# Patient Record
Sex: FEMALE | Race: ASIAN | NOT HISPANIC OR LATINO | Employment: FULL TIME | URBAN - METROPOLITAN AREA
[De-identification: names, ages, dates, MRNs, and addresses within clinical notes are randomized per-mention and may not be internally consistent; named-entity substitution may affect disease eponyms.]

---

## 2017-02-08 DIAGNOSIS — A60.00 RECURRENT GENITAL HERPES: ICD-10-CM

## 2017-02-08 DIAGNOSIS — K21.9 GASTROESOPHAGEAL REFLUX DISEASE WITHOUT ESOPHAGITIS: ICD-10-CM

## 2017-02-08 DIAGNOSIS — F32.1 MAJOR DEPRESSIVE DISORDER, SINGLE EPISODE, MODERATE (H): Primary | ICD-10-CM

## 2017-02-08 NOTE — Clinical Note
[facility]  [address] [place] [address]  [phone]      February 10, 2017      Fredy Jeanstacy Parker  [address] [facility] [address]        Dear Fredy,          Our records indicates that it is time for you to be seen for an office visit with IVY Mejía.    Please call our office at [phone] to schedule an appointment for a Physical .     Please disregard this notice if you have already made an appointment.      Sincerely,    [facility] Staff

## 2017-02-08 NOTE — TELEPHONE ENCOUNTER
FLUoxetine (PROZAC) 20 MG capsule  Last Written Prescription Date: 7-  Last Fill Quantity: 30, # refills: 4  Last Office Visit with [facility] primary care provider:  8-        Last PHQ-9 score on record=   PHQ-9 SCORE 5/23/2016   Total Score -   Total Score 2   Total Score -      valACYclovir (VALTREX) 500 MG tablet  Last Written Prescription Date: 12-3-2015  Last Fill Quantity: 90, # refills: 3  Last Office Visit with [facility], [facility] or [facility] prescribing provider: 8-        CREATININE   Date Value Ref Range Status   05/15/2016 0.58 0.52 - 1.04 mg/dL Final            omeprazole (PRILOSEC) 40 MG capsule  Last Written Prescription Date: 12-3-2015  Last Fill Quantity: 180,  # refills: 3   Last Office Visit with [facility], [facility] or [facility] prescribing provider: 8-

## 2017-02-09 RX ORDER — VALACYCLOVIR HYDROCHLORIDE 500 MG/1
500 TABLET, FILM COATED ORAL DAILY
Qty: 90 TABLET | Refills: 0 | Status: SHIPPED
Start: 2017-02-09 | End: 2017-05-11

## 2017-02-09 RX ORDER — OMEPRAZOLE 40 MG/1
40 CAPSULE, DELAYED RELEASE ORAL 2 TIMES DAILY
Qty: 180 CAPSULE | Refills: 2 | Status: SHIPPED
Start: 2017-02-09 | End: 2017-10-26

## 2017-02-09 NOTE — TELEPHONE ENCOUNTER
PHQ-9 SCORE 12/3/2015 5/23/2016 2/9/2017   Total Score - - -   Total Score 2 2 1   Total Score - - -     patient takes her medications daily and feels so much better on them.        Noelle Reardon RN  [facility]  [phone]

## 2017-02-09 NOTE — TELEPHONE ENCOUNTER
Needs updated phq9  Left message on answering machine for patient to call back.    Valtrex and omeprazole filled.      Noelle Reardon RN  [facility]  [phone]

## 2017-02-09 NOTE — TELEPHONE ENCOUNTER
I sent the Rx to the pharmacy.    Needs PHQ 9 . Overdue for physical exam. Please help her make appt

## 2017-02-10 ASSESSMENT — PATIENT HEALTH QUESTIONNAIRE - PHQ9: SUM OF ALL RESPONSES TO PHQ QUESTIONS 1-9: 1

## 2017-02-10 NOTE — TELEPHONE ENCOUNTER
PHQ obtained today. Routing to team to inform and assist in scheduling.   Sumi Schilling RN   [facility] - Triage

## 2017-02-14 ENCOUNTER — TELEPHONE (OUTPATIENT)
Dept: FAMILY MEDICINE | Facility: CLINIC | Age: 57
End: 2017-02-14

## 2017-02-20 ENCOUNTER — OFFICE VISIT (OUTPATIENT)
Dept: FAMILY MEDICINE | Facility: CLINIC | Age: 57
End: 2017-02-20
Payer: COMMERCIAL

## 2017-02-20 VITALS
RESPIRATION RATE: 16 BRPM | WEIGHT: 134 LBS | DIASTOLIC BLOOD PRESSURE: 84 MMHG | HEART RATE: 71 BPM | SYSTOLIC BLOOD PRESSURE: 136 MMHG | HEIGHT: 60 IN | BODY MASS INDEX: 26.31 KG/M2 | TEMPERATURE: 97.2 F | OXYGEN SATURATION: 97 %

## 2017-02-20 DIAGNOSIS — M54.2 POSTERIOR NECK PAIN: ICD-10-CM

## 2017-02-20 DIAGNOSIS — F32.1 MAJOR DEPRESSIVE DISORDER, SINGLE EPISODE, MODERATE (H): ICD-10-CM

## 2017-02-20 DIAGNOSIS — K21.9 GASTROESOPHAGEAL REFLUX DISEASE WITHOUT ESOPHAGITIS: ICD-10-CM

## 2017-02-20 DIAGNOSIS — Z23 NEED FOR PROPHYLACTIC VACCINATION WITH TETANUS-DIPHTHERIA (TD): ICD-10-CM

## 2017-02-20 DIAGNOSIS — R87.612 PAPANICOLAOU SMEAR OF CERVIX WITH LOW GRADE SQUAMOUS INTRAEPITHELIAL LESION (LGSIL): ICD-10-CM

## 2017-02-20 DIAGNOSIS — M19.90 INFLAMMATORY ARTHRITIS: ICD-10-CM

## 2017-02-20 DIAGNOSIS — R74.01 ELEVATED TRANSAMINASE LEVEL: ICD-10-CM

## 2017-02-20 DIAGNOSIS — A60.00 RECURRENT GENITAL HERPES: ICD-10-CM

## 2017-02-20 DIAGNOSIS — E78.5 HYPERLIPIDEMIA LDL GOAL <160: ICD-10-CM

## 2017-02-20 DIAGNOSIS — Z23 NEED FOR VACCINATION: ICD-10-CM

## 2017-02-20 DIAGNOSIS — Z00.00 ENCOUNTER FOR ROUTINE ADULT HEALTH EXAMINATION WITHOUT ABNORMAL FINDINGS: Primary | ICD-10-CM

## 2017-02-20 DIAGNOSIS — J30.89 PERENNIAL ALLERGIC RHINITIS, UNSPECIFIED ALLERGIC RHINITIS TRIGGER: ICD-10-CM

## 2017-02-20 DIAGNOSIS — M62.830 BACK MUSCLE SPASM: ICD-10-CM

## 2017-02-20 DIAGNOSIS — Z12.4 SCREENING FOR MALIGNANT NEOPLASM OF CERVIX: ICD-10-CM

## 2017-02-20 PROCEDURE — 87624 HPV HI-RISK TYP POOLED RSLT: CPT | Performed by: NURSE PRACTITIONER

## 2017-02-20 PROCEDURE — 88175 CYTOPATH C/V AUTO FLUID REDO: CPT | Performed by: NURSE PRACTITIONER

## 2017-02-20 PROCEDURE — 88141 CYTOPATH C/V INTERPRET: CPT | Performed by: NURSE PRACTITIONER

## 2017-02-20 PROCEDURE — 99396 PREV VISIT EST AGE 40-64: CPT | Mod: 25 | Performed by: NURSE PRACTITIONER

## 2017-02-20 PROCEDURE — 90471 IMMUNIZATION ADMIN: CPT | Performed by: NURSE PRACTITIONER

## 2017-02-20 PROCEDURE — 90715 TDAP VACCINE 7 YRS/> IM: CPT | Performed by: NURSE PRACTITIONER

## 2017-02-20 RX ORDER — METHOCARBAMOL 750 MG/1
750 TABLET, FILM COATED ORAL 3 TIMES DAILY PRN
Qty: 30 TABLET | Refills: 2 | Status: SHIPPED | OUTPATIENT
Start: 2017-02-20 | End: 2017-10-26

## 2017-02-20 RX ORDER — CYCLOBENZAPRINE HCL 5 MG
TABLET ORAL
Qty: 30 TABLET | Refills: 2 | Status: SHIPPED | OUTPATIENT
Start: 2017-02-20 | End: 2017-10-26

## 2017-02-20 NOTE — NURSING NOTE
Chief Complaint   Patient presents with     Physical       Initial /84 (Cuff Size: Adult Regular)  Pulse 71  Temp 97.2  F (36.2  C) (Tympanic)  Resp 16  Ht 5' (1.524 m)  Wt 134 lb (60.8 kg)  LMP 03/24/2009  SpO2 97%  BMI 26.17 kg/m2 Estimated body mass index is 26.17 kg/(m^2) as calculated from the following:    Height as of this encounter: 5' (1.524 m).    Weight as of this encounter: 134 lb (60.8 kg).  Medication Reconciliation: complete   Bushra Grajeda, CMA

## 2017-02-20 NOTE — MR AVS SNAPSHOT
After Visit Summary   2/20/2017    Fredy Parker    MRN: [id number]           Patient Information     Date Of Birth          1960        Visit Information        Provider Department      2/20/2017 5:00 PM Nancy Saldivar APRN [facility]        Today's Diagnoses     Encounter for routine adult health examination without abnormal findings    -  1    Major depressive disorder, single episode, moderate (H)        Papanicolaou smear of cervix with low grade squamous intraepithelial lesion (LGSIL)        Hyperlipidemia LDL goal <160        Back muscle spasm        Posterior neck pain        Inflammatory arthritis        Elevated transaminase level        Recurrent genital herpes        Gastroesophageal reflux disease without esophagitis        Perennial allergic rhinitis, unspecified allergic rhinitis trigger        Need for vaccination        Need for prophylactic vaccination with tetanus-diphtheria (TD)        Screening for malignant neoplasm of cervix          Care Instructions      Preventive Health Recommendations  Female Ages 50 - 64    Yearly exam: See your health care provider every year in order to  o Review health changes.   o Discuss preventive care.    o Review your medicines if your doctor has prescribed any.      Get a Pap test every three years (unless you have an abnormal result and your provider advises testing more often).    If you get Pap tests with HPV test, you only need to test every 5 years, unless you have an abnormal result.     You do not need a Pap test if your uterus was removed (hysterectomy) and you have not had cancer.    You should be tested each year for STDs (sexually transmitted diseases) if you're at risk.     Have a mammogram every 1 to 2 years.    Have a colonoscopy at age 50, or have a yearly FIT test (stool test). These exams screen for colon cancer.      Have a cholesterol test every 5 years, or more often if advised.    Have a diabetes  test (fasting glucose) every three years. If you are at risk for diabetes, you should have this test more often.     If you are at risk for osteoporosis (brittle bone disease), think about having a bone density scan (DEXA).    Shots: Get a flu shot each year. Get a tetanus shot every 10 years.    Nutrition:     Eat at least 5 servings of fruits and vegetables each day.    Eat whole-grain bread, whole-wheat pasta and brown rice instead of white grains and rice.    Talk to your provider about Calcium and Vitamin D.     Lifestyle    Exercise at least 150 minutes a week (30 minutes a day, 5 days a week). This will help you control your weight and prevent disease.    Limit alcohol to one drink per day.    No smoking.     Wear sunscreen to prevent skin cancer.     See your dentist every six months for an exam and cleaning.    See your eye doctor every 1 to 2 years.          Follow-ups after your visit        Your next 10 appointments already scheduled     Feb 28, 2017  2:30 PM CST   MAMMOGRAM with [facility]   [facility] ([facility] [address] [phone]           Do not wear any body powder, lotions, deodorant or perfume the day of the exam. Bring a list of all medications, especially hormones.  If your last mammogram was not done at [facility], please bring your mammogram films. We will need the name of your MD/PA to send a copy of your report.              Future tests that were ordered for you today     Open Future Orders        Priority Expected Expires Ordered    Lipid Profile with reflex to direct LDL Routine 2/27/2017 3/30/2017 2/21/2017    Comprehensive metabolic panel Routine 2/27/2017 3/30/2017 2/21/2017    *MA Screening Digital Bilateral Routine  2/21/2018 2/21/2017            Who to contact     If you have questions or need follow up information about today's clinic visit or your schedule please contact [facility] directly at [phone].  Normal or non-critical lab and imaging results will be communicated to you by MyChart, letter or phone within 4 business days after the clinic has received the results. If you do not hear from us within 7 days, please contact the clinic through [facility] or phone. If you have a critical or abnormal lab result, we will notify you by phone as soon as possible.  Submit refill requests through [employer] or call your pharmacy and they will forward the refill request to us. Please allow 3 business days for your refill to be completed.          Additional Information About Your Visit        [facility] Information     [employer] lets you send messages to your doctor, view your test results, renew your prescriptions, schedule appointments and more. To sign up, go to [email or web address] . Click on \"Log in\" on the left side of the screen, which will take you to the Welcome page. Then click on \"Sign up Now\" on the right side of the page.     You will be asked to enter the access code listed below, as well as some personal information. Please follow the directions to create your username and password.     Your access code is: [email or web address]  Expires: 2017 12:05 PM     Your access code will  in 90 days. If you need help or a new code, please call your [facility] clinic or [phone].        Care EveryWhere ID     This is your Care EveryWhere ID. This could be used by other organizations to access your [facility] medical records  [id number]        Your Vitals Were     Pulse Temperature Respirations Height Last Period Pulse Oximetry    71 97.2  F (36.2  C) (Tympanic) 16 5' (1.524 m) 2009 97%    BMI (Body Mass Index)                   26.17 kg/m2            Blood Pressure from Last 3 Encounters:   17 136/84   16 110/72   08/10/16 120/80    Weight from Last 3 Encounters:   17 134 lb (60.8 kg)   16 132 lb 9.6 oz (60.1 kg)   08/10/16 134 lb (60.8 kg)              We " Performed the Following     1st  Administration  [17659]     DEPRESSION ACTION PLAN (DAP) Order [[id number]]     HPV High Risk Types DNA Cervical     Pap imaged thin layer diagnostic with HPV (select HPV order below)     TDAP (ADACEL AGES 11-64) [12547.002]          Today's Medication Changes          These changes are accurate as of: 2/20/17 11:59 PM.  If you have any questions, ask your nurse or doctor.               These medicines have changed or have updated prescriptions.        Dose/Directions    methocarbamol 750 MG tablet   Commonly known as:  ROBAXIN   This may have changed:  Another medication with the same name was removed. Continue taking this medication, and follow the directions you see here.   Used for:  Back muscle spasm   Changed by:  Nancy Saldivar APRN CNP        Dose:  750 mg   Take 1 tablet (750 mg) by mouth 3 times daily as needed   Quantity:  30 tablet   Refills:  2         Stop taking these medicines if you haven't already. Please contact your care team if you have questions.     TYLENOL PO   Stopped by:  Nancy Saldivar APRN CNP                Where to get your medicines      These medications were sent to [facility]/pharmacy #[id number] - [place], [place] - [address] [place] [facility] [place] [address]     Phone:  [phone]     cyclobenzaprine 5 MG tablet    methocarbamol 750 MG tablet                Primary Care Provider Office Phone # Fax #    MARIA DEL CARMEN Mejía [phone]       [address]  [place] [address]        Thank you!     Thank you for choosing [facility]  for your care. Our goal is always to provide you with excellent care. Hearing back from our patients is one way we can continue to improve our services. Please take a few minutes to complete the written survey that you may receive in the mail after your visit with us. Thank you!             Your Updated Medication List - Protect others around you: Learn how  to safely use, store and throw away your medicines at www.disposemymeds.org.          This list is accurate as of: 2/20/17 11:59 PM.  Always use your most recent med list.                   Brand Name Dispense Instructions for use    CALTRATE 600+D PLUS 600-400 MG-UNIT Tabs     3 MONTHS    1 TABLET TWICE DAILY       CENTRUM Tabs     30    1 TABLET DAILY       cetirizine 10 MG tablet    ZYRTEC    90 tablet    Take 1 tablet (10 mg) by mouth daily       cyclobenzaprine 5 MG tablet    FLEXERIL    30 tablet    TAKE 1 TABLET BY MOUTH NIGHTLY AS NEEDED FOR MUSCLE SPASMS       FLUoxetine 20 MG capsule    PROzac    90 capsule    Take 1 capsule (20 mg) by mouth daily       methocarbamol 750 MG tablet    ROBAXIN    30 tablet    Take 1 tablet (750 mg) by mouth 3 times daily as needed       omeprazole 40 MG capsule    priLOSEC    180 capsule    Take 1 capsule (40 mg) by mouth 2 times daily Take 30-60 minutes before a meal.       valACYclovir 500 MG tablet    VALTREX    90 tablet    Take 1 tablet (500 mg) by mouth daily       vitamin D 1000 UNITS capsule     180 Cap    2 CAPSULES DAILY

## 2017-02-20 NOTE — LETTER
[facility]  [address] [place] [address]  [phone]      March 14, 2017    Fredy Parker  [address] [facility] [address]    Dear Fredy,    We are contacting you in writing because we have been unable to reach you by phone.      We have recently received your PAP smear results and they were not normal.  Your results came back as low grade squamous intraepithelial lesion (LGSIL) and was positive for a high risk type of HPV (Human Papilloma Virus).  Although this is not cancer, it is clinically very significant, and can possibly develop into cancer in the future.      Because of this, we need to do further testing.  This testing would include sending you to see a doctor who is able to do a more specific test called a colposcopy.  This test allows a closer look at the abnormal cells of the cervix.  During this test, a biopsy of these cells may be taken for further lab testing.  The exam and tests will help determine the reason for your abnormal PAP smear.    Please call [facility] at [phone] to schedule this procedure.   If you have additional questions regarding this result, please call Melissa KELLY at [phone].      Sincerely,    MARIA DEL CARMEN Mejía CNP/Lynette Nissen RN

## 2017-02-20 NOTE — PROGRESS NOTES
SUBJECTIVE:     CC: Fredy Parker is an 56 year old woman who presents for preventive health visit.     Healthy Habits:    Do you get at least three servings of calcium containing foods daily (dairy, green leafy vegetables, etc.)? no, taking calcium and/or vitamin D supplement: yes     Amount of exercise or daily activities, outside of work: 2 day(s) per week    Problems taking medications regularly No    Medication side effects: No    Have you had an eye exam in the past two years? yes    Do you see a dentist twice per year? no    Do you have sleep apnea, excessive snoring or daytime drowsiness?no            Today's PHQ-2 Score:   PHQ-2 ( 1999 [employer]) 6/14/2016 6/6/2016   Q1: Little interest or pleasure in doing things 0 0   Q2: Feeling down, depressed or hopeless 0 0   PHQ-2 Score 0 0       Abuse: Current or Past(Physical, Sexual or Emotional)- No  Do you feel safe in your environment - Yes    Social History   Substance Use Topics     Smoking status: Never Smoker     Smokeless tobacco: Never Used     Alcohol use No     The patient does not drink >3 drinks per day nor >7 drinks per week.    Recent Labs   Lab Test  12/02/15   0934  10/24/14   0705  02/12/13   1609   CHOL  221*  231*  198   HDL  73  66  79   LDL  111*  136*  95   TRIG  185*  144  117   CHOLHDLRATIO   --   3.5  2.5   NHDL  148*   --    --        Reviewed orders with patient.  Reviewed health maintenance and updated orders accordingly - Yes    Mammo Decision Support:  Patient over age 50, mutual decision to screen reflected in health maintenance.  Pertinent mammograms are reviewed under the imaging tab    History of abnormal Pap smear: YES - other categories - see link Cervical Cytology Screening Guidelines    All Histories reviewed and updated in Epic.  Past Medical History   Diagnosis Date     Acne      Allergic rhinitis      grasses, birch, dust mite, cat, dog - previous immunotherapy     ASCUS with positive high risk HPV 09/2014      colposcopy benign     Atrophic vaginitis      with dyspareunia     Chronic gastric ulcer without mention of hemorrhage, perforation, without mention of obstruction      gastric erosion dx'ed by endoscopy. pt was also treated for pos H.pylori     Disc disorder of cervical region 2006     mild disc changes C3-4/C4-5 and C5-6 with chronic myofascial cervicoscapular pain     Elevated glucose 2012     one fasting glc of 136, A1C 6.3     Elevated transaminase level      AST/ALT 2-3 x normal; fatty infiltration liver on ultrasound; negative testing for infectious hepatitis; normal iron levels, ELP, CK     Inflammatory arthritis      likely a spondyloarthropathy, IP joints and Angel's tendons     Latent tuberculosis 10/2009     started INH      LSIL (low grade squamous intraepithelial lesion) on Pap smear 3/2009, 3/2010, 2010     HPV 56 (high risk) detected      Lumbar disc herniation with radiculopathy      left extruded L5-S1 disc herniation, treated with diskectomy 2007     Major depressive disorder, single episode, moderate (H)      Mixed hyperlipidemia 2009     elevated TG and LDL     Osteopenia 2009     mild, femoral neck     Papanicolaou smear of cervix with low grade squamous intraepithelial lesion (LGSIL) 12/02/15     +HR HPV. Plan for colp     Prolonged QT interval 2016     ? Prozac, resolved with decreased dose                                            Recurrent genital herpes      Status post laminectomy 2007: L5-S1 diskectomy :Revision diskectomy left at L5-S1     Status post LEEP (loop electrosurgical excision procedure) of cervix 2011     for persistent LSIL     Supervision of other normal pregnancy       - vaginal     Trigger finger, right 2012     right ring finger - surgically treated 2012     Vestibular neuronitis of left ear 2016      Past Surgical History   Procedure Laterality Date     Tubal ligation        Laparoscopic cholecystectomy  7/99     Hc mri cervical spine w/o contrast  3/2006     C3-4 small central disc /C4-5 broad based disk/C5-6 central to rt disc all with mild to mod central stenosis     Hc duplex extremity venous, limited unilateral  4/2006     left lower extremity - normal     Hc inj transforamin epidural, cerv/thor single  5/2006     C7-T1 epidural steroid     Injection, anesthetic/steroid, transforaminal epidural; lumbar/sacral, single level  8,9/2007     left L5 selective nerve root injection under fluoroscopy with corticosteroid.      Injection, anesthetic/steroid, transforaminal epidural; lumbar/sacral, single level  10/2007     right L5-S1 translaminar epidural injection     Diskectomy, lumbar, single sp  12/2007     L5-S1 diskectomy     C dexa interpretation, axial  3/2009     T score lumbar -0.2, femoral neck -1.4/-1.3     Hc colp cervix/upper vagina w bx cervix/endocerv curett  3/2009     ECC bx with chronic inflammation and squamous atypia, favor reactive     Hc ct head wo contrast  4/2009     normal - done for acute vertigo     Hc colp cervix/upper vagina w endocerv curett  3/2009     negative, reactive process with no dysplasia, repeat pap 6 months     Colonoscopy  7/2009     normal, repeat 7 years     Hc colp cervix/upper vagina w bx cervix/endocerv curett  4/2010     acute inflammation, no dysplasia.  ECC negative - repeat pap 6 months     Conization leep  2/2011     chronic endocervicitis     Dexa  3/2011     T score lumbar -0.7, femoral neck -1.7/-1.9 with BMD 0.769     Mr lumbar spine w/o contrast  1/2012     Moderate-sized left posterolateral caudally extruded L5-S1disc herniation impinging on left S1 nerve, mild degenerative disc changes at L4-5     Xr lumbar transforaminal inj single  1/2012     Left L5-S1 transforaminal epidural steroid/anesthetic along left L5 nerve root      Laminectomy lumbar one level  3/6/2012     Procedure:LAMINECTOMY LUMBAR ONE LEVEL; REVISION DISCECTOMY  L5-S1 ON THE LEFT ; Surgeon:ROSE PRITCHETT; Location: OR     Release trigger finger  5/17/2012     Procedure:RELEASE TRIGGER FINGER; right ring finger trigger release  (latex allergy:contact); Surgeon:QUYEN FUNK; Location: [facility]     Sono abdomen limited  9/2012     Increased echotexture liver c/w fatty infiltration       Social Hx: . Has 2 children. Works in assembly.       ROS:  C: NEGATIVE for fever, chills, change in weight  I: NEGATIVE for worrisome rashes, moles or lesions  E: NEGATIVE for vision changes or irritation  ENT: NEGATIVE for ear, mouth and throat problems. Zyrtec for allergies   R: NEGATIVE for significant cough or SOB  B: NEGATIVE for masses, tenderness or discharge.Plans mammogram at the clinic mobile truck.   CV: NEGATIVE for chest pain, palpitations or peripheral edema  GI: NEGATIVE for nausea, abdominal pain, heartburn, or change in bowel habits. Omeprazole for GERD  : NEGATIVE for unusual urinary or vaginal symptoms. No vaginal bleeding. Pap 12/2015 was LSIL & positive HPV (not 16 or 18) . H/O LEEP.  Valtrex daily to prevent herpes outbreaks.   M: NEGATIVE for significant arthralgias or myalgia. Chronic neck & back  pain. Takes Flexeril or Robaxin depending on level of discomfort.   N: NEGATIVE for weakness, dizziness or paresthesias  P: NEGATIVE for changes in mood or affect. Prozac for depression. PHQ 9 = 1       OBJECTIVE:     /84 (Cuff Size: Adult Regular)  Pulse 71  Temp 97.2  F (36.2  C) (Tympanic)  Resp 16  Ht 5' (1.524 m)  Wt 134 lb (60.8 kg)  LMP 03/24/2009  SpO2 97%  BMI 26.17 kg/m2  EXAM:  GENERAL APPEARANCE: healthy, alert and no distress  EYES: Eyes grossly normal to inspection, PERRL and conjunctivae and sclerae normal  HENT: ear canals and TM's normal, nose and mouth without ulcers or lesions, oropharynx clear and oral mucous membranes moist  NECK: no adenopathy, no asymmetry, masses, or scars and thyroid normal to palpation  RESP: lungs clear to  auscultation - no rales, rhonchi or wheezes  BREAST: normal without masses, tenderness or nipple discharge and no palpable axillary masses or adenopathy  CV: regular rate and rhythm, normal S1 S2, no S3 or S4, no murmur, click or rub, no peripheral edema and peripheral pulses strong  ABDOMEN: soft, nontender, no hepatosplenomegaly, no masses and bowel sounds normal   (female): normal female external genitalia, normal urethral meatus, vaginal mucosal atrophy noted, normal cervix (appearance of LEEP), adnexae, and uterus without masses or abnormal discharge  MS: no musculoskeletal defects are noted and gait is age appropriate without ataxia  SKIN: no suspicious lesions or rashes  NEURO: Normal strength and tone, sensory exam grossly normal, mentation intact and speech normal  PSYCH: mentation appears normal and affect normal/bright    ASSESSMENT/PLAN:     Freyd was seen today for physical.    Diagnoses and all orders for this visit:    Encounter for routine adult health examination without abnormal findings  -     *MA Screening Digital Bilateral; Future  -     Comprehensive metabolic panel; Future    Major depressive disorder, single episode, moderate (H)  -     DEPRESSION ACTION PLAN (DAP) Order [[id number]]    Papanicolaou smear of cervix with low grade squamous intraepithelial lesion (LGSIL)    Hyperlipidemia LDL goal <160  -     Lipid Profile with reflex to direct LDL; Future    Back muscle spasm  -     methocarbamol (ROBAXIN) 750 MG tablet; Take 1 tablet (750 mg) by mouth 3 times daily as needed    Posterior neck pain  -     cyclobenzaprine (FLEXERIL) 5 MG tablet; TAKE 1 TABLET BY MOUTH NIGHTLY AS NEEDED FOR MUSCLE SPASMS    Inflammatory arthritis    Elevated transaminase level  -     Comprehensive metabolic panel; Future    Recurrent genital herpes    Gastroesophageal reflux disease without esophagitis    Perennial allergic rhinitis, unspecified allergic rhinitis trigger    Need for vaccination  -     TDAP  (ADACEL AGES 11-64) [31770.002]  -     1st  Administration  [37266]    Need for prophylactic vaccination with tetanus-diphtheria (TD)    Screening for malignant neoplasm of cervix  -     Pap imaged thin layer diagnostic with HPV (select HPV order below)  -     HPV High Risk Types DNA Cervical    Other orders  -     Cancel: Pap imaged thin layer screen with HPV - recommended age 30 - 65 years (select HPV order below)  -     Cancel: HPV High Risk Types DNA Cervical        COUNSELING:   Reviewed preventive health counseling, as reflected in patient instructions  Special attention given to:        Regular exercise       Healthy diet/nutrition       Vision screening       Osteoporosis Prevention/Bone Health       Dental care     BP Screening:   Last 3 BP Readings:    BP Readings from Last 3 Encounters:   02/20/17 136/84   09/12/16 110/72   08/10/16 120/80       The following was recommended to the patient:  Re-screen BP within a year and recommended lifestyle modifications     reports that she has never smoked. She has never used smokeless tobacco.    Estimated body mass index is 25.9 kg/(m^2) as calculated from the following:    Height as of 6/14/16: 5' (1.524 m).    Weight as of 9/12/16: 132 lb 9.6 oz (60.1 kg).       Counseling Resources:  ATP IV Guidelines  Pooled Cohorts Equation Calculator  Breast Cancer Risk Calculator  FRAX Risk Assessment  ICSI Preventive Guidelines  Dietary Guidelines for Americans, 2010  USDA's MyPlate  ASA Prophylaxis  Lung CA Screening    MARIA DEL CARMEN Mejía [facility]

## 2017-02-24 ENCOUNTER — TELEPHONE (OUTPATIENT)
Dept: FAMILY MEDICINE | Facility: CLINIC | Age: 57
End: 2017-02-24

## 2017-02-24 LAB
COPATH REPORT: ABNORMAL
PAP: ABNORMAL

## 2017-02-24 NOTE — TELEPHONE ENCOUNTER
"Called Pt per Nancy to remind her to schedule fasting labs and a mammogram at the \"truck\". Bushra Grajeda, [facility]    "

## 2017-02-27 LAB
FINAL DIAGNOSIS: ABNORMAL
HPV HR 12 DNA CVX QL NAA+PROBE: POSITIVE
HPV16 DNA SPEC QL NAA+PROBE: NEGATIVE
HPV18 DNA SPEC QL NAA+PROBE: NEGATIVE
SPECIMEN DESCRIPTION: ABNORMAL

## 2017-03-01 ENCOUNTER — TELEPHONE (OUTPATIENT)
Dept: FAMILY MEDICINE | Facility: CLINIC | Age: 57
End: 2017-03-01

## 2017-03-01 NOTE — TELEPHONE ENCOUNTER
Nancy    Please review and advise     Current diagnostic LSIL/+ HR HPV (not 16 or 18) in 56 yr old . Extensive pap hx below including LEEP for non resolving LSIL in 2011.  Please recommend follow up for this patient.  Repeat colposcopy?    2/27/07 ASCUS/ Neg HPV  6/8/07 ASCUS/Neg HPV  3/20/09 LSIL  5/2009 COLP- Negative  3/26/10 LSIL/+ HR HPV  12/7/10 LSIL/Neg HPV  2/2011 LEEP  2011, 2012, 2013 NIL paps  09/29/14 ASCUS, +HR HPV.   10/21/14 Colposcopy = Cervical Bx and ECC= Benign. Repeat co-test 1 yr  12/02/15 LSIL/ +HR HPV. Plan: colposcopy  12/15/15 Colposcopy= Negative. 1 yr pap  Current Dx pap results    Thank you  Lynette Nissen RN

## 2017-03-02 NOTE — TELEPHONE ENCOUNTER
I would suggest referring patient to OB/GYN.    Erna Patrick MD  [facility], [place]

## 2017-03-02 NOTE — TELEPHONE ENCOUNTER
Left message for patient to call back to this writer at [phone].  It looks like she has had prior abnormal pap treatment with [facility]av[facility] Martinez.  Could be referred back to them for colposcopy.    Lynette Nissen RN

## 2017-03-07 NOTE — TELEPHONE ENCOUNTER
Left 2nd message for patient to call back to this writer for results and recommendations.  Lynette Nissen RN

## 2017-04-10 ENCOUNTER — TELEPHONE (OUTPATIENT)
Dept: FAMILY MEDICINE | Facility: CLINIC | Age: 57
End: 2017-04-10

## 2017-04-17 ENCOUNTER — OFFICE VISIT (OUTPATIENT)
Dept: FAMILY MEDICINE | Facility: CLINIC | Age: 57
End: 2017-04-17
Payer: COMMERCIAL

## 2017-04-17 VITALS
BODY MASS INDEX: 25.6 KG/M2 | HEIGHT: 60 IN | TEMPERATURE: 97.2 F | DIASTOLIC BLOOD PRESSURE: 78 MMHG | WEIGHT: 130.4 LBS | HEART RATE: 68 BPM | OXYGEN SATURATION: 99 % | SYSTOLIC BLOOD PRESSURE: 122 MMHG

## 2017-04-17 DIAGNOSIS — R87.612 PAPANICOLAOU SMEAR OF CERVIX WITH LOW GRADE SQUAMOUS INTRAEPITHELIAL LESION (LGSIL): Primary | ICD-10-CM

## 2017-04-17 PROCEDURE — 88305 TISSUE EXAM BY PATHOLOGIST: CPT | Performed by: FAMILY MEDICINE

## 2017-04-17 PROCEDURE — 57454 BX/CURETT OF CERVIX W/SCOPE: CPT | Performed by: FAMILY MEDICINE

## 2017-04-17 NOTE — MR AVS SNAPSHOT
After Visit Summary   4/17/2017    Fredy Parker    MRN: [id number]           Patient Information     Date Of Birth          1960        Visit Information        Provider Department      4/17/2017 1:40 PM Erna Patrick MD [facility] [place]        Today's Diagnoses     Papanicolaou smear of cervix with low grade squamous intraepithelial lesion (LGSIL)    -  1       Follow-ups after your visit        Additional Services     OB/GYN REFERRAL       Your provider has referred you to:  [facility] [phone]  [email or web address]    Please be aware that coverage of these services is subject to the terms and limitations of your health insurance plan.  Call member services at your health plan with any benefit or coverage questions.      Please bring the following with you to your appointment:    (1) Any X-Rays, CTs or MRIs which have been performed.  Contact the facility where they were done to arrange for  prior to your scheduled appointment.   (2) List of current medications   (3) This referral request   (4) Any documents/labs given to you for this referral                  Who to contact     If you have questions or need follow up information about today's clinic visit or your schedule please contact [facility] directly at [phone].  Normal or non-critical lab and imaging results will be communicated to you by MyChart, letter or phone within 4 business days after the clinic has received the results. If you do not hear from us within 7 days, please contact the clinic through MyChart or phone. If you have a critical or abnormal lab result, we will notify you by phone as soon as possible.  Submit refill requests through [email or web address] or call your pharmacy and they will forward the refill request to us. Please allow 3 business days for your refill to be completed.          Additional Information About  "Your Visit        MyChart Information     [employer] lets you send messages to your doctor, view your test results, renew your prescriptions, schedule appointments and more. To sign up, go to [email or web address] . Click on \"Log in\" on the left side of the screen, which will take you to the Welcome page. Then click on \"Sign up Now\" on the right side of the page.     You will be asked to enter the access code listed below, as well as some personal information. Please follow the directions to create your username and password.     Your access code is: [email or web address]  Expires: 2017  1:05 PM     Your access code will  in 90 days. If you need help or a new code, please call your [place] clinic or [phone].        Care EveryWhere ID     This is your Care EveryWhere ID. This could be used by other organizations to access your [facility] medical records  [id number]        Your Vitals Were     Pulse Temperature Height Last Period Pulse Oximetry BMI (Body Mass Index)    68 97.2  F (36.2  C) (Tympanic) 5' (1.524 m) 2009 99% 25.47 kg/m2       Blood Pressure from Last 3 Encounters:   17 122/78   17 136/84   16 110/72    Weight from Last 3 Encounters:   17 130 lb 6.4 oz (59.1 kg)   17 134 lb (60.8 kg)   16 132 lb 9.6 oz (60.1 kg)              We Performed the Following     COLP CERVIX/UPPER VAGINA W BX CERVIX/ENDOCERV CURETT     OB/GYN REFERRAL     Surgical pathology exam        Primary Care Provider Office Phone # Fax #    MARIA DEL CARMEN Mejía [phone]       [address]  [place] [address]        Thank you!     Thank you for choosing [facility] [place]  for your care. Our goal is always to provide you with excellent care. Hearing back from our patients is one way we can continue to improve our services. Please take a few minutes to complete the written survey that you may receive in the mail after your visit with " us. Thank you!             Your Updated Medication List - Protect others around you: Learn how to safely use, store and throw away your medicines at www.disposemymeds.org.          This list is accurate as of: 4/17/17 11:59 PM.  Always use your most recent med list.                   Brand Name Dispense Instructions for use    CALTRATE 600+D PLUS 600-400 MG-UNIT Tabs     3 MONTHS    1 TABLET TWICE DAILY       CENTRUM Tabs     30    1 TABLET DAILY       cetirizine 10 MG tablet    ZYRTEC    90 tablet    Take 1 tablet (10 mg) by mouth daily       cyclobenzaprine 5 MG tablet    FLEXERIL    30 tablet    TAKE 1 TABLET BY MOUTH NIGHTLY AS NEEDED FOR MUSCLE SPASMS       FLUoxetine 20 MG capsule    PROzac    90 capsule    Take 1 capsule (20 mg) by mouth daily       methocarbamol 750 MG tablet    ROBAXIN    30 tablet    Take 1 tablet (750 mg) by mouth 3 times daily as needed       omeprazole 40 MG capsule    priLOSEC    180 capsule    Take 1 capsule (40 mg) by mouth 2 times daily Take 30-60 minutes before a meal.       valACYclovir 500 MG tablet    VALTREX    90 tablet    Take 1 tablet (500 mg) by mouth daily       vitamin D 1000 UNITS capsule     180 Cap    2 CAPSULES DAILY

## 2017-04-17 NOTE — NURSING NOTE
Chief Complaint   Patient presents with     Colposcopy       Initial /78 (BP Location: Right arm, Cuff Size: Adult Regular)  Pulse 68  Temp 97.2  F (36.2  C) (Tympanic)  Ht 5' (1.524 m)  Wt 130 lb 6.4 oz (59.1 kg)  LMP 03/24/2009  SpO2 99%  BMI 25.47 kg/m2 Estimated body mass index is 25.47 kg/(m^2) as calculated from the following:    Height as of this encounter: 5' (1.524 m).    Weight as of this encounter: 130 lb 6.4 oz (59.1 kg).  Medication Reconciliation: complete

## 2017-04-19 ENCOUNTER — RADIANT APPOINTMENT (OUTPATIENT)
Dept: MAMMOGRAPHY | Facility: CLINIC | Age: 57
End: 2017-04-19
Attending: NURSE PRACTITIONER
Payer: COMMERCIAL

## 2017-04-19 DIAGNOSIS — Z00.00 ENCOUNTER FOR ROUTINE ADULT HEALTH EXAMINATION WITHOUT ABNORMAL FINDINGS: ICD-10-CM

## 2017-04-19 LAB — COPATH REPORT: NORMAL

## 2017-04-19 PROCEDURE — G0202 SCR MAMMO BI INCL CAD: HCPCS | Mod: TC

## 2017-04-19 NOTE — PROGRESS NOTES
Please call the patient to notify patient that the colposcopy shows low grade changes in the cells from the cervical biopsies. As the abnormality has persisted over the last 2 years, I would like her to see OB/GYN to review this and suggested management. Referral to OB/GYN placed.    Erna Patrick MD

## 2017-04-20 NOTE — TELEPHONE ENCOUNTER
Patient had colposcopy completed on 4/17/17.    Episode, history, problem list and HM updated.  Results follow up tracking continued.    Lynette Nissen RN

## 2017-04-20 NOTE — PROGRESS NOTES
4/17/2017    Colposcopy Procedure Note  LMP: Patient's last menstrual period was 03/24/2009.    Lab Results   Component Value Date    PAP LSIL 02/20/2017       Previous pap showed LGSIL.   Previous Colpo : YES - benign  Previous cervical treatment: no treatment.  Bleeding disorder:NO    Social History   Substance Use Topics     Smoking status: Never Smoker     Smokeless tobacco: Never Used     Alcohol use No     Past Medical History:   Diagnosis Date     Acne      Allergic rhinitis     grasses, birch, dust mite, cat, dog - previous immunotherapy     ASCUS with positive high risk HPV 09/2014    colposcopy benign     Atrophic vaginitis 2012    with dyspareunia     Chronic gastric ulcer without mention of hemorrhage, perforation, without mention of obstruction 1994    gastric erosion dx'ed by endoscopy. pt was also treated for pos H.pylori     Disc disorder of cervical region 2006    mild disc changes C3-4/C4-5 and C5-6 with chronic myofascial cervicoscapular pain     Elevated glucose 2012    one fasting glc of 136, A1C 6.3     Elevated transaminase level 2012    AST/ALT 2-3 x normal; fatty infiltration liver on ultrasound; negative testing for infectious hepatitis; normal iron levels, ELP, CK     Inflammatory arthritis 2009    likely a spondyloarthropathy, IP joints and Angel's tendons     Latent tuberculosis 10/2009    started INH      LSIL (low grade squamous intraepithelial lesion) on Pap smear 3/2009, 3/2010, 12/2010    HPV 56 (high risk) detected 2010     Lumbar disc herniation with radiculopathy 2007    left extruded L5-S1 disc herniation, treated with diskectomy 12/2007     Major depressive disorder, single episode, moderate (H) 2006     Mixed hyperlipidemia 2009    elevated TG and LDL     Osteopenia 2009    mild, femoral neck     Papanicolaou smear of cervix with low grade squamous intraepithelial lesion (LGSIL) 12/02/15 & 2017    +HR HPV. Piffard= CORBIN 1 in 2017.     Prolonged QT interval 5/2016    ?  Prozac, resolved with decreased dose                                            Recurrent genital herpes      Status post laminectomy 2007: L5-S1 diskectomy :Revision diskectomy left at L5-S1     Status post LEEP (loop electrosurgical excision procedure) of cervix 2011    for persistent LSIL     Supervision of other normal pregnancy      - vaginal     Trigger finger, right     right ring finger - surgically treated 2012     Vestibular neuronitis of left ear 2016     Past Surgical History:   Procedure Laterality Date     C DEXA INTERPRETATION, AXIAL  3/2009    T score lumbar -0.2, femoral neck -1.4/-1.3     COLONOSCOPY  2009    normal, repeat 10 years     CONIZATION LEEP  2011    chronic endocervicitis     DEXA  3/2011    T score lumbar -0.7, femoral neck -1.7/-1.9 with BMD 0.769     DISKECTOMY, LUMBAR, SINGLE SP  2007    L5-S1 diskectomy     HC COLP CERVIX/UPPER VAGINA W BX CERVIX/ENDOCERV CURETT  3/2009    ECC bx with chronic inflammation and squamous atypia, favor reactive     HC COLP CERVIX/UPPER VAGINA W BX CERVIX/ENDOCERV CURETT  2010    acute inflammation, no dysplasia.  ECC negative - repeat pap 6 months     HC COLP CERVIX/UPPER VAGINA W ENDOCERV CURETT  3/2009    negative, reactive process with no dysplasia, repeat pap 6 months     HC CT HEAD WO CONTRAST  2009    normal - done for acute vertigo     HC DUPLEX EXTREMITY VENOUS, LIMITED UNILATERAL  2006    left lower extremity - normal     HC INJ TRANSFORAMIN EPIDURAL, CERV/THOR SINGLE  2006    C7-T1 epidural steroid     HC MRI CERVICAL SPINE W/O CONTRAST  3/2006    C3-4 small central disc /C4-5 broad based disk/C5-6 central to rt disc all with mild to mod central stenosis     INJECTION, ANESTHETIC/STEROID, TRANSFORAMINAL EPIDURAL; LUMBAR/SACRAL, SINGLE LEVEL  ,2007    left L5 selective nerve root injection under fluoroscopy with corticosteroid.      INJECTION, ANESTHETIC/STEROID, TRANSFORAMINAL  EPIDURAL; LUMBAR/SACRAL, SINGLE LEVEL  10/2007    right L5-S1 translaminar epidural injection     LAMINECTOMY LUMBAR ONE LEVEL  3/6/2012    Procedure:LAMINECTOMY LUMBAR ONE LEVEL; REVISION DISCECTOMY L5-S1 ON THE LEFT ; Surgeon:ROSE PRITCHETT; Location: OR     LAPAROSCOPIC CHOLECYSTECTOMY  7/99     MR LUMBAR SPINE W/O CONTRAST  1/2012    Moderate-sized left posterolateral caudally extruded L5-S1disc herniation impinging on left S1 nerve, mild degenerative disc changes at L4-5     RELEASE TRIGGER FINGER  5/17/2012    Procedure:RELEASE TRIGGER FINGER; right ring finger trigger release  (latex allergy:contact); Surgeon:QUYEN FUNK; Location: [facility]     SONO ABDOMEN LIMITED  9/2012    Increased echotexture liver c/w fatty infiltration     TUBAL LIGATION  2000     XR LUMBAR TRANSFORAMINAL INJ SINGLE  1/2012    Left L5-S1 transforaminal epidural steroid/anesthetic along left L5 nerve root            Current Outpatient Prescriptions:      methocarbamol (ROBAXIN) 750 MG tablet, Take 1 tablet (750 mg) by mouth 3 times daily as needed, Disp: 30 tablet, Rfl: 2     cyclobenzaprine (FLEXERIL) 5 MG tablet, TAKE 1 TABLET BY MOUTH NIGHTLY AS NEEDED FOR MUSCLE SPASMS, Disp: 30 tablet, Rfl: 2     FLUoxetine (PROZAC) 20 MG capsule, Take 1 capsule (20 mg) by mouth daily, Disp: 90 capsule, Rfl: 0     valACYclovir (VALTREX) 500 MG tablet, Take 1 tablet (500 mg) by mouth daily, Disp: 90 tablet, Rfl: 0     omeprazole (PRILOSEC) 40 MG capsule, Take 1 capsule (40 mg) by mouth 2 times daily Take 30-60 minutes before a meal., Disp: 180 capsule, Rfl: 2     cetirizine (ZYRTEC) 10 MG tablet, Take 1 tablet (10 mg) by mouth daily, Disp: 90 tablet, Rfl: 3     VITAMIN D 1000 UNIT OR CAPS, 2 CAPSULES DAILY, Disp: 180 Cap, Rfl: PRN     CALTRATE 600+D PLUS 600-400 MG-UNIT OR TABS, 1 TABLET TWICE DAILY , Disp: 3 MONTHS, Rfl: PRN     CENTRUM OR TABS, 1 TABLET DAILY, Disp: 30, Rfl: 0        Allergies   Allergen Reactions     Aspirin Rash     Latex       Nsaids GI Disturbance     Sulfasalazine Rash         Procedure Details   The risks and benefits of the procedure and Written Consent Received.  Speculum placed in vagina and excellent visualization of cervix achieved, cervix swabbed with acetic acid solution.  Findings:  Cervix: abnormal vessels noted at 5 o'clock; endocervical curettage performed, cervical biopsies taken at 5 o'clock and specimen labelled and sent to pathology.  Specimens:   -cervical biopsy  -ECC  Complications:none    1. Papanicolaou smear of cervix with low grade squamous intraepithelial lesion (LGSIL)    - Surgical pathology exam  - OB/GYN REFERRAL  - COLP CERVIX/UPPER VAGINA W BX CERVIX/ENDOCERV CURETT    Plan:  Specimens labelled and sent to Pathology.  Will base further treatment on Pathology findings.  Post biopsy instructions given to patient.        Erna Patrick  4/17/2017

## 2017-05-01 ENCOUNTER — OFFICE VISIT (OUTPATIENT)
Dept: OBGYN | Facility: CLINIC | Age: 57
End: 2017-05-01
Payer: COMMERCIAL

## 2017-05-01 VITALS
DIASTOLIC BLOOD PRESSURE: 72 MMHG | HEIGHT: 60 IN | SYSTOLIC BLOOD PRESSURE: 124 MMHG | WEIGHT: 131 LBS | BODY MASS INDEX: 25.72 KG/M2

## 2017-05-01 DIAGNOSIS — R87.612 LGSIL OF CERVIX OF UNDETERMINED SIGNIFICANCE: Primary | ICD-10-CM

## 2017-05-01 DIAGNOSIS — B97.7 HPV IN FEMALE: ICD-10-CM

## 2017-05-01 PROCEDURE — 99203 OFFICE O/P NEW LOW 30 MIN: CPT | Performed by: OBSTETRICS & GYNECOLOGY

## 2017-05-01 ASSESSMENT — PATIENT HEALTH QUESTIONNAIRE - PHQ9: 5. POOR APPETITE OR OVEREATING: NOT AT ALL

## 2017-05-01 ASSESSMENT — ANXIETY QUESTIONNAIRES
3. WORRYING TOO MUCH ABOUT DIFFERENT THINGS: NOT AT ALL
1. FEELING NERVOUS, ANXIOUS, OR ON EDGE: NOT AT ALL
5. BEING SO RESTLESS THAT IT IS HARD TO SIT STILL: NOT AT ALL
2. NOT BEING ABLE TO STOP OR CONTROL WORRYING: NOT AT ALL
IF YOU CHECKED OFF ANY PROBLEMS ON THIS QUESTIONNAIRE, HOW DIFFICULT HAVE THESE PROBLEMS MADE IT FOR YOU TO DO YOUR WORK, TAKE CARE OF THINGS AT HOME, OR GET ALONG WITH OTHER PEOPLE: NOT DIFFICULT AT ALL
7. FEELING AFRAID AS IF SOMETHING AWFUL MIGHT HAPPEN: NOT AT ALL
GAD7 TOTAL SCORE: 0
6. BECOMING EASILY ANNOYED OR IRRITABLE: NOT AT ALL

## 2017-05-01 NOTE — PROGRESS NOTES
SUBJECTIVE:                                                   Fredy Parker is a 56 year old female who presents to clinic today for the following health issue(s):  Patient presents with:  Consult: Discuss colposcopy results        HPI:  Patient referred by Dr Patrick due to concerns regarding persistent hpv and now LGSIL on pap  Patient has had colposcopy more than once in past few yrs  Biopsy this years showed lgsil   Not moderate or severe  Her hpv test is + other type, not 16 or 18  Patient recalls having abnormal paps in yrs past so  Most likely has had this hpv for many yrs and thus far has never progressed to more serious levels of tissue disease.   Prior protocols didn't do hpv testing if pap was ok and for many yrs there were no hpv tests available so there are a lot of pts who prob had chronic hpv for many yrs but were unaware of it    I discussed with patient that having LGSIL on biopsy only indicates presence of HPV and does not require treatment at this level since there is a low risk of progression to invasive cancer at this level  The typical protocol would be yearly pap and colposcopy as long as her hpv remains positive  And her dysplasia is no worse than mild. Treatment usually only for severe or greater lesion on colposcopy biopsies.  Leep or laser will not cure her of her carrier status for hpv as the virus will persist in surrounding tissues in most cases.  We see a lot of persistent hpv in older women and I suspect it is similar to other viral illnesses that increase with age such as shingles and herpes.     Alternative option is to go ahead and do a leep cone biopsy now just to prove she doesn't have more serious level of disease and possibly buy some time where she might be free of hpv testing positive for a time and maybe skip a few colposcopies.  I did however tell here that in my experience, pts at her age are frequently back with +HPV tests within a year or two.  There is no  treatment that can rid her body of this virus permanently at this time and we have no effective antiviral medications that can suppress this virus long term so the best we have to offer is close surveillance and treatment when a patient has progression of their biopsy proven disease to a severe level of dysplasia.      Even hysterectomy will not keep the viral disease from recurring on vaginal walls after surgery so I generally reserve that as a treatment option only to patients who have progressed to the severe level.   In a way, hpv is more a disease of containment rather than cure.  It is in her favor that her viral type was not 16 or 18 since those are higher risk for progression than the other types.     Patient wants to go ahead with Leep hoping it will stay gone for a long time.  I spent a long time drawing her diagrams of the stages of dysplasia and how a leep is done.   I'm not sure she fully understands that there is a good chance of relatively fast recurrence even with surgical excision of part of cervix.    It is a low risk simple procedure so doing it once is an ok choice as long as we are clear that it may not stay gone.     Patient's last menstrual period was 03/24/2009..   Patient is not sexually active, No obstetric history on file..  Using not sexually active for contraception.    reports that she has never smoked. She has never used smokeless tobacco.    STD testing offered?  Declined    Health maintenance updated:  yes    Today's PHQ-2 Score:   PHQ-2 ( 1999 Pfizer) 2/20/2017   Q1: Little interest or pleasure in doing things 0   Q2: Feeling down, depressed or hopeless 0   PHQ-2 Score 0     Today's PHQ-9 Score:   PHQ-9 SCORE 5/1/2017   Total Score -   Total Score 3   Total Score -     Today's KEYLA-7 Score:   KEYLA-7 SCORE 5/1/2017   Total Score 0       Problem list and histories reviewed & adjusted, as indicated.  Additional history: as documented.    Patient Active Problem List   Diagnosis      Inflammatory arthritis     Osteopenia     Papanicolaou smear of cervix with low grade squamous intraepithelial lesion (LGSIL)     Latent tuberculosis     Atrophic vaginitis     Elevated transaminase level     Recurrent genital herpes     Posterior neck pain     Hyperlipidemia LDL goal <160     Gastroesophageal reflux disease without esophagitis     Major depressive disorder, single episode, moderate (H)     Osteoarthritis of finger, unspecified laterality     Perennial allergic rhinitis     Advanced directives, counseling/discussion     Vertigo     Vestibular neuronitis of left ear     Past Surgical History:   Procedure Laterality Date     C DEXA INTERPRETATION, AXIAL  3/2009    T score lumbar -0.2, femoral neck -1.4/-1.3     COLONOSCOPY  07/2009    normal, repeat 10 years     CONIZATION LEEP  2/2011    chronic endocervicitis     DEXA  3/2011    T score lumbar -0.7, femoral neck -1.7/-1.9 with BMD 0.769     DISKECTOMY, LUMBAR, SINGLE SP  12/2007    L5-S1 diskectomy     HC COLP CERVIX/UPPER VAGINA W BX CERVIX/ENDOCERV CURETT  3/2009    ECC bx with chronic inflammation and squamous atypia, favor reactive     HC COLP CERVIX/UPPER VAGINA W BX CERVIX/ENDOCERV CURETT  4/2010    acute inflammation, no dysplasia.  ECC negative - repeat pap 6 months     HC COLP CERVIX/UPPER VAGINA W ENDOCERV CURETT  3/2009    negative, reactive process with no dysplasia, repeat pap 6 months     HC CT HEAD WO CONTRAST  4/2009    normal - done for acute vertigo     HC DUPLEX EXTREMITY VENOUS, LIMITED UNILATERAL  4/2006    left lower extremity - normal     HC INJ TRANSFORAMIN EPIDURAL, CERV/THOR SINGLE  5/2006    C7-T1 epidural steroid     HC MRI CERVICAL SPINE W/O CONTRAST  3/2006    C3-4 small central disc /C4-5 broad based disk/C5-6 central to rt disc all with mild to mod central stenosis     INJECTION, ANESTHETIC/STEROID, TRANSFORAMINAL EPIDURAL; LUMBAR/SACRAL, SINGLE LEVEL  8,9/2007    left L5 selective nerve root injection under  fluoroscopy with corticosteroid.      INJECTION, ANESTHETIC/STEROID, TRANSFORAMINAL EPIDURAL; LUMBAR/SACRAL, SINGLE LEVEL  10/2007    right L5-S1 translaminar epidural injection     LAMINECTOMY LUMBAR ONE LEVEL  3/6/2012    Procedure:LAMINECTOMY LUMBAR ONE LEVEL; REVISION DISCECTOMY L5-S1 ON THE LEFT ; Surgeon:ROSE PRITCHETT; Location: OR     LAPAROSCOPIC CHOLECYSTECTOMY  7/99     MR LUMBAR SPINE W/O CONTRAST  1/2012    Moderate-sized left posterolateral caudally extruded L5-S1disc herniation impinging on left S1 nerve, mild degenerative disc changes at L4-5     RELEASE TRIGGER FINGER  5/17/2012    Procedure:RELEASE TRIGGER FINGER; right ring finger trigger release  (latex allergy:contact); Surgeon:QUYEN FUNK; Location: [facility]     SONO ABDOMEN LIMITED  9/2012    Increased echotexture liver c/w fatty infiltration     TUBAL LIGATION  2000     XR LUMBAR TRANSFORAMINAL INJ SINGLE  1/2012    Left L5-S1 transforaminal epidural steroid/anesthetic along left L5 nerve root       Social History   Substance Use Topics     Smoking status: Never Smoker     Smokeless tobacco: Never Used     Alcohol use No      Problem (# of Occurrences) Relation (Name,Age of Onset)    Hypertension (2) Mother, Father    Lipids (3) Mother, Father, Sister            Current Outpatient Prescriptions   Medication Sig     FLUoxetine (PROZAC) 20 MG capsule Take 1 capsule (20 mg) by mouth daily     valACYclovir (VALTREX) 500 MG tablet Take 1 tablet (500 mg) by mouth daily     omeprazole (PRILOSEC) 40 MG capsule Take 1 capsule (40 mg) by mouth 2 times daily Take 30-60 minutes before a meal.     cetirizine (ZYRTEC) 10 MG tablet Take 1 tablet (10 mg) by mouth daily     VITAMIN D 1000 UNIT OR CAPS 2 CAPSULES DAILY     CALTRATE 600+D PLUS 600-400 MG-UNIT OR TABS 1 TABLET TWICE DAILY      CENTRUM OR TABS 1 TABLET DAILY     methocarbamol (ROBAXIN) 750 MG tablet Take 1 tablet (750 mg) by mouth 3 times daily as needed (Patient not taking: Reported on  5/1/2017)     cyclobenzaprine (FLEXERIL) 5 MG tablet TAKE 1 TABLET BY MOUTH NIGHTLY AS NEEDED FOR MUSCLE SPASMS (Patient not taking: Reported on 5/1/2017)     No current facility-administered medications for this visit.      Allergies   Allergen Reactions     Aspirin Rash     Latex      Nsaids GI Disturbance     Sulfasalazine Rash       ROS:  12 point review of systems negative other than symptoms noted below.    OBJECTIVE:     /72  Ht 5' (1.524 m)  Wt 131 lb (59.4 kg)  LMP 03/24/2009  BMI 25.58 kg/m2  Body mass index is 25.58 kg/(m^2).    Exam:  Constitutional:  Appearance: Well nourished, well developed alert, in no acute distress  Neurologic/Psychiatric:  Mental Status:  Oriented X3      In-Clinic Test Results:  No results found for this or any previous visit (from the past 24 hour(s)).    ASSESSMENT/PLAN:                                                        ICD-10-CM    1. LGSIL of cervix of undetermined significance R87.612    2. HPV in female A63.0        There are no Patient Instructions on file for this visit.    Face to face time 30 minute  100% counceling          Kristine Diaz MD  [facility]

## 2017-05-01 NOTE — MR AVS SNAPSHOT
"              After Visit Summary   5/1/2017    Fredy Parker    MRN: [id number]           Patient Information     Date Of Birth          1960        Visit Information        Provider Department      5/1/2017 3:20 PM Kristine Diaz MD [facility]        Today's Diagnoses     LGSIL of cervix of undetermined significance    -  1    HPV in female           Follow-ups after your visit        Your next 10 appointments already scheduled     May 11, 2017   Procedure with Kristine Diaz MD   [facility] (--)    [address]  [facility] [id number] [phone]              Who to contact     If you have questions or need follow up information about today's clinic visit or your schedule please contact [facility] directly at [phone].  Normal or non-critical lab and imaging results will be communicated to you by MyChart, letter or phone within 4 business days after the clinic has received the results. If you do not hear from us within 7 days, please contact the clinic through MyChart or phone. If you have a critical or abnormal lab result, we will notify you by phone as soon as possible.  Submit refill requests through [employer] or call your pharmacy and they will forward the refill request to us. Please allow 3 business days for your refill to be completed.          Additional Information About Your Visit        MyChart Information     [employer] lets you send messages to your doctor, view your test results, renew your prescriptions, schedule appointments and more. To sign up, go to [email or web address] . Click on \"Log in\" on the left side of the screen, which will take you to the Welcome page. Then click on \"Sign up Now\" on the right side of the page.     You will be asked to enter the access code listed below, as well as some personal information. Please follow the directions to create your username and password.     Your " access code is: [email or web address]  Expires: 2017  1:05 PM     Your access code will  in 90 days. If you need help or a new code, please call your [facility] clinic or [phone].        Care EveryWhere ID     This is your Care EveryWhere ID. This could be used by other organizations to access your [facility] medical records  [id number]        Your Vitals Were     Height Last Period BMI (Body Mass Index)             5' (1.524 m) 2009 25.58 kg/m2          Blood Pressure from Last 3 Encounters:   17 124/72   17 122/78   17 136/84    Weight from Last 3 Encounters:   17 131 lb (59.4 kg)   17 130 lb 6.4 oz (59.1 kg)   17 134 lb (60.8 kg)              Today, you had the following     No orders found for display       Primary Care Provider Office Phone # Fax #    MARIA DEL CARMEN Mejía [phone]       [facility] [address]  [place] [address]        Thank you!     Thank you for choosing [facility] [place]  for your care. Our goal is always to provide you with excellent care. Hearing back from our patients is one way we can continue to improve our services. Please take a few minutes to complete the written survey that you may receive in the mail after your visit with us. Thank you!             Your Updated Medication List - Protect others around you: Learn how to safely use, store and throw away your medicines at www.disposemymeds.org.          This list is accurate as of: 17  5:28 PM.  Always use your most recent med list.                   Brand Name Dispense Instructions for use    CALTRATE 600+D PLUS 600-400 MG-UNIT Tabs     3 MONTHS    1 TABLET TWICE DAILY       CENTRUM Tabs     30    1 TABLET DAILY       cetirizine 10 MG tablet    ZYRTEC    90 tablet    Take 1 tablet (10 mg) by mouth daily       cyclobenzaprine 5 MG tablet    FLEXERIL    30 tablet    TAKE 1 TABLET BY MOUTH NIGHTLY AS NEEDED FOR MUSCLE SPASMS        FLUoxetine 20 MG capsule    PROzac    90 capsule    Take 1 capsule (20 mg) by mouth daily       methocarbamol 750 MG tablet    ROBAXIN    30 tablet    Take 1 tablet (750 mg) by mouth 3 times daily as needed       omeprazole 40 MG capsule    priLOSEC    180 capsule    Take 1 capsule (40 mg) by mouth 2 times daily Take 30-60 minutes before a meal.       valACYclovir 500 MG tablet    VALTREX    90 tablet    Take 1 tablet (500 mg) by mouth daily       vitamin D 1000 UNITS capsule     180 Cap    2 CAPSULES DAILY

## 2017-05-01 NOTE — Clinical Note
Please schedule for surgery:  Patient Name:  Fredy Parker ([id number]). :  1960   Physician requests assist from:  None Requested Dates:  any Schedule based on:  any Amount of time needed for the procedure:  30 min  Expected time off from work:  2 days Surgeon:  Kristine Diaz MD Surgery permit to read:  leep cone bx Preop Needed:  Yes with  PCP Location for surgery to performed:   Surgery Outpatient Anesthesia:  General   -- Aspirin -- Rash  -- Latex   -- Nsaids -- GI Disturbance  -- Sulfasalazine -- Rash Nickel allergy:  Not Applicable EMB: Not applicable  DIAGNOSIS:  Lgsil, +HPV  Special instructions:  none Vendor Rep:  none Meds Needed: None

## 2017-05-02 ENCOUNTER — TELEPHONE (OUTPATIENT)
Dept: OBGYN | Facility: CLINIC | Age: 57
End: 2017-05-02

## 2017-05-02 ENCOUNTER — TELEPHONE (OUTPATIENT)
Dept: NURSING | Facility: CLINIC | Age: 57
End: 2017-05-02

## 2017-05-02 ASSESSMENT — ANXIETY QUESTIONNAIRES: GAD7 TOTAL SCORE: 0

## 2017-05-02 ASSESSMENT — PATIENT HEALTH QUESTIONNAIRE - PHQ9: SUM OF ALL RESPONSES TO PHQ QUESTIONS 1-9: 3

## 2017-05-02 NOTE — TELEPHONE ENCOUNTER
Pt left message on Surgery Scheduler (Holly) VM asking for the information to be sent to her PCP. Pt was difficult to understand on the VM. Her PCP, Dr. Patrick, is in the [facility] system and are able to see all the records and what was discussed yesterday with Dr. Diaz. LM on VM (PHI on consent) that we received her VM and that her PCP can see all the records. If has any further questions to call back and ask to speak to Triage.      Closing encounter.

## 2017-05-02 NOTE — TELEPHONE ENCOUNTER
Pt called stating that she spoke with  and they have decided to cancel procedure at this time as they do not think she needs this done.     Spoke w/Claudia at [facility] to CX per pt request and advised Dr Diaz of above    Holly Roman  Surgery Scheduler

## 2017-05-02 NOTE — TELEPHONE ENCOUNTER
Patient surgery scheduled on 2017 at 9AM Check in 7AM  Location for surgery to performed:   Surgery Outpatient  Scheduled by Richie 2017     Information Packet given :Yes: HANDED 2017    CPT codes given: Yes    15327        Consents signed? N/A  Rep Informed :N/A    PREOP DATE :  PRIMARY  In Epic :Yes    On Spreadsheet :Yes    On Calendar EB  :No    In [place] Calendar KAIT  :No    Assist NA   Assist in Epic NA  Assist Notified as needed :No     Holly Roman  Surgery Scheduler      Patient Name:  Fredy Parker ([id number]).   :  1960       Physician requests assist from:  None   Requested Dates:  any   Schedule based on:  any   Amount of time needed for the procedure:  30 min   Expected time off from work:  2 days   Surgeon:  Kristine Diaz MD   Surgery permit to read:  leep cone bx   Preop Needed:  Yes with  PCP   Location for surgery to performed:   Surgery Outpatient   Anesthesia:  General     -- Aspirin -- Rash    -- Latex    -- Nsaids -- GI Disturbance    -- Sulfasalazine -- Rash   Nickel allergy:  Not Applicable   EMB: Not applicable     DIAGNOSIS:  Lgsil, +HPV     Special instructions:  none   Vendor Rep:  none   Meds Needed: None

## 2017-05-02 NOTE — TELEPHONE ENCOUNTER
Patient reconsidered after our discussion yesterday and called to cancel her Leep.   I would like to see her back in 1 years for colposcopy again.

## 2017-05-11 DIAGNOSIS — A60.00 RECURRENT GENITAL HERPES: ICD-10-CM

## 2017-05-11 DIAGNOSIS — F32.1 MAJOR DEPRESSIVE DISORDER, SINGLE EPISODE, MODERATE (H): ICD-10-CM

## 2017-05-11 NOTE — TELEPHONE ENCOUNTER
valACYclovir (VALTREX) 500 MG     Last Written Prescription Date: 2/9/17  Last Fill Quantity: 90, # refills: 0  Last Office Visit with [facility], [facility] or [facility] prescribing provider: 4/17/17        Creatinine   Date Value Ref Range Status   05/15/2016 0.58 0.52 - 1.04 mg/dL Final     FLUoxetine (PROZAC) 20 MG     Last Written Prescription Date: 2/9/17  Last Fill Quantity: 90, # refills: 0  Last Office Visit with [facility] primary care provider:  4/17/17        Last PHQ-9 score on record=   PHQ-9 SCORE 5/1/2017   Total Score -   Total Score 3   Total Score -

## 2017-05-12 RX ORDER — VALACYCLOVIR HYDROCHLORIDE 500 MG/1
500 TABLET, FILM COATED ORAL DAILY
Qty: 90 TABLET | Refills: 1 | Status: SHIPPED | OUTPATIENT
Start: 2017-05-12 | End: 2017-10-26

## 2017-05-12 NOTE — TELEPHONE ENCOUNTER
Refill approved through [facility] protocol.  Noelle Reardon RN  [facility]  [phone]

## 2017-10-26 ENCOUNTER — OFFICE VISIT (OUTPATIENT)
Dept: FAMILY MEDICINE | Facility: CLINIC | Age: 57
End: 2017-10-26
Payer: COMMERCIAL

## 2017-10-26 VITALS
HEIGHT: 60 IN | DIASTOLIC BLOOD PRESSURE: 89 MMHG | BODY MASS INDEX: 25.52 KG/M2 | SYSTOLIC BLOOD PRESSURE: 143 MMHG | OXYGEN SATURATION: 100 % | HEART RATE: 73 BPM | WEIGHT: 130 LBS | TEMPERATURE: 97.6 F | RESPIRATION RATE: 14 BRPM

## 2017-10-26 DIAGNOSIS — A60.00 RECURRENT GENITAL HERPES: ICD-10-CM

## 2017-10-26 DIAGNOSIS — K21.9 GASTROESOPHAGEAL REFLUX DISEASE WITHOUT ESOPHAGITIS: ICD-10-CM

## 2017-10-26 DIAGNOSIS — M54.2 POSTERIOR NECK PAIN: ICD-10-CM

## 2017-10-26 DIAGNOSIS — Z23 NEED FOR PROPHYLACTIC VACCINATION AND INOCULATION AGAINST INFLUENZA: Primary | ICD-10-CM

## 2017-10-26 DIAGNOSIS — M62.830 BACK MUSCLE SPASM: ICD-10-CM

## 2017-10-26 DIAGNOSIS — F32.1 MAJOR DEPRESSIVE DISORDER, SINGLE EPISODE, MODERATE (H): ICD-10-CM

## 2017-10-26 LAB
ERYTHROCYTE [DISTWIDTH] IN BLOOD BY AUTOMATED COUNT: 13.5 % (ref 10–15)
HCT VFR BLD AUTO: 41.1 % (ref 35–47)
HGB BLD-MCNC: 13.8 G/DL (ref 11.7–15.7)
MCH RBC QN AUTO: 29.4 PG (ref 26.5–33)
MCHC RBC AUTO-ENTMCNC: 33.6 G/DL (ref 31.5–36.5)
MCV RBC AUTO: 87 FL (ref 78–100)
PLATELET # BLD AUTO: 228 10E9/L (ref 150–450)
RBC # BLD AUTO: 4.7 10E12/L (ref 3.8–5.2)
WBC # BLD AUTO: 7.4 10E9/L (ref 4–11)

## 2017-10-26 PROCEDURE — 83721 ASSAY OF BLOOD LIPOPROTEIN: CPT | Performed by: FAMILY MEDICINE

## 2017-10-26 PROCEDURE — 36415 COLL VENOUS BLD VENIPUNCTURE: CPT | Performed by: FAMILY MEDICINE

## 2017-10-26 PROCEDURE — 80053 COMPREHEN METABOLIC PANEL: CPT | Performed by: FAMILY MEDICINE

## 2017-10-26 PROCEDURE — 90471 IMMUNIZATION ADMIN: CPT | Performed by: FAMILY MEDICINE

## 2017-10-26 PROCEDURE — 99214 OFFICE O/P EST MOD 30 MIN: CPT | Performed by: FAMILY MEDICINE

## 2017-10-26 PROCEDURE — 90686 IIV4 VACC NO PRSV 0.5 ML IM: CPT | Performed by: FAMILY MEDICINE

## 2017-10-26 PROCEDURE — 84443 ASSAY THYROID STIM HORMONE: CPT | Performed by: FAMILY MEDICINE

## 2017-10-26 PROCEDURE — 85027 COMPLETE CBC AUTOMATED: CPT | Performed by: FAMILY MEDICINE

## 2017-10-26 RX ORDER — OMEPRAZOLE 40 MG/1
40 CAPSULE, DELAYED RELEASE ORAL DAILY
Qty: 90 CAPSULE | Refills: 1 | Status: SHIPPED | OUTPATIENT
Start: 2017-10-26 | End: 2018-04-17

## 2017-10-26 RX ORDER — OMEPRAZOLE 40 MG/1
40 CAPSULE, DELAYED RELEASE ORAL 2 TIMES DAILY
Qty: 180 CAPSULE | Refills: 2 | Status: CANCELLED | OUTPATIENT
Start: 2017-10-26

## 2017-10-26 RX ORDER — VALACYCLOVIR HYDROCHLORIDE 500 MG/1
500 TABLET, FILM COATED ORAL DAILY
Qty: 90 TABLET | Refills: 1 | Status: SHIPPED | OUTPATIENT
Start: 2017-10-26 | End: 2018-05-03

## 2017-10-26 RX ORDER — CYCLOBENZAPRINE HCL 5 MG
TABLET ORAL
Qty: 30 TABLET | Refills: 2 | Status: SHIPPED | OUTPATIENT
Start: 2017-10-26 | End: 2019-02-19

## 2017-10-26 RX ORDER — FLUOXETINE 40 MG/1
40 CAPSULE ORAL DAILY
Qty: 90 CAPSULE | Refills: 1 | Status: SHIPPED | OUTPATIENT
Start: 2017-10-26 | End: 2018-04-20

## 2017-10-26 RX ORDER — METHOCARBAMOL 750 MG/1
750 TABLET, FILM COATED ORAL 3 TIMES DAILY PRN
Qty: 30 TABLET | Refills: 2 | Status: SHIPPED | OUTPATIENT
Start: 2017-10-26 | End: 2018-10-04

## 2017-10-26 NOTE — LETTER
October 30, 2017      Fredy Parker  [address] [facility]  [place] [address]        Dear ,    We are writing to inform you of your test results.    Your lab results including complete blood cell counts/electrolyte balance and glucose/liver and kidney function/thyroid function were all normal.  However, your cholesterol indexes are elevated around the borderline. Please keep working on life style modification including low fat/carb diet with regular exercise, and plan to recheck fasting lipid in 6 months with clinic visit. We should consider starting cholesterol lowering medicine if your cholesterol level is not improving.    Resulted Orders   CBC with platelets   Result Value Ref Range    WBC 7.4 4.0 - 11.0 10e9/L    RBC Count 4.70 3.8 - 5.2 10e12/L    Hemoglobin 13.8 11.7 - 15.7 g/dL    Hematocrit 41.1 35.0 - 47.0 %    MCV 87 78 - 100 fl    MCH 29.4 26.5 - 33.0 pg    MCHC 33.6 31.5 - 36.5 g/dL    RDW 13.5 10.0 - 15.0 %    Platelet Count 228 150 - 450 10e9/L   Comprehensive metabolic panel   Result Value Ref Range    Sodium 140 133 - 144 mmol/L    Potassium 4.2 3.4 - 5.3 mmol/L    Chloride 109 94 - 109 mmol/L    Carbon Dioxide 19 (L) 20 - 32 mmol/L    Anion Gap 12 3 - 14 mmol/L    Glucose 93 70 - 99 mg/dL      Comment:      Non Fasting    Urea Nitrogen 23 7 - 30 mg/dL    Creatinine 0.66 0.52 - 1.04 mg/dL    GFR Estimate >90 >60 mL/min/1.7m2      Comment:      Non  GFR Calc    GFR Estimate If Black >90 >60 mL/min/1.7m2      Comment:       GFR Calc    Calcium 9.1 8.5 - 10.1 mg/dL    Bilirubin Total 0.3 0.2 - 1.3 mg/dL    Albumin 4.0 3.4 - 5.0 g/dL    Protein Total 7.8 6.8 - 8.8 g/dL    Alkaline Phosphatase 141 40 - 150 U/L    ALT 31 0 - 50 U/L    AST 12 0 - 45 U/L   LDL cholesterol direct   Result Value Ref Range    LDL Cholesterol Direct 141 (H) <100 mg/dL      Comment:      Above desirable:  100-129 mg/dl  Borderline High:  130-159 mg/dL  High:             160-189  mg/dL  Very high:       >189 mg/dl     TSH with free T4 reflex   Result Value Ref Range    TSH 2.76 0.40 - 4.00 mU/L       If you have any questions or concerns, please call the clinic at the number listed above.       Sincerely,        Blu Henry MD

## 2017-10-26 NOTE — NURSING NOTE
Chief Complaint   Patient presents with     Establish Care       Initial /89 (Cuff Size: Adult Regular)  Pulse 73  Temp 97.6  F (36.4  C) (Tympanic)  Resp 14  Ht 5' (1.524 m)  Wt 130 lb (59 kg)  LMP 03/24/2009  SpO2 100%  BMI 25.39 kg/m2 Estimated body mass index is 25.39 kg/(m^2) as calculated from the following:    Height as of this encounter: 5' (1.524 m).    Weight as of this encounter: 130 lb (59 kg).  Medication Reconciliation: complete   Bushra Grajeda, CMA

## 2017-10-26 NOTE — PROGRESS NOTES

## 2017-10-26 NOTE — PROGRESS NOTES
SUBJECTIVE:   Fredy Parker is a 57 year old female who presents to clinic today for the following health issues:      Establish Care         Description (location/character/radiation): Pt is her to establish care and get her medication refilled.        Problem list and histories reviewed & adjusted, as indicated.  Additional history: as documented    Patient Active Problem List   Diagnosis     Inflammatory arthritis     Osteopenia     Papanicolaou smear of cervix with low grade squamous intraepithelial lesion (LGSIL)     Latent tuberculosis     Atrophic vaginitis     Elevated transaminase level     Recurrent genital herpes     Posterior neck pain     Hyperlipidemia LDL goal <160     Gastroesophageal reflux disease without esophagitis     Major depressive disorder, single episode, moderate (H)     Osteoarthritis of finger, unspecified laterality     Perennial allergic rhinitis     Advanced directives, counseling/discussion     Vertigo     Vestibular neuronitis of left ear     Past Surgical History:   Procedure Laterality Date     C DEXA INTERPRETATION, AXIAL  3/2009    T score lumbar -0.2, femoral neck -1.4/-1.3     COLONOSCOPY  07/2009    normal, repeat 10 years     CONIZATION LEEP  2/2011    chronic endocervicitis     DEXA  3/2011    T score lumbar -0.7, femoral neck -1.7/-1.9 with BMD 0.769     DISKECTOMY, LUMBAR, SINGLE SP  12/2007    L5-S1 diskectomy     HC COLP CERVIX/UPPER VAGINA W BX CERVIX/ENDOCERV CURETT  3/2009    ECC bx with chronic inflammation and squamous atypia, favor reactive     HC COLP CERVIX/UPPER VAGINA W BX CERVIX/ENDOCERV CURETT  4/2010    acute inflammation, no dysplasia.  ECC negative - repeat pap 6 months     HC COLP CERVIX/UPPER VAGINA W ENDOCERV CURETT  3/2009    negative, reactive process with no dysplasia, repeat pap 6 months     HC CT HEAD WO CONTRAST  4/2009    normal - done for acute vertigo     HC DUPLEX EXTREMITY VENOUS, LIMITED UNILATERAL  4/2006    left lower extremity -  normal     HC INJ TRANSFORAMIN EPIDURAL, CERV/THOR SINGLE  5/2006    C7-T1 epidural steroid     HC MRI CERVICAL SPINE W/O CONTRAST  3/2006    C3-4 small central disc /C4-5 broad based disk/C5-6 central to rt disc all with mild to mod central stenosis     INJECTION, ANESTHETIC/STEROID, TRANSFORAMINAL EPIDURAL; LUMBAR/SACRAL, SINGLE LEVEL  8,9/2007    left L5 selective nerve root injection under fluoroscopy with corticosteroid.      INJECTION, ANESTHETIC/STEROID, TRANSFORAMINAL EPIDURAL; LUMBAR/SACRAL, SINGLE LEVEL  10/2007    right L5-S1 translaminar epidural injection     LAMINECTOMY LUMBAR ONE LEVEL  3/6/2012    Procedure:LAMINECTOMY LUMBAR ONE LEVEL; REVISION DISCECTOMY L5-S1 ON THE LEFT ; Surgeon:ROSE PRITCHETT; Location: OR     LAPAROSCOPIC CHOLECYSTECTOMY  7/99     MR LUMBAR SPINE W/O CONTRAST  1/2012    Moderate-sized left posterolateral caudally extruded L5-S1disc herniation impinging on left S1 nerve, mild degenerative disc changes at L4-5     RELEASE TRIGGER FINGER  5/17/2012    Procedure:RELEASE TRIGGER FINGER; right ring finger trigger release  (latex allergy:contact); Surgeon:QUYEN FUNK; Location: [facility]     SONO ABDOMEN LIMITED  9/2012    Increased echotexture liver c/w fatty infiltration     TUBAL LIGATION  2000     XR LUMBAR TRANSFORAMINAL INJ SINGLE  1/2012    Left L5-S1 transforaminal epidural steroid/anesthetic along left L5 nerve root        Social History   Substance Use Topics     Smoking status: Never Smoker     Smokeless tobacco: Never Used     Alcohol use No     Family History   Problem Relation Age of Onset     Hypertension Mother      Lipids Mother      Hypertension Father      Lipids Father      Lipids Sister          Current Outpatient Prescriptions   Medication Sig Dispense Refill     omeprazole (PRILOSEC) 40 MG capsule Take 1 capsule (40 mg) by mouth daily Take 30-60 minutes before a meal. 90 capsule 1     methocarbamol (ROBAXIN) 750 MG tablet Take 1 tablet (750 mg) by mouth 3  times daily as needed 30 tablet 2     cyclobenzaprine (FLEXERIL) 5 MG tablet TAKE 1 TABLET BY MOUTH NIGHTLY AS NEEDED FOR MUSCLE SPASMS 30 tablet 2     valACYclovir (VALTREX) 500 MG tablet Take 1 tablet (500 mg) by mouth daily 90 tablet 1     FLUoxetine (PROZAC) 40 MG capsule Take 1 capsule (40 mg) by mouth daily 90 capsule 1     FLUoxetine (PROZAC) 20 MG capsule Take 1 capsule (20 mg) by mouth daily 90 capsule 1     cetirizine (ZYRTEC) 10 MG tablet Take 1 tablet (10 mg) by mouth daily 90 tablet 3     VITAMIN D 1000 UNIT OR CAPS 2 CAPSULES DAILY 180 Cap PRN     CALTRATE 600+D PLUS 600-400 MG-UNIT OR TABS 1 TABLET TWICE DAILY  3 MONTHS PRN     CENTRUM OR TABS 1 TABLET DAILY 30 0     [DISCONTINUED] valACYclovir (VALTREX) 500 MG tablet Take 1 tablet (500 mg) by mouth daily 90 tablet 1     [DISCONTINUED] omeprazole (PRILOSEC) 40 MG capsule Take 1 capsule (40 mg) by mouth 2 times daily Take 30-60 minutes before a meal. 180 capsule 2     Allergies   Allergen Reactions     Aspirin Rash     Latex      Nsaids GI Disturbance     Sulfasalazine Rash     Recent Labs   Lab Test  05/15/16   0632  05/14/16   0606  05/13/16   1447  12/02/15   0934  10/24/14   0705  04/24/13   1754  02/12/13   1609  12/18/12   1612  08/27/12   0739   05/02/12   0751   A1C   --    --    --    --    --   6.2*   --   6.1*  5.9   --   6.3*   LDL   --    --    --   111*  136*   --   95   --   101   --   143*   HDL   --    --    --   73  66   --   79   --   60   --   49*   TRIG   --    --    --   185*  144   --   117   --   159*   --   263*   ALT   --   49  60*  109*  96*  140*  158*  137*  111*   < >  65*   CR  0.58  0.61  0.60  0.61  0.60   --    --    --    --    < >  0.60   GFRESTIMATED  >90  Non  GFR Calc    >90  Non  GFR Calc    >90  Non  GFR Calc    >90  Non  GFR Calc    >90  Non  GFR Calc     --    --    --    --    < >  >90   GFRESTBLACK  >90   GFR  Calc    >90   GFR Calc    >90   GFR Calc    >90   GFR Calc    >90   GFR Calc     --    --    --    --    --   >90   POTASSIUM  3.5  3.4  3.5  3.4  4.0   --    --    --    --    --   4.1   TSH   --    --    --   1.88   --    --    --    --    --    --   2.85    < > = values in this interval not displayed.      BP Readings from Last 3 Encounters:   10/26/17 143/89   05/01/17 124/72   04/17/17 122/78    Wt Readings from Last 3 Encounters:   10/26/17 130 lb (59 kg)   05/01/17 131 lb (59.4 kg)   04/17/17 130 lb 6.4 oz (59.1 kg)          Reviewed and updated as needed this visit by clinical staff     Reviewed and updated as needed this visit by Provider         ROS:  Constitutional, HEENT, cardiovascular, pulmonary, gi and gu systems are negative, except as otherwise noted.      OBJECTIVE:   /89 (Cuff Size: Adult Regular)  Pulse 73  Temp 97.6  F (36.4  C) (Tympanic)  Resp 14  Ht 5' (1.524 m)  Wt 130 lb (59 kg)  LMP 03/24/2009  SpO2 100%  BMI 25.39 kg/m2  Body mass index is 25.39 kg/(m^2).  GENERAL: healthy, alert and no distress  NECK: no adenopathy, no asymmetry, masses, or scars and thyroid normal to palpation  RESP: lungs clear to auscultation - no rales, rhonchi or wheezes  CV: regular rate and rhythm, normal S1 S2, no S3 or S4, no murmur, click or rub, no peripheral edema and peripheral pulses strong  ABDOMEN: soft, nontender, no hepatosplenomegaly, no masses and bowel sounds normal  MS: no gross musculoskeletal defects noted, no edema        ASSESSMENT/PLAN:   ASSESSMENT / PLAN:  (Z23) Need for prophylactic vaccination and inoculation against influenza  (primary encounter diagnosis)      (F32.1) Major depressive disorder, single episode, moderate (H)  Comment: has been worsening, will have her to increase the dose of prozac to 40mg  Plan: FLUoxetine (PROZAC) 40 MG capsule, CBC with         platelets, Comprehensive metabolic panel, LDL          cholesterol direct, TSH with free T4 reflex        RTC in 6 months for recheck    (K21.9) Gastroesophageal reflux disease without esophagitis  Comment: has been stable with current dose of meds, due to increase risk of dementia, will have her to cut the dose of omeprazole to 40mg once daily for next 3 months and qod for the next 3 months   Plan: omeprazole (PRILOSEC) 40 MG capsule, CBC with         platelets, Comprehensive metabolic panel, LDL         cholesterol direct, TSH with free T4 reflex        RTC in 6 months     (M62.830) Back muscle spasm  Comment: has been stable with current dose of meds, will keep watching sx   Plan: methocarbamol (ROBAXIN) 750 MG tablet, CBC with        platelets, Comprehensive metabolic panel, LDL         cholesterol direct, TSH with free T4 reflex            (M54.2) Posterior neck pain  Comment: stable   Plan: cyclobenzaprine (FLEXERIL) 5 MG tablet, CBC         with platelets, Comprehensive metabolic panel,         LDL cholesterol direct, TSH with free T4 reflex            (A60.00) Recurrent genital herpes  Comment: stable   Plan: valACYclovir (VALTREX) 500 MG tablet, CBC with         platelets, Comprehensive metabolic panel, LDL         cholesterol direct, TSH with free T4 reflex                FUTURE APPOINTMENTS:       - Follow-up visit in 6 months     Blu Henry MD  [facility]

## 2017-10-26 NOTE — MR AVS SNAPSHOT
"              After Visit Summary   10/26/2017    Fredy Parker    MRN: [id number]           Patient Information     Date Of Birth          1960        Visit Information        Provider Department      10/26/2017 6:00 PM Blu Henry MD [facility] [place]        Today's Diagnoses     Need for prophylactic vaccination and inoculation against influenza    -  1    Major depressive disorder, single episode, moderate (H)        Gastroesophageal reflux disease without esophagitis        Back muscle spasm        Posterior neck pain        Recurrent genital herpes           Follow-ups after your visit        Who to contact     If you have questions or need follow up information about today's clinic visit or your schedule please [facility] directly at [phone].  Normal or non-critical lab and imaging results will be communicated to you by MyChart, letter or phone within 4 business days after the clinic has received the results. If you do not hear from us within 7 days, please contact the clinic through MyChart or phone. If you have a critical or abnormal lab result, we will notify you by phone as soon as possible.  Submit refill requests through [employer] or call your pharmacy and they will forward the refill request to us. Please allow 3 business days for your refill to be completed.          Additional Information About Your Visit        MyChart Information     [employer] lets you send messages to your doctor, view your test results, renew your prescriptions, schedule appointments and more. To sign up, go to [email or web address] . Click on \"Log in\" on the left side of the screen, which will take you to the Welcome page. Then click on \"Sign up Now\" on the right side of the page.     You will be asked to enter the access code listed below, as well as some personal information. Please follow the directions to create your username and password.     Your access code is: " [email or web address]  Expires: 2018  6:29 PM     Your access code will  in 90 days. If you need help or a new code, please call your [place] clinic or [phone].        Care EveryWhere ID     This is your Care EveryWhere ID. This could be used by other organizations to access your [place] medical records  [id number]        Your Vitals Were     Pulse Temperature Respirations Height Last Period Pulse Oximetry    73 97.6  F (36.4  C) (Tympanic) 14 5' (1.524 m) 2009 100%    BMI (Body Mass Index)                   25.39 kg/m2            Blood Pressure from Last 3 Encounters:   10/26/17 143/89   17 124/72   17 122/78    Weight from Last 3 Encounters:   10/26/17 130 lb (59 kg)   17 131 lb (59.4 kg)   17 130 lb 6.4 oz (59.1 kg)              We Performed the Following     CBC with platelets     Comprehensive metabolic panel     LDL cholesterol direct     TSH with free T4 reflex          Today's Medication Changes          These changes are accurate as of: 10/26/17  6:29 PM.  If you have any questions, ask your nurse or doctor.               These medicines have changed or have updated prescriptions.        Dose/Directions    * FLUoxetine 20 MG capsule   Commonly known as:  PROzac   This may have changed:  Another medication with the same name was added. Make sure you understand how and when to take each.   Used for:  Major depressive disorder, single episode, moderate (H)   Changed by:  Nancy Saldivar APRN CNP        Dose:  20 mg   Take 1 capsule (20 mg) by mouth daily   Quantity:  90 capsule   Refills:  1       * FLUoxetine 40 MG capsule   Commonly known as:  PROzac   This may have changed:  You were already taking a medication with the same name, and this prescription was added. Make sure you understand how and when to take each.   Used for:  Major depressive disorder, single episode, moderate (H)   Changed by:  Blu Henry MD        Dose:  40 mg   Take 1 capsule (40 mg) by mouth daily    Quantity:  90 capsule   Refills:  1       omeprazole 40 MG capsule   Commonly known as:  priLOSEC   This may have changed:  when to take this   Used for:  Gastroesophageal reflux disease without esophagitis   Changed by:  Blu Henry MD        Dose:  40 mg   Take 1 capsule (40 mg) by mouth daily Take 30-60 minutes before a meal.   Quantity:  90 capsule   Refills:  1       * Notice:  This list has 2 medication(s) that are the same as other medications prescribed for you. Read the directions carefully, and ask your doctor or other care provider to review them with you.         Where to get your medicines      These medications were sent to [facility] - [place], [place] - [address] AT [address] [place] [address] [address], [facility] [address]     Phone:  [phone]     cyclobenzaprine 5 MG tablet    FLUoxetine 40 MG capsule    methocarbamol 750 MG tablet    omeprazole 40 MG capsule    valACYclovir 500 MG tablet                Primary Care Provider Office Phone #    Nancy Saldivar, APRN [phone]       No address on file        Equal Access to Services     IVAN TRISTAN : Hadii randee bean hadasho Soomaali, waaxda luqadaha, qaybta kaalmada adeegyagian, antonietta altman . So [facility] [phone].    ATENCIÓN: Si habla español, tiene a mercado disposición servicios gratuitos de asistencia lingüística. Ll[facility] [phone].    We comply with applicable federal civil rights laws and [place] laws. We do not discriminate on the basis of race, color, national origin, age, disability, sex, sexual orientation, or gender identity.            Thank you!     Thank you for choosing [facility] [place]  for your care. Our goal is always to provide you with excellent care. Hearing back from our patients is one way we can continue to improve our services. Please take a few minutes to complete the written survey that you may receive in the mail after your visit with  us. Thank you!             Your Updated Medication List - Protect others around you: Learn how to safely use, store and throw away your medicines at www.disposemymeds.org.          This list is accurate as of: 10/26/17  6:29 PM.  Always use your most recent med list.                   Brand Name Dispense Instructions for use Diagnosis    CALTRATE 600+D PLUS 600-400 MG-UNIT Tabs     3 MONTHS    1 TABLET TWICE DAILY    Osteopenia       CENTRUM Tabs     30    1 TABLET DAILY        cetirizine 10 MG tablet    ZYRTEC    90 tablet    Take 1 tablet (10 mg) by mouth daily    Perennial allergic rhinitis       cyclobenzaprine 5 MG tablet    FLEXERIL    30 tablet    TAKE 1 TABLET BY MOUTH NIGHTLY AS NEEDED FOR MUSCLE SPASMS    Posterior neck pain       * FLUoxetine 20 MG capsule    PROzac    90 capsule    Take 1 capsule (20 mg) by mouth daily    Major depressive disorder, single episode, moderate (H)       * FLUoxetine 40 MG capsule    PROzac    90 capsule    Take 1 capsule (40 mg) by mouth daily    Major depressive disorder, single episode, moderate (H)       methocarbamol 750 MG tablet    ROBAXIN    30 tablet    Take 1 tablet (750 mg) by mouth 3 times daily as needed    Back muscle spasm       omeprazole 40 MG capsule    priLOSEC    90 capsule    Take 1 capsule (40 mg) by mouth daily Take 30-60 minutes before a meal.    Gastroesophageal reflux disease without esophagitis       valACYclovir 500 MG tablet    VALTREX    90 tablet    Take 1 tablet (500 mg) by mouth daily    Recurrent genital herpes       vitamin D 1000 UNITS capsule     180 Cap    2 CAPSULES DAILY    Routine physical examination       * Notice:  This list has 2 medication(s) that are the same as other medications prescribed for you. Read the directions carefully, and ask your doctor or other care provider to review them with you.

## 2017-10-27 LAB
ALBUMIN SERPL-MCNC: 4 G/DL (ref 3.4–5)
ALP SERPL-CCNC: 141 U/L (ref 40–150)
ALT SERPL W P-5'-P-CCNC: 31 U/L (ref 0–50)
ANION GAP SERPL CALCULATED.3IONS-SCNC: 12 MMOL/L (ref 3–14)
AST SERPL W P-5'-P-CCNC: 12 U/L (ref 0–45)
BILIRUB SERPL-MCNC: 0.3 MG/DL (ref 0.2–1.3)
BUN SERPL-MCNC: 23 MG/DL (ref 7–30)
CALCIUM SERPL-MCNC: 9.1 MG/DL (ref 8.5–10.1)
CHLORIDE SERPL-SCNC: 109 MMOL/L (ref 94–109)
CO2 SERPL-SCNC: 19 MMOL/L (ref 20–32)
CREAT SERPL-MCNC: 0.66 MG/DL (ref 0.52–1.04)
GFR SERPL CREATININE-BSD FRML MDRD: >90 ML/MIN/1.7M2
GLUCOSE SERPL-MCNC: 93 MG/DL (ref 70–99)
LDLC SERPL DIRECT ASSAY-MCNC: 141 MG/DL
POTASSIUM SERPL-SCNC: 4.2 MMOL/L (ref 3.4–5.3)
PROT SERPL-MCNC: 7.8 G/DL (ref 6.8–8.8)
SODIUM SERPL-SCNC: 140 MMOL/L (ref 133–144)
TSH SERPL DL<=0.005 MIU/L-ACNC: 2.76 MU/L (ref 0.4–4)

## 2017-10-30 ENCOUNTER — TELEPHONE (OUTPATIENT)
Dept: FAMILY MEDICINE | Facility: CLINIC | Age: 57
End: 2017-10-30

## 2017-10-30 DIAGNOSIS — F32.1 MAJOR DEPRESSIVE DISORDER, SINGLE EPISODE, MODERATE (H): Primary | ICD-10-CM

## 2017-10-30 RX ORDER — FLUOXETINE 40 MG/1
40 CAPSULE ORAL DAILY
Qty: 30 CAPSULE | Refills: 1 | Status: SHIPPED | OUTPATIENT
Start: 2017-10-30 | End: 2017-11-03

## 2017-10-30 NOTE — TELEPHONE ENCOUNTER
Patient returning clinic call. She did call [facility] home delivery to verify. Please return her call.  [phone] (home) NONE (work)  Thank you  Marga Diaz

## 2017-10-30 NOTE — TELEPHONE ENCOUNTER
Spoke with pharmacy. Pt was listed under his middle name.They will process this request and it takes 7-10 business  days for Pt to receive her meds. Pt has only 3 Prozac left. Can we send one time refill to our Pharmacy so that she does not run out?

## 2017-10-30 NOTE — TELEPHONE ENCOUNTER
Received a notification from [facility] that they do not have this Pt in their system. Bushra Grajeda, CMA'

## 2017-10-30 NOTE — TELEPHONE ENCOUNTER
Tried calling pharmacy to clarify. They still claim that Pt is not in their system. Called Pt and asked her to call her pharmacy and call us back once  She has this figured out. Bushra Grajeda, CMA

## 2017-11-03 ENCOUNTER — OFFICE VISIT (OUTPATIENT)
Dept: FAMILY MEDICINE | Facility: CLINIC | Age: 57
End: 2017-11-03
Payer: COMMERCIAL

## 2017-11-03 VITALS
HEART RATE: 82 BPM | DIASTOLIC BLOOD PRESSURE: 77 MMHG | OXYGEN SATURATION: 100 % | SYSTOLIC BLOOD PRESSURE: 121 MMHG | HEIGHT: 60 IN | BODY MASS INDEX: 25.52 KG/M2 | WEIGHT: 130 LBS | TEMPERATURE: 97.1 F | RESPIRATION RATE: 14 BRPM

## 2017-11-03 DIAGNOSIS — M62.838 NECK MUSCLE SPASM: Primary | ICD-10-CM

## 2017-11-03 PROCEDURE — 99213 OFFICE O/P EST LOW 20 MIN: CPT | Mod: 25 | Performed by: FAMILY MEDICINE

## 2017-11-03 PROCEDURE — 96372 THER/PROPH/DIAG INJ SC/IM: CPT | Performed by: FAMILY MEDICINE

## 2017-11-03 RX ORDER — SULINDAC 200 MG/1
200 TABLET ORAL 2 TIMES DAILY PRN
Qty: 30 TABLET | Refills: 0 | Status: SHIPPED | OUTPATIENT
Start: 2017-11-03 | End: 2018-03-19

## 2017-11-03 RX ORDER — KETOROLAC TROMETHAMINE 30 MG/ML
30 INJECTION, SOLUTION INTRAMUSCULAR; INTRAVENOUS ONCE
Qty: 1 ML | Refills: 0 | OUTPATIENT
Start: 2017-11-03 | End: 2017-11-03

## 2017-11-03 ASSESSMENT — ANXIETY QUESTIONNAIRES
IF YOU CHECKED OFF ANY PROBLEMS ON THIS QUESTIONNAIRE, HOW DIFFICULT HAVE THESE PROBLEMS MADE IT FOR YOU TO DO YOUR WORK, TAKE CARE OF THINGS AT HOME, OR GET ALONG WITH OTHER PEOPLE: NOT DIFFICULT AT ALL
1. FEELING NERVOUS, ANXIOUS, OR ON EDGE: NOT AT ALL
3. WORRYING TOO MUCH ABOUT DIFFERENT THINGS: NEARLY EVERY DAY
GAD7 TOTAL SCORE: 3
2. NOT BEING ABLE TO STOP OR CONTROL WORRYING: NOT AT ALL
6. BECOMING EASILY ANNOYED OR IRRITABLE: NOT AT ALL
7. FEELING AFRAID AS IF SOMETHING AWFUL MIGHT HAPPEN: NOT AT ALL
5. BEING SO RESTLESS THAT IT IS HARD TO SIT STILL: NOT AT ALL

## 2017-11-03 ASSESSMENT — PATIENT HEALTH QUESTIONNAIRE - PHQ9
5. POOR APPETITE OR OVEREATING: NOT AT ALL
SUM OF ALL RESPONSES TO PHQ QUESTIONS 1-9: 4

## 2017-11-03 NOTE — NURSING NOTE
Chief Complaint   Patient presents with     Neck Pain       Initial /77 (Cuff Size: Adult Regular)  Pulse 82  Temp 97.1  F (36.2  C) (Tympanic)  Resp 14  Ht 5' (1.524 m)  Wt 130 lb (59 kg)  LMP 03/24/2009  SpO2 100%  BMI 25.39 kg/m2 Estimated body mass index is 25.39 kg/(m^2) as calculated from the following:    Height as of this encounter: 5' (1.524 m).    Weight as of this encounter: 130 lb (59 kg).  Medication Reconciliation: complete   Bushra Grajeda, CMA

## 2017-11-03 NOTE — PROGRESS NOTES
SUBJECTIVE:   Fredy Parker is a 57 year old female who presents to clinic today for the following health issues:      Musculoskeletal problem/pain      Duration: 4-5 days     Description  Location: neck pain    Intensity:  moderate    Accompanying signs and symptoms: headache     History  Previous similar problem: no   Previous evaluation:  none    Precipitating or alleviating factors:  Trauma or overuse: no   Aggravating factors include: none    Therapies tried and outcome: Flexeril, not helping       Problem list and histories reviewed & adjusted, as indicated.  Additional history: as documented    Patient Active Problem List   Diagnosis     Inflammatory arthritis     Osteopenia     Papanicolaou smear of cervix with low grade squamous intraepithelial lesion (LGSIL)     Latent tuberculosis     Atrophic vaginitis     Elevated transaminase level     Recurrent genital herpes     Posterior neck pain     Hyperlipidemia LDL goal <160     Gastroesophageal reflux disease without esophagitis     Major depressive disorder, single episode, moderate (H)     Osteoarthritis of finger, unspecified laterality     Perennial allergic rhinitis     Advanced directives, counseling/discussion     Vertigo     Vestibular neuronitis of left ear     Past Surgical History:   Procedure Laterality Date     C DEXA INTERPRETATION, AXIAL  3/2009    T score lumbar -0.2, femoral neck -1.4/-1.3     COLONOSCOPY  07/2009    normal, repeat 10 years     CONIZATION LEEP  2/2011    chronic endocervicitis     DEXA  3/2011    T score lumbar -0.7, femoral neck -1.7/-1.9 with BMD 0.769     DISKECTOMY, LUMBAR, SINGLE SP  12/2007    L5-S1 diskectomy     HC COLP CERVIX/UPPER VAGINA W BX CERVIX/ENDOCERV CURETT  3/2009    ECC bx with chronic inflammation and squamous atypia, favor reactive     HC COLP CERVIX/UPPER VAGINA W BX CERVIX/ENDOCERV CURETT  4/2010    acute inflammation, no dysplasia.  ECC negative - repeat pap 6 months     HC COLP CERVIX/UPPER  VAGINA W ENDOCERV CURETT  3/2009    negative, reactive process with no dysplasia, repeat pap 6 months     HC CT HEAD WO CONTRAST  4/2009    normal - done for acute vertigo     HC DUPLEX EXTREMITY VENOUS, LIMITED UNILATERAL  4/2006    left lower extremity - normal     HC INJ TRANSFORAMIN EPIDURAL, CERV/THOR SINGLE  5/2006    C7-T1 epidural steroid     HC MRI CERVICAL SPINE W/O CONTRAST  3/2006    C3-4 small central disc /C4-5 broad based disk/C5-6 central to rt disc all with mild to mod central stenosis     INJECTION, ANESTHETIC/STEROID, TRANSFORAMINAL EPIDURAL; LUMBAR/SACRAL, SINGLE LEVEL  8,9/2007    left L5 selective nerve root injection under fluoroscopy with corticosteroid.      INJECTION, ANESTHETIC/STEROID, TRANSFORAMINAL EPIDURAL; LUMBAR/SACRAL, SINGLE LEVEL  10/2007    right L5-S1 translaminar epidural injection     LAMINECTOMY LUMBAR ONE LEVEL  3/6/2012    Procedure:LAMINECTOMY LUMBAR ONE LEVEL; REVISION DISCECTOMY L5-S1 ON THE LEFT ; Surgeon:ROSE PRITCHETT; Location: OR     LAPAROSCOPIC CHOLECYSTECTOMY  7/99     MR LUMBAR SPINE W/O CONTRAST  1/2012    Moderate-sized left posterolateral caudally extruded L5-S1disc herniation impinging on left S1 nerve, mild degenerative disc changes at L4-5     RELEASE TRIGGER FINGER  5/17/2012    Procedure:RELEASE TRIGGER FINGER; right ring finger trigger release  (latex allergy:contact); Surgeon:QUYEN FUNK; Location: [facility]     SONO ABDOMEN LIMITED  9/2012    Increased echotexture liver c/w fatty infiltration     TUBAL LIGATION  2000     XR LUMBAR TRANSFORAMINAL INJ SINGLE  1/2012    Left L5-S1 transforaminal epidural steroid/anesthetic along left L5 nerve root        Social History   Substance Use Topics     Smoking status: Never Smoker     Smokeless tobacco: Never Used     Alcohol use No     Family History   Problem Relation Age of Onset     Hypertension Mother      Lipids Mother      Hypertension Father      Lipids Father      Lipids Sister          Current  Outpatient Prescriptions   Medication Sig Dispense Refill     sulindac (CLINORIL) 200 MG tablet Take 1 tablet (200 mg) by mouth 2 times daily as needed 30 tablet 0     omeprazole (PRILOSEC) 40 MG capsule Take 1 capsule (40 mg) by mouth daily Take 30-60 minutes before a meal. 90 capsule 1     methocarbamol (ROBAXIN) 750 MG tablet Take 1 tablet (750 mg) by mouth 3 times daily as needed 30 tablet 2     cyclobenzaprine (FLEXERIL) 5 MG tablet TAKE 1 TABLET BY MOUTH NIGHTLY AS NEEDED FOR MUSCLE SPASMS 30 tablet 2     valACYclovir (VALTREX) 500 MG tablet Take 1 tablet (500 mg) by mouth daily 90 tablet 1     FLUoxetine (PROZAC) 40 MG capsule Take 1 capsule (40 mg) by mouth daily 90 capsule 1     cetirizine (ZYRTEC) 10 MG tablet Take 1 tablet (10 mg) by mouth daily 90 tablet 3     VITAMIN D 1000 UNIT OR CAPS 2 CAPSULES DAILY 180 Cap PRN     CALTRATE 600+D PLUS 600-400 MG-UNIT OR TABS 1 TABLET TWICE DAILY  3 MONTHS PRN     CENTRUM OR TABS 1 TABLET DAILY 30 0     [DISCONTINUED] FLUoxetine (PROZAC) 40 MG capsule Take 1 capsule (40 mg) by mouth daily 30 capsule 1     [DISCONTINUED] FLUoxetine (PROZAC) 20 MG capsule Take 1 capsule (20 mg) by mouth daily 90 capsule 1     Allergies   Allergen Reactions     Aspirin Rash     Latex      Nsaids GI Disturbance     Sulfasalazine Rash     Recent Labs   Lab Test  10/26/17   1829  05/15/16   0632  05/14/16   0606  05/13/16   1447  12/02/15   0934  10/24/14   0705  04/24/13   1754  02/12/13   1609  12/18/12   1612  08/27/12   0739   A1C   --    --    --    --    --    --   6.2*   --   6.1*  5.9   LDL  141*   --    --    --   111*  136*   --   95   --   101   HDL   --    --    --    --   73  66   --   79   --   60   TRIG   --    --    --    --   185*  144   --   117   --   159*   ALT  31   --   49  60*  109*  96*  140*  158*  137*  111*   CR  0.66  0.58  0.61  0.60  0.61  0.60   --    --    --    --    GFRESTIMATED  >90  >90  Non African American GFR Calc    >90  Non  GFR  Calc    >90  Non  GFR Calc    >90  Non  GFR Calc    >90  Non  GFR Calc     --    --    --    --    GFRESTBLACK  >90  >90  African American GFR Calc    >90   GFR Calc    >90   GFR Calc    >90   GFR Calc    >90   GFR Calc     --    --    --    --    POTASSIUM  4.2  3.5  3.4  3.5  3.4  4.0   --    --    --    --    TSH  2.76   --    --    --   1.88   --    --    --    --    --       BP Readings from Last 3 Encounters:   11/03/17 121/77   10/26/17 143/89   05/01/17 124/72    Wt Readings from Last 3 Encounters:   11/03/17 130 lb (59 kg)   10/26/17 130 lb (59 kg)   05/01/17 131 lb (59.4 kg)             Reviewed and updated as needed this visit by clinical staff     Reviewed and updated as needed this visit by Provider         ROS:  Constitutional, HEENT, cardiovascular, pulmonary, gi and gu systems are negative, except as otherwise noted.      OBJECTIVE:   /77 (Cuff Size: Adult Regular)  Pulse 82  Temp 97.1  F (36.2  C) (Tympanic)  Resp 14  Ht 5' (1.524 m)  Wt 130 lb (59 kg)  LMP 03/24/2009  SpO2 100%  BMI 25.39 kg/m2  Body mass index is 25.39 kg/(m^2).  GENERAL: healthy, alert and no distress  NECK: no adenopathy, no asymmetry, masses, or scars and thyroid normal to palpation  RESP: lungs clear to auscultation - no rales, rhonchi or wheezes  CV: regular rate and rhythm, normal S1 S2, no S3 or S4, no murmur, click or rub, no peripheral edema and peripheral pulses strong  ABDOMEN: soft, nontender, no hepatosplenomegaly, no masses and bowel sounds normal  MS: no gross musculoskeletal defects noted, no edema        ASSESSMENT/PLAN:   Fredy was seen today for neck pain.    Diagnoses and all orders for this visit:    Neck muscle spasm  -     [facility] PT, HAND, AND CHIROPRACTIC REFERRAL  -     sulindac (CLINORIL) 200 MG tablet; Take 1 tablet (200 mg) by mouth 2 times daily as needed  -     ketorolac (TORADOL)  30 MG/ML injection; Inject 1 mL (30 mg) into the muscle once for 1 dose      Neck spasm with tension HA for 4 days, has no other focal neurologic sign, will have her to try PT        Blu Henry MD  [facility] [place]

## 2017-11-03 NOTE — MR AVS SNAPSHOT
After Visit Summary   11/3/2017    Fredy Parker    MRN: [id number]           Patient Information     Date Of Birth          1960        Visit Information        Provider Department      11/3/2017 2:00 PM Blu Henry MD [facility] [place]        Today's Diagnoses     Neck muscle spasm    -  1       Follow-ups after your visit        Additional Services     [facility] PT, HAND, AND CHIROPRACTIC REFERRAL       **This order will print in the [facility] Scheduling Office**    Physical Therapy, Hand Therapy and Chiropractic Care are available through:    *[facility]  *[facility]  *[facility]    Call one number to schedule at any of the above locations: [phone].    Your provider has referred you to: Physical Therapy at [facility] or [facility]    Indication/Reason for Referral: neck spasm   Onset of Illness: acute   Therapy Orders: Evaluate and Treat  Special Programs: None  Special Request: None    Sherley Lepe      Additional Comments for the Therapist or Chiropractor: none     Please be aware that coverage of these services is subject to the terms and limitations of your health insurance plan.  Call member services at your health plan with any benefit or coverage questions.      Please bring the following to your appointment:    *Your personal calendar for scheduling future appointments  *Comfortable clothing                  Who to contact     If you have questions or need follow up information about today's clinic visit or your schedule please contact [facility] [place] directly at [phone].  Normal or non-critical lab and imaging results will be communicated to you by MyChart, letter or phone within 4 business days after the clinic has received the results. If you do not hear from us within 7 days, please contact the clinic through MyChart or phone. If you have a critical or abnormal lab result, we will notify you by phone as  "soon as possible.  Submit refill requests through [employer] or call your pharmacy and they will forward the refill request to us. Please allow 3 business days for your refill to be completed.          Additional Information About Your Visit        [employer] Information     [employer] lets you send messages to your doctor, view your test results, renew your prescriptions, schedule appointments and more. To sign up, go to [email or web address] . Click on \"Log in\" on the left side of the screen, which will take you to the Welcome page. Then click on \"Sign up Now\" on the right side of the page.     You will be asked to enter the access code listed below, as well as some personal information. Please follow the directions to create your username and password.     Your access code is: [email or web address]  Expires: 2018  6:29 PM     Your access code will  in 90 days. If you need help or a new code, please call your [facility] clinic or [phone].        Care EveryWhere ID     This is your Care EveryWhere ID. This could be used by other organizations to access your [facility] medical records  [id number]        Your Vitals Were     Pulse Temperature Respirations Height Last Period Pulse Oximetry    82 97.1  F (36.2  C) (Tympanic) 14 5' (1.524 m) 2009 100%    BMI (Body Mass Index)                   25.39 kg/m2            Blood Pressure from Last 3 Encounters:   17 121/77   10/26/17 143/89   17 124/72    Weight from Last 3 Encounters:   17 130 lb (59 kg)   10/26/17 130 lb (59 kg)   17 131 lb (59.4 kg)              We Performed the Following     [facility]LD[facility] PT, HAND, AND CHIROPRACTIC REFERRAL     INJECTION INTRAMUSCULAR OR SUB-Q     KETOROLAC (TORADOL) 15 MG          Today's Medication Changes          These changes are accurate as of: 11/3/17  2:57 PM.  If you have any questions, ask your nurse or doctor.               Start taking these medicines.        Dose/Directions    ketorolac 30 MG/ML injection "   Commonly known as:  TORADOL   Used for:  Neck muscle spasm   Started by:  Blu Henry MD        Dose:  30 mg   Inject 1 mL (30 mg) into the muscle once for 1 dose   Quantity:  1 mL   Refills:  0       sulindac 200 MG tablet   Commonly known as:  CLINORIL   Used for:  Neck muscle spasm   Started by:  Blu Henry MD        Dose:  200 mg   Take 1 tablet (200 mg) by mouth 2 times daily as needed   Quantity:  30 tablet   Refills:  0            Where to get your medicines      These medications were sent to [facility] - [place], [place] - [address] to [address], [facility] [address]     Phone:  [phone]     sulindac 200 MG tablet         Some of these will need a paper prescription and others can be bought over the counter.  Ask your nurse if you have questions.     You don't need a prescription for these medications     ketorolac 30 MG/ML injection                Primary Care Provider Office Phone #    MARIA DEL CARMEN Mejía [phone]       No address on file        Equal Access to Services     [facility]: Hadii randee bean hadasho Soomaali, waaxda luqadaha, qaybta kaalmada adeegyada, antonietta altman . So [facility] [phone].    ATENCIÓN: Si habla español, tiene a mercado disposición servicios gratuitos de asistencia lingüística. Llame al [phone].    We comply with applicable federal civil rights laws and [place] laws. We do not discriminate on the basis of race, color, national origin, age, disability, sex, sexual orientation, or gender identity.            Thank you!     Thank you for choosing [facility]  for your care. Our goal is always to provide you with excellent care. Hearing back from our patients is one way we can continue to improve our services. Please take a few minutes to complete the written survey that you may receive in the mail after your visit with us. Thank you!             Your  Updated Medication List - Protect others around you: Learn how to safely use, store and throw away your medicines at www.disposemymeds.org.          This list is accurate as of: 11/3/17  2:57 PM.  Always use your most recent med list.                   Brand Name Dispense Instructions for use Diagnosis    CALTRATE 600+D PLUS 600-400 MG-UNIT Tabs     3 MONTHS    1 TABLET TWICE DAILY    Osteopenia       CENTRUM Tabs     30    1 TABLET DAILY        cetirizine 10 MG tablet    ZYRTEC    90 tablet    Take 1 tablet (10 mg) by mouth daily    Perennial allergic rhinitis       cyclobenzaprine 5 MG tablet    FLEXERIL    30 tablet    TAKE 1 TABLET BY MOUTH NIGHTLY AS NEEDED FOR MUSCLE SPASMS    Posterior neck pain       FLUoxetine 40 MG capsule    PROzac    90 capsule    Take 1 capsule (40 mg) by mouth daily    Major depressive disorder, single episode, moderate (H)       ketorolac 30 MG/ML injection    TORADOL    1 mL    Inject 1 mL (30 mg) into the muscle once for 1 dose    Neck muscle spasm       methocarbamol 750 MG tablet    ROBAXIN    30 tablet    Take 1 tablet (750 mg) by mouth 3 times daily as needed    Back muscle spasm       omeprazole 40 MG capsule    priLOSEC    90 capsule    Take 1 capsule (40 mg) by mouth daily Take 30-60 minutes before a meal.    Gastroesophageal reflux disease without esophagitis       sulindac 200 MG tablet    CLINORIL    30 tablet    Take 1 tablet (200 mg) by mouth 2 times daily as needed    Neck muscle spasm       valACYclovir 500 MG tablet    VALTREX    90 tablet    Take 1 tablet (500 mg) by mouth daily    Recurrent genital herpes       vitamin D 1000 UNITS capsule     180 Cap    2 CAPSULES DAILY    Routine physical examination

## 2017-11-04 ASSESSMENT — ANXIETY QUESTIONNAIRES: GAD7 TOTAL SCORE: 3

## 2018-03-19 ENCOUNTER — RADIANT APPOINTMENT (OUTPATIENT)
Dept: MAMMOGRAPHY | Facility: CLINIC | Age: 58
End: 2018-03-19
Payer: COMMERCIAL

## 2018-03-19 ENCOUNTER — OFFICE VISIT (OUTPATIENT)
Dept: OBGYN | Facility: CLINIC | Age: 58
End: 2018-03-19
Payer: COMMERCIAL

## 2018-03-19 VITALS
DIASTOLIC BLOOD PRESSURE: 60 MMHG | BODY MASS INDEX: 25.52 KG/M2 | HEART RATE: 64 BPM | WEIGHT: 130 LBS | SYSTOLIC BLOOD PRESSURE: 108 MMHG | HEIGHT: 60 IN

## 2018-03-19 DIAGNOSIS — Z01.419 ENCOUNTER FOR GYNECOLOGICAL EXAMINATION WITHOUT ABNORMAL FINDING: Primary | ICD-10-CM

## 2018-03-19 DIAGNOSIS — Z12.31 VISIT FOR SCREENING MAMMOGRAM: ICD-10-CM

## 2018-03-19 DIAGNOSIS — N95.2 ATROPHY OF VAGINA: ICD-10-CM

## 2018-03-19 PROCEDURE — 87624 HPV HI-RISK TYP POOLED RSLT: CPT | Performed by: NURSE PRACTITIONER

## 2018-03-19 PROCEDURE — 77067 SCR MAMMO BI INCL CAD: CPT | Mod: TC

## 2018-03-19 PROCEDURE — 99396 PREV VISIT EST AGE 40-64: CPT | Performed by: NURSE PRACTITIONER

## 2018-03-19 PROCEDURE — G0124 SCREEN C/V THIN LAYER BY MD: HCPCS | Performed by: NURSE PRACTITIONER

## 2018-03-19 PROCEDURE — 88175 CYTOPATH C/V AUTO FLUID REDO: CPT | Performed by: NURSE PRACTITIONER

## 2018-03-19 RX ORDER — ESTRADIOL 0.1 MG/G
0.5 CREAM VAGINAL
Qty: 42.5 G | Refills: 6 | Status: SHIPPED | OUTPATIENT
Start: 2018-03-19 | End: 2019-05-14

## 2018-03-19 ASSESSMENT — ANXIETY QUESTIONNAIRES
IF YOU CHECKED OFF ANY PROBLEMS ON THIS QUESTIONNAIRE, HOW DIFFICULT HAVE THESE PROBLEMS MADE IT FOR YOU TO DO YOUR WORK, TAKE CARE OF THINGS AT HOME, OR GET ALONG WITH OTHER PEOPLE: NOT DIFFICULT AT ALL
3. WORRYING TOO MUCH ABOUT DIFFERENT THINGS: NOT AT ALL
1. FEELING NERVOUS, ANXIOUS, OR ON EDGE: NOT AT ALL
5. BEING SO RESTLESS THAT IT IS HARD TO SIT STILL: NOT AT ALL
7. FEELING AFRAID AS IF SOMETHING AWFUL MIGHT HAPPEN: NOT AT ALL
GAD7 TOTAL SCORE: 0
6. BECOMING EASILY ANNOYED OR IRRITABLE: NOT AT ALL
2. NOT BEING ABLE TO STOP OR CONTROL WORRYING: NOT AT ALL

## 2018-03-19 ASSESSMENT — PATIENT HEALTH QUESTIONNAIRE - PHQ9: 5. POOR APPETITE OR OVEREATING: NOT AT ALL

## 2018-03-19 NOTE — PROGRESS NOTES
Fredy is a 57 year old No obstetric history on file. female who presents for annual exam.     Besides routine health maintenance, she has no other health concerns today .    HPI: here for annual exam today.  No HRT, menopause symptons are manageable, other than IC is painful and very dry.  LSIL pap in 05/2017 and colposcopy.  All blood work done thru PCP in [facility] office.      GYNECOLOGIC HISTORY:    Patient's last menstrual period was 03/24/2009.  Her current contraception method is: menopause.  She  reports that she has never smoked. She has never used smokeless tobacco.    Patient is sexually active.  STD testing offered?  Declined  Last PHQ-9 score on record =   PHQ-9 SCORE 3/19/2018   Total Score -   Total Score 0   Total Score -     Last GAD7 score on record =   KEYLA-7 SCORE 3/19/2018   Total Score 0     Alcohol Score = 0    HEALTH MAINTENANCE:  Cholesterol: (  Cholesterol   Date Value Ref Range Status   12/02/2015 221 (H) <200 mg/dL Final     Comment:     Desirable:       <200 mg/dl   10/24/2014 231 (H) <200 mg/dL Final     Comment:     LDL Cholesterol is the primary guide to therapy.   The NCEP recommends further evaluation of: patients with cholesterol greater   than 200 mg/dL if additional risk factors are present, cholesterol greater   than   240 mg/dL, triglycerides greater than 150 mg/dL, or HDL less than 40 mg/dL.        Last Mammo: one year ago, Result: normal, Next Mammo: today   Pap: (  Lab Results   Component Value Date    PAP LSIL 02/20/2017    PAP LSIL 12/02/2015    PAP ASC-US 09/29/2014    ) HPV+  Colonoscopy:  7/29/2009, Result: normal, Next Colonoscopy: Next year.  Dexa:  2011 Spine -1.7, Spine -0.7    Health maintenance updated:  yes    HISTORY:  Obstetric History     No data available          Patient Active Problem List   Diagnosis     Inflammatory arthritis     Osteopenia     Papanicolaou smear of cervix with low grade squamous intraepithelial lesion (LGSIL)     Latent  tuberculosis     Atrophic vaginitis     Elevated transaminase level     Recurrent genital herpes     Posterior neck pain     Hyperlipidemia LDL goal <160     Gastroesophageal reflux disease without esophagitis     Major depressive disorder, single episode, moderate (H)     Osteoarthritis of finger, unspecified laterality     Perennial allergic rhinitis     Advanced directives, counseling/discussion     Vertigo     Vestibular neuronitis of left ear     Past Surgical History:   Procedure Laterality Date     C DEXA INTERPRETATION, AXIAL  3/2009    T score lumbar -0.2, femoral neck -1.4/-1.3     COLONOSCOPY  07/2009    normal, repeat 10 years     CONIZATION LEEP  2/2011    chronic endocervicitis     DEXA  3/2011    T score lumbar -0.7, femoral neck -1.7/-1.9 with BMD 0.769     DISKECTOMY, LUMBAR, SINGLE SP  12/2007    L5-S1 diskectomy     HC COLP CERVIX/UPPER VAGINA W BX CERVIX/ENDOCERV CURETT  3/2009    ECC bx with chronic inflammation and squamous atypia, favor reactive     HC COLP CERVIX/UPPER VAGINA W BX CERVIX/ENDOCERV CURETT  4/2010    acute inflammation, no dysplasia.  ECC negative - repeat pap 6 months     HC COLP CERVIX/UPPER VAGINA W ENDOCERV CURETT  3/2009    negative, reactive process with no dysplasia, repeat pap 6 months     HC CT HEAD WO CONTRAST  4/2009    normal - done for acute vertigo     HC DUPLEX EXTREMITY VENOUS, LIMITED UNILATERAL  4/2006    left lower extremity - normal     HC INJ TRANSFORAMIN EPIDURAL, CERV/THOR SINGLE  5/2006    C7-T1 epidural steroid     HC MRI CERVICAL SPINE W/O CONTRAST  3/2006    C3-4 small central disc /C4-5 broad based disk/C5-6 central to rt disc all with mild to mod central stenosis     INJECTION, ANESTHETIC/STEROID, TRANSFORAMINAL EPIDURAL; LUMBAR/SACRAL, SINGLE LEVEL  8,9/2007    left L5 selective nerve root injection under fluoroscopy with corticosteroid.      INJECTION, ANESTHETIC/STEROID, TRANSFORAMINAL EPIDURAL; LUMBAR/SACRAL, SINGLE LEVEL  10/2007    right L5-S1  translaminar epidural injection     LAMINECTOMY LUMBAR ONE LEVEL  3/6/2012    Procedure:LAMINECTOMY LUMBAR ONE LEVEL; REVISION DISCECTOMY L5-S1 ON THE LEFT ; Surgeon:ROSE PRITCHETT; Location: OR     LAPAROSCOPIC CHOLECYSTECTOMY  7/99     MR LUMBAR SPINE W/O CONTRAST  1/2012    Moderate-sized left posterolateral caudally extruded L5-S1disc herniation impinging on left S1 nerve, mild degenerative disc changes at L4-5     RELEASE TRIGGER FINGER  5/17/2012    Procedure:RELEASE TRIGGER FINGER; right ring finger trigger release  (latex allergy:contact); Surgeon:QUYEN FUNK; Location: [facility]     SONO ABDOMEN LIMITED  9/2012    Increased echotexture liver c/w fatty infiltration     TUBAL LIGATION  2000     XR LUMBAR TRANSFORAMINAL INJ SINGLE  1/2012    Left L5-S1 transforaminal epidural steroid/anesthetic along left L5 nerve root       Social History   Substance Use Topics     Smoking status: Never Smoker     Smokeless tobacco: Never Used     Alcohol use No      Problem (# of Occurrences) Relation (Name,Age of Onset)    Hypertension (2) Mother, Father    Lipids (3) Mother, Father, Sister            Current Outpatient Prescriptions   Medication Sig     estradiol (ESTRACE VAGINAL) 0.1 MG/GM cream Place 0.5 g vaginally twice a week Place 0.5mg vaginally every night for a week, than twice weekly.     omeprazole (PRILOSEC) 40 MG capsule Take 1 capsule (40 mg) by mouth daily Take 30-60 minutes before a meal.     methocarbamol (ROBAXIN) 750 MG tablet Take 1 tablet (750 mg) by mouth 3 times daily as needed     cyclobenzaprine (FLEXERIL) 5 MG tablet TAKE 1 TABLET BY MOUTH NIGHTLY AS NEEDED FOR MUSCLE SPASMS     valACYclovir (VALTREX) 500 MG tablet Take 1 tablet (500 mg) by mouth daily     FLUoxetine (PROZAC) 40 MG capsule Take 1 capsule (40 mg) by mouth daily     cetirizine (ZYRTEC) 10 MG tablet Take 1 tablet (10 mg) by mouth daily     VITAMIN D 1000 UNIT OR CAPS 2 CAPSULES DAILY     CALTRATE 600+D PLUS 600-400 MG-UNIT OR  "TABS 1 TABLET TWICE DAILY      CENTRUM OR TABS 1 TABLET DAILY     No current facility-administered medications for this visit.      Allergies   Allergen Reactions     Aspirin Rash     Latex      Nsaids GI Disturbance     Sulfasalazine Rash       Past medical, surgical, social and family histories were reviewed and updated in EPIC.    ROS:   12 point review of systems negative other than symptoms noted below.  Gastrointestinal: Heartburn  Genitourinary: Cramps and No Periods  Skin: Skin Dryness  Neurologic: Headaches  Musculoskeletal: Joint Pain    EXAM:  /60  Pulse 64  Ht 4' 11.5\" (1.511 m)  Wt 130 lb (59 kg)  LMP 03/24/2009  Breastfeeding? No  BMI 25.82 kg/m2   BMI: Body mass index is 25.82 kg/(m^2).    PHYSICAL EXAM:  Constitutional:  Appearance: Well nourished, well developed, alert, in no acute distress  Neck:  Lymph Nodes:  No lymphadenopathy present    Thyroid:  Gland size normal, nontender, no nodules or masses present  on palpation  Chest:  Respiratory Effort:  Breathing unlabored  Cardiovascular:    Heart: Auscultation:  Regular rate, normal rhythm, no murmurs present  Breasts: Inspection of Breasts:  No lymphadenopathy present., Palpation of Breasts and Axillae:  No masses present on palpation, no breast tenderness., Axillary Lymph Nodes:  No lymphadenopathy present. and No nodularity, asymmetry or nipple discharge bilaterally.  Gastrointestinal:   Abdominal Examination:  Abdomen nontender to palpation, tone normal without rigidity or guarding, no masses present, umbilicus without lesions   Liver and Spleen:  No hepatomegaly present, liver nontender to palpation    Hernias:  No hernias present  Lymphatic: Lymph Nodes:  No other lymphadenopathy present  Skin:  General Inspection:  No rashes present, no lesions present, no areas of  discoloration    Genitalia and Groin:  No rashes present, no lesions present, no areas of  discoloration, no masses present  Neurologic/Psychiatric:    Mental Status: "  Oriented X3     Pelvic Exam:  External Genitalia:     Normal appearance for age, no discharge present, no tenderness present, no inflammatory lesions present, color normal  Vagina:     Normal vaginal vault without central or paravaginal defects, ATROPHIC  Bladder:     Nontender to palpation  Urethra:   Urethral Body:  Urethra palpation normal, urethra structural support normal   Urethral Meatus:  No erythema or lesions present  Cervix:     Appearance healthy, no lesions present, nontender to palpation, no bleeding present  Uterus:     Nontender to palpation, no masses present, position anteflexed, mobility: normal  Adnexa:     No adnexal tenderness present, no adnexal masses present  Perineum:     Perineum within normal limits, no evidence of trauma, no rashes or skin lesions present  Inguinal Lymph Nodes:     No lymphadenopathy present      COUNSELING:   Reviewed preventive health counseling, as reflected in patient instructions       Healthy diet/nutrition       Osteoporosis Prevention/Bone Health       ([place])menopause management    BMI: Body mass index is 25.82 kg/(m^2).  Weight management plan: Discussed healthy diet and exercise guidelines and patient will follow up in 12 months in clinic to re-evaluate.    ASSESSMENT:  57 year old female with satisfactory annual exam.    ICD-10-CM    1. Encounter for gynecological examination without abnormal finding Z01.419 Pap imaged thin layer screen with HPV - recommended age 30 - 65     HPV High Risk Types DNA Cervical   2. Atrophy of vagina N95.2 estradiol (ESTRACE VAGINAL) 0.1 MG/GM cream       PLAN:  Normal Gyn exam.  Patient would like to try estrace vaginal cream for atrophic vagina.  Return prn of 1 year for annual and pap smear.    MARIA DEL CARMEN Sandy CNP

## 2018-03-19 NOTE — MR AVS SNAPSHOT
"              After Visit Summary   3/19/2018    Fredy Parker    MRN: [id number]           Patient Information     Date Of Birth          1960        Visit Information        Provider Department      3/19/2018 2:30 PM Viviane Ortiz APRN CNP [facility]        Today's Diagnoses     Encounter for gynecological examination without abnormal finding    -  1    Atrophy of vagina           Follow-ups after your visit        Who to contact     If you have questions or need follow up information about today's clinic visit or your schedule please contact [facility] directly at [phone].  Normal or non-critical lab and imaging results will be communicated to you by MyChart, letter or phone within 4 business days after the clinic has received the results. If you do not hear from us within 7 days, please contact the clinic through Handmarkhart or phone. If you have a critical or abnormal lab result, we will notify you by phone as soon as possible.  Submit refill requests through AG&P or call your pharmacy and they will forward the refill request to us. Please allow 3 business days for your refill to be completed.          Additional Information About Your Visit        MyChart Information     AG&P lets you send messages to your doctor, view your test results, renew your prescriptions, schedule appointments and more. To sign up, go to [email or web address] . Click on \"Log in\" on the left side of the screen, which will take you to the Welcome page. Then click on \"Sign up Now\" on the right side of the page.     You will be asked to enter the access code listed below, as well as some personal information. Please follow the directions to create your username and password.     Your access code is: [email or web address]  Expires: 2018  3:50 PM     Your access code will  in 90 days. If you need help or a new code, please call your [place] clinic or [phone].   " "     Care EveryWhere ID     This is your Care EveryWhere ID. This could be used by other organizations to access your [place] medical records  [id number]        Your Vitals Were     Pulse Height Last Period Breastfeeding? BMI (Body Mass Index)       64 4' 11.5\" (1.511 m) 03/24/2009 No 25.82 kg/m2        Blood Pressure from Last 3 Encounters:   03/19/18 108/60   11/03/17 121/77   10/26/17 143/89    Weight from Last 3 Encounters:   03/19/18 130 lb (59 kg)   11/03/17 130 lb (59 kg)   10/26/17 130 lb (59 kg)              We Performed the Following     HPV High Risk Types DNA Cervical     Pap imaged thin layer screen with HPV - recommended age 30 - 65          Today's Medication Changes          These changes are accurate as of 3/19/18  3:50 PM.  If you have any questions, ask your nurse or doctor.               Start taking these medicines.        Dose/Directions    estradiol 0.1 MG/GM cream   Commonly known as:  ESTRACE VAGINAL   Used for:  Atrophy of vagina   Started by:  Viviane Ortiz APRN CNP        Dose:  0.5 g   Place 0.5 g vaginally twice a week Place 0.5mg vaginally every night for a week, than twice weekly.   Quantity:  42.5 g   Refills:  6            Where to get your medicines      These medications were sent to [facility] - [place], [place] - [address] AT [address] [place] [address] [address] [facility], [facility] [address]     Phone:  [phone]     estradiol 0.1 MG/GM cream                Primary Care Provider Office Phone # Fax #    [facility] [phone]       [place] [address]  [facility] [address]  [facility] [address]        Equal Access to Services     IVAN TRISTAN AH: Geraldine Carbajal, ruby cohn, qadanielle kaalantonietta ruano. So [facility] [phone].    ATENCIÓN: Si habla español, tiene a mercado disposición servicios gratuitos de " asistencia lingüística. Angus al [phone].    We comply with applicable federal civil rights laws and [place] laws. We do not discriminate on the basis of race, color, national origin, age, disability, sex, sexual orientation, or gender identity.            Thank you!     Thank you for choosing [facility]  for your care. Our goal is always to provide you with excellent care. Hearing back from our patients is one way we can continue to improve our services. Please take a few minutes to complete the written survey that you may receive in the mail after your visit with us. Thank you!             Your Updated Medication List - Protect others around you: Learn how to safely use, store and throw away your medicines at www.disposemymeds.org.          This list is accurate as of 3/19/18  3:50 PM.  Always use your most recent med list.                   Brand Name Dispense Instructions for use Diagnosis    CALTRATE 600+D PLUS 600-400 MG-UNIT Tabs     3 MONTHS    1 TABLET TWICE DAILY    Osteopenia       CENTRUM Tabs     30    1 TABLET DAILY        cetirizine 10 MG tablet    ZYRTEC    90 tablet    Take 1 tablet (10 mg) by mouth daily    Perennial allergic rhinitis       cyclobenzaprine 5 MG tablet    FLEXERIL    30 tablet    TAKE 1 TABLET BY MOUTH NIGHTLY AS NEEDED FOR MUSCLE SPASMS    Posterior neck pain       estradiol 0.1 MG/GM cream    ESTRACE VAGINAL    42.5 g    Place 0.5 g vaginally twice a week Place 0.5mg vaginally every night for a week, than twice weekly.    Atrophy of vagina       FLUoxetine 40 MG capsule    PROzac    90 capsule    Take 1 capsule (40 mg) by mouth daily    Major depressive disorder, single episode, moderate (H)       methocarbamol 750 MG tablet    ROBAXIN    30 tablet    Take 1 tablet (750 mg) by mouth 3 times daily as needed    Back muscle spasm       omeprazole 40 MG capsule    priLOSEC    90 capsule    Take 1 capsule (40 mg) by mouth daily Take 30-60 minutes before a  meal.    Gastroesophageal reflux disease without esophagitis       valACYclovir 500 MG tablet    VALTREX    90 tablet    Take 1 tablet (500 mg) by mouth daily    Recurrent genital herpes       vitamin D 1000 UNITS capsule     180 Cap    2 CAPSULES DAILY    Routine physical examination

## 2018-03-20 ASSESSMENT — PATIENT HEALTH QUESTIONNAIRE - PHQ9: SUM OF ALL RESPONSES TO PHQ QUESTIONS 1-9: 0

## 2018-03-20 ASSESSMENT — ANXIETY QUESTIONNAIRES: GAD7 TOTAL SCORE: 0

## 2018-03-23 LAB
COPATH REPORT: ABNORMAL
PAP: ABNORMAL

## 2018-03-26 LAB
FINAL DIAGNOSIS: ABNORMAL
HPV HR 12 DNA CVX QL NAA+PROBE: POSITIVE
HPV16 DNA SPEC QL NAA+PROBE: NEGATIVE
HPV18 DNA SPEC QL NAA+PROBE: NEGATIVE
SPECIMEN DESCRIPTION: ABNORMAL
SPECIMEN SOURCE CVX/VAG CYTO: ABNORMAL

## 2018-04-10 ENCOUNTER — OFFICE VISIT (OUTPATIENT)
Dept: OBGYN | Facility: CLINIC | Age: 58
End: 2018-04-10
Payer: COMMERCIAL

## 2018-04-10 VITALS — DIASTOLIC BLOOD PRESSURE: 86 MMHG | BODY MASS INDEX: 25.82 KG/M2 | SYSTOLIC BLOOD PRESSURE: 134 MMHG | WEIGHT: 130 LBS

## 2018-04-10 DIAGNOSIS — R87.611 PAP SMEAR OF CERVIX WITH ASCUS, CANNOT EXCLUDE HGSIL: Primary | ICD-10-CM

## 2018-04-10 PROCEDURE — 88305 TISSUE EXAM BY PATHOLOGIST: CPT | Performed by: OBSTETRICS & GYNECOLOGY

## 2018-04-10 PROCEDURE — 57454 BX/CURETT OF CERVIX W/SCOPE: CPT | Performed by: OBSTETRICS & GYNECOLOGY

## 2018-04-10 NOTE — MR AVS SNAPSHOT
"              After Visit Summary   4/10/2018    Fredy Parker    MRN: [id number]           Patient Information     Date Of Birth          1960        Visit Information        Provider Department      4/10/2018 3:30 PM Kristine Diaz MD; [facility]COP[facility]        Today's Diagnoses     Pap smear of cervix with ASCUS, cannot exclude HGSIL    -  1       Follow-ups after your visit        Your next 10 appointments already scheduled     Apr 17, 2018  3:10 PM CDT   [facility]   [facility]    [address] [place] [id number] [phone]           Bring a current list of meds and any records pertaining to this visit. For Physicals, please bring immunization records and any forms needing to be filled out. Please arrive 10 minutes early to complete paperwork.              Who to contact     If you have questions or need follow up information about today's clinic visit or your schedule please contact [facility] directly at [phone].  Normal or non-critical lab and imaging results will be communicated to you by Auto I.D.hart, letter or phone within 4 business days after the clinic has received the results. If you do not hear from us within 7 days, please contact the clinic through 37mhealtht or phone. If you have a critical or abnormal lab result, we will notify you by phone as soon as possible.  Submit refill requests through Apprats or call your pharmacy and they will forward the refill request to us. Please allow 3 business days for your refill to be completed.          Additional Information About Your Visit        MyChart Information     Apprats lets you send messages to your doctor, view your test results, renew your prescriptions, schedule appointments and more. To sign up, go to [email or web address] . Click on \"Log in\" on the left side of the screen, " "which will take you to the Welcome page. Then click on \"Sign up Now\" on the right side of the page.     You will be asked to enter the access code listed below, as well as some personal information. Please follow the directions to create your username and password.     Your access code is: [email or web address]  Expires: 2018  3:50 PM     Your access code will  in 90 days. If you need help or a new code, please call your [place] clinic or [phone].        Care EveryWhere ID     This is your Care EveryWhere ID. This could be used by other organizations to access your [place] medical records  [id number]        Your Vitals Were     Last Period Breastfeeding? BMI (Body Mass Index)             2009 No 25.82 kg/m2          Blood Pressure from Last 3 Encounters:   04/10/18 134/86   18 108/60   17 121/77    Weight from Last 3 Encounters:   04/10/18 130 lb (59 kg)   18 130 lb (59 kg)   17 130 lb (59 kg)              We Performed the Following     COLPOSCOPY CERVIX/UPPER VAGINA W BX CERVIX     Surgical pathology exam        Primary Care Provider Office Phone # Fax #    [facility] [phone]       [place] [address]  [address] [address]        Equal Access to Services     IVAN TRISTAN : Hadii aad ku hadasho Soloerneali, waaxda luqadaha, qaybta kaalmada adeegyada, antonietta altman . So [facility] [phone].    ATENCIÓN: Si habla español, tiene a mercado disposición servicios gratuitos de asistencia lingüística. Llame al [phone].    We comply with applicable federal civil rights laws and [place] laws. We do not discriminate on the basis of race, color, national origin, age, disability, sex, sexual orientation, or gender identity.            Thank you!     Thank you for choosing [facility]  for your care. Our goal is always to provide you with excellent care. " Hearing back from our patients is one way we can continue to improve our services. Please take a few minutes to complete the written survey that you may receive in the mail after your visit with us. Thank you!             Your Updated Medication List - Protect others around you: Learn how to safely use, store and throw away your medicines at www.disposemymeds.org.          This list is accurate as of 4/10/18 11:59 PM.  Always use your most recent med list.                   Brand Name Dispense Instructions for use Diagnosis    CALTRATE 600+D PLUS 600-400 MG-UNIT Tabs     3 MONTHS    1 TABLET TWICE DAILY    Osteopenia       CENTRUM Tabs     30    1 TABLET DAILY        cetirizine 10 MG tablet    ZYRTEC    90 tablet    Take 1 tablet (10 mg) by mouth daily    Perennial allergic rhinitis       cyclobenzaprine 5 MG tablet    FLEXERIL    30 tablet    TAKE 1 TABLET BY MOUTH NIGHTLY AS NEEDED FOR MUSCLE SPASMS    Posterior neck pain       estradiol 0.1 MG/GM cream    ESTRACE VAGINAL    42.5 g    Place 0.5 g vaginally twice a week Place 0.5mg vaginally every night for a week, than twice weekly.    Atrophy of vagina       FLUoxetine 40 MG capsule    PROzac    90 capsule    Take 1 capsule (40 mg) by mouth daily    Major depressive disorder, single episode, moderate (H)       methocarbamol 750 MG tablet    ROBAXIN    30 tablet    Take 1 tablet (750 mg) by mouth 3 times daily as needed    Back muscle spasm       omeprazole 40 MG capsule    priLOSEC    90 capsule    Take 1 capsule (40 mg) by mouth daily Take 30-60 minutes before a meal.    Gastroesophageal reflux disease without esophagitis       valACYclovir 500 MG tablet    VALTREX    90 tablet    Take 1 tablet (500 mg) by mouth daily    Recurrent genital herpes       vitamin D 1000 units capsule     180 Cap    2 CAPSULES DAILY    Routine physical examination

## 2018-04-10 NOTE — PROGRESS NOTES
"  INDICATIONS:                                                    Is a pregnancy test required: No.  Was a consent obtained?  Yes    Fredy Parker, is a 57 year old female, who had a recent ASC-H pap.  HPV other type (+)pos.  Yes prior history of abnormal pap. Here today for colposcopy. Discussed indication, risks of infection and bleeding.    Her last pap was   Lab Results   Component Value Date    PAP ASC-H 03/19/2018    .    PROCEDURE:                                                      Cervix is stained with acetic acid and viewed colposcopically. Squamocolumnar junction is visualized in it's entirity. no visible lesions . Biopsy done Yes. Endocervical curretage Done         POST PROCEDURE:                                                      IMPRESSION: Patient tolerated procedure well    PLAN : Await the results of the biopsies.    Patient has worsening pap with ascus H.  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   Already had leep. Only minimal cervix remains about 1/4\".   If dysplasia persists, will need hysterectomy, prob abdominal.    There were no complications. Patient was discharged in stable condition.    Patient advised to call the clinic if excessive bleeding, pelvic pain, or fever.     Follow-up plan based on pathology results.    Kristine Diaz MD  "

## 2018-04-12 LAB — COPATH REPORT: NORMAL

## 2018-04-17 ENCOUNTER — OFFICE VISIT (OUTPATIENT)
Dept: OBGYN | Facility: CLINIC | Age: 58
End: 2018-04-17
Payer: COMMERCIAL

## 2018-04-17 VITALS
WEIGHT: 128.2 LBS | DIASTOLIC BLOOD PRESSURE: 70 MMHG | SYSTOLIC BLOOD PRESSURE: 124 MMHG | BODY MASS INDEX: 25.17 KG/M2 | HEIGHT: 60 IN

## 2018-04-17 DIAGNOSIS — B97.7 HPV IN FEMALE: Primary | ICD-10-CM

## 2018-04-17 DIAGNOSIS — K21.9 GASTROESOPHAGEAL REFLUX DISEASE WITHOUT ESOPHAGITIS: ICD-10-CM

## 2018-04-17 PROCEDURE — 99213 OFFICE O/P EST LOW 20 MIN: CPT | Performed by: OBSTETRICS & GYNECOLOGY

## 2018-04-17 RX ORDER — OMEPRAZOLE 40 MG/1
CAPSULE, DELAYED RELEASE ORAL
Qty: 90 CAPSULE | Refills: 2 | Status: SHIPPED | OUTPATIENT
Start: 2018-04-17 | End: 2019-01-21

## 2018-04-17 NOTE — MR AVS SNAPSHOT
"              After Visit Summary   4/17/2018    Fredy Parker    MRN: [id number]           Patient Information     Date Of Birth          1960        Visit Information        Provider Department      4/17/2018 3:10 PM Kristine Diaz MD [facility]        Today's Diagnoses     HPV in female    -  1       Follow-ups after your visit        Follow-up notes from your care team     Return in about 6 months (around 10/17/2018).      Your next 10 appointments already scheduled     Sep 17, 2018  4:10 PM CDT   SHORT with Kristine Diaz MD   [facility] ([facility]    [address] [id number] [phone]              Who to contact     If you have questions or need follow up information about today's clinic visit or your schedule please contact [facility] directly at [phone].  Normal or non-critical lab and imaging results will be communicated to you by MyChart, letter or phone within 4 business days after the clinic has received the results. If you do not hear from us within 7 days, please contact the clinic through [employer] or phone. If you have a critical or abnormal lab result, we will notify you by phone as soon as possible.  Submit refill requests through [employer] or call your pharmacy and they will forward the refill request to us. Please allow 3 business days for your refill to be completed.          Additional Information About Your Visit        [facility] Information     [employer] lets you send messages to your doctor, view your test results, renew your prescriptions, schedule appointments and more. To sign up, go to [email or web address] . Click on \"Log in\" on the left side of the screen, which will take you to the Welcome page. Then click on \"Sign up Now\" on the right side of the page.     You will be asked to enter the access code listed below, as well as some personal " "information. Please follow the directions to create your username and password.     Your access code is: [email or web address]  Expires: 2018  3:50 PM     Your access code will  in 90 days. If you need help or a new code, please call your [place] clinic or [phone].        Care EveryWhere ID     This is your Care EveryWhere ID. This could be used by other organizations to access your [place] medical records  [id number]        Your Vitals Were     Height Last Period BMI (Body Mass Index)             4' 11.5\" (1.511 m) 2009 25.46 kg/m2          Blood Pressure from Last 3 Encounters:   18 124/70   04/10/18 134/86   18 108/60    Weight from Last 3 Encounters:   18 128 lb 3.2 oz (58.2 kg)   04/10/18 130 lb (59 kg)   18 130 lb (59 kg)              Today, you had the following     No orders found for display         Today's Medication Changes          These changes are accurate as of 18  3:30 PM.  If you have any questions, ask your nurse or doctor.               These medicines have changed or have updated prescriptions.        Dose/Directions    omeprazole 40 MG capsule   Commonly known as:  priLOSEC   This may have changed:  See the new instructions.   Used for:  Gastroesophageal reflux disease without esophagitis   Changed by:  Blu Henry MD        TAKE 1 CAPSULE DAILY       30 TO 60 MINUTES BEFORE    A MEAL   Quantity:  90 capsule   Refills:  2            Where to get your medicines      These medications were sent to [facility] - [place], [place] - [address] AT Portal to Registered [facility] Sites  [address], [facility] [address]     Phone:  [phone]     omeprazole 40 MG capsule                Primary Care Provider Office Phone # Fax #    [facility] [phone]       [facility] [address] [facility] [address]  [facility] [address]        Equal Access to Services     [facility] " GAAR : Hadii aad ku zenaida Carbajal, waaxda luqadaha, qaybta kaalmada adeeda, antonietta marcosin hayaapedro pablo ferguson jero agapito . So [facility] [place] [phone].    ATENCIÓN: Si habla español, tiene a mercado disposición servicios gratuitos de asistencia lingüística. Llame al [phone].    We comply with applicable federal civil rights laws and [place] laws. We do not discriminate on the basis of race, color, national origin, age, disability, sex, sexual orientation, or gender identity.            Thank you!     Thank you for choosing [facility] [place]  for your care. Our goal is always to provide you with excellent care. Hearing back from our patients is one way we can continue to improve our services. Please take a few minutes to complete the written survey that you may receive in the mail after your visit with us. Thank you!             Your Updated Medication List - Protect others around you: Learn how to safely use, store and throw away your medicines at www.disposemymeds.org.          This list is accurate as of 4/17/18  3:30 PM.  Always use your most recent med list.                   Brand Name Dispense Instructions for use Diagnosis    CALTRATE 600+D PLUS 600-400 MG-UNIT Tabs     3 MONTHS    1 TABLET TWICE DAILY    Osteopenia       CENTRUM Tabs     30    1 TABLET DAILY        cetirizine 10 MG tablet    ZYRTEC    90 tablet    Take 1 tablet (10 mg) by mouth daily    Perennial allergic rhinitis       cyclobenzaprine 5 MG tablet    FLEXERIL    30 tablet    TAKE 1 TABLET BY MOUTH NIGHTLY AS NEEDED FOR MUSCLE SPASMS    Posterior neck pain       estradiol 0.1 MG/GM cream    ESTRACE VAGINAL    42.5 g    Place 0.5 g vaginally twice a week Place 0.5mg vaginally every night for a week, than twice weekly.    Atrophy of vagina       FLUoxetine 40 MG capsule    PROzac    90 capsule    Take 1 capsule (40 mg) by mouth daily    Major depressive disorder, single episode, moderate (H)       methocarbamol 750 MG tablet     ROBAXIN    30 tablet    Take 1 tablet (750 mg) by mouth 3 times daily as needed    Back muscle spasm       omeprazole 40 MG capsule    priLOSEC    90 capsule    TAKE 1 CAPSULE DAILY       30 TO 60 MINUTES BEFORE    A MEAL    Gastroesophageal reflux disease without esophagitis       valACYclovir 500 MG tablet    VALTREX    90 tablet    Take 1 tablet (500 mg) by mouth daily    Recurrent genital herpes       vitamin D 1000 units capsule     180 Cap    2 CAPSULES DAILY    Routine physical examination

## 2018-04-17 NOTE — PROGRESS NOTES
SUBJECTIVE:                                                   Fredy Parker is a 57 year old female who presents to clinic today for the following health issue(s):  Patient presents with:  Follow Up For: patient is here to discuss results from previous visit. had colpo done recently.      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    Had leep cone in 2011  Has had repeated colposcopies  Patient is quite anxious      Patient's last menstrual period was 03/24/2009..   Patient is sexually active, No obstetric history on file..  Using none for contraception.    reports that she has never smoked. She has never used smokeless tobacco.    STD testing offered?  Declined    Health maintenance updated:  no    Today's PHQ-2 Score:   PHQ-2 ( 1999 [employer]) 11/3/2017   Q1: Little interest or pleasure in doing things 0   Q2: Feeling down, depressed or hopeless 0   PHQ-2 Score 0     Today's PHQ-9 Score:   PHQ-9 SCORE 3/19/2018   Total Score -   Total Score 0   Total Score -     Today's KEYLA-7 Score:   KEYLA-7 SCORE 3/19/2018   Total Score 0       Problem list and histories reviewed & adjusted, as indicated.  Additional history: as documented.    Patient Active Problem List   Diagnosis     Inflammatory arthritis     Osteopenia     Papanicolaou smear of cervix with low grade squamous intraepithelial lesion (LGSIL)     Latent tuberculosis     Atrophic vaginitis     Elevated transaminase level     Recurrent genital herpes     Posterior neck pain     Hyperlipidemia LDL goal <160     Gastroesophageal reflux disease without esophagitis     Major depressive disorder, single episode, moderate (H)     Osteoarthritis of finger, unspecified laterality     Perennial allergic rhinitis     Advanced directives, counseling/discussion     Vertigo     Vestibular neuronitis of left ear     Past Surgical History:    Procedure Laterality Date     C DEXA INTERPRETATION, AXIAL  3/2009    T score lumbar -0.2, femoral neck -1.4/-1.3     COLONOSCOPY  07/2009    normal, repeat 10 years     CONIZATION LEEP  2/2011    chronic endocervicitis     DEXA  3/2011    T score lumbar -0.7, femoral neck -1.7/-1.9 with BMD 0.769     DISKECTOMY, LUMBAR, SINGLE SP  12/2007    L5-S1 diskectomy     HC COLP CERVIX/UPPER VAGINA W BX CERVIX/ENDOCERV CURETT  3/2009    ECC bx with chronic inflammation and squamous atypia, favor reactive     HC COLP CERVIX/UPPER VAGINA W BX CERVIX/ENDOCERV CURETT  4/2010    acute inflammation, no dysplasia.  ECC negative - repeat pap 6 months     HC COLP CERVIX/UPPER VAGINA W ENDOCERV CURETT  3/2009    negative, reactive process with no dysplasia, repeat pap 6 months     HC CT HEAD WO CONTRAST  4/2009    normal - done for acute vertigo     HC DUPLEX EXTREMITY VENOUS, LIMITED UNILATERAL  4/2006    left lower extremity - normal     HC INJ TRANSFORAMIN EPIDURAL, CERV/THOR SINGLE  5/2006    C7-T1 epidural steroid     HC MRI CERVICAL SPINE W/O CONTRAST  3/2006    C3-4 small central disc /C4-5 broad based disk/C5-6 central to rt disc all with mild to mod central stenosis     INJECTION, ANESTHETIC/STEROID, TRANSFORAMINAL EPIDURAL; LUMBAR/SACRAL, SINGLE LEVEL  8,9/2007    left L5 selective nerve root injection under fluoroscopy with corticosteroid.      INJECTION, ANESTHETIC/STEROID, TRANSFORAMINAL EPIDURAL; LUMBAR/SACRAL, SINGLE LEVEL  10/2007    right L5-S1 translaminar epidural injection     LAMINECTOMY LUMBAR ONE LEVEL  3/6/2012    Procedure:LAMINECTOMY LUMBAR ONE LEVEL; REVISION DISCECTOMY L5-S1 ON THE LEFT ; Surgeon:ROSE PRITCHETT; Location:[facility]     LAPAROSCOPIC CHOLECYSTECTOMY  7/99     MR LUMBAR SPINE W/O CONTRAST  1/2012    Moderate-sized left posterolateral caudally extruded L5-S1disc herniation impinging on left S1 nerve, mild degenerative disc changes at L4-5     RELEASE TRIGGER FINGER  5/17/2012     Procedure:RELEASE TRIGGER FINGER; right ring finger trigger release  (latex allergy:contact); Surgeon:QUYEN FUNK; Location: [facility]     SONO ABDOMEN LIMITED  9/2012    Increased echotexture liver c/w fatty infiltration     TUBAL LIGATION  2000     XR LUMBAR TRANSFORAMINAL INJ SINGLE  1/2012    Left L5-S1 transforaminal epidural steroid/anesthetic along left L5 nerve root       Social History   Substance Use Topics     Smoking status: Never Smoker     Smokeless tobacco: Never Used     Alcohol use No      Problem (# of Occurrences) Relation (Name,Age of Onset)    Hypertension (2) Mother, Father    Lipids (3) Mother, Father, Sister            Current Outpatient Prescriptions   Medication Sig     omeprazole (PRILOSEC) 40 MG capsule TAKE 1 CAPSULE DAILY       30 TO 60 MINUTES BEFORE    A MEAL     estradiol (ESTRACE VAGINAL) 0.1 MG/GM cream Place 0.5 g vaginally twice a week Place 0.5mg vaginally every night for a week, than twice weekly.     methocarbamol (ROBAXIN) 750 MG tablet Take 1 tablet (750 mg) by mouth 3 times daily as needed     cyclobenzaprine (FLEXERIL) 5 MG tablet TAKE 1 TABLET BY MOUTH NIGHTLY AS NEEDED FOR MUSCLE SPASMS     valACYclovir (VALTREX) 500 MG tablet Take 1 tablet (500 mg) by mouth daily     FLUoxetine (PROZAC) 40 MG capsule Take 1 capsule (40 mg) by mouth daily     cetirizine (ZYRTEC) 10 MG tablet Take 1 tablet (10 mg) by mouth daily     VITAMIN D 1000 UNIT OR CAPS 2 CAPSULES DAILY     CALTRATE 600+D PLUS 600-400 MG-UNIT OR TABS 1 TABLET TWICE DAILY      CENTRUM OR TABS 1 TABLET DAILY     [DISCONTINUED] omeprazole (PRILOSEC) 40 MG capsule Take 1 capsule (40 mg) by mouth daily Take 30-60 minutes before a meal.     No current facility-administered medications for this visit.      Allergies   Allergen Reactions     Aspirin Rash     Latex      Nsaids GI Disturbance     Sulfasalazine Rash       ROS:  12 point review of systems negative other than symptoms noted below.    OBJECTIVE:     /70  Ht  "4' 11.5\" (1.511 m)  Wt 128 lb 3.2 oz (58.2 kg)  LMP 03/24/2009  BMI 25.46 kg/m2  Body mass index is 25.46 kg/(m^2).    Exam:  Constitutional:  Appearance: Well nourished, well developed alert, in no acute distress     In-Clinic Test Results:  No results found for this or any previous visit (from the past 24 hour(s)).    ASSESSMENT/PLAN:                                                        ICD-10-CM    1. HPV in female B97.7        There are no Patient Instructions on file for this visit.    We discussed options   Can change to more frequent paps q 6 months and hpv once a year and prob colposcopy once a year  Or do NAHUN   Patient wants to continue expectant management for now  We discussed chronic life long nature of hpv and possible cancers associated with it  Return 6 mo    Kristine Diaz MD  [facility]  "

## 2018-04-17 NOTE — TELEPHONE ENCOUNTER
"Requested Prescriptions   Pending Prescriptions Disp Refills     omeprazole (PRILOSEC) 40 MG capsule [Pharmacy Med Name: OMEPRAZOL DR CAP 40MG RX]  Last Written Prescription Date:  10/26/17  Last Fill Quantity: 90,  # refills: 1   Last office visit: 11/3/2017 with prescribing provider:  jose alfredo   Future Office Visit:   Next 5 appointments (look out 90 days)     Apr 17, 2018  3:10 PM CDT   Office Visit with Kristine Diaz MD   [facility]    [address] [place] [id number]   [phone]                  90 capsule 1     Sig: TAKE 1 CAPSULE DAILY       30 TO 60 MINUTES BEFORE    A MEAL    PPI Protocol Passed    4/17/2018  4:07 AM       Passed - Not on Clopidogrel (unless Pantoprazole ordered)       Passed - No diagnosis of osteoporosis on record       Passed - Recent (12 mo) or future (30 days) visit within the authorizing provider's specialty    Patient had office visit in the last 12 months or has a visit in the next 30 days with authorizing provider or within the authorizing provider's specialty.  See \"Patient Info\" tab in inbasket, or \"Choose Columns\" in Meds & Orders section of the refill encounter.           Passed - Patient is age 18 or older       Passed - No active pregnacy on record       Passed - No positive pregnancy test in past 12 months          "

## 2018-04-20 DIAGNOSIS — F32.1 MAJOR DEPRESSIVE DISORDER, SINGLE EPISODE, MODERATE (H): ICD-10-CM

## 2018-04-20 RX ORDER — FLUOXETINE 40 MG/1
40 CAPSULE ORAL DAILY
Qty: 90 CAPSULE | Refills: 1 | Status: SHIPPED | OUTPATIENT
Start: 2018-04-20 | End: 2018-04-26

## 2018-04-20 NOTE — TELEPHONE ENCOUNTER
"Requested Prescriptions   Pending Prescriptions Disp Refills     FLUoxetine (PROZAC) 40 MG capsule 90 capsule 1    Last Written Prescription Date:  10/26/2017  Last Fill Quantity: 90 capsule,  # refills: 1   Last office visit: 11/3/2017 with prescribing provider:  Blu Henry   Future Office Visit:     Sig: Take 1 capsule (40 mg) by mouth daily    SSRIs Protocol Passed    4/20/2018  8:32 AM       Passed - PHQ-9 score less than 5 in past 6 months    Please review last PHQ-9 score.          Passed - Patient is age 18 or older       Passed - No active pregnancy on record       Passed - No positive pregnancy test in last 12 months       Passed - Recent (6 mo) or future (30 days) visit within the authorizing provider's specialty    Patient had office visit in the last 6 months or has a visit in the next 30 days with authorizing provider or within the authorizing provider's specialty.  See \"Patient Info\" tab in inbasket, or \"Choose Columns\" in Meds & Orders section of the refill encounter.            PHQ-9 SCORE 5/1/2017 11/3/2017 3/19/2018   Total Score - - -   Total Score 3 4 0   Total Score - - -     KEYLA-7 SCORE 5/1/2017 11/3/2017 3/19/2018   Total Score 0 3 0         "

## 2018-04-20 NOTE — TELEPHONE ENCOUNTER
For depression  PHQ 9 normal   Prescription approved per [facility] [place]ill [facility].  Cielo Bradford RN- Triage [employer]

## 2018-04-26 ENCOUNTER — TELEPHONE (OUTPATIENT)
Dept: FAMILY MEDICINE | Facility: CLINIC | Age: 58
End: 2018-04-26

## 2018-04-26 DIAGNOSIS — F32.1 MAJOR DEPRESSIVE DISORDER, SINGLE EPISODE, MODERATE (H): ICD-10-CM

## 2018-04-26 RX ORDER — FLUOXETINE 40 MG/1
40 CAPSULE ORAL DAILY
Qty: 90 CAPSULE | Refills: 1 | Status: SHIPPED | OUTPATIENT
Start: 2018-04-26 | End: 2018-10-14

## 2018-04-26 NOTE — TELEPHONE ENCOUNTER
Reason for Call:  Other Pharamcy    Detailed comments: [facility].    Phone Number Patient can be reached at: Other phone number:  [phone]    Best Time: anytime    Can we leave a detailed message on this number? YES    Call taken on 4/26/2018 at 5:00 PM by Vesna Frost

## 2018-05-03 DIAGNOSIS — A60.00 RECURRENT GENITAL HERPES: ICD-10-CM

## 2018-05-03 RX ORDER — VALACYCLOVIR HYDROCHLORIDE 500 MG/1
TABLET, FILM COATED ORAL
Qty: 90 TABLET | Refills: 1 | Status: SHIPPED | OUTPATIENT
Start: 2018-05-03 | End: 2018-05-23

## 2018-05-03 NOTE — TELEPHONE ENCOUNTER
Prescription approved per [facility] or [facility] refill protocol.  Dana Burgos RN - Triage  [facility]

## 2018-05-03 NOTE — TELEPHONE ENCOUNTER
"Requested Prescriptions   Pending Prescriptions Disp Refills     valACYclovir (VALTREX) 500 MG tablet [Pharmacy Med Name: VALACYCLOVIR TAB 500MG]  Last Written Prescription Date:  10/26/17  Last Fill Quantity: 90,  # refills: 1   Last office visit: 11/3/2017 with prescribing provider:  Carl   Future Office Visit:     90 tablet 1     Sig: TAKE 1 TABLET DAILY    Antivirals for Herpes Protocol Passed    5/3/2018 11:11 AM       Passed - Patient is age 12 or older       Passed - Recent (12 mo) or future (30 days) visit within the authorizing provider's specialty    Patient had office visit in the last 12 months or has a visit in the next 30 days with authorizing provider or within the authorizing provider's specialty.  See \"Patient Info\" tab in inbasket, or \"Choose Columns\" in Meds & Orders section of the refill encounter.           Passed - Normal serum creatinine on file in past 12 months    Recent Labs   Lab Test  10/26/17   1829   CR  0.66               "

## 2018-05-22 DIAGNOSIS — A60.00 RECURRENT GENITAL HERPES: ICD-10-CM

## 2018-05-22 NOTE — TELEPHONE ENCOUNTER
"Requested Prescriptions   Pending Prescriptions Disp Refills     valACYclovir (VALTREX) 500 MG tablet  Last Written Prescription Date:  5/3/18  Last Fill Quantity: 90,  # refills: 1   Last office visit: 11/3/2017 with prescribing provider:  Carl   Future Office Visit:     90 tablet 1     Sig: Take 1 tablet (500 mg) by mouth daily    Antivirals for Herpes Protocol Passed    5/22/2018  2:11 PM       Passed - Patient is age 12 or older       Passed - Recent (12 mo) or future (30 days) visit within the authorizing provider's specialty    Patient had office visit in the last 12 months or has a visit in the next 30 days with authorizing provider or within the authorizing provider's specialty.  See \"Patient Info\" tab in inbasket, or \"Choose Columns\" in Meds & Orders section of the refill encounter.           Passed - Normal serum creatinine on file in past 12 months    Recent Labs   Lab Test  10/26/17   1829   CR  0.66               "

## 2018-05-22 NOTE — TELEPHONE ENCOUNTER
Reason for Call:  Medication or medication refill:    Do you use a [facility]?  Name of the pharmacy and phone number for the current request: [facility] - [place], [place] - [address] AT PORTAL TO REGISTERED [facility] SITES      Name of the medication requested: valACYclovir (VALTREX) 500 MG tablet    Other request: patient is requesting 3 monthsupply    Can we leave a detailed message on this number? YES    Phone number patient can be reached at: Cell number on file:    Telephone Information:   Mobile [phone]       Best Time: anytime    Call taken on 5/22/2018 at 2:10 PM by Candice Sanchez

## 2018-05-23 RX ORDER — VALACYCLOVIR HYDROCHLORIDE 500 MG/1
500 TABLET, FILM COATED ORAL DAILY
Qty: 90 TABLET | Refills: 1 | Status: SHIPPED | OUTPATIENT
Start: 2018-05-23 | End: 2018-05-23

## 2018-05-23 RX ORDER — VALACYCLOVIR HYDROCHLORIDE 500 MG/1
500 TABLET, FILM COATED ORAL DAILY
Qty: 90 TABLET | Refills: 1 | OUTPATIENT
Start: 2018-05-23

## 2018-05-23 RX ORDER — VALACYCLOVIR HYDROCHLORIDE 500 MG/1
500 TABLET, FILM COATED ORAL DAILY
Qty: 30 TABLET | Refills: 0 | Status: SHIPPED | OUTPATIENT
Start: 2018-05-23 | End: 2018-05-25

## 2018-05-23 NOTE — TELEPHONE ENCOUNTER
Patient states William did not receive rx from 5/3/18. Medication resent.   Sumi Schilling RN   [facility] - Triage

## 2018-05-23 NOTE — TELEPHONE ENCOUNTER
Patient also reports that she needs a small refill sent to a local pharmacy to get her through until rx comes in the mail. This was done as requested.   Sumi Schilling RN   [facility] - Triage

## 2018-05-25 DIAGNOSIS — A60.00 RECURRENT GENITAL HERPES: ICD-10-CM

## 2018-05-25 RX ORDER — VALACYCLOVIR HYDROCHLORIDE 500 MG/1
500 TABLET, FILM COATED ORAL DAILY
Qty: 30 TABLET | Refills: 0 | Status: SHIPPED | OUTPATIENT
Start: 2018-05-25 | End: 2018-06-14

## 2018-05-25 NOTE — TELEPHONE ENCOUNTER
Prescription approved per [facility], [facility] or [facility] refill protocol.  Dana Burgos RN - Triage  [facility]

## 2018-05-25 NOTE — TELEPHONE ENCOUNTER
"Requested Prescriptions   Pending Prescriptions Disp Refills     valACYclovir (VALTREX) 500 MG tablet  Last Written Prescription Date:  5-  Last Fill Quantity: 30 tablet,  # refills: 0   Last office visit: 11/3/2017 with prescribing provider:  11-3-2017  Future Office Visit:     30 tablet 0     Sig: Take 1 tablet (500 mg) by mouth daily    Antivirals for Herpes Protocol Passed    5/25/2018  9:44 AM       Passed - Patient is age 12 or older       Passed - Recent (12 mo) or future (30 days) visit within the authorizing provider's specialty    Patient had office visit in the last 12 months or has a visit in the next 30 days with authorizing provider or within the authorizing provider's specialty.  See \"Patient Info\" tab in inbasket, or \"Choose Columns\" in Meds & Orders section of the refill encounter.           Passed - Normal serum creatinine on file in past 12 months    Recent Labs   Lab Test  10/26/17   1829   CR  0.66               "

## 2018-06-14 DIAGNOSIS — A60.00 RECURRENT GENITAL HERPES: ICD-10-CM

## 2018-06-14 RX ORDER — VALACYCLOVIR HYDROCHLORIDE 500 MG/1
500 TABLET, FILM COATED ORAL DAILY
Qty: 90 TABLET | Refills: 0 | Status: SHIPPED | OUTPATIENT
Start: 2018-06-14 | End: 2018-09-02

## 2018-06-14 NOTE — TELEPHONE ENCOUNTER
"Requested Prescriptions   Pending Prescriptions Disp Refills     valACYclovir (VALTREX) 500 MG tablet  Last Written Prescription Date:  5/25/18  Last Fill Quantity: 30,  # refills: 0   Last office visit: 11/3/2017 with prescribing provider:  Carl   Future Office Visit:     30 tablet 0     Sig: Take 1 tablet (500 mg) by mouth daily    Antivirals for Herpes Protocol Passed    6/14/2018 12:32 PM       Passed - Patient is age 12 or older       Passed - Recent (12 mo) or future (30 days) visit within the authorizing provider's specialty    Patient had office visit in the last 12 months or has a visit in the next 30 days with authorizing provider or within the authorizing provider's specialty.  See \"Patient Info\" tab in inbasket, or \"Choose Columns\" in Meds & Orders section of the refill encounter.           Passed - Normal serum creatinine on file in past 12 months    Recent Labs   Lab Test  10/26/17   1829   CR  0.66               "

## 2018-06-14 NOTE — TELEPHONE ENCOUNTER
Prescription approved per [facility] Refill Protocol.  Sumi Schilling RN   [facility] - Triage

## 2018-09-02 DIAGNOSIS — A60.00 RECURRENT GENITAL HERPES: ICD-10-CM

## 2018-09-04 RX ORDER — VALACYCLOVIR HYDROCHLORIDE 500 MG/1
TABLET, FILM COATED ORAL
Qty: 90 TABLET | Refills: 0 | Status: SHIPPED | OUTPATIENT
Start: 2018-09-04 | End: 2018-10-04

## 2018-09-04 NOTE — TELEPHONE ENCOUNTER
"Requested Prescriptions   Pending Prescriptions Disp Refills     valACYclovir (VALTREX) 500 MG tablet [Pharmacy Med Name: VALACYCLOVIR TAB 500MG]  Last Written Prescription Date:  6/14/18  Last Fill Quantity: 90,  # refills: 0   Last office visit: 11/3/2017 with prescribing provider:  jose alfredo   Future Office Visit:   Next 5 appointments (look out 90 days)     Sep 17, 2018  4:10 PM CDT   SHORT with Kristine Diaz MD   [facility]    [address] [place] [id number]   [phone]                  90 tablet 0     Sig: TAKE 1 TABLET DAILY    Antivirals for Herpes Protocol Passed    9/2/2018  8:36 AM       Passed - Patient is age 12 or older       Passed - Recent (12 mo) or future (30 days) visit within the authorizing provider's specialty    Patient had office visit in the last 12 months or has a visit in the next 30 days with authorizing provider or within the authorizing provider's specialty.  See \"Patient Info\" tab in inbasket, or \"Choose Columns\" in Meds & Orders section of the refill encounter.           Passed - Normal serum creatinine on file in past 12 months    Recent Labs   Lab Test  10/26/17   1829   CR  0.66               "

## 2018-09-04 NOTE — TELEPHONE ENCOUNTER
Prescription approved per [facility] Refill Protocol.    Barb Ortega RN  EP Triage

## 2018-09-25 DIAGNOSIS — A60.00 RECURRENT GENITAL HERPES: ICD-10-CM

## 2018-09-25 RX ORDER — VALACYCLOVIR HYDROCHLORIDE 500 MG/1
500 TABLET, FILM COATED ORAL DAILY
Qty: 90 TABLET | Refills: 0 | OUTPATIENT
Start: 2018-09-25

## 2018-09-25 NOTE — TELEPHONE ENCOUNTER
"Requested Prescriptions   Pending Prescriptions Disp Refills     valACYclovir (VALTREX) 500 MG tablet 90 tablet 0     Sig: Take 1 tablet (500 mg) by mouth daily  Last Written Prescription Date:  9/4/18  Last Fill Quantity: 90,  # refills: 0   Last office visit: 11/3/2017 with prescribing provider:  Carl   Future Office Visit:   Next 5 appointments (look out 90 days)     Oct 09, 2018  2:30 PM CDT   SHORT with Kristine Diaz MD   [facility]    [address] [place] [address]   [phone]                     Antivirals for Herpes Protocol Passed    9/25/2018  4:12 PM       Passed - Patient is age 12 or older       Passed - Recent (12 mo) or future (30 days) visit within the authorizing provider's specialty    Patient had office visit in the last 12 months or has a visit in the next 30 days with authorizing provider or within the authorizing provider's specialty.  See \"Patient Info\" tab in inbasket, or \"Choose Columns\" in Meds & Orders section of the refill encounter.           Passed - Normal serum creatinine on file in past 12 months    Recent Labs   Lab Test  10/26/17   1829   CR  0.66             Rx denied to pharmacy.  New rx was sent on 9/4/18.    Barb Ortega RN  EP Triage    "

## 2018-09-25 NOTE — TELEPHONE ENCOUNTER
Reason for Call:  Medication or medication refill:    Do you use a [facility]?  Name of the pharmacy and phone number for the current request:  [facility]    Name of the medication requested: valACYclovir (VALTREX) 500 MG tablet    Other request: none    Can we leave a detailed message on this number? YES    Phone number patient can be reached at: Cell number on file:    Telephone Information:   Mobile [phone]       Best Time: any    Call taken on 9/25/2018 at 4:11 PM by Raysa Soriano

## 2018-09-26 DIAGNOSIS — F32.1 MAJOR DEPRESSIVE DISORDER, SINGLE EPISODE, MODERATE (H): ICD-10-CM

## 2018-09-26 RX ORDER — FLUOXETINE 40 MG/1
40 CAPSULE ORAL DAILY
Qty: 90 CAPSULE | Refills: 1 | OUTPATIENT
Start: 2018-09-26

## 2018-09-26 NOTE — TELEPHONE ENCOUNTER
"  Requested Prescriptions   Pending Prescriptions Disp Refills     FLUoxetine (PROZAC) 40 MG capsule 90 capsule 1     Sig: Take 1 capsule (40 mg) by mouth daily    SSRIs Protocol Failed    9/26/2018  8:50 AM       Failed - PHQ-9 score less than 5 in past 6 months    Please review last PHQ-9 score.   Last Written Prescription Date: 4/26/2018  Last Fill Quantity: 90  # refills: 1  Last office visit: 11/3/2017 with prescribing provider:    Future Office Visit:   Next 5 appointments (look out 90 days)     Oct 09, 2018  2:30 PM CDT   SHORT with Kristine Diaz MD   [facility]    [address] [place] [address] [phone]                        Failed - Recent (6 mo) or future (30 days) visit within the authorizing provider's specialty    Patient had office visit in the last 6 months or has a visit in the next 30 days with authorizing provider or within the authorizing provider's specialty.  See \"Patient Info\" tab in inbasket, or \"Choose Columns\" in Meds & Orders section of the refill encounter.           Passed - Patient is age 18 or older       Passed - No active pregnancy on record       Passed - No positive pregnancy test in last 12 months        PHQ-9 SCORE 5/1/2017 11/3/2017 3/19/2018   Total Score - - -   Total Score 3 4 0   Total Score - - -       "

## 2018-10-02 ENCOUNTER — OFFICE VISIT (OUTPATIENT)
Dept: OBGYN | Facility: CLINIC | Age: 58
End: 2018-10-02
Payer: COMMERCIAL

## 2018-10-02 VITALS
HEIGHT: 60 IN | DIASTOLIC BLOOD PRESSURE: 70 MMHG | BODY MASS INDEX: 25.72 KG/M2 | SYSTOLIC BLOOD PRESSURE: 120 MMHG | WEIGHT: 131 LBS | HEART RATE: 72 BPM

## 2018-10-02 DIAGNOSIS — Z23 NEED FOR PROPHYLACTIC VACCINATION AND INOCULATION AGAINST INFLUENZA: ICD-10-CM

## 2018-10-02 DIAGNOSIS — R87.611 PAPANICOLAOU SMEAR OF CERVIX WITH ATYPICAL SQUAMOUS CELLS CANNOT EXCLUDE HIGH GRADE SQUAMOUS INTRAEPITHELIAL LESION (ASC-H): Primary | ICD-10-CM

## 2018-10-02 PROCEDURE — 90686 IIV4 VACC NO PRSV 0.5 ML IM: CPT | Performed by: NURSE PRACTITIONER

## 2018-10-02 PROCEDURE — 90471 IMMUNIZATION ADMIN: CPT | Performed by: NURSE PRACTITIONER

## 2018-10-02 PROCEDURE — 99212 OFFICE O/P EST SF 10 MIN: CPT | Mod: 25 | Performed by: NURSE PRACTITIONER

## 2018-10-02 PROCEDURE — 88175 CYTOPATH C/V AUTO FLUID REDO: CPT | Performed by: NURSE PRACTITIONER

## 2018-10-02 PROCEDURE — 88141 CYTOPATH C/V INTERPRET: CPT | Performed by: NURSE PRACTITIONER

## 2018-10-02 PROCEDURE — 87624 HPV HI-RISK TYP POOLED RSLT: CPT | Performed by: NURSE PRACTITIONER

## 2018-10-02 NOTE — LETTER
October 10, 2018      Fredy Parker  [address] [address]  [place] [address]    Dear ,        We have recently received your PAP smear results and they were not normal.  Your results came back as low grade squamous intraepithelial lesion (LGSIL) and HPV Positive      We also tested your sample for the presence of the HPV (Human Papillomavirus). Your sample was positive for HPV. There are many types of HPV, but we test pap samples for the high risk types. HPV can be the cause of an abnormal Pap smear result.  The high risk types of HPV can also be related to the potential development of cervical cancer if not monitored and/or treated appropriately.       Please return in 6 months for a repeat pap smear and HPV test.        If you have additional questions regarding this result, please call our registered nurse, Paola at [phone].    Sincerely,      Ivette Ahmadi, APRN GENET/  Paola Goldman, RN, BSN  Pap Tracking

## 2018-10-02 NOTE — PROGRESS NOTES
SUBJECTIVE:                                                   Fredy Parker is a 58 year old female who presents to clinic today for the following health issue(s):  Patient presents with:  Gyn Exam: 6 month f/u pap smear after having colpo 04/10/18; biopsy was normal. Pap was ASC-H, HPV OTHER+      HPI:  Pt of Dr. Diaz here today for repeat pap smear at the 6 month interval. She is feelign well today. She received her flu shot today as well.     She had ASC-H, HPV other +, colpo in April which was negative.     Patient's last menstrual period was 03/24/2009..   Patient is not sexually active, No obstetric history on file..  Using not sexually active for contraception.    reports that she has never smoked. She has never used smokeless tobacco.    STD testing offered?  Declined    Health maintenance updated:  yes      Problem list and histories reviewed & adjusted, as indicated.  Additional history: as documented.    Patient Active Problem List   Diagnosis     Inflammatory arthritis     Osteopenia     Papanicolaou smear of cervix with low grade squamous intraepithelial lesion (LGSIL)     Latent tuberculosis     Atrophic vaginitis     Elevated transaminase level     Recurrent genital herpes     Posterior neck pain     Hyperlipidemia LDL goal <160     Gastroesophageal reflux disease without esophagitis     Major depressive disorder, single episode, moderate (H)     Osteoarthritis of finger, unspecified laterality     Perennial allergic rhinitis     Advanced directives, counseling/discussion     Vertigo     Vestibular neuronitis of left ear     Past Surgical History:   Procedure Laterality Date     C DEXA INTERPRETATION, AXIAL  3/2009    T score lumbar -0.2, femoral neck -1.4/-1.3     COLONOSCOPY  07/2009    normal, repeat 10 years     CONIZATION LEEP  2/2011    chronic endocervicitis     DEXA  3/2011    T score lumbar -0.7, femoral neck -1.7/-1.9 with BMD 0.769     DISKECTOMY, LUMBAR, SINGLE SP  12/2007     L5-S1 diskectomy     HC COLP CERVIX/UPPER VAGINA W BX CERVIX/ENDOCERV CURETT  3/2009    ECC bx with chronic inflammation and squamous atypia, favor reactive     HC COLP CERVIX/UPPER VAGINA W BX CERVIX/ENDOCERV CURETT  4/2010    acute inflammation, no dysplasia.  ECC negative - repeat pap 6 months     HC COLP CERVIX/UPPER VAGINA W ENDOCERV CURETT  3/2009    negative, reactive process with no dysplasia, repeat pap 6 months     HC CT HEAD WO CONTRAST  4/2009    normal - done for acute vertigo     HC DUPLEX EXTREMITY VENOUS, LIMITED UNILATERAL  4/2006    left lower extremity - normal     HC INJ TRANSFORAMIN EPIDURAL, CERV/THOR SINGLE  5/2006    C7-T1 epidural steroid     HC MRI CERVICAL SPINE W/O CONTRAST  3/2006    C3-4 small central disc /C4-5 broad based disk/C5-6 central to rt disc all with mild to mod central stenosis     INJECTION, ANESTHETIC/STEROID, TRANSFORAMINAL EPIDURAL; LUMBAR/SACRAL, SINGLE LEVEL  8,9/2007    left L5 selective nerve root injection under fluoroscopy with corticosteroid.      INJECTION, ANESTHETIC/STEROID, TRANSFORAMINAL EPIDURAL; LUMBAR/SACRAL, SINGLE LEVEL  10/2007    right L5-S1 translaminar epidural injection     LAMINECTOMY LUMBAR ONE LEVEL  3/6/2012    Procedure:LAMINECTOMY LUMBAR ONE LEVEL; REVISION DISCECTOMY L5-S1 ON THE LEFT ; Surgeon:ROSE PRITCHETT; Location: OR     LAPAROSCOPIC CHOLECYSTECTOMY  7/99     MR LUMBAR SPINE W/O CONTRAST  1/2012    Moderate-sized left posterolateral caudally extruded L5-S1disc herniation impinging on left S1 nerve, mild degenerative disc changes at L4-5     RELEASE TRIGGER FINGER  5/17/2012    Procedure:RELEASE TRIGGER FINGER; right ring finger trigger release  (latex allergy:contact); Surgeon:QUYEN FUNK; Location: [facility]     SONO ABDOMEN LIMITED  9/2012    Increased echotexture liver c/w fatty infiltration     TUBAL LIGATION  2000     XR LUMBAR TRANSFORAMINAL INJ SINGLE  1/2012    Left L5-S1 transforaminal epidural steroid/anesthetic along  "left L5 nerve root       Social History   Substance Use Topics     Smoking status: Never Smoker     Smokeless tobacco: Never Used     Alcohol use No      Problem (# of Occurrences) Relation (Name,Age of Onset)    Hypertension (2) Mother, Father    Lipids (3) Mother, Father, Sister            Current Outpatient Prescriptions   Medication Sig     CALTRATE 600+D PLUS 600-400 MG-UNIT OR TABS 1 TABLET TWICE DAILY      CENTRUM OR TABS 1 TABLET DAILY     cetirizine (ZYRTEC) 10 MG tablet Take 1 tablet (10 mg) by mouth daily     cyclobenzaprine (FLEXERIL) 5 MG tablet TAKE 1 TABLET BY MOUTH NIGHTLY AS NEEDED FOR MUSCLE SPASMS     estradiol (ESTRACE VAGINAL) 0.1 MG/GM cream Place 0.5 g vaginally twice a week Place 0.5mg vaginally every night for a week, than twice weekly.     FLUoxetine (PROZAC) 40 MG capsule Take 1 capsule (40 mg) by mouth daily     methocarbamol (ROBAXIN) 750 MG tablet Take 1 tablet (750 mg) by mouth 3 times daily as needed     omeprazole (PRILOSEC) 40 MG capsule TAKE 1 CAPSULE DAILY       30 TO 60 MINUTES BEFORE    A MEAL     valACYclovir (VALTREX) 500 MG tablet TAKE 1 TABLET DAILY     VITAMIN D 1000 UNIT OR CAPS 2 CAPSULES DAILY     No current facility-administered medications for this visit.      Allergies   Allergen Reactions     Aspirin Rash     Latex      Nsaids GI Disturbance     Sulfasalazine Rash       ROS:  12 point review of systems negative other than symptoms noted below.    OBJECTIVE:     /70  Pulse 72  Ht 4' 11.5\" (1.511 m)  Wt 131 lb (59.4 kg)  LMP 03/24/2009  BMI 26.02 kg/m2  Body mass index is 26.02 kg/(m^2).    Exam:  Constitutional:  Appearance: Well nourished, well developed alert, in no acute distress  Neurologic/Psychiatric:  Mental Status:  Oriented X3   Pelvic Exam:  External Genitalia:     Normal appearance for age, no discharge present, no tenderness present, no inflammatory lesions present, color normal  Vagina:     Normal vaginal vault without central or paravaginal " defects, no discharge present, no inflammatory lesions present, no masses present  Urethra:   Urethral Meatus:  No erythema or lesions present  Cervix:     Appearance healthy, no lesions present, nontender to palpation, no bleeding present  Perineum:     Perineum within normal limits, no evidence of trauma, no rashes or skin lesions present  Anus:     Anus within normal limits, no hemorrhoids present  Inguinal Lymph Nodes:     No lymphadenopathy present  Pubic Hair:     Normal pubic hair distribution for age  Genitalia and Groin:     No rashes present, no lesions present, no areas of discoloration, no masses present       In-Clinic Test Results:  No results found for this or any previous visit (from the past 24 hour(s)).    ASSESSMENT/PLAN:                                                        ICD-10-CM    1. Papanicolaou smear of cervix with atypical squamous cells cannot exclude high grade squamous intraepithelial lesion (ASC-H) R87.611 Pap imaged thin layer diagnostic with HPV (select HPV order below)     HPV High Risk Types DNA Cervical   2. Need for prophylactic vaccination and inoculation against influenza Z23 FLU VACCINE, SPLIT VIRUS, IM (QUADRIVALENT) [11427]- >3 YRS     Vaccine Administration, Initial [37730]       There are no Patient Instructions on file for this visit.    57 yo female with abnormal pap. Negative colpo in April 2018. Repeat pap smear collected today. Will follow up with pap results per Dr. Diaz.     MARIA DEL CARMNE Amador [facility]    Injectable Influenza Immunization Documentation    1.  Is the person to be vaccinated sick today?   No    2. Does the person to be vaccinated have an allergy to a component   of the vaccine?   No  Egg Allergy Algorithm Link    3. Has the person to be vaccinated ever had a serious reaction   to influenza vaccine in the past?   No    4. Has the person to be vaccinated ever had Guillain-Barré syndrome?   No    Form completed by  Zarina Haile, CMA

## 2018-10-02 NOTE — MR AVS SNAPSHOT
"              After Visit Summary   10/2/2018    Fredy Parker    MRN: [id number]           Patient Information     Date Of Birth          1960        Visit Information        Provider Department      10/2/2018 2:30 PM Ivette Ahmadi APRN CNP [facility]        Today's Diagnoses     Papanicolaou smear of cervix with atypical squamous cells cannot exclude high grade squamous intraepithelial lesion (ASC-H)    -  1    Need for prophylactic vaccination and inoculation against influenza           Follow-ups after your visit        Who to contact     If you have questions or need follow up information about today's clinic visit or your schedule please contact [facility] directly at [phone].  Normal or non-critical lab and imaging results will be communicated to you by MyChart, letter or phone within 4 business days after the clinic has received the results. If you do not hear from us within 7 days, please contact the clinic through MyChart or phone. If you have a critical or abnormal lab result, we will notify you by phone as soon as possible.  Submit refill requests through [employer] or call your pharmacy and they will forward the refill request to us. Please allow 3 business days for your refill to be completed.          Additional Information About Your Visit        Care EveryWhere ID     This is your Care EveryWhere ID. This could be used by other organizations to access your [place] medical records  [id number]        Your Vitals Were     Pulse Height Last Period BMI (Body Mass Index)          72 4' 11.5\" (1.511 m) 03/24/2009 26.02 kg/m2         Blood Pressure from Last 3 Encounters:   10/02/18 120/70   04/17/18 124/70   04/10/18 134/86    Weight from Last 3 Encounters:   10/02/18 131 lb (59.4 kg)   04/17/18 128 lb 3.2 oz (58.2 kg)   04/10/18 130 lb (59 kg)              We Performed the Following     FLU VACCINE, SPLIT VIRUS, IM (QUADRIVALENT) [14964]- >3 " YRS     HPV High Risk Types DNA Cervical     Pap imaged thin layer diagnostic with HPV (select HPV order below)     Vaccine Administration, Initial [[id number]]        Primary Care Provider Office Phone # Fax #    [facility] [phone]       [facility] [address]  [place] [address]        Equal Access to Services     IVAN TRISTAN : Hadii aad ku hadasho Soomaali, waaxda luqadaha, qaybta kaalmada adeegyada, waxay idiin hayaan adeeg khnasrinyuridia lajatin ah. So [facility] [phone].    ATENCIÓN: Si habla español, tiene a mercado disposición servicios gratuitos de asistencia lingüística. Angus al [phone].    We comply with applicable federal civil rights laws and [place] laws. We do not discriminate on the basis of race, color, national origin, age, disability, sex, sexual orientation, or gender identity.            Thank you!     Thank you for choosing [facility]  for your care. Our goal is always to provide you with excellent care. Hearing back from our patients is one way we can continue to improve our services. Please take a few minutes to complete the written survey that you may receive in the mail after your visit with us. Thank you!             Your Updated Medication List - Protect others around you: Learn how to safely use, store and throw away your medicines at www.disposemymeds.org.          This list is accurate as of 10/2/18  2:42 PM.  Always use your most recent med list.                   Brand Name Dispense Instructions for use Diagnosis    CALTRATE 600+D PLUS 600-400 MG-UNIT Tabs     3 MONTHS    1 TABLET TWICE DAILY    Osteopenia       CENTRUM Tabs     30    1 TABLET DAILY        cetirizine 10 MG tablet    ZYRTEC    90 tablet    Take 1 tablet (10 mg) by mouth daily    Perennial allergic rhinitis       cyclobenzaprine 5 MG tablet    FLEXERIL    30 tablet    TAKE 1 TABLET BY MOUTH NIGHTLY AS NEEDED FOR MUSCLE  SPASMS    Posterior neck pain       estradiol 0.1 MG/GM cream    ESTRACE VAGINAL    42.5 g    Place 0.5 g vaginally twice a week Place 0.5mg vaginally every night for a week, than twice weekly.    Atrophy of vagina       FLUoxetine 40 MG capsule    PROzac    90 capsule    Take 1 capsule (40 mg) by mouth daily    Major depressive disorder, single episode, moderate (H)       methocarbamol 750 MG tablet    ROBAXIN    30 tablet    Take 1 tablet (750 mg) by mouth 3 times daily as needed    Back muscle spasm       omeprazole 40 MG capsule    priLOSEC    90 capsule    TAKE 1 CAPSULE DAILY       30 TO 60 MINUTES BEFORE    A MEAL    Gastroesophageal reflux disease without esophagitis       valACYclovir 500 MG tablet    VALTREX    90 tablet    TAKE 1 TABLET DAILY    Recurrent genital herpes       vitamin D 1000 units capsule     180 Cap    2 CAPSULES DAILY    Routine physical examination

## 2018-10-04 ENCOUNTER — OFFICE VISIT (OUTPATIENT)
Dept: FAMILY MEDICINE | Facility: CLINIC | Age: 58
End: 2018-10-04
Payer: COMMERCIAL

## 2018-10-04 VITALS
OXYGEN SATURATION: 100 % | SYSTOLIC BLOOD PRESSURE: 130 MMHG | DIASTOLIC BLOOD PRESSURE: 80 MMHG | HEIGHT: 60 IN | TEMPERATURE: 97.4 F | WEIGHT: 131 LBS | BODY MASS INDEX: 25.72 KG/M2 | HEART RATE: 76 BPM

## 2018-10-04 DIAGNOSIS — M62.838 NECK MUSCLE SPASM: ICD-10-CM

## 2018-10-04 DIAGNOSIS — A60.00 RECURRENT GENITAL HERPES: ICD-10-CM

## 2018-10-04 DIAGNOSIS — S16.1XXD STRAIN OF NECK MUSCLE, SUBSEQUENT ENCOUNTER: Primary | ICD-10-CM

## 2018-10-04 PROCEDURE — 99214 OFFICE O/P EST MOD 30 MIN: CPT | Performed by: FAMILY MEDICINE

## 2018-10-04 RX ORDER — VALACYCLOVIR HYDROCHLORIDE 500 MG/1
500 TABLET, FILM COATED ORAL DAILY
Qty: 90 TABLET | Refills: 3 | Status: SHIPPED | OUTPATIENT
Start: 2018-10-04 | End: 2018-10-08

## 2018-10-04 RX ORDER — METHOCARBAMOL 750 MG/1
750 TABLET, FILM COATED ORAL 3 TIMES DAILY
Qty: 30 TABLET | Refills: 1 | Status: SHIPPED | OUTPATIENT
Start: 2018-10-04 | End: 2018-12-03

## 2018-10-04 ASSESSMENT — ANXIETY QUESTIONNAIRES
1. FEELING NERVOUS, ANXIOUS, OR ON EDGE: NOT AT ALL
3. WORRYING TOO MUCH ABOUT DIFFERENT THINGS: NOT AT ALL
7. FEELING AFRAID AS IF SOMETHING AWFUL MIGHT HAPPEN: NOT AT ALL
5. BEING SO RESTLESS THAT IT IS HARD TO SIT STILL: NOT AT ALL
GAD7 TOTAL SCORE: 0
6. BECOMING EASILY ANNOYED OR IRRITABLE: NOT AT ALL
2. NOT BEING ABLE TO STOP OR CONTROL WORRYING: NOT AT ALL

## 2018-10-04 ASSESSMENT — PATIENT HEALTH QUESTIONNAIRE - PHQ9: 5. POOR APPETITE OR OVEREATING: NOT AT ALL

## 2018-10-04 NOTE — MR AVS SNAPSHOT
"              After Visit Summary   10/4/2018    Fredy Parker    MRN: [id number]           Patient Information     Date Of Birth          1960        Visit Information        Provider Department      10/4/2018 4:00 PM Stevie Graves MD [facility] [place]        Today's Diagnoses     Strain of neck muscle, subsequent encounter    -  1    Neck muscle spasm        Recurrent genital herpes           Follow-ups after your visit        Follow-up notes from your care team     Return in about 3 months (around 1/4/2019) for if symptom does not get better.      Who to contact     If you have questions or need follow up information about today's clinic visit or your schedule please contact [facility] [place] directly at [phone].  Normal or non-critical lab and imaging results will be communicated to you by MyChart, letter or phone within 4 business days after the clinic has received the results. If you do not hear from us within 7 days, please contact the clinic through MyChart or phone. If you have a critical or abnormal lab result, we will notify you by phone as soon as possible.  Submit refill requests through [employer] or call your pharmacy and they will forward the refill request to us. Please allow 3 business days for your refill to be completed.          Additional Information About Your Visit        Care EveryWhere ID     This is your Care EveryWhere ID. This could be used by other organizations to access your [place] medical records  [id number]        Your Vitals Were     Pulse Temperature Height Last Period Pulse Oximetry Breastfeeding?    76 97.4  F (36.3  C) (Tympanic) 4' 11.5\" (1.511 m) 03/24/2009 100% No    BMI (Body Mass Index)                   26.02 kg/m2            Blood Pressure from Last 3 Encounters:   10/04/18 130/80   10/02/18 120/70   04/17/18 124/70    Weight from Last 3 Encounters:   10/04/18 131 lb (59.4 kg)   10/02/18 131 lb (59.4 kg)   04/17/18 128 lb 3.2 oz " (58.2 kg)              Today, you had the following     No orders found for display         Today's Medication Changes          These changes are accurate as of 10/4/18  4:26 PM.  If you have any questions, ask your nurse or doctor.               These medicines have changed or have updated prescriptions.        Dose/Directions    methocarbamol 750 MG tablet   Commonly known as:  ROBAXIN   This may have changed:    - when to take this  - reasons to take this   Used for:  Strain of neck muscle, subsequent encounter, Neck muscle spasm   Changed by:  Stevie Graves MD        Dose:  750 mg   Take 1 tablet (750 mg) by mouth 3 times daily   Quantity:  30 tablet   Refills:  1       valACYclovir 500 MG tablet   Commonly known as:  VALTREX   This may have changed:  See the new instructions.   Used for:  Recurrent genital herpes   Changed by:  Stevie Graves MD        Dose:  500 mg   Take 1 tablet (500 mg) by mouth daily   Quantity:  90 tablet   Refills:  3            Where to get your medicines      These medications were sent to [facility] [address] AT [address] to [address] [address]     Phone:  [phone]     valACYclovir 500 MG tablet         These medications were sent to [facility] [place] [id number] [facility]  [address] [address]     Phone:  [phone]     methocarbamol 750 MG tablet                Primary Care Provider Office Phone # Fax #    [facility] [phone]       [place] [address]  [facility]   [facility] [address]        Equal Access to Services     IVAN TRISTAN AH: Hadii randee Carbajal, waaxda luqadaha, qaybta kaalmaantonietta lopez. So [facility] [phone].    ATENCIÓN: Si habla español, tiene a mercado disposición servicios gratuitos de  asistencia lingüística. Angus al [phone].    We comply with applicable federal civil rights laws and [place] laws. We do not discriminate on the basis of race, color, national origin, age, disability, sex, sexual orientation, or gender identity.            Thank you!     Thank you for choosing [facility] [place]  for your care. Our goal is always to provide you with excellent care. Hearing back from our patients is one way we can continue to improve our services. Please take a few minutes to complete the written survey that you may receive in the mail after your visit with us. Thank you!             Your Updated Medication List - Protect others around you: Learn how to safely use, store and throw away your medicines at www.disposemymeds.org.          This list is accurate as of 10/4/18  4:26 PM.  Always use your most recent med list.                   Brand Name Dispense Instructions for use Diagnosis    CALTRATE 600+D PLUS 600-400 MG-UNIT Tabs     3 MONTHS    1 TABLET TWICE DAILY    Osteopenia       CENTRUM Tabs     30    1 TABLET DAILY        cetirizine 10 MG tablet    ZYRTEC    90 tablet    Take 1 tablet (10 mg) by mouth daily    Perennial allergic rhinitis       cyclobenzaprine 5 MG tablet    FLEXERIL    30 tablet    TAKE 1 TABLET BY MOUTH NIGHTLY AS NEEDED FOR MUSCLE SPASMS    Posterior neck pain       estradiol 0.1 MG/GM cream    ESTRACE VAGINAL    42.5 g    Place 0.5 g vaginally twice a week Place 0.5mg vaginally every night for a week, than twice weekly.    Atrophy of vagina       FLUoxetine 40 MG capsule    PROzac    90 capsule    Take 1 capsule (40 mg) by mouth daily    Major depressive disorder, single episode, moderate (H)       methocarbamol 750 MG tablet    ROBAXIN    30 tablet    Take 1 tablet (750 mg) by mouth 3 times daily    Strain of neck muscle, subsequent encounter, Neck muscle spasm       omeprazole 40 MG capsule    priLOSEC    90 capsule    TAKE 1 CAPSULE DAILY       30 TO 60  MINUTES BEFORE    A MEAL    Gastroesophageal reflux disease without esophagitis       valACYclovir 500 MG tablet    VALTREX    90 tablet    Take 1 tablet (500 mg) by mouth daily    Recurrent genital herpes       vitamin D 1000 units capsule     180 Cap    2 CAPSULES DAILY    Routine physical examination

## 2018-10-04 NOTE — NURSING NOTE
"Chief Complaint   Patient presents with     Neck Pain       Initial /80  Pulse 76  Temp 97.4  F (36.3  C) (Tympanic)  Ht 4' 11.5\" (1.511 m)  Wt 131 lb (59.4 kg)  LMP 03/24/2009  SpO2 100%  Breastfeeding? No  BMI 26.02 kg/m2 Estimated body mass index is 26.02 kg/(m^2) as calculated from the following:    Height as of this encounter: 4' 11.5\" (1.511 m).    Weight as of this encounter: 131 lb (59.4 kg).  BP completed using cuff size: regular    Health Maintenance Due   Topic Date Due     HIV SCREEN (SYSTEM ASSIGNED)  05/25/1976     DEPRESSION ACTION PLAN Q1 YR  02/20/2018     PHQ-9 Q6 MONTHS  09/19/2018         Liv Ponce MA 10/04/18        "

## 2018-10-04 NOTE — PROGRESS NOTES
SUBJECTIVE:   Fredy Parker is a 58 year old female who presents to clinic today for the following health issues:      Neck Pain  Onset: x 2 years     Description:   Location: Lower Neck   Radiation: into the right shoulder    Intensity: moderate    Progression of Symptoms:  worsening    Accompanying Signs & Symptoms:  Burning, prickly sensation (paresthesias) in arm(s): no   Numbness in arm(s): no   Weakness in arm(s):  no   Fever: no   Headache: YES  Nausea and/or vomiting: no     History:   Trauma: no   Previous neck pain: Tried Flexeril but it has not helped.  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        Problem list and histories reviewed & adjusted, as indicated.  Additional history: as documented    Patient Active Problem List   Diagnosis     Inflammatory arthritis     Osteopenia     Papanicolaou smear of cervix with low grade squamous intraepithelial lesion (LGSIL)     Latent tuberculosis     Atrophic vaginitis     Elevated transaminase level     Recurrent genital herpes     Posterior neck pain     Hyperlipidemia LDL goal <160     Gastroesophageal reflux disease without esophagitis     Major depressive disorder, single episode, moderate (H)     Osteoarthritis of finger, unspecified laterality     Perennial allergic rhinitis     Advanced directives, counseling/discussion     Vertigo     Vestibular neuronitis of left ear     Past Surgical History:   Procedure Laterality Date     C DEXA INTERPRETATION, AXIAL  3/2009    T score lumbar -0.2, femoral neck -1.4/-1.3     COLONOSCOPY  07/2009    normal, repeat 10 years     CONIZATION LEEP  2/2011    chronic endocervicitis     DEXA  3/2011    T score lumbar -0.7, femoral neck -1.7/-1.9 with BMD  0.769     DISKECTOMY, LUMBAR, SINGLE SP  12/2007    L5-S1 diskectomy     HC COLP CERVIX/UPPER VAGINA W BX CERVIX/ENDOCERV CURETT  3/2009    ECC bx with chronic inflammation and squamous atypia, favor reactive     HC COLP CERVIX/UPPER VAGINA W BX CERVIX/ENDOCERV CURETT  4/2010    acute inflammation, no dysplasia.  ECC negative - repeat pap 6 months     HC COLP CERVIX/UPPER VAGINA W ENDOCERV CURETT  3/2009    negative, reactive process with no dysplasia, repeat pap 6 months     HC CT HEAD WO CONTRAST  4/2009    normal - done for acute vertigo     HC DUPLEX EXTREMITY VENOUS, LIMITED UNILATERAL  4/2006    left lower extremity - normal     HC INJ TRANSFORAMIN EPIDURAL, CERV/THOR SINGLE  5/2006    C7-T1 epidural steroid     HC MRI CERVICAL SPINE W/O CONTRAST  3/2006    C3-4 small central disc /C4-5 broad based disk/C5-6 central to rt disc all with mild to mod central stenosis     INJECTION, ANESTHETIC/STEROID, TRANSFORAMINAL EPIDURAL; LUMBAR/SACRAL, SINGLE LEVEL  8,9/2007    left L5 selective nerve root injection under fluoroscopy with corticosteroid.      INJECTION, ANESTHETIC/STEROID, TRANSFORAMINAL EPIDURAL; LUMBAR/SACRAL, SINGLE LEVEL  10/2007    right L5-S1 translaminar epidural injection     LAMINECTOMY LUMBAR ONE LEVEL  3/6/2012    Procedure:LAMINECTOMY LUMBAR ONE LEVEL; REVISION DISCECTOMY L5-S1 ON THE LEFT ; Surgeon:ROSE PRITCHETT; Location: OR     LAPAROSCOPIC CHOLECYSTECTOMY  7/99     MR LUMBAR SPINE W/O CONTRAST  1/2012    Moderate-sized left posterolateral caudally extruded L5-S1disc herniation impinging on left S1 nerve, mild degenerative disc changes at L4-5     RELEASE TRIGGER FINGER  5/17/2012    Procedure:RELEASE TRIGGER FINGER; right ring finger trigger release  (latex allergy:contact); Surgeon:QUYEN FUNK; Location: [facility]     SONO ABDOMEN LIMITED  9/2012    Increased echotexture liver c/w fatty infiltration     TUBAL LIGATION  2000     XR LUMBAR TRANSFORAMINAL INJ SINGLE  1/2012    Left L5-S1  transforaminal epidural steroid/anesthetic along left L5 nerve root        Social History   Substance Use Topics     Smoking status: Never Smoker     Smokeless tobacco: Never Used     Alcohol use No     Family History   Problem Relation Age of Onset     Hypertension Mother      Lipids Mother      Hypertension Father      Lipids Father      Lipids Sister          Current Outpatient Prescriptions   Medication Sig Dispense Refill     methocarbamol (ROBAXIN) 750 MG tablet Take 1 tablet (750 mg) by mouth 3 times daily 30 tablet 1     valACYclovir (VALTREX) 500 MG tablet Take 1 tablet (500 mg) by mouth daily 90 tablet 3     CALTRATE 600+D PLUS 600-400 MG-UNIT OR TABS 1 TABLET TWICE DAILY  3 MONTHS PRN     CENTRUM OR TABS 1 TABLET DAILY 30 0     cetirizine (ZYRTEC) 10 MG tablet Take 1 tablet (10 mg) by mouth daily 90 tablet 3     cyclobenzaprine (FLEXERIL) 5 MG tablet TAKE 1 TABLET BY MOUTH NIGHTLY AS NEEDED FOR MUSCLE SPASMS 30 tablet 2     estradiol (ESTRACE VAGINAL) 0.1 MG/GM cream Place 0.5 g vaginally twice a week Place 0.5mg vaginally every night for a week, than twice weekly. 42.5 g 6     FLUoxetine (PROZAC) 40 MG capsule Take 1 capsule (40 mg) by mouth daily 90 capsule 1     omeprazole (PRILOSEC) 40 MG capsule TAKE 1 CAPSULE DAILY       30 TO 60 MINUTES BEFORE    A MEAL 90 capsule 2     VITAMIN D 1000 UNIT OR CAPS 2 CAPSULES DAILY 180 Cap PRN     [DISCONTINUED] valACYclovir (VALTREX) 500 MG tablet TAKE 1 TABLET DAILY 90 tablet 0       Reviewed and updated as needed this visit by clinical staff       Reviewed and updated as needed this visit by Provider         ROS:  CONSTITUTIONAL: NEGATIVE for fever, chills, change in weight  ENT/MOUTH: NEGATIVE for ear, mouth and throat problems  RESP: NEGATIVE for significant cough or SOB  CV: NEGATIVE for chest pain, palpitations or peripheral edema    OBJECTIVE:                                                    /80  Pulse 76  Temp 97.4  F (36.3  C) (Tympanic)  Ht 4'  "11.5\" (1.511 m)  Wt 131 lb (59.4 kg)  LMP 03/24/2009  SpO2 100%  Breastfeeding? No  BMI 26.02 kg/m2  Body mass index is 26.02 kg/(m^2).   GENERAL: healthy, alert, well nourished, well hydrated, no distress  HENT: ear canals- normal; TMs- normal; Nose- normal; Mouth- no ulcers, no lesions  NECK: no tenderness, no adenopathy, no asymmetry, no masses, no stiffness; thyroid- normal to palpation  RESP: lungs clear to auscultation - no rales, no rhonchi, no wheezes  CV: regular rates and rhythm, normal S1 S2, no S3 or S4 and no murmur, no click or rub -         ASSESSMENT/PLAN:                                                        ICD-10-CM    1. Strain of neck muscle, subsequent encounter S16.1XXD methocarbamol (ROBAXIN) 750 MG tablet   2. Neck muscle spasm M62.838 methocarbamol (ROBAXIN) 750 MG tablet   3. Recurrent genital herpes A60.00 valACYclovir (VALTREX) 500 MG tablet       Patient has ongoing issues with neck muscle spasm suggested Robaxin to be used as needed.  If she is not working, she can take up to 3 times a day.  Range of motion exercise discussed with the patient.  I do suggested patient she should get formal physical therapy.  She mentioned she has done that it has not helped her much.    Other modalities include stable posture.  Patient wondering if she could benefit from injection in that area.  I suggested to get into that level we may need to do an MRI to make sure there is any other abnormality.  She will let us know in future if her symptoms are continuing and recurrent will do further evaluation with MRI.  Stevie Graves MD  [facility]  "

## 2018-10-05 DIAGNOSIS — A60.00 RECURRENT GENITAL HERPES: ICD-10-CM

## 2018-10-05 LAB
COPATH REPORT: ABNORMAL
PAP: ABNORMAL

## 2018-10-05 ASSESSMENT — PATIENT HEALTH QUESTIONNAIRE - PHQ9: SUM OF ALL RESPONSES TO PHQ QUESTIONS 1-9: 6

## 2018-10-05 ASSESSMENT — ANXIETY QUESTIONNAIRES: GAD7 TOTAL SCORE: 0

## 2018-10-08 RX ORDER — VALACYCLOVIR HYDROCHLORIDE 500 MG/1
500 TABLET, FILM COATED ORAL DAILY
Qty: 90 TABLET | Refills: 3 | OUTPATIENT
Start: 2018-10-08

## 2018-10-08 RX ORDER — VALACYCLOVIR HYDROCHLORIDE 500 MG/1
500 TABLET, FILM COATED ORAL DAILY
Qty: 90 TABLET | Refills: 3 | Status: SHIPPED | OUTPATIENT
Start: 2018-10-08 | End: 2019-09-19

## 2018-10-08 NOTE — TELEPHONE ENCOUNTER
Patient walked into clinic, requests this rx be transferred from mail order to clinic pharmacy. Re-ordered per patient request.   Sumi Schilling RN   [facility] - Triage

## 2018-10-08 NOTE — TELEPHONE ENCOUNTER
"Requested Prescriptions   Pending Prescriptions Disp Refills     valACYclovir (VALTREX) 500 MG tablet  Last Written Prescription Date:  10-4-2018  Last Fill Quantity: 90 tablet,  # refills: 3   Last office visit: 10/4/2018 with prescribing provider:     Future Office Visit:     90 tablet 3     Sig: Take 1 tablet (500 mg) by mouth daily    Antivirals for Herpes Protocol Passed    10/5/2018  4:43 PM       Passed - Patient is age 12 or older       Passed - Recent (12 mo) or future (30 days) visit within the authorizing provider's specialty    Patient had office visit in the last 12 months or has a visit in the next 30 days with authorizing provider or within the authorizing provider's specialty.  See \"Patient Info\" tab in inbasket, or \"Choose Columns\" in Meds & Orders section of the refill encounter.           Passed - Normal serum creatinine on file in past 12 months    Recent Labs   Lab Test  10/26/17   1829   CR  0.66               "

## 2018-10-14 DIAGNOSIS — F32.1 MAJOR DEPRESSIVE DISORDER, SINGLE EPISODE, MODERATE (H): ICD-10-CM

## 2018-10-15 RX ORDER — FLUOXETINE 40 MG/1
CAPSULE ORAL
Qty: 90 CAPSULE | Refills: 0 | Status: SHIPPED | OUTPATIENT
Start: 2018-10-15 | End: 2019-01-12

## 2018-10-15 NOTE — TELEPHONE ENCOUNTER
"Requested Prescriptions   Pending Prescriptions Disp Refills     FLUoxetine (PROZAC) 40 MG capsule [Pharmacy Med Name: FLUOXETIN(P) CAP 40MG]  Last Written Prescription Date:  4/26/18  Last Fill Quantity: 90,  # refills: 1   Last office visit: 10/4/2018 with prescribing provider:  Star   Future Office Visit:     90 capsule 1     Sig: TAKE 1 CAPSULE DAILY    SSRIs Protocol Failed    10/14/2018  5:39 AM       Failed - PHQ-9 score less than 5 in past 6 months    Please review last PHQ-9 score.   PHQ-9 SCORE 11/3/2017 3/19/2018 10/4/2018   Total Score - - -   Total Score 4 0 6   Total Score - - -              Passed - Patient is age 18 or older       Passed - No active pregnancy on record       Passed - No positive pregnancy test in last 12 months       Passed - Recent (6 mo) or future (30 days) visit within the authorizing provider's specialty    Patient had office visit in the last 6 months or has a visit in the next 30 days with authorizing provider or within the authorizing provider's specialty.  See \"Patient Info\" tab in inbasket, or \"Choose Columns\" in Meds & Orders section of the refill encounter.              "

## 2018-10-15 NOTE — TELEPHONE ENCOUNTER
Prescription approved per [facility] Refill Protocol.    Barb VITAL RN  EP Triage

## 2019-01-02 DIAGNOSIS — A60.00 RECURRENT GENITAL HERPES: ICD-10-CM

## 2019-01-03 RX ORDER — VALACYCLOVIR HYDROCHLORIDE 500 MG/1
TABLET, FILM COATED ORAL
Qty: 30 TABLET | Refills: 0 | Status: SHIPPED | OUTPATIENT
Start: 2019-01-03 | End: 2019-07-08

## 2019-01-03 NOTE — TELEPHONE ENCOUNTER
"Requested Prescriptions   Pending Prescriptions Disp Refills     valACYclovir (VALTREX) 500 MG tablet [Pharmacy Med Name: VALACYCLOVIR HCL 500MG TABS] 30 tablet 0     Sig: TAKE ONE TABLET BY MOUTH EVERY DAY    Antivirals for Herpes Protocol Failed - 1/2/2019  5:58 PM       Failed - Normal serum creatinine on file in past 12 months    Recent Labs   Lab Test 10/26/17  1829   CR 0.66        Last Written Prescription Date:  10/8/2018  Last Fill Quantity: 90,  # refills: 3   Last office visit: 10/4/2018 with prescribing provider:  RJ Graves  Future Office Visit:   Next 5 appointments (look out 90 days)    Apr 02, 2019  3:30 PM CDT  [facility] [address] [place] [id number] [phone]             Passed - Patient is age 12 or older       Passed - Recent (12 mo) or future (30 days) visit within the authorizing provider's specialty    Patient had office visit in the last 12 months or has a visit in the next 30 days with authorizing provider or within the authorizing provider's specialty.  See \"Patient Info\" tab in inbasket, or \"Choose Columns\" in Meds & Orders section of the refill encounter.                "

## 2019-01-12 DIAGNOSIS — F32.1 MAJOR DEPRESSIVE DISORDER, SINGLE EPISODE, MODERATE (H): ICD-10-CM

## 2019-01-14 NOTE — TELEPHONE ENCOUNTER
"Requested Prescriptions   Pending Prescriptions Disp Refills     FLUoxetine (PROZAC) 40 MG capsule [Pharmacy Med Name: FLUOXETIN(P) CAP 40MG]  Last Written Prescription Date:  10/15/18  Last Fill Quantity: 90,  # refills: 0   Last office visit: 10/4/2018 with prescribing provider:  Star   Future Office Visit:   Next 5 appointments (look out 90 days)    Apr 02, 2019  3:30 PM CDT  [facility] Visit with Kristine Diaz MD  [facility] [address] [place] [phone]  [phone]          90 capsule 0     Sig: TAKE 1 CAPSULE DAILY    SSRIs Protocol Failed - 1/12/2019 12:49 AM       Failed - PHQ-9 score less than 5 in past 6 months    Please review last PHQ-9 score.   PHQ-9 SCORE 11/3/2017 3/19/2018 10/4/2018   PHQ-9 Total Score - - -   PHQ-9 Total Score 4 0 6   PHQ-9 Total Score - - -              Passed - Medication is active on med list       Passed - Patient is age 18 or older       Passed - No active pregnancy on record       Passed - No positive pregnancy test in last 12 months       Passed - Recent (6 mo) or future (30 days) visit within the authorizing provider's specialty    Patient had office visit in the last 6 months or has a visit in the next 30 days with authorizing provider or within the authorizing provider's specialty.  See \"Patient Info\" tab in inbasket, or \"Choose Columns\" in Meds & Orders section of the refill encounter.              "

## 2019-01-14 NOTE — TELEPHONE ENCOUNTER
Routing refill request to provider for review/approval because:  phq9 out of protocol range  Noelle ReardonRN BSN  [facility]  [phone]

## 2019-01-15 RX ORDER — FLUOXETINE 40 MG/1
CAPSULE ORAL
Qty: 90 CAPSULE | Refills: 0 | Status: SHIPPED | OUTPATIENT
Start: 2019-01-15 | End: 2019-04-29

## 2019-01-21 DIAGNOSIS — K21.9 GASTROESOPHAGEAL REFLUX DISEASE WITHOUT ESOPHAGITIS: ICD-10-CM

## 2019-01-21 RX ORDER — OMEPRAZOLE 40 MG/1
CAPSULE, DELAYED RELEASE ORAL
Qty: 90 CAPSULE | Refills: 2 | Status: SHIPPED | OUTPATIENT
Start: 2019-01-21 | End: 2019-09-19

## 2019-01-21 NOTE — TELEPHONE ENCOUNTER
Prescription approved per [facility] Refill Protocol.  Cielo Bradford RN- Triage [employer]

## 2019-01-21 NOTE — TELEPHONE ENCOUNTER
"Requested Prescriptions   Pending Prescriptions Disp Refills     omeprazole (PRILOSEC) 40 MG DR capsule  Last Written Prescription Date:  4/17/189  Last Fill Quantity: 90,  # refills: 2   Last office visit: 10/4/2018 with prescribing provider:  Star   Future Office Visit:   Next 5 appointments (look out 90 days)    Apr 02, 2019  3:30 PM CDT  Office Visit with Kristine Diaz MD  [facility] [address] [place] [id number]  [phone]          90 capsule 2    PPI Protocol Failed - 1/21/2019  9:47 AM       Failed - Recent (12 mo) or future (30 days) visit within the authorizing provider's specialty    Patient had office visit in the last 12 months or has a visit in the next 30 days with authorizing provider or within the authorizing provider's specialty.  See \"Patient Info\" tab in inbasket, or \"Choose Columns\" in Meds & Orders section of the refill encounter.             Passed - Not on Clopidogrel (unless Pantoprazole ordered)       Passed - No diagnosis of osteoporosis on record       Passed - Medication is active on med list       Passed - Patient is age 18 or older       Passed - No active pregnacy on record       Passed - No positive pregnancy test in past 12 months          "

## 2019-02-15 ENCOUNTER — OFFICE VISIT (OUTPATIENT)
Dept: FAMILY MEDICINE | Facility: CLINIC | Age: 59
End: 2019-02-15
Payer: COMMERCIAL

## 2019-02-15 VITALS
HEART RATE: 64 BPM | TEMPERATURE: 97.3 F | WEIGHT: 126 LBS | BODY MASS INDEX: 25.02 KG/M2 | SYSTOLIC BLOOD PRESSURE: 135 MMHG | DIASTOLIC BLOOD PRESSURE: 76 MMHG | OXYGEN SATURATION: 99 %

## 2019-02-15 DIAGNOSIS — M54.2 POSTERIOR NECK PAIN: Primary | ICD-10-CM

## 2019-02-15 DIAGNOSIS — S16.1XXD STRAIN OF NECK MUSCLE, SUBSEQUENT ENCOUNTER: ICD-10-CM

## 2019-02-15 PROCEDURE — 99213 OFFICE O/P EST LOW 20 MIN: CPT | Performed by: NURSE PRACTITIONER

## 2019-02-15 RX ORDER — PREDNISONE 20 MG/1
40 TABLET ORAL DAILY
Qty: 10 TABLET | Refills: 0 | Status: SHIPPED | OUTPATIENT
Start: 2019-02-15 | End: 2019-02-19

## 2019-02-15 RX ORDER — TIZANIDINE 2 MG/1
2-4 TABLET ORAL 3 TIMES DAILY
Qty: 90 TABLET | Refills: 1 | Status: SHIPPED | OUTPATIENT
Start: 2019-02-15 | End: 2019-12-11

## 2019-02-15 NOTE — PROGRESS NOTES
SUBJECTIVE:                                                      Fredy Parker is a 58 year old female who presents to clinic today for the following health issues:    Concern - Pt is here for ongoing neck pain. Has been seen in October for this. States it flared up again two weeks ago.     Therapies Tried and outcome: tylenol, heat, stretching     HPI: Fredy presents today with the complaint of ongoing neck pain. This is a chronic issue for her. She has tried and failed chiropractic care and two different muscle relaxants (Flexeril and Robaxin). She is unable to tolerated aspirin or NSAIDs (anaphylaxis). No recalled injury, though she did work in assembly for a time, so she frequently spent hours standing with her neck flexed forward. She is frustrated with this issue and reports that she is having a hard time driving due to the inability to turn her head to look before turning or switching lanes. She denies concerning neurologic issues and limb weakness.    Problem list and histories reviewed & adjusted, as indicated.  Additional history: as documented    Reviewed and updated as needed this visit by clinical staff  Tobacco  Allergies  Meds  Problems  Med Hx  Surg Hx  Fam Hx       Reviewed and updated as needed this visit by Provider  Tobacco  Allergies  Meds  Problems  Med Hx  Surg Hx  Fam Hx         ROS:  Constitutional, HEENT, musculoskeletal, neuro systems are negative, except as otherwise noted.    OBJECTIVE:                                                      /76   Pulse 64   Temp 97.3  F (36.3  C) (Tympanic)   Wt 57.2 kg (126 lb)   LMP 03/24/2009   SpO2 99%   BMI 25.02 kg/m   Body mass index is 25.02 kg/m .   GENERAL: healthy, alert, well nourished, well hydrated, no distress  HENT: ear canals- normal; TMs- normal; Nose- normal; Mouth- no ulcers, no lesions  NECK: no tenderness, no adenopathy, no asymmetry, no masses, no stiffness; thyroid- normal to palpation  MS:  cervical region - pain elicited with rotation, flexion, and extension of neck; ROM and strength preserved. Muscle spasm / knots (tender) palpated bilaterally adjacent to cervical spine.  NEURO: strength and tone- normal, sensory exam- grossly normal, mentation- intact, speech- normal, reflexes- symmetric    Diagnostic test results:  none     ASSESSMENT/PLAN:                                                      Fredy was seen today for neck pain. Frustrated, persistent neck pain / muscle spasm issues. She was reluctant to see PT / chiro again, but I suggested that she try this one more time. I also ordered a referral to medical spine / sports med, so further workup of her neck issue can begin. In the short term, I ordered Zanaflex for muscle spasm (she has not tried this particular muscle relaxant yet). Pain management is a challenge for her given her intolerance of NSAIDs, but I will try a 5 day burst of prednisone in the hope that this will assist with inflammation / pain. Discussed reasons to call or return to clinic. Carlene acknowledges and demonstrates understanding of circumstances under which care should be sought urgently or emergently. Follow up as discussed. Discussed risks, benefits, alternatives, potential side effects, and proper administration of new medication / treatment. Agrees with plan of care. All questions answered.       Diagnoses and all orders for this visit:    Posterior neck pain  -     [facility] PT[facility] HAND, AND CHIROPRACTIC REFERRAL; Future  -     SPORTS MEDICINE REFERRAL  -     predniSONE (DELTASONE) 20 MG tablet; Take 40 mg by mouth daily for 5 days.    Strain of neck muscle, subsequent encounter  -     tiZANidine (ZANAFLEX) 2 MG tablet; Take 1-2 tablets (2-4 mg) by mouth 3 times daily  -     [facility] PT, HAND, AND CHIROPRACTIC REFERRAL; Future  -     SPORTS MEDICINE REFERRAL      Risks, benefits and alternatives of treatments discussed. Plan agreed upon and all questions answered.      Follow-Up:  Return in about 4 weeks (around 3/15/2019) for peristent or worsening symptoms.    See Patient Instructions      MARIA DEL CARMEN Lewis, CNP

## 2019-02-15 NOTE — PATIENT INSTRUCTIONS
1. Try a different muscle relaxant - tizanidine. 2-4 mg up to three times a day  2. Try 5 day course of prednisone for neck pain  3. See sports medicine Dr[facility]row  4. Start physical therapy / chiropractor (based on therapists evaluation)

## 2019-02-18 NOTE — PROGRESS NOTES
HPI   [facility]   Clinic Visit s Feb 19, 2019    PCP: Clinic, [facility] Evy Daniel is a 58 year old female who is seen in consultation at the request of Dr. Guzman for   Chief Complaint   Patient presents with     Neck - Pain       Injury: Patient reports insidious onset without acute precipitating event.     Right hand dominant    Location of Pain: both sides  neck, nonradiating   Duration of Pain: 2 week(s)  Rating of Pain at worst: 8/10  Rating of Pain Currently: 4/10  Pain is better with: nothing   Pain is worse with: turing her neck and self care  Treatment so far consists of: other medications: Prednisone (Medrol), tizanidine, heat, tylenol  Associated symptoms: no distal numbness or tingling; denies swelling or warmth  Recent imaging completed: No recent imaging completed.  Prior History of related problems: none    Social History: is employed as a/an assembly    Past Medical History:   Diagnosis Date     Acne      Allergic rhinitis     grasses, birch, dust mite, cat, dog - previous immunotherapy     ASCUS with positive high risk HPV 09/2014    colposcopy benign     Atrophic vaginitis 2012    with dyspareunia     Chronic gastric ulcer without mention of hemorrhage, perforation, without mention of obstruction 1994    gastric erosion dx'ed by endoscopy. pt was also treated for pos H.pylori     Disc disorder of cervical region 2006    mild disc changes C3-4/C4-5 and C5-6 with chronic myofascial cervicoscapular pain     Elevated glucose 2012    one fasting glc of 136, A1C 6.3     Elevated transaminase level 2012    AST/ALT 2-3 x normal; fatty infiltration liver on ultrasound; negative testing for infectious hepatitis; normal iron levels, ELP, CK     Inflammatory arthritis 2009    likely a spondyloarthropathy, IP joints and [place]'s tendons     Latent tuberculosis 10/2009    started INH      LSIL (low grade squamous intraepithelial lesion) on Pap smear  3/2009, 3/2010, 2010    HPV 56 (high risk) detected      Lumbar disc herniation with radiculopathy     left extruded L5-S1 disc herniation, treated with diskectomy 2007     Major depressive disorder, single episode, moderate (H)      Mixed hyperlipidemia 2009    elevated TG and LDL     Osteopenia 2009    mild, femoral neck     Papanicolaou smear of cervix with low grade squamous intraepithelial lesion (LGSIL) 12/02/15 & 2017, 10/2/18    See problem list     Prolonged QT interval 2016    ? Prozac, resolved with decreased dose                                            Recurrent genital herpes      Status post laminectomy 2007: L5-S1 diskectomy :Revision diskectomy left at L5-S1     Status post LEEP (loop electrosurgical excision procedure) of cervix 2011    for persistent LSIL     Supervision of other normal pregnancy      - vaginal     Trigger finger, right     right ring finger - surgically treated 2012     Vestibular neuronitis of left ear 2016       Patient Active Problem List    Diagnosis Date Noted     Vestibular neuronitis of left ear 2016     Priority: Medium     Vertigo 2016     Priority: Medium     Advanced directives, counseling/discussion 12/10/2015     Priority: Medium     Advance Care Planning 12/10/2015: ACP Review of Chart:  Reviewed chart for advance care plan.  Fredy Parker has no plan or code status on file. Patient declined per [facility] referral. Added by Jocelyn Vo             Gastroesophageal reflux disease without esophagitis 2015     Priority: Medium     Major depressive disorder, single episode, moderate (H) 2015     Priority: Medium     Osteoarthritis of finger, unspecified laterality 2015     Priority: Medium     Perennial allergic rhinitis 2015     Priority: Medium     Hyperlipidemia LDL goal <160 2015     Priority: Medium     Posterior neck pain 2014      Priority: Medium     Recurrent genital herpes      Priority: Medium     Atrophic vaginitis      Priority: Medium     With dyspareunia       Elevated transaminase level      Priority: Medium     AST/ALT 2-3 x normal; fatty infiltration liver on ultrasound; negative testing for infectious hepatitis; normal iron levels, ELP, CK       Latent tuberculosis 10/01/2009     Priority: Medium     Osteopenia      Priority: Medium     mild, femoral neck       Papanicolaou smear of cervix with low grade squamous intraepithelial lesion (LGSIL) 03/01/2009     Priority: Medium     2/27/07 ASCUS/ Neg HPV  6/8/07 ASCUS/Neg HPV  3/20/09 LSIL  5/2009 COLP- Negative  3/26/10 LSIL/+ HR HPV  12/7/10 LSIL/Neg HPV  2/2011 LEEP  2011, 2012, 2013 NIL paps  09/29/14 ASCUS, +HR HPV.   10/21/14 [facility] = Cervical Bx and ECC= Benign. Repeat co-test 1 yr  12/02/15 LSIL/ +HR HPV. Plan: colposcopy  12/15/15 [facility]= Negative. 1 yr pap  2/20/17 LSIL/+ HR HPV (not 16 or 18).   04/17/17 [facility]= CORBIN 1. Referred to ObGyn for further evaluation  3/19/18  ASC-H, LSIL/+ HR HPV (not 16/18).  Plan: colposcopy by 6/19/18  4/10/18 [facility]- Normal.   10/2/18 LSIL pap, + HR HPV (not 16/18). Plan cotest in 6 months         Inflammatory arthritis      Priority: Medium     IP joints and Angel's tendons         Family History   Problem Relation Age of Onset     Hypertension Mother      Lipids Mother      Hypertension Father      Lipids Father      Lipids Sister        Social History     Socioeconomic History     Marital status:      Spouse name: Not on file     Number of children: 2     Years of education: 12     Highest education level: Not on file   Social Needs     Financial resource strain: Not on file     Food insecurity - worry: Not on file     Food insecurity - inability: Not on file     Transportation needs - medical: Not on file     Transportation needs - non-medical: Not on file   Occupational History     Occupation:      Employer: [employer]   Tobacco Use     Smoking status: Never Smoker     Smokeless tobacco: Never Used   Substance and Sexual Activity     Alcohol use: No     Alcohol/week: 0.0 oz       Past Surgical History:   Procedure Laterality Date     C DEXA INTERPRETATION, AXIAL  3/2009    T score lumbar -0.2, femoral neck -1.4/-1.3     COLONOSCOPY  07/2009    normal, repeat 10 years     CONIZATION LEEP  2/2011    chronic endocervicitis     DEXA  3/2011    T score lumbar -0.7, femoral neck -1.7/-1.9 with BMD 0.769     DISKECTOMY, LUMBAR, SINGLE SP  12/2007    L5-S1 diskectomy     HC COLP CERVIX/UPPER VAGINA W BX CERVIX/ENDOCERV CURETT  3/2009    ECC bx with chronic inflammation and squamous atypia, favor reactive     HC COLP CERVIX/UPPER VAGINA W BX CERVIX/ENDOCERV CURETT  4/2010    acute inflammation, no dysplasia.  ECC negative - repeat pap 6 months     HC COLP CERVIX/UPPER VAGINA W ENDOCERV CURETT  3/2009    negative, reactive process with no dysplasia, repeat pap 6 months     HC CT HEAD WO CONTRAST  4/2009    normal - done for acute vertigo     HC DUPLEX EXTREMITY VENOUS, LIMITED UNILATERAL  4/2006    left lower extremity - normal     HC INJ TRANSFORAMIN EPIDURAL, CERV/THOR SINGLE  5/2006    C7-T1 epidural steroid     HC MRI CERVICAL SPINE W/O CONTRAST  3/2006    C3-4 small central disc /C4-5 broad based disk/C5-6 central to rt disc all with mild to mod central stenosis     INJECTION, ANESTHETIC/STEROID, TRANSFORAMINAL EPIDURAL; LUMBAR/SACRAL, SINGLE LEVEL  8,9/2007    left L5 selective nerve root injection under fluoroscopy with corticosteroid.      INJECTION, ANESTHETIC/STEROID, TRANSFORAMINAL EPIDURAL; LUMBAR/SACRAL, SINGLE LEVEL  10/2007    right L5-S1 translaminar epidural injection     LAMINECTOMY LUMBAR ONE LEVEL  3/6/2012    Procedure:LAMINECTOMY LUMBAR ONE LEVEL; REVISION DISCECTOMY L5-S1 ON THE LEFT ; Surgeon:ROSE PRITCHETT; Location:[facility]     LAPAROSCOPIC CHOLECYSTECTOMY  7/99     MR LUMBAR SPINE W/O CONTRAST  1/2012  "   Moderate-sized left posterolateral caudally extruded L5-S1disc herniation impinging on left S1 nerve, mild degenerative disc changes at L4-5     RELEASE TRIGGER FINGER  5/17/2012    Procedure:RELEASE TRIGGER FINGER; right ring finger trigger release  (latex allergy:contact); Surgeon:QUYEN FUNK; Location:[facility]     SONO ABDOMEN LIMITED  9/2012    Increased echotexture liver c/w fatty infiltration     TUBAL LIGATION  2000     XR LUMBAR SACRAL TRANSFORAMINAL INJ RIGHT  1/2012    Left L5-S1 transforaminal epidural steroid/anesthetic along left L5 nerve root              Review of Systems   Musculoskeletal: Positive for neck pain.   Neurological: Negative for tingling, sensory change and focal weakness.   All other systems reviewed and are negative.        Physical Exam  /78   Ht 1.511 m (4' 11.5\")   Wt 57.2 kg (126 lb)   LMP 03/24/2009   BMI 25.02 kg/m    Constitutional:well-developed, well-nourished, and in no distress.   Cardiovascular: Intact distal pulses.    Neurological: alert. Gait Normal:   Gait, station, stance, and balance appear normal for age  Skin: Skin is warm and dry.   Psychiatric: Mood and affect normal.   Respiratory: unlabored, speaks in full sentences  Lymph: no LAD, no lymphangitis        Ortho Exam     Cervical Spine Exam    Side bend LEFT20 degrees RIGHT 20 degrees  Rotation LEFT 45 RIGHT45  Flexionnormal  [id number] degrees and painful  Head forward protraction negative  Head retraction negative    Inspection:       No visible deformity        normal lordotic curvature maintained    Tender:      midline       paraspinal muscles    Non-Tender:      remainder of cervical spine area    Sensation:     grossly intact througout bilateral upper extremities    Skin:     well perfused       capillary refill brisk    Lymphatics:      no edema noted in the upper extremities       X-ray images Ordered and independently reviewed by me in the office today with the patient. X-ray shows:   Mild " degenerative changes, loss of lordosis    ASSESSMENT/PLAN    ICD-10-CM    1. Neck pain M54.2 XR Cervical Spine 2/3 Views     methylPREDNISolone (MEDROL DOSEPAK) 4 MG tablet therapy pack     [facility], AND CHIROPRACTIC REFERRAL     Neck stiffness with minimal DDD, without good response to PHYSICAL THERAPY for similar symptoms last fall - location uncertain - will repeat oral steroids with higher potency and send for trial of chiro.

## 2019-02-19 ENCOUNTER — ANCILLARY PROCEDURE (OUTPATIENT)
Dept: GENERAL RADIOLOGY | Facility: CLINIC | Age: 59
End: 2019-02-19
Attending: FAMILY MEDICINE
Payer: COMMERCIAL

## 2019-02-19 ENCOUNTER — OFFICE VISIT (OUTPATIENT)
Dept: ORTHOPEDICS | Facility: CLINIC | Age: 59
End: 2019-02-19
Payer: COMMERCIAL

## 2019-02-19 VITALS
HEIGHT: 60 IN | SYSTOLIC BLOOD PRESSURE: 142 MMHG | WEIGHT: 126 LBS | BODY MASS INDEX: 24.74 KG/M2 | DIASTOLIC BLOOD PRESSURE: 78 MMHG

## 2019-02-19 DIAGNOSIS — M54.2 NECK PAIN: Primary | ICD-10-CM

## 2019-02-19 DIAGNOSIS — M54.2 NECK PAIN: ICD-10-CM

## 2019-02-19 PROCEDURE — 99204 OFFICE O/P NEW MOD 45 MIN: CPT | Performed by: FAMILY MEDICINE

## 2019-02-19 PROCEDURE — 72040 X-RAY EXAM NECK SPINE 2-3 VW: CPT | Mod: FY

## 2019-02-19 RX ORDER — METHYLPREDNISOLONE 4 MG
TABLET, DOSE PACK ORAL
Qty: 21 TABLET | Refills: 0 | Status: SHIPPED | OUTPATIENT
Start: 2019-02-19 | End: 2019-05-14

## 2019-02-19 ASSESSMENT — ENCOUNTER SYMPTOMS
TINGLING: 0
SENSORY CHANGE: 0
NECK PAIN: 1
FOCAL WEAKNESS: 0

## 2019-02-19 ASSESSMENT — MIFFLIN-ST. JEOR: SCORE: 1055.09

## 2019-02-19 NOTE — LETTER
2/19/2019         RE: Fredy Parker  [address]  [place] [address]        Dear Colleague,    Thank you for referring your patient, Fredy Parker, to the [facility]. Please see a copy of my visit note below.    HPI   [facility]   Clinic Visit s Feb 19, 2019    PCP: Clinic, [facility] Evy Daniel is a 58 year old female who is seen in consultation at the request of Dr. Guzman for   Chief Complaint   Patient presents with     Neck - Pain       Injury: Patient reports insidious onset without acute precipitating event.     Right hand dominant    Location of Pain: both sides  neck, nonradiating   Duration of Pain: 2 week(s)  Rating of Pain at worst: 8/10  Rating of Pain Currently: 4/10  Pain is better with: nothing   Pain is worse with: turing her neck and self care  Treatment so far consists of: other medications: Prednisone (Medrol), tizanidine, heat, tylenol  Associated symptoms: no distal numbness or tingling; denies swelling or warmth  Recent imaging completed: No recent imaging completed.  Prior History of related problems: none    Social History: is employed as a/an assembly    Past Medical History:   Diagnosis Date     Acne      Allergic rhinitis     grasses, birch, dust mite, cat, dog - previous immunotherapy     ASCUS with positive high risk HPV 09/2014    colposcopy benign     Atrophic vaginitis 2012    with dyspareunia     Chronic gastric ulcer without mention of hemorrhage, perforation, without mention of obstruction 1994    gastric erosion dx'ed by endoscopy. pt was also treated for pos H.pylori     Disc disorder of cervical region 2006    mild disc changes C3-4/C4-5 and C5-6 with chronic myofascial cervicoscapular pain     Elevated glucose 2012    one fasting glc of 136, A1C 6.3     Elevated transaminase level 2012    AST/ALT 2-3 x normal; fatty infiltration liver on ultrasound; negative  testing for infectious hepatitis; normal iron levels, ELP, CK     Inflammatory arthritis     likely a spondyloarthropathy, IP joints and [place]'s tendons     Latent tuberculosis 10/2009    started INH      LSIL (low grade squamous intraepithelial lesion) on Pap smear 3/2009, 3/2010, 2010    HPV 56 (high risk) detected      Lumbar disc herniation with radiculopathy     left extruded L5-S1 disc herniation, treated with diskectomy 2007     Major depressive disorder, single episode, moderate (H)      Mixed hyperlipidemia 2009    elevated TG and LDL     Osteopenia 2009    mild, femoral neck     Papanicolaou smear of cervix with low grade squamous intraepithelial lesion (LGSIL) 12/02/15 & 2017, 10/2/18    See problem list     Prolonged QT interval 2016    ? Prozac, resolved with decreased dose                                            Recurrent genital herpes      Status post laminectomy 2007: L5-S1 diskectomy 2012:Revision diskectomy left at L5-S1     Status post LEEP (loop electrosurgical excision procedure) of cervix 2011    for persistent LSIL     Supervision of other normal pregnancy      - vaginal     Trigger finger, right     right ring finger - surgically treated 2012     Vestibular neuronitis of left ear 2016       Patient Active Problem List    Diagnosis Date Noted     Vestibular neuronitis of left ear 2016     Priority: Medium     Vertigo 2016     Priority: Medium     Advanced directives, counseling/discussion 12/10/2015     Priority: Medium     Advance Care Planning 12/10/2015: ACP Review of Chart:  Reviewed chart for advance care plan.  Fredy Parker has no plan or code status on file. Patient declined per [facility] referral. Added by Jocelyn Vo             Gastroesophageal reflux disease without esophagitis 2015     Priority: Medium     Major depressive disorder, single episode, moderate (H)  12/03/2015     Priority: Medium     Osteoarthritis of finger, unspecified laterality 12/03/2015     Priority: Medium     Perennial allergic rhinitis 12/03/2015     Priority: Medium     Hyperlipidemia LDL goal <160 05/03/2015     Priority: Medium     Posterior neck pain 09/30/2014     Priority: Medium     Recurrent genital herpes      Priority: Medium     Atrophic vaginitis      Priority: Medium     With dyspareunia       Elevated transaminase level      Priority: Medium     AST/ALT 2-3 x normal; fatty infiltration liver on ultrasound; negative testing for infectious hepatitis; normal iron levels, ELP, CK       Latent tuberculosis 10/01/2009     Priority: Medium     Osteopenia      Priority: Medium     mild, femoral neck       Papanicolaou smear of cervix with low grade squamous intraepithelial lesion (LGSIL) 03/01/2009     Priority: Medium     2/27/07 ASCUS/ Neg HPV  6/8/07 ASCUS/Neg HPV  3/20/09 LSIL  5/2009 COLP- Negative  3/26/10 LSIL/+ HR HPV  12/7/10 LSIL/Neg HPV  2/2011 LEEP  2011, 2012, 2013 NIL paps  09/29/14 ASCUS, +HR HPV.   10/21/14 [facility] = Cervical Bx and ECC= Benign. Repeat co-test 1 yr  12/02/15 LSIL/ +HR HPV. Plan: colposcopy  12/15/15 [facility]= Negative. 1 yr pap  2/20/17 LSIL/+ HR HPV (not 16 or 18).   04/17/17 [facility]= CORBIN 1. Referred to ObGyn for further evaluation  3/19/18  ASC-H, LSIL/+ HR HPV (not 16/18).  Plan: colposcopy by 6/19/18  4/10/18 [facility]- Normal.   10/2/18 LSIL pap, + HR HPV (not 16/18). Plan cotest in 6 months         Inflammatory arthritis      Priority: Medium     IP joints and [place]'s tendons         Family History   Problem Relation Age of Onset     Hypertension Mother      Lipids Mother      Hypertension Father      Lipids Father      Lipids Sister        Social History     Socioeconomic History     Marital status:      Spouse name: Not on file     Number of children: 2     Years of education: 12     Highest education level: Not on file   Social Needs     Financial resource  strain: Not on file     Food insecurity - worry: Not on file     Food insecurity - inability: Not on file     Transportation needs - medical: Not on file     Transportation needs - non-medical: Not on file   Occupational History     Occupation:      Employer: [employer]   Tobacco Use     Smoking status: Never Smoker     Smokeless tobacco: Never Used   Substance and Sexual Activity     Alcohol use: No     Alcohol/week: 0.0 oz       Past Surgical History:   Procedure Laterality Date     C DEXA INTERPRETATION, AXIAL  3/2009    T score lumbar -0.2, femoral neck -1.4/-1.3     COLONOSCOPY  07/2009    normal, repeat 10 years     CONIZATION LEEP  2/2011    chronic endocervicitis     DEXA  3/2011    T score lumbar -0.7, femoral neck -1.7/-1.9 with BMD 0.769     DISKECTOMY, LUMBAR, SINGLE SP  12/2007    L5-S1 diskectomy     HC COLP CERVIX/UPPER VAGINA W BX CERVIX/ENDOCERV CURETT  3/2009    ECC bx with chronic inflammation and squamous atypia, favor reactive     HC COLP CERVIX/UPPER VAGINA W BX CERVIX/ENDOCERV CURETT  4/2010    acute inflammation, no dysplasia.  ECC negative - repeat pap 6 months     HC COLP CERVIX/UPPER VAGINA W ENDOCERV CURETT  3/2009    negative, reactive process with no dysplasia, repeat pap 6 months     HC CT HEAD WO CONTRAST  4/2009    normal - done for acute vertigo     HC DUPLEX EXTREMITY VENOUS, LIMITED UNILATERAL  4/2006    left lower extremity - normal     HC INJ TRANSFORAMIN EPIDURAL, CERV/THOR SINGLE  5/2006    C7-T1 epidural steroid     HC MRI CERVICAL SPINE W/O CONTRAST  3/2006    C3-4 small central disc /C4-5 broad based disk/C5-6 central to rt disc all with mild to mod central stenosis     INJECTION, ANESTHETIC/STEROID, TRANSFORAMINAL EPIDURAL; LUMBAR/SACRAL, SINGLE LEVEL  8,9/2007    left L5 selective nerve root injection under fluoroscopy with corticosteroid.      INJECTION, ANESTHETIC/STEROID, TRANSFORAMINAL EPIDURAL; LUMBAR/SACRAL, SINGLE LEVEL  10/2007    right  "L5-S1 translaminar epidural injection     LAMINECTOMY LUMBAR ONE LEVEL  3/6/2012    Procedure:LAMINECTOMY LUMBAR ONE LEVEL; REVISION DISCECTOMY L5-S1 ON THE LEFT ; Surgeon:ROSE PRITCHETT; Location: OR     LAPAROSCOPIC CHOLECYSTECTOMY  7/99     MR LUMBAR SPINE W/O CONTRAST  1/2012    Moderate-sized left posterolateral caudally extruded L5-S1disc herniation impinging on left S1 nerve, mild degenerative disc changes at L4-5     RELEASE TRIGGER FINGER  5/17/2012    Procedure:RELEASE TRIGGER FINGER; right ring finger trigger release  (latex allergy:contact); Surgeon:QUYEN FUNK; Location: [facility]     SONO ABDOMEN LIMITED  9/2012    Increased echotexture liver c/w fatty infiltration     TUBAL LIGATION  2000     XR LUMBAR SACRAL TRANSFORAMINAL INJ RIGHT  1/2012    Left L5-S1 transforaminal epidural steroid/anesthetic along left L5 nerve root              Review of Systems   Musculoskeletal: Positive for neck pain.   Neurological: Negative for tingling, sensory change and focal weakness.   All other systems reviewed and are negative.        Physical Exam  /78   Ht 1.511 m (4' 11.5\")   Wt 57.2 kg (126 lb)   LMP 03/24/2009   BMI 25.02 kg/m     Constitutional:well-developed, well-nourished, and in no distress.   Cardiovascular: Intact distal pulses.    Neurological: alert. Gait Normal:   Gait, station, stance, and balance appear normal for age  Skin: Skin is warm and dry.   Psychiatric: Mood and affect normal.   Respiratory: unlabored, speaks in full sentences  Lymph: no LAD, no lymphangitis        Ortho Exam     Cervical Spine Exam    Side bend LEFT20 degrees RIGHT 20 degrees  Rotation LEFT 45 RIGHT45  Flexionnormal  [id number] degrees and painful  Head forward protraction negative  Head retraction negative    Inspection:       No visible deformity        normal lordotic curvature maintained    Tender:      midline       paraspinal muscles    Non-Tender:      remainder of cervical spine area    Sensation:   "   grossly intact througout bilateral upper extremities    Skin:     well perfused       capillary refill brisk    Lymphatics:      no edema noted in the upper extremities       X-ray images Ordered and independently reviewed by me in the office today with the patient. X-ray shows:   Mild degenerative changes, loss of lordosis    ASSESSMENT/PLAN    ICD-10-CM    1. Neck pain M54.2 XR Cervical Spine 2/3 Views     methylPREDNISolone (MEDROL DOSEPAK) 4 MG tablet therapy pack     [facility], AND CHIROPRACTIC REFERRAL     Neck stiffness with minimal DDD, without good response to PHYSICAL THERAPY for similar symptoms last fall - location uncertain - will repeat oral steroids with higher potency and send for trial of chiro.      Again, thank you for allowing me to participate in the care of your patient.        Sincerely,        Thai Oneal MD

## 2019-03-13 ENCOUNTER — THERAPY VISIT (OUTPATIENT)
Dept: CHIROPRACTIC MEDICINE | Facility: CLINIC | Age: 59
End: 2019-03-13
Attending: FAMILY MEDICINE
Payer: COMMERCIAL

## 2019-03-13 DIAGNOSIS — M99.01 CERVICAL SEGMENT DYSFUNCTION: Primary | ICD-10-CM

## 2019-03-13 DIAGNOSIS — M99.00 HEAD REGION SOMATIC DYSFUNCTION: ICD-10-CM

## 2019-03-13 DIAGNOSIS — R51.9 CEPHALGIA: ICD-10-CM

## 2019-03-13 DIAGNOSIS — M54.2 CERVICALGIA: ICD-10-CM

## 2019-03-13 DIAGNOSIS — M99.02 THORACIC SEGMENT DYSFUNCTION: ICD-10-CM

## 2019-03-13 PROCEDURE — 99203 OFFICE O/P NEW LOW 30 MIN: CPT | Mod: 25 | Performed by: CHIROPRACTOR

## 2019-03-13 PROCEDURE — 98940 CHIROPRACT MANJ 1-2 REGIONS: CPT | Mod: AT | Performed by: CHIROPRACTOR

## 2019-03-13 PROCEDURE — 97112 NEUROMUSCULAR REEDUCATION: CPT | Mod: 59 | Performed by: CHIROPRACTOR

## 2019-03-17 PROBLEM — M99.02 THORACIC SEGMENT DYSFUNCTION: Status: ACTIVE | Noted: 2019-03-17

## 2019-03-17 PROBLEM — R51.9 CEPHALGIA: Status: ACTIVE | Noted: 2019-03-17

## 2019-03-17 PROBLEM — M99.00 HEAD REGION SOMATIC DYSFUNCTION: Status: ACTIVE | Noted: 2019-03-17

## 2019-03-17 NOTE — PROGRESS NOTES
Chiropractic Clinic Visit    PCP: Clinic, [facility] [place]    Fredy Parker is a 58 year old female who is seen  in consultation at the request of  Thai Oneal M.D. presenting with acute neck pain . Patient reports that the onset was over 3 weeks ago however the pt has had neck pain intermittently in the past .  When asked, patient denies:, falling, slipping, bending and reaching or sleeping awkwardly. The pt reports B neck pain that started gradually in the past month. She has had chiropractic care in the past with no improvement. She reports HA sx along with the neck pain.  There was no specific incident that could have caused the px. The pt sits most of the day with her head in a bent position. She is unable to turn her head in either direction without pain. The pt is unable to sleep for prolonged periods. She cannot sit comfortably for 8 hours. The pt grades the px a 6-7/10 on  VAS. She denies weakness in the extremities or other unusual sx   Prior to onset, the patient was able to sleep for 6-7 hours. Patient notes that due to symptoms, they can only sit for 1-2 hours. Fredy Parker notes   sleeping rated at a 7/10 difficulty  and prior to this incident it was 1/10.    Injury: There was no injury associated with this episode     Location of Pain: bilateral cervical  at the following level(s) C2 , C7 , T1  and T5   Duration of Pain: one month   Rating of Pain at worst: 7/10  Rating of Pain Currently: 6/10  Symptoms are better with: Nothing  Symptoms are worse with: sitting and sleeping  Additional Features: HA        Health History  as reported by the patient:    How does the patient rate their own health:   Poor    Current or past medical history:   Severe Headache    Medical allergies  Latex    Past Traumas/Surgeries  None    Family History  Family History   Problem Relation Age of Onset     Hypertension Mother      Lipids Mother      Hypertension Father      Lipids  Father      Lipids Sister        Medications:  Muscle relaxants    Occupation:  None     Primary job tasks:   Assembly    Barriers as home/work:   none    Additional health Issues:     None       Review of Systems  Musculoskeletal: as above  Remainder of review of systems is negative including constitutional, CV, pulmonary, GI, Skin and Neurologic except as noted in HPI or medical history.    Past Medical History:   Diagnosis Date     Acne      Allergic rhinitis     grasses, birch, dust mite, cat, dog - previous immunotherapy     ASCUS with positive high risk HPV 09/2014    colposcopy benign     Atrophic vaginitis 2012    with dyspareunia     Chronic gastric ulcer without mention of hemorrhage, perforation, without mention of obstruction 1994    gastric erosion dx'ed by endoscopy. pt was also treated for pos H.pylori     Disc disorder of cervical region 2006    mild disc changes C3-4/C4-5 and C5-6 with chronic myofascial cervicoscapular pain     Elevated glucose 2012    one fasting glc of 136, A1C 6.3     Elevated transaminase level 2012    AST/ALT 2-3 x normal; fatty infiltration liver on ultrasound; negative testing for infectious hepatitis; normal iron levels, ELP, CK     Inflammatory arthritis 2009    likely a spondyloarthropathy, IP joints and Pepperell's tendons     Latent tuberculosis 10/2009    started INH      LSIL (low grade squamous intraepithelial lesion) on Pap smear 3/2009, 3/2010, 12/2010    HPV 56 (high risk) detected 2010     Lumbar disc herniation with radiculopathy 2007    left extruded L5-S1 disc herniation, treated with diskectomy 12/2007     Major depressive disorder, single episode, moderate (H) 2006     Mixed hyperlipidemia 2009    elevated TG and LDL     Osteopenia 2009    mild, femoral neck     Papanicolaou smear of cervix with low grade squamous intraepithelial lesion (LGSIL) 12/02/15 & 2017, 10/2/18    See problem list     Prolonged QT interval 5/2016    ? Prozac, resolved with  decreased dose                                            Recurrent genital herpes      Status post laminectomy 2007: L5-S1 diskectomy :Revision diskectomy left at L5-S1     Status post LEEP (loop electrosurgical excision procedure) of cervix 2011    for persistent LSIL     Supervision of other normal pregnancy      - vaginal     Trigger finger, right 2012    right ring finger - surgically treated 2012     Vestibular neuronitis of left ear 2016     Past Surgical History:   Procedure Laterality Date     C DEXA INTERPRETATION, AXIAL  3/2009    T score lumbar -0.2, femoral neck -1.4/-1.3     COLONOSCOPY  2009    normal, repeat 10 years     CONIZATION LEEP  2011    chronic endocervicitis     DEXA  3/2011    T score lumbar -0.7, femoral neck -1.7/-1.9 with BMD 0.769     DISKECTOMY, LUMBAR, SINGLE SP  2007    L5-S1 diskectomy     HC COLP CERVIX/UPPER VAGINA W BX CERVIX/ENDOCERV CURETT  3/2009    ECC bx with chronic inflammation and squamous atypia, favor reactive     HC COLP CERVIX/UPPER VAGINA W BX CERVIX/ENDOCERV CURETT  2010    acute inflammation, no dysplasia.  ECC negative - repeat pap 6 months     HC COLP CERVIX/UPPER VAGINA W ENDOCERV CURETT  3/2009    negative, reactive process with no dysplasia, repeat pap 6 months     HC CT HEAD WO CONTRAST  2009    normal - done for acute vertigo     HC DUPLEX EXTREMITY VENOUS, LIMITED UNILATERAL  2006    left lower extremity - normal     HC INJ TRANSFORAMIN EPIDURAL, CERV/THOR SINGLE  2006    C7-T1 epidural steroid     HC MRI CERVICAL SPINE W/O CONTRAST  3/2006    C3-4 small central disc /C4-5 broad based disk/C5-6 central to rt disc all with mild to mod central stenosis     INJECTION, ANESTHETIC/STEROID, TRANSFORAMINAL EPIDURAL; LUMBAR/SACRAL, SINGLE LEVEL  ,2007    left L5 selective nerve root injection under fluoroscopy with corticosteroid.      INJECTION, ANESTHETIC/STEROID, TRANSFORAMINAL EPIDURAL; LUMBAR/SACRAL,  SINGLE LEVEL  10/2007    right L5-S1 translaminar epidural injection     LAMINECTOMY LUMBAR ONE LEVEL  3/6/2012    Procedure:LAMINECTOMY LUMBAR ONE LEVEL; REVISION DISCECTOMY L5-S1 ON THE LEFT ; Surgeon:ROSE PRITCHETT; Location: OR     LAPAROSCOPIC CHOLECYSTECTOMY  7/99     MR LUMBAR SPINE W/O CONTRAST  1/2012    Moderate-sized left posterolateral caudally extruded L5-S1disc herniation impinging on left S1 nerve, mild degenerative disc changes at L4-5     RELEASE TRIGGER FINGER  5/17/2012    Procedure:RELEASE TRIGGER FINGER; right ring finger trigger release  (latex allergy:contact); Surgeon:QUYEN FUNK; Location: [place]     SONO ABDOMEN LIMITED  9/2012    Increased echotexture liver c/w fatty infiltration     TUBAL LIGATION  2000     XR LUMBAR SACRAL TRANSFORAMINAL INJ RIGHT  1/2012    Left L5-S1 transforaminal epidural steroid/anesthetic along left L5 nerve root        Objective  LMP 03/24/2009     GENERAL APPEARANCE: healthy, alert and no distress   GAIT: NORMAL  SKIN: no suspicious lesions or rashes  NEURO: Normal strength and tone, mentation intact and speech normal  PSYCH:  mentation appears normal and affect normal/bright    Fredy was asked to complete the Neck Disability Index, today in the office. NDI Disability score: 38%; pain severity scale: 7/10..    Cervical Spine Exam    Range of Motion:         Full active and passive ROM forward flexion,  Decreased  extension, lateral rotation, lateral flexion with pain at end range     Inspection:         No visible deformity        normal lordotic curvature maintained  Anterior neck carriage, slumped seated posture, poor posture, increased kyphosis      Tender:        B suboccipitals, B levator scapula     Non-Tender:        remainder of cervical spine area    Muscle strength:       C4 (shoulder shrug)  symmetric 5/5 Normal       C5 (shoulder abduction) symmetric 5/5 Normal       C6 (elbow flexion) symmetric 5/5 Normal       C7 (elbow extension) symmetric  5/5 Normal       C8 (finger abduction, thumb flexion) symmetric 5/5 Normal    Reflexes:        C5 (biceps) symmetric 2 bilaterally       C6 (supinator) symmetric 2 bilaterally       C7 (triceps) symmetric 2 bilaterally    Sensation:       grossly intact througout bilateral upper extremities    Special Tests:       positive (+) Spurling  Remi's- positive, VBI- negative and Larios Coley - negative    Lymphatics:        no edema noted in the upper extremities       Segmental spinal dysfunction/restrictions found at:  C2 , C7 , T1 , T5  and T9       The following soft tissue hypotonicities were observed:Lev scapulae: ache and dull pain, referred pain: no  Sub-occiput: ache and dull pain, referred pain: no    Trigger points were found in:none     Muscle spasm found in:Levator scapulae and Sub-occipital      Radiology:  None     Assessment:    1. Cervical segment dysfunction    2. Cervicalgia    3. Thoracic segment dysfunction    4. Head region somatic dysfunction    5. Cephalgia        RX ordered/plan of care  Anticipated outcomes  Possible risks and side effects    After discussing the risk and benefits of care, patient consented to treatment    Patient's condition:  Patient had restrictions pre-manipulation    Treatment effectiveness:  Post manipulation there is better intersegmental movement and Patient claims to feel looser post manipulation    Plan:    Procedures:    Evaluation and Management:  17515 Moderate level exam 30 min    CMT:  71919 Chiropractic manipulative treatment 1-2 regions performed   Cervical: Diversified and Activator, Occiput, C1 , C7 , Prone, Supine  Thoracic: Diversified, T1, T5, T9, Prone    Modalities:  None performed this visit    Therapeutic procedures:  35286: Neurological re-education/proprioception training and proper long term sitting posture: Corrected patient's seated posture when sitting for longer than 20 minutes or seated at the computer related to work duties for over 8 hours per  day. Fit patient with Dorothy lumbar support for postural re-training with demonstration. Showed patient how to place the support correctly when seated and to increase usage by 2-3 hour increments per day until they are able to sit full time without spinal irritation. Related improper vs. proper sitting to optimal spinal biomechanics using the spine model with demonstration with the purpose of PREVENTING premature spinal degeneration from cumulative static motion. Demonstrated the increase in load and shearing forces on the spine in addition to the cumulative degenerative effects of axial compression on a spine that is chronically slumped and in an 'unlocked' position vs a 'locked' position. Demonstrated the use of a lap top table for lap top computers to prevent excessive cervical flexion of the neck. Demonstrated 'hip hinging' to access the computer when seated rather than thoracic flexion. Gave cervical retraction for proper cervical alignment, anterior deep cervical flexor strengthening and cervical proprioception training with demonstration. Per 10-12 minutes total        Response to Treatment  Reduction in symptoms as reported by patient    Prognosis: Excellent      Treatment plan and goals:  Goals:  SLEEPING: the patient specific goal is to attain his pre-injury status of 6-7 hours comfortably  SITTING: the patient specific goal is to attain pre-injury status of  8 hours comfortably    Frequency of care  Duration of care is estimated to be 3-6 weeks, from the initial treatment.  It is estimated that the patient will need a total of 3-6 visits to resolve this episode.  For the initial therapeutic trial of care, the frequency is recommended at 1 X week, once daily.  A reevaluation would be clinically appropriate in 3-6 visits, to determine progress and further course of care.    In-Office Treatment  Evaluation  Spinal Chiropractic Manipulative Therapy  Postural  correction      Recommendations:    Instructions:  use lumbar support with seated at all times as per instruction    Follow-up:  Return to care in 1 week with US .     Disclaimer: This note consists of symbols derived from keyboarding, dictation and/or voice recognition software. As a result, there may be errors in the script that have gone undetected. Please consider this when interpreting information found in this chart.

## 2019-03-27 ENCOUNTER — THERAPY VISIT (OUTPATIENT)
Dept: CHIROPRACTIC MEDICINE | Facility: CLINIC | Age: 59
End: 2019-03-27
Payer: COMMERCIAL

## 2019-03-27 DIAGNOSIS — R51.9 CEPHALGIA: ICD-10-CM

## 2019-03-27 DIAGNOSIS — M54.2 CERVICALGIA: ICD-10-CM

## 2019-03-27 DIAGNOSIS — M99.00 HEAD REGION SOMATIC DYSFUNCTION: ICD-10-CM

## 2019-03-27 DIAGNOSIS — M99.02 THORACIC SEGMENT DYSFUNCTION: ICD-10-CM

## 2019-03-27 DIAGNOSIS — M99.01 CERVICAL SEGMENT DYSFUNCTION: Primary | ICD-10-CM

## 2019-03-27 PROCEDURE — 98940 CHIROPRACT MANJ 1-2 REGIONS: CPT | Mod: AT | Performed by: CHIROPRACTOR

## 2019-03-27 PROCEDURE — 97035 APP MDLTY 1+ULTRASOUND EA 15: CPT | Performed by: CHIROPRACTOR

## 2019-03-28 ENCOUNTER — OFFICE VISIT (OUTPATIENT)
Dept: FAMILY MEDICINE | Facility: CLINIC | Age: 59
End: 2019-03-28
Payer: COMMERCIAL

## 2019-03-28 ENCOUNTER — ANCILLARY PROCEDURE (OUTPATIENT)
Dept: GENERAL RADIOLOGY | Facility: CLINIC | Age: 59
End: 2019-03-28
Attending: FAMILY MEDICINE
Payer: COMMERCIAL

## 2019-03-28 VITALS
DIASTOLIC BLOOD PRESSURE: 70 MMHG | RESPIRATION RATE: 18 BRPM | WEIGHT: 126 LBS | BODY MASS INDEX: 25.02 KG/M2 | OXYGEN SATURATION: 99 % | TEMPERATURE: 98.7 F | HEART RATE: 89 BPM | SYSTOLIC BLOOD PRESSURE: 100 MMHG

## 2019-03-28 DIAGNOSIS — R50.9 FEVER, UNSPECIFIED FEVER CAUSE: ICD-10-CM

## 2019-03-28 DIAGNOSIS — R05.9 COUGH: ICD-10-CM

## 2019-03-28 DIAGNOSIS — R05.9 COUGH: Primary | ICD-10-CM

## 2019-03-28 DIAGNOSIS — J10.1 INFLUENZA A: ICD-10-CM

## 2019-03-28 LAB
FLUAV+FLUBV AG SPEC QL: NEGATIVE
FLUAV+FLUBV AG SPEC QL: POSITIVE
SPECIMEN SOURCE: ABNORMAL

## 2019-03-28 PROCEDURE — 99214 OFFICE O/P EST MOD 30 MIN: CPT | Performed by: FAMILY MEDICINE

## 2019-03-28 PROCEDURE — 87804 INFLUENZA ASSAY W/OPTIC: CPT | Performed by: FAMILY MEDICINE

## 2019-03-28 PROCEDURE — 71046 X-RAY EXAM CHEST 2 VIEWS: CPT | Mod: FY

## 2019-03-28 RX ORDER — CODEINE PHOSPHATE/GUAIFENESIN 10-100MG/5
5 LIQUID (ML) ORAL
Qty: 120 ML | Refills: 1 | Status: SHIPPED | OUTPATIENT
Start: 2019-03-28 | End: 2019-05-14

## 2019-03-28 RX ORDER — OSELTAMIVIR PHOSPHATE 75 MG/1
75 CAPSULE ORAL 2 TIMES DAILY
Qty: 10 CAPSULE | Refills: 0 | Status: SHIPPED | OUTPATIENT
Start: 2019-03-28 | End: 2019-05-14

## 2019-03-28 NOTE — PROGRESS NOTES
SUBJECTIVE:   Fredy Parker is a 58 year old female who presents to clinic today for the following health issues:      Acute Illness   Acute illness concerns: cough/flu symptoms  Onset: 3 days    Fever: no     Chills/Sweats: YES- chills    Headache (location?): YES    Sinus Pressure:YES    Conjunctivitis:  no    Ear Pain: no    Rhinorrhea: YES    Congestion: YES    Sore Throat: YES; began after coughing.     Cough: YES    Wheeze: no     Decreased Appetite: YES    Nausea: no     Vomiting: no     Diarrhea:  no     Dysuria/Freq.: no     Fatigue/Achiness: no     Sick/Strep Exposure: no      Therapies Tried and outcome: nyquil and dayquil are not helping        Problem list and histories reviewed & adjusted, as indicated.  Additional history: as documented    Patient Active Problem List   Diagnosis     Inflammatory arthritis     Osteopenia     Papanicolaou smear of cervix with low grade squamous intraepithelial lesion (LGSIL)     Latent tuberculosis     Cervicalgia     Atrophic vaginitis     Elevated transaminase level     Recurrent genital herpes     Posterior neck pain     Hyperlipidemia LDL goal <160     Gastroesophageal reflux disease without esophagitis     Major depressive disorder, single episode, moderate (H)     Osteoarthritis of finger, unspecified laterality     Perennial allergic rhinitis     Advanced directives, counseling/discussion     Vertigo     Vestibular neuronitis of left ear     Thoracic segment dysfunction     Head region somatic dysfunction     Cephalgia     Past Surgical History:   Procedure Laterality Date     C DEXA INTERPRETATION, AXIAL  3/2009    T score lumbar -0.2, femoral neck -1.4/-1.3     COLONOSCOPY  07/2009    normal, repeat 10 years     CONIZATION LEEP  2/2011    chronic endocervicitis     DEXA  3/2011    T score lumbar -0.7, femoral neck -1.7/-1.9 with BMD 0.769     DISKECTOMY, LUMBAR, SINGLE SP  12/2007    L5-S1 diskectomy     HC COLP CERVIX/UPPER VAGINA W BX CERVIX/ENDOCERV  CURETT  3/2009    ECC bx with chronic inflammation and squamous atypia, favor reactive     HC COLP CERVIX/UPPER VAGINA W BX CERVIX/ENDOCERV CURETT  4/2010    acute inflammation, no dysplasia.  ECC negative - repeat pap 6 months     HC COLP CERVIX/UPPER VAGINA W ENDOCERV CURETT  3/2009    negative, reactive process with no dysplasia, repeat pap 6 months     HC CT HEAD WO CONTRAST  4/2009    normal - done for acute vertigo     HC DUPLEX EXTREMITY VENOUS, LIMITED UNILATERAL  4/2006    left lower extremity - normal     HC INJ TRANSFORAMIN EPIDURAL, CERV/THOR SINGLE  5/2006    C7-T1 epidural steroid     HC MRI CERVICAL SPINE W/O CONTRAST  3/2006    C3-4 small central disc /C4-5 broad based disk/C5-6 central to rt disc all with mild to mod central stenosis     INJECTION, ANESTHETIC/STEROID, TRANSFORAMINAL EPIDURAL; LUMBAR/SACRAL, SINGLE LEVEL  8,9/2007    left L5 selective nerve root injection under fluoroscopy with corticosteroid.      INJECTION, ANESTHETIC/STEROID, TRANSFORAMINAL EPIDURAL; LUMBAR/SACRAL, SINGLE LEVEL  10/2007    right L5-S1 translaminar epidural injection     LAMINECTOMY LUMBAR ONE LEVEL  3/6/2012    Procedure:LAMINECTOMY LUMBAR ONE LEVEL; REVISION DISCECTOMY L5-S1 ON THE LEFT ; Surgeon:ROSE PRITCHETT; Location: OR     LAPAROSCOPIC CHOLECYSTECTOMY  7/99     MR LUMBAR SPINE W/O CONTRAST  1/2012    Moderate-sized left posterolateral caudally extruded L5-S1disc herniation impinging on left S1 nerve, mild degenerative disc changes at L4-5     RELEASE TRIGGER FINGER  5/17/2012    Procedure:RELEASE TRIGGER FINGER; right ring finger trigger release  (latex allergy:contact); Surgeon:QUYEN FUNK; Location: [place]     SONO ABDOMEN LIMITED  9/2012    Increased echotexture liver c/w fatty infiltration     TUBAL LIGATION  2000     XR LUMBAR SACRAL TRANSFORAMINAL INJ RIGHT  1/2012    Left L5-S1 transforaminal epidural steroid/anesthetic along left L5 nerve root        Social History     Tobacco Use      Smoking status: Never Smoker     Smokeless tobacco: Never Used   Substance Use Topics     Alcohol use: No     Alcohol/week: 0.0 oz     Family History   Problem Relation Age of Onset     Hypertension Mother      Lipids Mother      Hypertension Father      Lipids Father      Lipids Sister          Current Outpatient Medications   Medication Sig Dispense Refill     CALTRATE 600+D PLUS 600-400 MG-UNIT OR TABS 1 TABLET TWICE DAILY  3 MONTHS PRN     CENTRUM OR TABS 1 TABLET DAILY 30 0     cetirizine (ZYRTEC) 10 MG tablet Take 1 tablet (10 mg) by mouth daily 90 tablet 3     estradiol (ESTRACE VAGINAL) 0.1 MG/GM cream Place 0.5 g vaginally twice a week Place 0.5mg vaginally every night for a week, than twice weekly. 42.5 g 6     FLUoxetine (PROZAC) 40 MG capsule TAKE 1 CAPSULE DAILY 90 capsule 0     guaiFENesin-codeine (GUAIFENESIN AC) 100-10 MG/5ML syrup Take 5 mLs by mouth nightly as needed for congestion or cough 120 mL 1     methylPREDNISolone (MEDROL DOSEPAK) 4 MG tablet therapy pack Follow package instructions 21 tablet 0     omeprazole (PRILOSEC) 40 MG DR capsule TAKE 1 CAPSULE DAILY 30 TO 60 MINUTES BEFORE A MEAL 90 capsule 2     oseltamivir (TAMIFLU) 75 MG capsule Take 1 capsule (75 mg) by mouth 2 times daily for 5 days 10 capsule 0     tiZANidine (ZANAFLEX) 2 MG tablet Take 1-2 tablets (2-4 mg) by mouth 3 times daily 90 tablet 1     valACYclovir (VALTREX) 500 MG tablet TAKE ONE TABLET BY MOUTH EVERY DAY 30 tablet 0     valACYclovir (VALTREX) 500 MG tablet Take 1 tablet (500 mg) by mouth daily 90 tablet 3     VITAMIN D 1000 UNIT OR CAPS 2 CAPSULES DAILY 180 Cap PRN       Reviewed and updated as needed this visit by clinical staff  Tobacco  Allergies  Meds       Reviewed and updated as needed this visit by Provider         ROS:  CONSTITUTIONAL: Positive for fever chills.  ENT/MOUTH: NEGATIVE for ear, mouth positive for throat pain  RESP:POSITIVE for cough-non productive  CV: NEGATIVE for chest pain, palpitations  or peripheral edema    OBJECTIVE:                                                    /70   Pulse 89   Temp 98.7  F (37.1  C) (Tympanic)   Resp 18   Wt 57.2 kg (126 lb)   LMP 03/24/2009   SpO2 99%   BMI 25.02 kg/m    Body mass index is 25.02 kg/m .   GENERAL: healthy, alert, well nourished, well hydrated, no distress  HENT: ear canals- normal; TMs- normal; Nose- normal; Mouth- no ulcers, no lesions  NECK: no tenderness, no adenopathy, no asymmetry, no masses, no stiffness; thyroid- normal to palpation  RESP: Slight decreased breath sounds bilaterally right more than left but no crackles.  CV: regular rates and rhythm, normal S1 S2, no S3 or S4 and no murmur, no click or rub -         ASSESSMENT/PLAN:                                                        ICD-10-CM    1. Cough R05 Influenza A/B antigen     XR Chest 2 Views     oseltamivir (TAMIFLU) 75 MG capsule     guaiFENesin-codeine (GUAIFENESIN AC) 100-10 MG/5ML syrup   2. Fever, unspecified fever cause R50.9    3. Influenza A J10.1 oseltamivir (TAMIFLU) 75 MG capsule     Patient influenza is positive.  Symptoms started over the last 48 hours to 72 hours.  Given her symptoms relatively new would suggest Tamiflu to see if that helps.  I gave her some cough medication for symptomatic relief.  Chest x-ray reviewed with patient no pneumonia appreciated.    Stevie Graves MD  [facility] [place]

## 2019-03-28 NOTE — PROGRESS NOTES
Visit #:  2    Subjective:  Fredy Parker is a 58 year old female who is seen in f/u up for:        Cervical segment dysfunction  Cervicalgia  Thoracic segment dysfunction  Head region somatic dysfunction  Cephalgia.     Since last visit on 3/13/2019,  Fredy Parker reports:    Area of chief complaint:  Cervical and Thoracic :  Symptoms are graded at 4/10. The quality is described as stiff, achey.  Motion has increased, but is still not normal. The pt reports less pain for 2 days however the pain in the R suboccipital region returned several days later. She is using the lumbar support with good results. She notes a moderate HA. She is modifying her seated position when working. The pt denies weakness or other unusual sx. Patient feels that they are improved due to a reduction in symptoms.     Since last visit the patient feels that they are 10 percent  improved from last visit.       Objective:  The following was observed:    P: palpatory tenderness Levator scapulae, Sub-occipital and R brachial plexus :  R>>L  A: static palpation demonstrates intersegmental asymmetry   R: motion palpation notes restricted motion  T: hypertonicity at: Levator scapulae and Sub-occipital R>>L    Segmental spinal dysfunction/restrictions found at:  Occiput, C1 , C2 , C7  and T4       Assessment:    Diagnoses:      1. Cervical segment dysfunction    2. Cervicalgia    3. Thoracic segment dysfunction    4. Head region somatic dysfunction    5. Cephalgia        Patient's condition:  Patient had restrictions pre-manipulation    Treatment effectiveness:  Post manipulation there is better intersegmental movement and Patient claims to feel looser post manipulation      Procedures:  CMT:  89986 Chiropractic manipulative treatment 1-2 regions performed   Occiput: Activator, R OCC, Prone, Supine  Cervical: Diversified and Activator, C1 , C2, Prone, Supine  Thoracic: Diversified, T1, T5, T9, Prone    Modalities:  78111: US:  1.6  Esteves/cm squared for 8  minutes at 1 mhz Location: R OCC and R levator     Therapeutic procedures:  None    Response to Treatment  Reduction in symptoms as reported by patient    Prognosis: Guarded    Progress towards Goals: Patient is making progress towards the goal.  Goals:  SLEEPING: the patient specific goal is to attain his pre-injury status of 6-7 hours comfortably  SITTING: the patient specific goal is to attain pre-injury status of  8 hours comfortably         Recommendations:    Instructions:  continue to use lumbar support     Follow-up:    Return to care in 1 week with US   Brachial plexus stretch*  pec*  Extension*

## 2019-04-03 ENCOUNTER — THERAPY VISIT (OUTPATIENT)
Dept: CHIROPRACTIC MEDICINE | Facility: CLINIC | Age: 59
End: 2019-04-03
Payer: COMMERCIAL

## 2019-04-03 DIAGNOSIS — M99.02 THORACIC SEGMENT DYSFUNCTION: ICD-10-CM

## 2019-04-03 DIAGNOSIS — M99.00 HEAD REGION SOMATIC DYSFUNCTION: ICD-10-CM

## 2019-04-03 DIAGNOSIS — R51.9 CEPHALGIA: ICD-10-CM

## 2019-04-03 DIAGNOSIS — M54.2 CERVICALGIA: ICD-10-CM

## 2019-04-03 DIAGNOSIS — M99.01 CERVICAL SEGMENT DYSFUNCTION: Primary | ICD-10-CM

## 2019-04-03 PROCEDURE — 97110 THERAPEUTIC EXERCISES: CPT | Performed by: CHIROPRACTOR

## 2019-04-03 PROCEDURE — 98940 CHIROPRACT MANJ 1-2 REGIONS: CPT | Mod: AT | Performed by: CHIROPRACTOR

## 2019-04-03 PROCEDURE — 97035 APP MDLTY 1+ULTRASOUND EA 15: CPT | Performed by: CHIROPRACTOR

## 2019-04-04 NOTE — PROGRESS NOTES
Visit #:  3    Subjective:  Fredy Parker is a 58 year old female who is seen in f/u up for:        Cervical segment dysfunction  Cervicalgia  Thoracic segment dysfunction  Head region somatic dysfunction  Cephalgia.     Since last visit on 3/13/2019,  Fredy Parker reports:    Area of chief complaint:  Cervical and Thoracic :  Symptoms are graded at 4/10. The quality is described as stiff, achey.  Motion has increased, but is still not normal. The pt reports significantly less pain in the R neck area post treatment. She denied HA sx for one week however she notes an increase in R sided neck pain today with a slight HA. She reports 30% improvement.  She denies weakness or other unusual sx.     Since last visit the patient feels that they are 30 percent  improved from last visit.       Objective:  The following was observed:    P: palpatory tenderness Levator scapulae, Sub-occipital and R brachial plexus :  R>>L  A: static palpation demonstrates intersegmental asymmetry   R: motion palpation notes restricted motion  T: hypertonicity at: Levator scapulae and Sub-occipital R>>L    Segmental spinal dysfunction/restrictions found at:  Occiput, C1 , C2 , C7  and T4       Assessment:    Diagnoses:      1. Cervical segment dysfunction    2. Cervicalgia    3. Thoracic segment dysfunction    4. Head region somatic dysfunction    5. Cephalgia        Patient's condition:  Patient had restrictions pre-manipulation    Treatment effectiveness:  Post manipulation there is better intersegmental movement and Patient claims to feel looser post manipulation      Procedures:  CMT:  78187 Chiropractic manipulative treatment 1-2 regions performed   Occiput: Activator, R OCC, Prone, Supine  Cervical: Diversified and Activator, C1 , C2, Prone, Supine  Thoracic: Diversified, T1, T5, T9, Prone    Modalities:  50643: US:  1.6 Esteves/cm squared for 8  minutes at 1 mhz Location: R OCC and R levator     Therapeutic  procedures:  Brachial plexus stretch and scalene stretch with demonstration  Gave doorway pectoralis stretch in 3 different positions starting at 90 degree angles from body and raising arms 3 levels higher with demonstration.    Gave latissimus stretch overhead and pulling the elbow behind the head with demonstration as per stretch protocol.   Gave cervical extension with demonstration  Per 8-9 minutes     Response to Treatment  Reduction in symptoms as reported by patient    Prognosis: Guarded    Progress towards Goals: Patient is making progress towards the goal.  Goals:  SLEEPING: the patient specific goal is to attain his pre-injury status of 6-7 hours comfortably  SITTING: the patient specific goal is to attain pre-injury status of  8 hours comfortably         Recommendations:    Instructions:  continue to use lumbar support     Follow-up:    Return to care in 1 week with ACP

## 2019-04-10 ENCOUNTER — THERAPY VISIT (OUTPATIENT)
Dept: CHIROPRACTIC MEDICINE | Facility: CLINIC | Age: 59
End: 2019-04-10
Payer: COMMERCIAL

## 2019-04-10 DIAGNOSIS — M99.01 CERVICAL SEGMENT DYSFUNCTION: Primary | ICD-10-CM

## 2019-04-10 DIAGNOSIS — R51.9 CEPHALGIA: ICD-10-CM

## 2019-04-10 DIAGNOSIS — M99.02 THORACIC SEGMENT DYSFUNCTION: ICD-10-CM

## 2019-04-10 DIAGNOSIS — M54.2 CERVICALGIA: ICD-10-CM

## 2019-04-10 DIAGNOSIS — M99.00 HEAD REGION SOMATIC DYSFUNCTION: ICD-10-CM

## 2019-04-10 PROCEDURE — 97810 ACUP 1/> WO ESTIM 1ST 15 MIN: CPT | Mod: GA | Performed by: CHIROPRACTOR

## 2019-04-10 PROCEDURE — 98940 CHIROPRACT MANJ 1-2 REGIONS: CPT | Mod: AT | Performed by: CHIROPRACTOR

## 2019-04-10 NOTE — PROGRESS NOTES
Visit #:  4    Subjective:  Fredy Parker is a 58 year old female who is seen in f/u up for:        Cervical segment dysfunction  Cervicalgia  Thoracic segment dysfunction  Head region somatic dysfunction  Cephalgia.     Since last visit ,  Fredy Parker reports:    Area of chief complaint:  Cervical and Thoracic :  Symptoms are graded at 3/10. The quality is described as stiff, achey.  Motion has increased, but is still not normal. The pt reports at least 50-60% improvement since initial presentation. She will fell the HA and neck pain intermittently throughout the day. The pain is on the R side of the neck. She denies weakness or other unusual sx.     Since last visit the patient feels that they are 50-60 percent  improved from last visit.       Objective:  The following was observed:    P: palpatory tenderness Levator scapulae, Sub-occipital and R brachial plexus :  R>>L  A: static palpation demonstrates intersegmental asymmetry   R: motion palpation notes restricted motion  T: hypertonicity at: Levator scapulae and Sub-occipital R>>L    Segmental spinal dysfunction/restrictions found at:  Occiput, C1 , C2 , C7  and T4       Assessment:    Diagnoses:      1. Cervical segment dysfunction    2. Cervicalgia    3. Thoracic segment dysfunction    4. Head region somatic dysfunction    5. Cephalgia        Patient's condition:  Patient responding well to therapy    Treatment effectiveness:  Post manipulation there is better intersegmental movement and Patient claims to feel looser post manipulation      Procedures:  CMT:  06724 Chiropractic manipulative treatment 1-2 regions performed   Occiput: Activator, R OCC, Prone, Supine  Cervical: Diversified and Activator, C1 , C2, Prone, Supine  Thoracic: Diversified, T1, T5, T9, Prone    Modalities:  54293: Tedatuochiachi points in the cervical spine, janusz points in the upper shoulders 15 minutes prone    Therapeutic procedures:  None     Response to  Treatment  Reduction in symptoms as reported by patient    Prognosis: Guarded    Progress towards Goals: Patient is making progress towards the goal.  Goals:  SLEEPING: the patient specific goal is to attain his pre-injury status of 6-7 hours comfortably  SITTING: the patient specific goal is to attain pre-injury status of  8 hours comfortably         Recommendations:    Instructions:  continue to use lumbar support     Follow-up:    Return to care in 1 week with ACP

## 2019-04-17 ENCOUNTER — THERAPY VISIT (OUTPATIENT)
Dept: CHIROPRACTIC MEDICINE | Facility: CLINIC | Age: 59
End: 2019-04-17
Payer: COMMERCIAL

## 2019-04-17 DIAGNOSIS — M54.2 CERVICALGIA: ICD-10-CM

## 2019-04-17 DIAGNOSIS — M99.02 THORACIC SEGMENT DYSFUNCTION: ICD-10-CM

## 2019-04-17 DIAGNOSIS — M99.01 CERVICAL SEGMENT DYSFUNCTION: Primary | ICD-10-CM

## 2019-04-17 DIAGNOSIS — M99.00 HEAD REGION SOMATIC DYSFUNCTION: ICD-10-CM

## 2019-04-17 DIAGNOSIS — R51.9 CEPHALGIA: ICD-10-CM

## 2019-04-17 PROCEDURE — 97810 ACUP 1/> WO ESTIM 1ST 15 MIN: CPT | Mod: GA | Performed by: CHIROPRACTOR

## 2019-04-17 PROCEDURE — 98940 CHIROPRACT MANJ 1-2 REGIONS: CPT | Mod: AT | Performed by: CHIROPRACTOR

## 2019-04-17 NOTE — PROGRESS NOTES
Visit #:  5    Subjective:  Fredy Parker is a 58 year old female who is seen in f/u up for:        Cervical segment dysfunction  Cervicalgia  Thoracic segment dysfunction  Head region somatic dysfunction  Cephalgia.     Since last visit ,  Fredy Parker reports:    Area of chief complaint:  Cervical and Thoracic :  Symptoms are graded at 2-3/10. The quality is described as stiff, achey.  Motion has increased, but is still not normal. The pt reports at least 75% improvement since initial presentation. She denies HA sx. She notes pain at the back of the neck when she bending her head. The pt is pleased with her progress.     Since last visit the patient feels that they are 75 percent  improved from last visit.       Objective:  The following was observed:    P: palpatory tenderness Levator scapulae, Sub-occipital and R brachial plexus :  R>>L  A: static palpation demonstrates intersegmental asymmetry   R: motion palpation notes restricted motion  T: hypertonicity at: Levator scapulae and Sub-occipital R>>L    Segmental spinal dysfunction/restrictions found at:  Occiput, C1 , C2 , C7  and T4       Assessment:    Diagnoses:      1. Cervical segment dysfunction    2. Cervicalgia    3. Thoracic segment dysfunction    4. Head region somatic dysfunction    5. Cephalgia        Patient's condition:  Patient responding well to therapy    Treatment effectiveness:  Post manipulation there is better intersegmental movement and Patient claims to feel looser post manipulation      Procedures:  CMT:  01172 Chiropractic manipulative treatment 1-2 regions performed   Occiput: Activator, R OCC, Prone, Supine  Cervical: Diversified and Activator, C1 , C2, Prone, Supine  Thoracic: Diversified, T1, T5, T9, Prone    Modalities:  20430: Tedatuochiachi points in the cervical spine, janusz points in the upper shoulders 15 minutes prone    Therapeutic procedures:  None     Response to Treatment  Reduction in symptoms as reported by  patient    Prognosis: Guarded    Progress towards Goals: Patient is making progress towards the goal.  Goals:  SLEEPING: the patient specific goal is to attain his pre-injury status of 6-7 hours comfortably  SITTING: the patient specific goal is to attain pre-injury status of  8 hours comfortably         Recommendations:    Instructions:  continue to use lumbar support     Follow-up:    Return to care in 1 week with ACP  Cervical flexion   Seated and supine

## 2019-04-22 DIAGNOSIS — F32.1 MAJOR DEPRESSIVE DISORDER, SINGLE EPISODE, MODERATE (H): ICD-10-CM

## 2019-04-22 NOTE — TELEPHONE ENCOUNTER
Reason for Call:  Other prescription    Detailed comments: pt needs her prozac refilled. Please give her a call back if any issues. Thank you    Phone Number Patient can be reached at: Cell number on file:    Telephone Information:   Mobile [phone]       Best Time: any    Can we leave a detailed message on this number? YES    Call taken on 4/22/2019 at 5:52 PM by Cristal Sanders

## 2019-04-22 NOTE — TELEPHONE ENCOUNTER
"Requested Prescriptions   Pending Prescriptions Disp Refills     FLUoxetine (PROZAC) 40 MG capsule 90 capsule 0     Sig: Take 1 capsule (40 mg) by mouth daily  Last Written Prescription Date:  1/15/19  Last Fill Quantity: 90,  # refills: 0   Last office visit: 3/28/2019 with prescribing provider:  Star   Future Office Visit:   Next 5 appointments (look out 90 days)    Apr 29, 2019  4:10 PM CDT  PHYSICAL with Kristine Diaz MD  [facility] ([facility]) [address] [place] [id number]  [phone]                SSRIs Protocol Failed - 4/22/2019  5:53 PM        Failed - PHQ-9 score less than 5 in past 6 months     Please review last PHQ-9 score.           Passed - Medication is active on med list        Passed - Patient is age 18 or older        Passed - No active pregnancy on record        Passed - No positive pregnancy test in last 12 months        Passed - Recent (6 mo) or future (30 days) visit within the authorizing provider's specialty     Patient had office visit in the last 6 months or has a visit in the next 30 days with authorizing provider or within the authorizing provider's specialty.  See \"Patient Info\" tab in inbasket, or \"Choose Columns\" in Meds & Orders section of the refill encounter.              "

## 2019-04-23 NOTE — TELEPHONE ENCOUNTER
PHQ-9 SCORE 11/3/2017 3/19/2018 10/4/2018   PHQ-9 Total Score - - -   PHQ-9 Total Score 4 0 6   PHQ-9 Total Score - - -       Called patient because she is due for PHQ-9 update.   Left a non detailed message for patient to return call.     Sally GAMEZN, RN   [facility]

## 2019-04-24 ENCOUNTER — THERAPY VISIT (OUTPATIENT)
Dept: CHIROPRACTIC MEDICINE | Facility: CLINIC | Age: 59
End: 2019-04-24
Payer: COMMERCIAL

## 2019-04-24 DIAGNOSIS — M99.01 CERVICAL SEGMENT DYSFUNCTION: Primary | ICD-10-CM

## 2019-04-24 DIAGNOSIS — M99.02 THORACIC SEGMENT DYSFUNCTION: ICD-10-CM

## 2019-04-24 DIAGNOSIS — M54.2 CERVICALGIA: ICD-10-CM

## 2019-04-24 DIAGNOSIS — R51.9 CEPHALGIA: ICD-10-CM

## 2019-04-24 DIAGNOSIS — M99.00 HEAD REGION SOMATIC DYSFUNCTION: ICD-10-CM

## 2019-04-24 PROCEDURE — 98940 CHIROPRACT MANJ 1-2 REGIONS: CPT | Mod: AT | Performed by: CHIROPRACTOR

## 2019-04-24 PROCEDURE — 97810 ACUP 1/> WO ESTIM 1ST 15 MIN: CPT | Mod: GA | Performed by: CHIROPRACTOR

## 2019-04-24 NOTE — PROGRESS NOTES
Visit #:  6    Subjective:  Fredy Parker is a 58 year old female who is seen in f/u up for:        Cervical segment dysfunction  Cervicalgia  Thoracic segment dysfunction  Head region somatic dysfunction  Cephalgia.     Since last visit ,  Fredy Parker reports:    Area of chief complaint:  Cervical and Thoracic :  Symptoms are graded at 2-3/10. The quality is described as stiff, achey.  Motion has increased, but is still not normal. The pt reports at least 85% improvement since initial presentation. She denies HA sx however today she reports an ache in the neck area on the R that started mid day.  She denies other unusual sx.  The pt is pleased with her progress.     Since last visit the patient feels that they are 85 percent  improved from last visit.       Objective:  The following was observed:    P: palpatory tenderness Levator scapulae, Sub-occipital and R brachial plexus :  R>>L  A: static palpation demonstrates intersegmental asymmetry   R: motion palpation notes restricted motion  T: hypertonicity at: Levator scapulae and Sub-occipital R>>L    Segmental spinal dysfunction/restrictions found at:  Occiput, C1 , C2 , C7  and T4       Assessment:    Diagnoses:      1. Cervical segment dysfunction    2. Cervicalgia    3. Thoracic segment dysfunction    4. Head region somatic dysfunction    5. Cephalgia        Patient's condition:  Slight exacerbation today however pt doing well overall    Treatment effectiveness:  Post manipulation there is better intersegmental movement and Patient claims to feel looser post manipulation      Procedures:  CMT:  02508 Chiropractic manipulative treatment 1-2 regions performed   Occiput: Activator, R OCC, Prone, Supine  Cervical: Diversified and Activator, C1 , C2, Prone, Supine  Thoracic: Diversified, T1, T5, T9, Prone    Modalities:  07651: Tedatuochiachi points in the cervical spine, ajnusz points in the upper shoulders 15 minutes prone    Therapeutic  procedures:  None     Response to Treatment  Reduction in symptoms as reported by patient    Prognosis: Guarded    Progress towards Goals: Patient is making progress towards the goal.  Goals:  SLEEPING: the patient specific goal is to attain his pre-injury status of 6-7 hours comfortably  SITTING: the patient specific goal is to attain pre-injury status of  8 hours comfortably         Recommendations:    Instructions:  continue to use lumbar support     Follow-up:    Return to care in 1 week with ACP  Cervical flexion   Seated and supine

## 2019-04-25 NOTE — TELEPHONE ENCOUNTER
2nd attempt.  Non detailed message left for pt to return call to clinic and ask to speak with a triage nurse.    Barb VITAL RN  EP Triage

## 2019-04-29 RX ORDER — FLUOXETINE 40 MG/1
40 CAPSULE ORAL DAILY
Qty: 90 CAPSULE | Refills: 0 | Status: SHIPPED | OUTPATIENT
Start: 2019-04-29 | End: 2019-05-14

## 2019-04-29 NOTE — TELEPHONE ENCOUNTER
PHQ-9 SCORE 3/19/2018 10/4/2018 4/29/2019   PHQ-9 Total Score - - -   PHQ-9 Total Score 0 6 0   PHQ-9 Total Score - - -     Prescription approved per [facility] Refill Protocol.

## 2019-05-01 ENCOUNTER — THERAPY VISIT (OUTPATIENT)
Dept: CHIROPRACTIC MEDICINE | Facility: CLINIC | Age: 59
End: 2019-05-01
Payer: COMMERCIAL

## 2019-05-01 DIAGNOSIS — M99.01 CERVICAL SEGMENT DYSFUNCTION: Primary | ICD-10-CM

## 2019-05-01 DIAGNOSIS — M54.2 CERVICALGIA: ICD-10-CM

## 2019-05-01 DIAGNOSIS — M99.02 THORACIC SEGMENT DYSFUNCTION: ICD-10-CM

## 2019-05-01 DIAGNOSIS — M99.00 HEAD REGION SOMATIC DYSFUNCTION: ICD-10-CM

## 2019-05-01 DIAGNOSIS — R51.9 CEPHALGIA: ICD-10-CM

## 2019-05-01 PROCEDURE — 98940 CHIROPRACT MANJ 1-2 REGIONS: CPT | Mod: AT | Performed by: CHIROPRACTOR

## 2019-05-01 PROCEDURE — 97810 ACUP 1/> WO ESTIM 1ST 15 MIN: CPT | Mod: GA | Performed by: CHIROPRACTOR

## 2019-05-01 NOTE — PROGRESS NOTES
Visit #:  6    Subjective:  Fredy Parker is a 58 year old female who is seen in f/u up for:        Cervical segment dysfunction  Cervicalgia  Thoracic segment dysfunction  Head region somatic dysfunction  Cephalgia.     Since last visit ,  Fredy Parker reports:    Area of chief complaint:  Cervical and Thoracic :  Symptoms are graded at 2-3/10. The quality is described as stiff, achey.  Motion has increased, but is still not normal. The pt reports at least 80% improvement since initial presentation. She reports 1 HA over the past week that resolved with tylenol. She notes tension on both sides of the neck area. The pt denies weakness or other unusual sx.     Since last visit the patient feels that they are 80 percent  improved from last visit.       Objective:  The following was observed:    P: palpatory tenderness Levator scapulae, Sub-occipital and R brachial plexus :  R>>L  A: static palpation demonstrates intersegmental asymmetry   R: motion palpation notes restricted motion  T: hypertonicity at: Levator scapulae and Sub-occipital R>>L    Segmental spinal dysfunction/restrictions found at:  Occiput, C1 , C2 , C7  and T4       Assessment:    Diagnoses:      1. Cervical segment dysfunction    2. Cervicalgia    3. Thoracic segment dysfunction    4. Head region somatic dysfunction    5. Cephalgia        Patient's condition:  PT responding well to therapy    Treatment effectiveness:  Post manipulation there is better intersegmental movement and Patient claims to feel looser post manipulation      Procedures:  CMT:  15100 Chiropractic manipulative treatment 1-2 regions performed   Occiput: Activator, R OCC, Prone, Supine  Cervical: Diversified and Activator, C1 , C2, Prone, Supine  Thoracic: Diversified, T1, T5, T9, Prone    Modalities:  69680: Huatuochiachi points in the cervical spine, jansuz points in the upper shoulders 15 minutes prone    Therapeutic procedures:  None     Response to  Treatment  Reduction in symptoms as reported by patient    Prognosis: Guarded    Progress towards Goals: Patient is making progress towards the goal.  Goals:  SLEEPING: the patient specific goal is to attain his pre-injury status of 6-7 hours comfortably  SITTING: the patient specific goal is to attain pre-injury status of  8 hours comfortably         Recommendations:    Instructions:  continue to use lumbar support     Follow-up:    Return to care in 1 week with ACP  Cervical flexion   Seated and supine

## 2019-05-08 NOTE — PROGRESS NOTES
Fredy is a 58 year old No obstetric history on file. female who presents for annual exam.     Besides routine health maintenance, she has no other health concerns today .    HPI:  The patient's PCP is none.   Due for mammogram  Pap and hpv done today  Needs colonoscopy referral placed  Refill estrace cream for vaginal dryness  No other concerns      GYNECOLOGIC HISTORY:    Patient's last menstrual period was 03/24/2009.  Her current contraception method is: menopause.  She  reports that she has never smoked. She has never used smokeless tobacco.    Patient is sexually active.  STD testing offered?  Declined  Last PHQ-9 score on record =   PHQ-9 SCORE 5/14/2019   PHQ-9 Total Score -   PHQ-9 Total Score 0   PHQ-9 Total Score -     Last GAD7 score on record =   KEYLA-7 SCORE 5/14/2019   Total Score 0     Alcohol Score = 0    HEALTH MAINTENANCE:  Cholesterol: 12/2/15   Total= 221, Triglycerides=185, HDL=73, QZP=327/141 (10/26/17), FBS=93 (10/26/17), TSH=2.76 (10/26/17)   Last Mammo: 3/19/18, Result: normal, Next Mammo:  Will schedule  Pap: HPV HR +  Lab Results   Component Value Date    PAP LSIL,  10/02/2018    PAP ASC-H 03/19/2018    PAP LSIL 02/20/2017      Colonoscopy:  7/30/09, Result: normal, Next Colonoscopy: 7 years.  Dexa:  2/24/11      Health maintenance updated:  No - due for Mammogram, Dexa, Colonoscopy    HISTORY:  OB History   No data available       Patient Active Problem List   Diagnosis     Inflammatory arthritis     Osteopenia     Papanicolaou smear of cervix with low grade squamous intraepithelial lesion (LGSIL)     Latent tuberculosis     Cervicalgia     Atrophic vaginitis     Elevated transaminase level     Recurrent genital herpes     Posterior neck pain     Hyperlipidemia LDL goal <160     Gastroesophageal reflux disease without esophagitis     Major depressive disorder, single episode, moderate (H)     Osteoarthritis of finger, unspecified laterality     Perennial allergic rhinitis     Advanced  directives, counseling/discussion     Vertigo     Vestibular neuronitis of left ear     Thoracic segment dysfunction     Head region somatic dysfunction     Cephalgia     Past Surgical History:   Procedure Laterality Date     C DEXA INTERPRETATION, AXIAL  3/2009    T score lumbar -0.2, femoral neck -1.4/-1.3     COLONOSCOPY  07/2009    normal, repeat 10 years     CONIZATION LEEP  2/2011    chronic endocervicitis     DEXA  3/2011    T score lumbar -0.7, femoral neck -1.7/-1.9 with BMD 0.769     DISKECTOMY, LUMBAR, SINGLE SP  12/2007    L5-S1 diskectomy     HC COLP CERVIX/UPPER VAGINA W BX CERVIX/ENDOCERV CURETT  3/2009    ECC bx with chronic inflammation and squamous atypia, favor reactive     HC COLP CERVIX/UPPER VAGINA W BX CERVIX/ENDOCERV CURETT  4/2010    acute inflammation, no dysplasia.  ECC negative - repeat pap 6 months     HC COLP CERVIX/UPPER VAGINA W ENDOCERV CURETT  3/2009    negative, reactive process with no dysplasia, repeat pap 6 months     HC CT HEAD WO CONTRAST  4/2009    normal - done for acute vertigo     HC DUPLEX EXTREMITY VENOUS, LIMITED UNILATERAL  4/2006    left lower extremity - normal     HC INJ TRANSFORAMIN EPIDURAL, CERV/THOR SINGLE  5/2006    C7-T1 epidural steroid     HC MRI CERVICAL SPINE W/O CONTRAST  3/2006    C3-4 small central disc /C4-5 broad based disk/C5-6 central to rt disc all with mild to mod central stenosis     INJECTION, ANESTHETIC/STEROID, TRANSFORAMINAL EPIDURAL; LUMBAR/SACRAL, SINGLE LEVEL  8,9/2007    left L5 selective nerve root injection under fluoroscopy with corticosteroid.      INJECTION, ANESTHETIC/STEROID, TRANSFORAMINAL EPIDURAL; LUMBAR/SACRAL, SINGLE LEVEL  10/2007    right L5-S1 translaminar epidural injection     LAMINECTOMY LUMBAR ONE LEVEL  3/6/2012    Procedure:LAMINECTOMY LUMBAR ONE LEVEL; REVISION DISCECTOMY L5-S1 ON THE LEFT ; Surgeon:ROSE PRITCHETT; Location:[facility]     LAPAROSCOPIC CHOLECYSTECTOMY  7/99     MR LUMBAR SPINE W/O CONTRAST  1/2012     Moderate-sized left posterolateral caudally extruded L5-S1disc herniation impinging on left S1 nerve, mild degenerative disc changes at L4-5     RELEASE TRIGGER FINGER  5/17/2012    Procedure:RELEASE TRIGGER FINGER; right ring finger trigger release  (latex allergy:contact); Surgeon:QUYEN FUNK; Location:[facility]     SONO ABDOMEN LIMITED  9/2012    Increased echotexture liver c/w fatty infiltration     TUBAL LIGATION  2000     XR LUMBAR SACRAL TRANSFORAMINAL INJ RIGHT  1/2012    Left L5-S1 transforaminal epidural steroid/anesthetic along left L5 nerve root       Social History     Tobacco Use     Smoking status: Never Smoker     Smokeless tobacco: Never Used   Substance Use Topics     Alcohol use: No     Alcohol/week: 0.0 oz      Problem (# of Occurrences) Relation (Name,Age of Onset)    Hypertension (2) Mother, Father    Lipids (3) Mother, Father, Sister            Current Outpatient Medications   Medication Sig     CALTRATE 600+D PLUS 600-400 MG-UNIT OR TABS 1 TABLET TWICE DAILY      CENTRUM OR TABS 1 TABLET DAILY     cetirizine (ZYRTEC) 10 MG tablet Take 1 tablet (10 mg) by mouth daily     estradiol (ESTRACE VAGINAL) 0.1 MG/GM cream Place 0.5 g vaginally twice a week Place 0.5mg vaginally every night for a week, than twice weekly.     FLUoxetine (PROZAC) 40 MG capsule Take 1 capsule (40 mg) by mouth daily     omeprazole (PRILOSEC) 40 MG DR capsule TAKE 1 CAPSULE DAILY 30 TO 60 MINUTES BEFORE A MEAL     tiZANidine (ZANAFLEX) 2 MG tablet Take 1-2 tablets (2-4 mg) by mouth 3 times daily     valACYclovir (VALTREX) 500 MG tablet TAKE ONE TABLET BY MOUTH EVERY DAY     valACYclovir (VALTREX) 500 MG tablet Take 1 tablet (500 mg) by mouth daily     VITAMIN D 1000 UNIT OR CAPS 2 CAPSULES DAILY     No current facility-administered medications for this visit.      Allergies   Allergen Reactions     Aspirin Rash     Latex      Nsaids GI Disturbance     Sulfasalazine Rash       Past medical, surgical, social and family  "histories were reviewed and updated in EPIC.    ROS:   12 point review of systems negative other than symptoms noted below.  Genitourinary: Cramps and Hot Flashes  Musculoskeletal: Muscle Cramps    EXAM:  /80 (BP Location: Right arm, Patient Position: Sitting, Cuff Size: Adult Regular)   Pulse 80   Ht 1.492 m (4' 10.75\")   Wt 58 kg (127 lb 12.8 oz)   LMP 03/24/2009   Breastfeeding? No   BMI 26.03 kg/m     BMI: Body mass index is 26.03 kg/m .    PHYSICAL EXAM:  Constitutional:  Appearance: Well nourished, well developed, alert, in no acute distress  Neck:  Lymph Nodes:  No lymphadenopathy present    Thyroid:  Gland size normal, nontender, no nodules or masses present  on palpation  Chest:  Respiratory Effort:  Breathing unlabored  Cardiovascular:    Heart: Auscultation:  Regular rate, normal rhythm, no murmurs present  Breasts: Inspection of Breasts:  No lymphadenopathy present., Palpation of Breasts and Axillae:  No masses present on palpation, no breast tenderness., Axillary Lymph Nodes:  No lymphadenopathy present. and No nodularity, asymmetry or nipple discharge bilaterally.  Gastrointestinal:   Abdominal Examination:  Abdomen nontender to palpation, tone normal without rigidity or guarding, no masses present, umbilicus without lesions   Liver and Spleen:  No hepatomegaly present, liver nontender to palpation    Hernias:  No hernias present  Lymphatic: Lymph Nodes:  No other lymphadenopathy present  Skin:  General Inspection:  No rashes present, no lesions present, no areas of  discoloration    Genitalia and Groin:  No rashes present, no lesions present, no areas of  discoloration, no masses present  Neurologic/Psychiatric:    Mental Status:  Oriented X3     Pelvic Exam:  External Genitalia:     Normal appearance for age, no discharge present, no tenderness present, no inflammatory lesions present, color normal  Vagina:     Normal vaginal vault without central or paravaginal defects, " ATROPHIC  Bladder:     Nontender to palpation  Urethra:   Urethral Body:  Urethra palpation normal, urethra structural support normal   Urethral Meatus:  No erythema or lesions present  Cervix:     Appearance healthy, no lesions present, nontender to palpation, no bleeding present  Uterus:     Nontender to palpation, no masses present, position anteflexed, mobility: normal  Adnexa:     No adnexal tenderness present, no adnexal masses present  Perineum:     Perineum within normal limits, no evidence of trauma, no rashes or skin lesions present  Inguinal Lymph Nodes:     No lymphadenopathy present      COUNSELING:   Reviewed preventive health counseling, as reflected in patient instructions       Regular exercise       Healthy diet/nutrition    BMI: Body mass index is 26.03 kg/m .  Weight management plan: Discussed healthy diet and exercise guidelines    ASSESSMENT:  58 year old female with satisfactory annual exam.    ICD-10-CM    1. Encounter for gynecological examination without abnormal finding Z01.419        PLAN:  Refilled prozac and estrace cream  Colon ref  Patient promised to make mammogram appt  Pap and hpv done      Kristine Diaz MD

## 2019-05-14 ENCOUNTER — OFFICE VISIT (OUTPATIENT)
Dept: OBGYN | Facility: CLINIC | Age: 59
End: 2019-05-14
Payer: COMMERCIAL

## 2019-05-14 VITALS
HEIGHT: 59 IN | BODY MASS INDEX: 25.76 KG/M2 | HEART RATE: 80 BPM | SYSTOLIC BLOOD PRESSURE: 130 MMHG | WEIGHT: 127.8 LBS | DIASTOLIC BLOOD PRESSURE: 80 MMHG

## 2019-05-14 DIAGNOSIS — N95.2 ATROPHY OF VAGINA: ICD-10-CM

## 2019-05-14 DIAGNOSIS — Z12.11 SCREEN FOR COLON CANCER: ICD-10-CM

## 2019-05-14 DIAGNOSIS — F32.1 MAJOR DEPRESSIVE DISORDER, SINGLE EPISODE, MODERATE (H): ICD-10-CM

## 2019-05-14 DIAGNOSIS — Z01.419 ENCOUNTER FOR GYNECOLOGICAL EXAMINATION WITHOUT ABNORMAL FINDING: Primary | ICD-10-CM

## 2019-05-14 PROCEDURE — 99396 PREV VISIT EST AGE 40-64: CPT | Performed by: OBSTETRICS & GYNECOLOGY

## 2019-05-14 PROCEDURE — 87624 HPV HI-RISK TYP POOLED RSLT: CPT | Performed by: OBSTETRICS & GYNECOLOGY

## 2019-05-14 PROCEDURE — G0124 SCREEN C/V THIN LAYER BY MD: HCPCS | Performed by: OBSTETRICS & GYNECOLOGY

## 2019-05-14 PROCEDURE — 88175 CYTOPATH C/V AUTO FLUID REDO: CPT | Performed by: OBSTETRICS & GYNECOLOGY

## 2019-05-14 RX ORDER — ESTRADIOL 0.1 MG/G
0.5 CREAM VAGINAL
Qty: 42.5 G | Refills: 6 | Status: SHIPPED | OUTPATIENT
Start: 2019-05-16 | End: 2023-06-19

## 2019-05-14 RX ORDER — FLUOXETINE 40 MG/1
40 CAPSULE ORAL DAILY
Qty: 90 CAPSULE | Refills: 3 | Status: SHIPPED | OUTPATIENT
Start: 2019-05-14 | End: 2019-07-08

## 2019-05-14 ASSESSMENT — ANXIETY QUESTIONNAIRES
IF YOU CHECKED OFF ANY PROBLEMS ON THIS QUESTIONNAIRE, HOW DIFFICULT HAVE THESE PROBLEMS MADE IT FOR YOU TO DO YOUR WORK, TAKE CARE OF THINGS AT HOME, OR GET ALONG WITH OTHER PEOPLE: NOT DIFFICULT AT ALL
6. BECOMING EASILY ANNOYED OR IRRITABLE: NOT AT ALL
7. FEELING AFRAID AS IF SOMETHING AWFUL MIGHT HAPPEN: NOT AT ALL
GAD7 TOTAL SCORE: 0
2. NOT BEING ABLE TO STOP OR CONTROL WORRYING: NOT AT ALL
1. FEELING NERVOUS, ANXIOUS, OR ON EDGE: NOT AT ALL
5. BEING SO RESTLESS THAT IT IS HARD TO SIT STILL: NOT AT ALL
3. WORRYING TOO MUCH ABOUT DIFFERENT THINGS: NOT AT ALL

## 2019-05-14 ASSESSMENT — PATIENT HEALTH QUESTIONNAIRE - PHQ9
5. POOR APPETITE OR OVEREATING: NOT AT ALL
SUM OF ALL RESPONSES TO PHQ QUESTIONS 1-9: 0

## 2019-05-14 ASSESSMENT — MIFFLIN-ST. JEOR: SCORE: 1051.36

## 2019-05-14 NOTE — LETTER
June 6, 2019    Fredy Parker  [address]  [place] [address]    Dear ,      We are contacting you in writing because we have been unable to reach you by phone.     We have recently received your PAP smear result. The result shows ASCUS or Atypical Squamous Cells of Undetermined Significance. This indicates a mild change only    We also tested your sample for the presence of the HPV (Human Papillomavirus). Your sample was positive for HPV. There are many types of HPV, but we test pap samples for the high risk types. HPV can be the cause of an abnormal Pap smear result.  The high risk types of HPV can also be related to the potential development of cervical cancer if not monitored and/or treated appropriately    Because of this, we need to do further testing. It is recommended that you schedule a colposcopy. Colposcopy is a way for your doctor to use a special magnifying device to look at your vulva, vagina, and cervix. If a problem is seen during colposcopy, a small sample of tissue may be collected for laboratory testing (biopsy). The exam and test will help determine the reason for your abnormal pap smear.    Please call [phone] to schedule this procedure. Schedule this for a time when you are not due to have a period (if having regular menstrual bleeding). You can take an over the counter pain reliever 1 hour before your colposcopy. Nothing in the vagina for 24 hours before your colposcopy (no sex, douches, vaginal medications or lubricants).     If you have additional questions regarding this result, please call our registered nurse, Paola at [phone].    Sincerely,    Your [facility] Team//  Kristine Diaz MD

## 2019-05-15 ASSESSMENT — ANXIETY QUESTIONNAIRES: GAD7 TOTAL SCORE: 0

## 2019-05-21 LAB
COPATH REPORT: ABNORMAL
PAP: ABNORMAL

## 2019-05-29 ENCOUNTER — ANCILLARY PROCEDURE (OUTPATIENT)
Dept: MAMMOGRAPHY | Facility: CLINIC | Age: 59
End: 2019-05-29
Payer: COMMERCIAL

## 2019-05-29 DIAGNOSIS — Z12.31 VISIT FOR SCREENING MAMMOGRAM: ICD-10-CM

## 2019-05-29 PROCEDURE — 77067 SCR MAMMO BI INCL CAD: CPT | Mod: TC

## 2019-07-02 NOTE — PROGRESS NOTES
INDICATIONS:                                                    Is a pregnancy test required: No.  Was a consent obtained?  Yes      Fredy Parker, is a 59 year old female, who had a recent ASCUS pap.  HPV HR +.  Yes prior history of abnormal pap. Here today for colposcopy. Discussed indication, risks of infection and bleeding.    Her last pap was   Lab Results   Component Value Date    PAP ASC-US 05/14/2019 2/27/07 ASCUS/ Neg HPV  6/8/07 ASCUS/Neg HPV  3/20/09 LSIL  5/2009 COLP- Negative  3/26/10 LSIL/+ HR HPV  12/7/10 LSIL/Neg HPV  2/2011 LEEP  2011, 2012, 2013 NIL paps  09/29/14 ASCUS, +HR HPV.   10/21/14 [place] = Cervical Bx and ECC= Benign. Repeat co-test 1 yr  12/02/15 LSIL/ +HR HPV. Plan: colposcopy  12/15/15 [place]= Negative. 1 yr pap  2/20/17 LSIL/+ HR HPV (not 16 or 18).   04/17/17 [place]= CORBIN 1. Referred to ObGyn for further evaluation  3/19/18  ASC-H, LSIL/+ HR HPV (not 16/18).  Plan: colposcopy by 6/19/18  4/10/18 [place]- Normal.   10/2/18 LSIL pap, + HR HPV (not 16/18). Plan cotest in 6 months  5/14/19 Pap: ASCUS, +HR HPV (not 16/18). Plan colpo    Patient has long term chronic issues with HPV  Prior Leep cone in 2011.   Most recent pap ASCUS + other type HPV  Her cervix is very flat, flush with vaginal apex  PROCEDURE:                                                      Cervix is stained with acetic acid and viewed colposcopically. Squamocolumnar junction is visualized in it's entirety. no visible lesions . Biopsy done Yes. Endocervical curretage Done    Random biopsy done at 12:00  Very hard to biopsy or do ECC due to flush cervix and vaginal atrophy. Patient rarely uses her estrogen cream.      POST PROCEDURE:                                                      IMPRESSION: Patient tolerated procedure well    PLAN : Await the results of the biopsies.  Repeat pap in 12 months.  She  experienced moderate discomfort during the procedure. There were no complications. Patient was discharged in  stable condition.    Patient advised to call the clinic if excessive bleeding, pelvic pain, or fever.     Follow-up plan based on pathology results.    Kristine Diaz MD

## 2019-07-08 ENCOUNTER — OFFICE VISIT (OUTPATIENT)
Dept: OBGYN | Facility: CLINIC | Age: 59
End: 2019-07-08
Payer: COMMERCIAL

## 2019-07-08 VITALS
SYSTOLIC BLOOD PRESSURE: 138 MMHG | DIASTOLIC BLOOD PRESSURE: 78 MMHG | BODY MASS INDEX: 25.76 KG/M2 | HEIGHT: 59 IN | WEIGHT: 127.8 LBS

## 2019-07-08 DIAGNOSIS — F32.1 MAJOR DEPRESSIVE DISORDER, SINGLE EPISODE, MODERATE (H): ICD-10-CM

## 2019-07-08 DIAGNOSIS — R87.612 PAPANICOLAOU SMEAR OF CERVIX WITH LOW GRADE SQUAMOUS INTRAEPITHELIAL LESION (LGSIL): Primary | ICD-10-CM

## 2019-07-08 PROCEDURE — 57454 BX/CURETT OF CERVIX W/SCOPE: CPT | Performed by: OBSTETRICS & GYNECOLOGY

## 2019-07-08 PROCEDURE — 88305 TISSUE EXAM BY PATHOLOGIST: CPT | Performed by: OBSTETRICS & GYNECOLOGY

## 2019-07-08 RX ORDER — FLUOXETINE 40 MG/1
40 CAPSULE ORAL DAILY
Qty: 90 CAPSULE | Refills: 3 | Status: SHIPPED | OUTPATIENT
Start: 2019-07-08 | End: 2019-09-19

## 2019-07-08 ASSESSMENT — MIFFLIN-ST. JEOR: SCORE: 1046.36

## 2019-07-10 LAB — COPATH REPORT: NORMAL

## 2019-07-15 ENCOUNTER — HOSPITAL ENCOUNTER (OUTPATIENT)
Facility: CLINIC | Age: 59
Discharge: HOME | End: 2019-07-15
Attending: COLON & RECTAL SURGERY | Admitting: COLON & RECTAL SURGERY
Payer: COMMERCIAL

## 2019-07-15 VITALS
HEART RATE: 61 BPM | OXYGEN SATURATION: 97 % | DIASTOLIC BLOOD PRESSURE: 72 MMHG | SYSTOLIC BLOOD PRESSURE: 110 MMHG | RESPIRATION RATE: 18 BRPM

## 2019-07-15 LAB — COLONOSCOPY: NORMAL

## 2019-07-15 PROCEDURE — 25000128 H RX IP 250 OP 636: Performed by: COLON & RECTAL SURGERY

## 2019-07-15 PROCEDURE — 88305 TISSUE EXAM BY PATHOLOGIST: CPT | Performed by: COLON & RECTAL SURGERY

## 2019-07-15 PROCEDURE — G0500 MOD SEDAT ENDO SERVICE >5YRS: HCPCS | Performed by: COLON & RECTAL SURGERY

## 2019-07-15 PROCEDURE — 45380 COLONOSCOPY AND BIOPSY: CPT | Performed by: COLON & RECTAL SURGERY

## 2019-07-15 PROCEDURE — 88305 TISSUE EXAM BY PATHOLOGIST: CPT | Mod: 26 | Performed by: COLON & RECTAL SURGERY

## 2019-07-15 RX ORDER — NALOXONE HYDROCHLORIDE 0.4 MG/ML
.1-.4 INJECTION, SOLUTION INTRAMUSCULAR; INTRAVENOUS; SUBCUTANEOUS
Status: DISCONTINUED | OUTPATIENT
Start: 2019-07-15 | End: 2019-07-15 | Stop reason: HOSPADM

## 2019-07-15 RX ORDER — ONDANSETRON 4 MG/1
4 TABLET, ORALLY DISINTEGRATING ORAL EVERY 6 HOURS PRN
Status: DISCONTINUED | OUTPATIENT
Start: 2019-07-15 | End: 2019-07-15 | Stop reason: HOSPADM

## 2019-07-15 RX ORDER — DIPHENHYDRAMINE HCL 25 MG
25 CAPSULE ORAL EVERY 4 HOURS PRN
Status: DISCONTINUED | OUTPATIENT
Start: 2019-07-15 | End: 2019-07-15 | Stop reason: HOSPADM

## 2019-07-15 RX ORDER — PROCHLORPERAZINE MALEATE 10 MG
10 TABLET ORAL EVERY 6 HOURS PRN
Status: DISCONTINUED | OUTPATIENT
Start: 2019-07-15 | End: 2019-07-15 | Stop reason: HOSPADM

## 2019-07-15 RX ORDER — ONDANSETRON 2 MG/ML
4 INJECTION INTRAMUSCULAR; INTRAVENOUS
Status: DISCONTINUED | OUTPATIENT
Start: 2019-07-15 | End: 2019-07-15 | Stop reason: HOSPADM

## 2019-07-15 RX ORDER — FLUMAZENIL 0.1 MG/ML
0.2 INJECTION, SOLUTION INTRAVENOUS
Status: DISCONTINUED | OUTPATIENT
Start: 2019-07-15 | End: 2019-07-15 | Stop reason: HOSPADM

## 2019-07-15 RX ORDER — ONDANSETRON 2 MG/ML
4 INJECTION INTRAMUSCULAR; INTRAVENOUS EVERY 6 HOURS PRN
Status: DISCONTINUED | OUTPATIENT
Start: 2019-07-15 | End: 2019-07-15 | Stop reason: HOSPADM

## 2019-07-15 RX ORDER — LIDOCAINE 40 MG/G
CREAM TOPICAL
Status: DISCONTINUED | OUTPATIENT
Start: 2019-07-15 | End: 2019-07-15 | Stop reason: HOSPADM

## 2019-07-15 RX ORDER — FENTANYL CITRATE 50 UG/ML
INJECTION, SOLUTION INTRAMUSCULAR; INTRAVENOUS PRN
Status: DISCONTINUED | OUTPATIENT
Start: 2019-07-15 | End: 2019-07-15 | Stop reason: HOSPADM

## 2019-07-15 RX ORDER — DIPHENHYDRAMINE HYDROCHLORIDE 50 MG/ML
25 INJECTION INTRAMUSCULAR; INTRAVENOUS EVERY 4 HOURS PRN
Status: DISCONTINUED | OUTPATIENT
Start: 2019-07-15 | End: 2019-07-15 | Stop reason: HOSPADM

## 2019-07-15 NOTE — H&P
Pre-Endoscopy History and Physical     Fredy Parker MRN# [id number]   YOB: 1960 Age: 59 year old     Date of Procedure: 7/15/2019  Primary care provider: [facility]  Type of Endoscopy: Colonoscopy  Reason for Procedure: H/O polyps  Type of Anesthesia Anticipated: Moderate Sedation    HPI:    Fredy is a 59 year old female who will be undergoing the above procedure.      A history and physical has been performed. The patient's medications and allergies have been reviewed. The risks and benefits of the procedure and the sedation options and risks were discussed with the patient.  All questions were answered and informed consent was obtained.      She denies a personal or family history of anesthesia complications or bleeding disorders.     Allergies   Allergen Reactions     Aspirin Rash     Latex      Nsaids GI Disturbance     Sulfasalazine Rash          No current facility-administered medications on file prior to encounter.   Current Outpatient Medications on File Prior to Encounter:  CALTRATE 600+D PLUS 600-400 MG-UNIT OR TABS 1 TABLET TWICE DAILY    CENTRUM OR TABS 1 TABLET DAILY   cetirizine (ZYRTEC) 10 MG tablet Take 1 tablet (10 mg) by mouth daily   estradiol (ESTRACE VAGINAL) 0.1 MG/GM vaginal cream Place 0.5 g vaginally twice a week Place 0.5mg vaginally every night for a week, than twice weekly.   FLUoxetine (PROZAC) 40 MG capsule Take 1 capsule (40 mg) by mouth daily   omeprazole (PRILOSEC) 40 MG DR capsule TAKE 1 CAPSULE DAILY 30 TO 60 MINUTES BEFORE A MEAL   tiZANidine (ZANAFLEX) 2 MG tablet Take 1-2 tablets (2-4 mg) by mouth 3 times daily   valACYclovir (VALTREX) 500 MG tablet Take 1 tablet (500 mg) by mouth daily   VITAMIN D 1000 UNIT OR CAPS 2 CAPSULES DAILY       Patient Active Problem List   Diagnosis     Inflammatory arthritis     Osteopenia     Papanicolaou smear of cervix with low grade squamous intraepithelial lesion (LGSIL)     Latent  tuberculosis     Cervicalgia     Atrophic vaginitis     Elevated transaminase level     Recurrent genital herpes     Posterior neck pain     Hyperlipidemia LDL goal <160     Gastroesophageal reflux disease without esophagitis     Major depressive disorder, single episode, moderate (H)     Osteoarthritis of finger, unspecified laterality     Perennial allergic rhinitis     Advanced directives, counseling/discussion     Vertigo     Vestibular neuronitis of left ear     Thoracic segment dysfunction     Head region somatic dysfunction     Cephalgia        Past Medical History:   Diagnosis Date     Acne      Allergic rhinitis     grasses, birch, dust mite, cat, dog - previous immunotherapy     ASCUS with positive high risk HPV 09/2014    colposcopy benign     Atrophic vaginitis 2012    with dyspareunia     Chronic gastric ulcer without mention of hemorrhage, perforation, without mention of obstruction 1994    gastric erosion dx'ed by endoscopy. pt was also treated for pos H.pylori     Disc disorder of cervical region 2006    mild disc changes C3-4/C4-5 and C5-6 with chronic myofascial cervicoscapular pain     Elevated glucose 2012    one fasting glc of 136, A1C 6.3     Elevated transaminase level 2012    AST/ALT 2-3 x normal; fatty infiltration liver on ultrasound; negative testing for infectious hepatitis; normal iron levels, ELP, CK     Inflammatory arthritis 2009    likely a spondyloarthropathy, IP joints and Angel's tendons     Latent tuberculosis 10/2009    started INH      LSIL (low grade squamous intraepithelial lesion) on Pap smear 3/2009, 3/2010, 12/2010    HPV 56 (high risk) detected 2010     Lumbar disc herniation with radiculopathy 2007    left extruded L5-S1 disc herniation, treated with diskectomy 12/2007     Major depressive disorder, single episode, moderate (H) 2006     Mixed hyperlipidemia 2009    elevated TG and LDL     Osteopenia 2009    mild, femoral neck     Papanicolaou smear of cervix  with low grade squamous intraepithelial lesion (LGSIL) 12/02/15 & 2017, 10/2/18    See problem list     Prolonged QT interval 2016    ? Prozac, resolved with decreased dose                                            Recurrent genital herpes      Status post laminectomy 2007: L5-S1 diskectomy :Revision diskectomy left at L5-S1     Status post LEEP (loop electrosurgical excision procedure) of cervix 2011    for persistent LSIL     Supervision of other normal pregnancy      - vaginal     Trigger finger, right 2012    right ring finger - surgically treated 2012     Vestibular neuronitis of left ear 2016        Past Surgical History:   Procedure Laterality Date     C DEXA INTERPRETATION, AXIAL  3/2009    T score lumbar -0.2, femoral neck -1.4/-1.3     COLONOSCOPY  2009    normal, repeat 10 years     CONIZATION LEEP  2011    chronic endocervicitis     DEXA  3/2011    T score lumbar -0.7, femoral neck -1.7/-1.9 with BMD 0.769     DISKECTOMY, LUMBAR, SINGLE SP  2007    L5-S1 diskectomy     HC COLP CERVIX/UPPER VAGINA W BX CERVIX/ENDOCERV CURETT  3/2009    ECC bx with chronic inflammation and squamous atypia, favor reactive     HC COLP CERVIX/UPPER VAGINA W BX CERVIX/ENDOCERV CURETT  2010    acute inflammation, no dysplasia.  ECC negative - repeat pap 6 months     HC COLP CERVIX/UPPER VAGINA W ENDOCERV CURETT  3/2009    negative, reactive process with no dysplasia, repeat pap 6 months     HC CT HEAD WO CONTRAST  2009    normal - done for acute vertigo     HC DUPLEX EXTREMITY VENOUS, LIMITED UNILATERAL  2006    left lower extremity - normal     HC INJ TRANSFORAMIN EPIDURAL, CERV/THOR SINGLE  2006    C7-T1 epidural steroid     HC MRI CERVICAL SPINE W/O CONTRAST  3/2006    C3-4 small central disc /C4-5 broad based disk/C5-6 central to rt disc all with mild to mod central stenosis     INJECTION, ANESTHETIC/STEROID, TRANSFORAMINAL EPIDURAL; LUMBAR/SACRAL, SINGLE LEVEL   "8,9/2007    left L5 selective nerve root injection under fluoroscopy with corticosteroid.      INJECTION, ANESTHETIC/STEROID, TRANSFORAMINAL EPIDURAL; LUMBAR/SACRAL, SINGLE LEVEL  10/2007    right L5-S1 translaminar epidural injection     LAMINECTOMY LUMBAR ONE LEVEL  3/6/2012    Procedure:LAMINECTOMY LUMBAR ONE LEVEL; REVISION DISCECTOMY L5-S1 ON THE LEFT ; Surgeon:ROSE PRITCHETT; Location: OR     LAPAROSCOPIC CHOLECYSTECTOMY  7/99     MR LUMBAR SPINE W/O CONTRAST  1/2012    Moderate-sized left posterolateral caudally extruded L5-S1disc herniation impinging on left S1 nerve, mild degenerative disc changes at L4-5     RELEASE TRIGGER FINGER  5/17/2012    Procedure:RELEASE TRIGGER FINGER; right ring finger trigger release  (latex allergy:contact); Surgeon:QUYEN FUNK; Location: [facility]     SONO ABDOMEN LIMITED  9/2012    Increased echotexture liver c/w fatty infiltration     TUBAL LIGATION  2000     XR LUMBAR SACRAL TRANSFORAMINAL INJ RIGHT  1/2012    Left L5-S1 transforaminal epidural steroid/anesthetic along left L5 nerve root        Social History     Tobacco Use     Smoking status: Never Smoker     Smokeless tobacco: Never Used   Substance Use Topics     Alcohol use: No     Alcohol/week: 0.0 oz       Family History   Problem Relation Age of Onset     Hypertension Mother      Lipids Mother      Hypertension Father      Lipids Father      Lipids Sister        REVIEW OF SYSTEMS:     5 point ROS negative except as noted above in HPI, including Gen., Resp., CV, GI &  system review.      PHYSICAL EXAM:   [facility] 03/24/2009  Estimated body mass index is 26.03 kg/m  as calculated from the following:    Height as of 7/8/19: 1.492 m (4' 10.75\").    Weight as of 7/8/19: 58 kg (127 lb 12.8 oz).   GENERAL APPEARANCE: healthy and alert  MENTAL STATUS: alert  AIRWAY EXAM: Mallampatti Class I (visualization of the soft palate, fauces, uvula, anterior and posterior pillars)  RESP: lungs clear to auscultation - no rales, " rhonchi or wheezes  CV: regular rates and rhythm      IMPRESSION   ASA Class 2 - Mild systemic disease        PLAN:     Plan for colonoscopy. We discussed the risks, benefits and alternatives and the patient wished to proceed.    The above has been forwarded to the consulting provider.      Barby Peters MD  [facility]  Phone: [phone]  Fax: [phone]  July 15, 2019

## 2019-07-15 NOTE — OP NOTE
See Provation Note In Chart    Barby Peters MD  [facility]  Office Phone # [phone]

## 2019-07-16 ENCOUNTER — TELEPHONE (OUTPATIENT)
Dept: OBGYN | Facility: CLINIC | Age: 59
End: 2019-07-16

## 2019-07-16 LAB — COPATH REPORT: NORMAL

## 2019-09-19 ENCOUNTER — OFFICE VISIT (OUTPATIENT)
Dept: FAMILY MEDICINE | Facility: CLINIC | Age: 59
End: 2019-09-19
Payer: COMMERCIAL

## 2019-09-19 VITALS
TEMPERATURE: 99.2 F | HEART RATE: 79 BPM | SYSTOLIC BLOOD PRESSURE: 138 MMHG | HEIGHT: 59 IN | BODY MASS INDEX: 25.88 KG/M2 | DIASTOLIC BLOOD PRESSURE: 86 MMHG | OXYGEN SATURATION: 97 % | WEIGHT: 128.4 LBS

## 2019-09-19 DIAGNOSIS — A60.00 RECURRENT GENITAL HERPES: ICD-10-CM

## 2019-09-19 DIAGNOSIS — F32.1 MAJOR DEPRESSIVE DISORDER, SINGLE EPISODE, MODERATE (H): ICD-10-CM

## 2019-09-19 DIAGNOSIS — K21.9 GASTROESOPHAGEAL REFLUX DISEASE WITHOUT ESOPHAGITIS: ICD-10-CM

## 2019-09-19 DIAGNOSIS — Z23 NEED FOR VACCINATION: Primary | ICD-10-CM

## 2019-09-19 DIAGNOSIS — K64.8 INTERNAL HEMORRHOID: ICD-10-CM

## 2019-09-19 PROCEDURE — 99214 OFFICE O/P EST MOD 30 MIN: CPT | Mod: 25 | Performed by: FAMILY MEDICINE

## 2019-09-19 PROCEDURE — 90471 IMMUNIZATION ADMIN: CPT | Performed by: FAMILY MEDICINE

## 2019-09-19 PROCEDURE — 90750 HZV VACC RECOMBINANT IM: CPT | Performed by: FAMILY MEDICINE

## 2019-09-19 RX ORDER — FLUOXETINE 40 MG/1
40 CAPSULE ORAL DAILY
Qty: 90 CAPSULE | Refills: 3 | Status: SHIPPED | OUTPATIENT
Start: 2019-09-19 | End: 2020-09-15

## 2019-09-19 RX ORDER — VALACYCLOVIR HYDROCHLORIDE 500 MG/1
500 TABLET, FILM COATED ORAL DAILY
Qty: 90 TABLET | Refills: 3 | Status: SHIPPED | OUTPATIENT
Start: 2019-09-19 | End: 2020-09-16

## 2019-09-19 RX ORDER — OMEPRAZOLE 40 MG/1
CAPSULE, DELAYED RELEASE ORAL
Qty: 90 CAPSULE | Refills: 2 | Status: SHIPPED | OUTPATIENT
Start: 2019-09-19 | End: 2020-06-09

## 2019-09-19 ASSESSMENT — MIFFLIN-ST. JEOR: SCORE: 1049.08

## 2019-09-19 NOTE — PROGRESS NOTES
Subjective     Fredy Parker is a 59 year old female who presents to clinic today for the following health issues:    HPI   Rectal bleeding      Duration: on and off for 1 month     Description (location/character/radiation): seeing dark red blood in toilet after bowel movement     Intensity:  n/a    Accompanying signs and symptoms: none    History (similar episodes/previous evaluation): YES several years ago had problem with hemorrhoids    Precipitating or alleviating factors: None    Therapies tried and outcome: None     Needs fluoxetine sent to [facility] no longer use [facility]       Patient Active Problem List   Diagnosis     Inflammatory arthritis     Osteopenia     Papanicolaou smear of cervix with low grade squamous intraepithelial lesion (LGSIL)     Latent tuberculosis     Cervicalgia     Atrophic vaginitis     Elevated transaminase level     Recurrent genital herpes     Posterior neck pain     Hyperlipidemia LDL goal <160     Gastroesophageal reflux disease without esophagitis     Major depressive disorder, single episode, moderate (H)     Osteoarthritis of finger, unspecified laterality     Perennial allergic rhinitis     Advanced directives, counseling/discussion     Vertigo     Vestibular neuronitis of left ear     Thoracic segment dysfunction     Head region somatic dysfunction     Cephalgia     Past Surgical History:   Procedure Laterality Date     C DEXA INTERPRETATION, AXIAL  3/2009    T score lumbar -0.2, femoral neck -1.4/-1.3     COLONOSCOPY  07/2009    normal, repeat 10 years     COLONOSCOPY N/A 7/15/2019    Procedure: COLONOSCOPY, WITH POLYPECTOMY AND BIOPSY;  Surgeon: Danyell Peters MD;  Location:  GI     CONIZATION LEEP  2/2011    chronic endocervicitis     DEXA  3/2011    T score lumbar -0.7, femoral neck -1.7/-1.9 with BMD 0.769     DISKECTOMY, LUMBAR, SINGLE SP  12/2007    L5-S1 diskectomy     HC COLP CERVIX/UPPER VAGINA W BX CERVIX/ENDOCERV CURETT   3/2009    ECC bx with chronic inflammation and squamous atypia, favor reactive     HC COLP CERVIX/UPPER VAGINA W BX CERVIX/ENDOCERV CURETT  4/2010    acute inflammation, no dysplasia.  ECC negative - repeat pap 6 months     HC COLP CERVIX/UPPER VAGINA W ENDOCERV CURETT  3/2009    negative, reactive process with no dysplasia, repeat pap 6 months     HC CT HEAD WO CONTRAST  4/2009    normal - done for acute vertigo     HC DUPLEX EXTREMITY VENOUS, LIMITED UNILATERAL  4/2006    left lower extremity - normal     HC INJ TRANSFORAMIN EPIDURAL, CERV/THOR SINGLE  5/2006    C7-T1 epidural steroid     HC MRI CERVICAL SPINE W/O CONTRAST  3/2006    C3-4 small central disc /C4-5 broad based disk/C5-6 central to rt disc all with mild to mod central stenosis     INJECTION, ANESTHETIC/STEROID, TRANSFORAMINAL EPIDURAL; LUMBAR/SACRAL, SINGLE LEVEL  8,9/2007    left L5 selective nerve root injection under fluoroscopy with corticosteroid.      INJECTION, ANESTHETIC/STEROID, TRANSFORAMINAL EPIDURAL; LUMBAR/SACRAL, SINGLE LEVEL  10/2007    right L5-S1 translaminar epidural injection     LAMINECTOMY LUMBAR ONE LEVEL  3/6/2012    Procedure:LAMINECTOMY LUMBAR ONE LEVEL; REVISION DISCECTOMY L5-S1 ON THE LEFT ; Surgeon:ROSE PRITCHETT; Location: OR     LAPAROSCOPIC CHOLECYSTECTOMY  7/99     MR LUMBAR SPINE W/O CONTRAST  1/2012    Moderate-sized left posterolateral caudally extruded L5-S1disc herniation impinging on left S1 nerve, mild degenerative disc changes at L4-5     RELEASE TRIGGER FINGER  5/17/2012    Procedure:RELEASE TRIGGER FINGER; right ring finger trigger release  (latex allergy:contact); Surgeon:QUYEN FUNK; Location: [facility]     SONO ABDOMEN LIMITED  9/2012    Increased echotexture liver c/w fatty infiltration     TUBAL LIGATION  2000     XR LUMBAR SACRAL TRANSFORAMINAL INJ RIGHT  1/2012    Left L5-S1 transforaminal epidural steroid/anesthetic along left L5 nerve root        Social History     Tobacco Use     Smoking  status: Never Smoker     Smokeless tobacco: Never Used   Substance Use Topics     Alcohol use: No     Alcohol/week: 0.0 oz     Family History   Problem Relation Age of Onset     Hypertension Mother      Lipids Mother      Hypertension Father      Lipids Father      Lipids Sister          Current Outpatient Medications   Medication Sig Dispense Refill     CALTRATE 600+D PLUS 600-400 MG-UNIT OR TABS 1 TABLET TWICE DAILY  3 MONTHS PRN     CENTRUM OR TABS 1 TABLET DAILY 30 0     cetirizine (ZYRTEC) 10 MG tablet Take 1 tablet (10 mg) by mouth daily 90 tablet 3     estradiol (ESTRACE VAGINAL) 0.1 MG/GM vaginal cream Place 0.5 g vaginally twice a week Place 0.5mg vaginally every night for a week, than twice weekly. 42.5 g 6     FLUoxetine (PROZAC) 40 MG capsule Take 1 capsule (40 mg) by mouth daily 90 capsule 3     hydrocortisone (ANUSOL-HC) 2.5 % cream Place rectally daily as needed for hemorrhoids 30 g 0     omeprazole (PRILOSEC) 40 MG DR capsule TAKE 1 CAPSULE DAILY 30 TO 60 MINUTES BEFORE A MEAL 90 capsule 2     tiZANidine (ZANAFLEX) 2 MG tablet Take 1-2 tablets (2-4 mg) by mouth 3 times daily 90 tablet 1     valACYclovir (VALTREX) 500 MG tablet Take 1 tablet (500 mg) by mouth daily 90 tablet 3     VITAMIN D 1000 UNIT OR CAPS 2 CAPSULES DAILY 180 Cap PRN     Allergies   Allergen Reactions     Aspirin Rash     Latex      Nsaids GI Disturbance     Sulfasalazine Rash     Recent Labs   Lab Test 10/26/17  1829 05/15/16  0632 05/14/16  0606 05/13/16  1447 12/02/15  0934 10/24/14  0705 04/24/13  1754 02/12/13  1609 12/18/12  1612 08/27/12  0739   A1C  --   --   --   --   --   --  6.2*  --  6.1* 5.9   *  --   --   --  111* 136*  --  95  --  101   HDL  --   --   --   --  73 66  --  79  --  60   TRIG  --   --   --   --  185* 144  --  117  --  159*   ALT 31  --  49 60* 109* 96* 140* 158* 137* 111*   CR 0.66 0.58 0.61 0.60 0.61 0.60  --   --   --   --    GFRESTIMATED >90 >90  Non  GFR Calc   >90  Non   "American GFR Calc   >90  Non  GFR Calc   >90  Non  GFR Calc   >90  Non  GFR Calc    --   --   --   --    GFRESTBLACK >90 >90   GFR Calc   >90   GFR Calc   >90   GFR Calc   >90   GFR Calc   >90   GFR Calc    --   --   --   --    POTASSIUM 4.2 3.5 3.4 3.5 3.4 4.0  --   --   --   --    TSH 2.76  --   --   --  1.88  --   --   --   --   --       BP Readings from Last 3 Encounters:   09/19/19 138/86   07/15/19 110/72   07/08/19 138/78    Wt Readings from Last 3 Encounters:   09/19/19 58.2 kg (128 lb 6.4 oz)   07/08/19 58 kg (127 lb 12.8 oz)   05/14/19 58 kg (127 lb 12.8 oz)                    Reviewed and updated as needed this visit by Provider         Review of Systems   ROS COMP: Constitutional, HEENT, cardiovascular, pulmonary, gi and gu systems are negative, except as otherwise noted.      Objective    /86 (BP Location: Right arm, Patient Position: Chair, Cuff Size: Adult Regular)   Pulse 79   Temp 99.2  F (37.3  C) (Oral)   Ht 1.492 m (4' 10.75\")   Wt 58.2 kg (128 lb 6.4 oz)   LMP 03/24/2009   SpO2 97%   BMI 26.15 kg/m    Body mass index is 26.15 kg/m .  Physical Exam   GENERAL: healthy, alert and no distress  EYES: Eyes grossly normal to inspection, PERRL and conjunctivae and sclerae normal  NECK: no adenopathy, no asymmetry, masses, or scars and thyroid normal to palpation  RESP: lungs clear to auscultation - no rales, rhonchi or wheezes  CV: regular rate and rhythm, normal S1 S2, no S3 or S4, no murmur, click or rub, no peripheral edema and peripheral pulses strong  ABDOMEN: soft, nontender, no hepatosplenomegaly, no masses and bowel sounds normal  RECTAL (female): normal sphincter tone, no rectal masses and no external mass, not able to check the internal hemorrhoid due to visualization limitation   MS: no gross musculoskeletal defects noted, no edema            Assessment " "& Plan     1. Major depressive disorder, single episode, moderate (H)  Stable with current dose of meds, has no side effect from the meds, will keep monitoring   - FLUoxetine (PROZAC) 40 MG capsule; Take 1 capsule (40 mg) by mouth daily  Dispense: 90 capsule; Refill: 3    2. Need for vaccination      3. Recurrent genital herpes  Stable   - valACYclovir (VALTREX) 500 MG tablet; Take 1 tablet (500 mg) by mouth daily  Dispense: 90 tablet; Refill: 3    4. Internal hemorrhoid  Unclear the bleeding resource, will have her to try anusol for next 1-2 weeks, and consider referral to colorectal surgery if not improving   - hydrocortisone (ANUSOL-HC) 2.5 % cream; Place rectally daily as needed for hemorrhoids  Dispense: 30 g; Refill: 0    5. Gastroesophageal reflux disease without esophagitis  Stable   - omeprazole (PRILOSEC) 40 MG DR capsule; TAKE 1 CAPSULE DAILY 30 TO 60 MINUTES BEFORE A MEAL  Dispense: 90 capsule; Refill: 2     BMI:   Estimated body mass index is 26.15 kg/m  as calculated from the following:    Height as of this encounter: 1.492 m (4' 10.75\").    Weight as of this encounter: 58.2 kg (128 lb 6.4 oz).         FUTURE APPOINTMENTS:       - Follow-up visit in 2-3 weeks     No follow-ups on file.    Blu Henry MD  [facility] [place]    "

## 2019-12-11 ENCOUNTER — OFFICE VISIT (OUTPATIENT)
Dept: FAMILY MEDICINE | Facility: CLINIC | Age: 59
End: 2019-12-11
Payer: COMMERCIAL

## 2019-12-11 VITALS
BODY MASS INDEX: 26.68 KG/M2 | SYSTOLIC BLOOD PRESSURE: 126 MMHG | WEIGHT: 131 LBS | DIASTOLIC BLOOD PRESSURE: 78 MMHG | OXYGEN SATURATION: 96 % | HEART RATE: 87 BPM | TEMPERATURE: 96.6 F

## 2019-12-11 DIAGNOSIS — M54.2 CERVICALGIA: Primary | ICD-10-CM

## 2019-12-11 DIAGNOSIS — M48.02 SPINAL STENOSIS IN CERVICAL REGION: ICD-10-CM

## 2019-12-11 PROCEDURE — 99203 OFFICE O/P NEW LOW 30 MIN: CPT | Performed by: INTERNAL MEDICINE

## 2019-12-11 RX ORDER — CYCLOBENZAPRINE HCL 5 MG
5-10 TABLET ORAL 3 TIMES DAILY PRN
Qty: 60 TABLET | Refills: 1 | Status: SHIPPED | OUTPATIENT
Start: 2019-12-11 | End: 2020-02-18

## 2019-12-11 ASSESSMENT — ANXIETY QUESTIONNAIRES
7. FEELING AFRAID AS IF SOMETHING AWFUL MIGHT HAPPEN: NOT AT ALL
1. FEELING NERVOUS, ANXIOUS, OR ON EDGE: NOT AT ALL
6. BECOMING EASILY ANNOYED OR IRRITABLE: NOT AT ALL
GAD7 TOTAL SCORE: 3
2. NOT BEING ABLE TO STOP OR CONTROL WORRYING: SEVERAL DAYS
3. WORRYING TOO MUCH ABOUT DIFFERENT THINGS: SEVERAL DAYS
IF YOU CHECKED OFF ANY PROBLEMS ON THIS QUESTIONNAIRE, HOW DIFFICULT HAVE THESE PROBLEMS MADE IT FOR YOU TO DO YOUR WORK, TAKE CARE OF THINGS AT HOME, OR GET ALONG WITH OTHER PEOPLE: NOT DIFFICULT AT ALL
5. BEING SO RESTLESS THAT IT IS HARD TO SIT STILL: NOT AT ALL

## 2019-12-11 ASSESSMENT — PATIENT HEALTH QUESTIONNAIRE - PHQ9
SUM OF ALL RESPONSES TO PHQ QUESTIONS 1-9: 6
5. POOR APPETITE OR OVEREATING: SEVERAL DAYS

## 2019-12-11 ASSESSMENT — PAIN SCALES - GENERAL: PAINLEVEL: SEVERE PAIN (7)

## 2019-12-11 NOTE — PROGRESS NOTES
Subjective     Fredy Parker is a 59 year old female who presents to clinic today for the following health issues:    HPI   Neck Pain  Onset: 2016- off and on    Description:   Location: neck  Radiation: none and head    Intensity: moderate, 7-8/10    Progression of Symptoms:  worsening and waxing and waning    Accompanying Signs & Symptoms:  Burning, prickly sensation (paresthesias) in arm(s): no   Numbness in arm(s): no   Weakness in arm(s):  YES- on and off  Fever: no   Headache: YES- on and off  Nausea and/or vomiting: no     History:   Trauma: no   Previous neck pain: no   Previous surgery or injections: no     Previous Imaging (MRI,X ray):2/2019     Precipitating factors:   Does movement increase the pain:  YES    Alleviating factors:  nothing    Therapies Tried and outcome:  Stretching/heat/tylenol/flexeril/chiropractor/accupuncture    Carlene is here with neck pain.  She has had this for years.  It has been getting worse.  She saw Dr. Onela earlier this year and had an x-ray showed mild diffuse degenerative disc disease.  He was referred to chiropractor and acupuncture.  The chiropractor was helpful, but she was only allowed 8 visits per her insurance and the pain has worsened since she stopped.  The neck pain is giving her headaches as well.  She takes Tylenol which helps the headache but not the neck pain.  Prescribed tizanidine which did not really help.  The pain is mostly in the center up and down her neck.  It goes down both arms, but there is known numbness or tingling or weakness.  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        Reviewed and updated as needed this visit by Provider         Review of Systems   Const, msk, neuro reviewed,  otherwise negative unless noted above.        Objective    /78   Pulse 87   Temp 96.6  F (35.9  C) (Tympanic)   Wt 59.4 kg (131 lb)   LMP 03/24/2009   SpO2 96%   BMI 26.68 kg/m    Body mass  index is 26.68 kg/m .  Physical Exam   Gen: pleasant, well appearing woman, no distress  Neck: + diffuse paraspinal and some midline neck tenderness. Mildly limitation of ROM.   Neuro:  Normal strength in UEs b/l.              Assessment & Plan     1. Cervicalgia  Chronic neck pain, has done conservative management.  Mild disease on the xray.  I suspect her work is certainly contributing to her pain.  She has an allergy to NSAIDs, so cannot take those.  We will see if Flexeril is more effective than the tizanidine.  Discussed that the next step would be an MRI which would give us more detail about her anatomy.  This may or may not reveal a finding that would be amenable to a steroid injection.  - MR Cervical Spine w/o Contrast; Future  - cyclobenzaprine (FLEXERIL) 5 MG tablet; Take 1-2 tablets (5-10 mg) by mouth 3 times daily as needed for muscle spasms  Dispense: 60 tablet; Refill: 1         Return in about 3 months (around 3/11/2020) for Physical Exam.    Lakshmi Mishra MD  [facility] [place]

## 2019-12-12 ASSESSMENT — ANXIETY QUESTIONNAIRES: GAD7 TOTAL SCORE: 3

## 2019-12-23 ENCOUNTER — HOSPITAL ENCOUNTER (OUTPATIENT)
Dept: MRI IMAGING | Facility: CLINIC | Age: 59
Discharge: HOME | End: 2019-12-23
Attending: INTERNAL MEDICINE | Admitting: INTERNAL MEDICINE
Payer: COMMERCIAL

## 2019-12-23 DIAGNOSIS — M54.2 CERVICALGIA: ICD-10-CM

## 2019-12-23 PROCEDURE — 72141 MRI NECK SPINE W/O DYE: CPT

## 2020-01-03 ENCOUNTER — OFFICE VISIT (OUTPATIENT)
Dept: NEUROSURGERY | Facility: CLINIC | Age: 60
End: 2020-01-03
Attending: PHYSICIAN ASSISTANT
Payer: COMMERCIAL

## 2020-01-03 ENCOUNTER — TELEPHONE (OUTPATIENT)
Dept: PALLIATIVE MEDICINE | Facility: CLINIC | Age: 60
End: 2020-01-03

## 2020-01-03 VITALS
DIASTOLIC BLOOD PRESSURE: 88 MMHG | OXYGEN SATURATION: 99 % | HEIGHT: 60 IN | HEART RATE: 88 BPM | SYSTOLIC BLOOD PRESSURE: 132 MMHG | BODY MASS INDEX: 25.52 KG/M2 | WEIGHT: 130 LBS | TEMPERATURE: 97.8 F

## 2020-01-03 DIAGNOSIS — M54.2 CERVICALGIA: Primary | ICD-10-CM

## 2020-01-03 PROCEDURE — G0463 HOSPITAL OUTPT CLINIC VISIT: HCPCS

## 2020-01-03 PROCEDURE — 99203 OFFICE O/P NEW LOW 30 MIN: CPT | Performed by: PHYSICIAN ASSISTANT

## 2020-01-03 ASSESSMENT — MIFFLIN-ST. JEOR: SCORE: 1076.18

## 2020-01-03 ASSESSMENT — PAIN SCALES - GENERAL: PAINLEVEL: SEVERE PAIN (7)

## 2020-01-03 NOTE — PROGRESS NOTES
NEUROSURGERY CLINIC CONSULT NOTE     DATE OF VISIT: 1/3/2020     SUBJECTIVE:     Fredy Parker is a pleasant 59 year old female who presents to the clinic today for consultation on her cervical spine pain. She is referred to the Neurosurgery Clinic by Dr. Mishra in .  Today, Ms. Parker reports a 3 year history of symptoms with a 1 month history of exacerbatioin. She describes an intermittant, sharp aching pain that initiates in the upper cervical region and radiates proximally in the C3 distribution. This pain is not accompanied by paresthesias and numbness in the same distribution. Prolonged cervical flexion aggravate the symptoms, while alleviation is obtained by stretching. No mechanism of injury such as trauma or a fall is associated with the onset of the pain. There are no bowel or bladder changes. The patient has participated in conservative therapies to include physical therapy, traction, chiropractic care, NSAIDs and acupuncture. None of these modalities have provided any significant long term relief.       Current Outpatient Medications:      CALTRATE 600+D PLUS 600-400 MG-UNIT OR TABS, 1 TABLET TWICE DAILY , Disp: 3 MONTHS, Rfl: PRN     CENTRUM OR TABS, 1 TABLET DAILY, Disp: 30, Rfl: 0     cetirizine (ZYRTEC) 10 MG tablet, Take 1 tablet (10 mg) by mouth daily, Disp: 90 tablet, Rfl: 3     cyclobenzaprine (FLEXERIL) 5 MG tablet, Take 1-2 tablets (5-10 mg) by mouth 3 times daily as needed for muscle spasms, Disp: 60 tablet, Rfl: 1     estradiol (ESTRACE VAGINAL) 0.1 MG/GM vaginal cream, Place 0.5 g vaginally twice a week Place 0.5mg vaginally every night for a week, than twice weekly., Disp: 42.5 g, Rfl: 6     FLUoxetine (PROZAC) 40 MG capsule, Take 1 capsule (40 mg) by mouth daily, Disp: 90 capsule, Rfl: 3     hydrocortisone (ANUSOL-HC) 2.5 % cream, Place rectally daily as needed for hemorrhoids, Disp: 30 g, Rfl: 0     omeprazole (PRILOSEC) 40 MG DR capsule, TAKE 1 CAPSULE DAILY 30 TO 60 MINUTES BEFORE  A MEAL, Disp: 90 capsule, Rfl: 2     valACYclovir (VALTREX) 500 MG tablet, Take 1 tablet (500 mg) by mouth daily, Disp: 90 tablet, Rfl: 3     VITAMIN D 1000 UNIT OR CAPS, 2 CAPSULES DAILY, Disp: 180 Cap, Rfl: PRN     Allergies   Allergen Reactions     Aspirin Rash     Latex      Nsaids GI Disturbance     Sulfasalazine Rash        Past Medical History:   Diagnosis Date     Acne      Allergic rhinitis     grasses, birch, dust mite, cat, dog - previous immunotherapy     ASCUS with positive high risk HPV 09/2014    colposcopy benign     Atrophic vaginitis 2012    with dyspareunia     Chronic gastric ulcer without mention of hemorrhage, perforation, without mention of obstruction 1994    gastric erosion dx'ed by endoscopy. pt was also treated for pos H.pylori     Disc disorder of cervical region 2006    mild disc changes C3-4/C4-5 and C5-6 with chronic myofascial cervicoscapular pain     Elevated glucose 2012    one fasting glc of 136, A1C 6.3     Elevated transaminase level 2012    AST/ALT 2-3 x normal; fatty infiltration liver on ultrasound; negative testing for infectious hepatitis; normal iron levels, ELP, CK     Inflammatory arthritis 2009    likely a spondyloarthropathy, IP joints and Crouse's tendons     Latent tuberculosis 10/2009    started INH      LSIL (low grade squamous intraepithelial lesion) on Pap smear 3/2009, 3/2010, 12/2010    HPV 56 (high risk) detected 2010     Lumbar disc herniation with radiculopathy 2007    left extruded L5-S1 disc herniation, treated with diskectomy 12/2007     Major depressive disorder, single episode, moderate (H) 2006     Mixed hyperlipidemia 2009    elevated TG and LDL     Osteopenia 2009    mild, femoral neck     Papanicolaou smear of cervix with low grade squamous intraepithelial lesion (LGSIL) 12/02/15 & 2017, 10/2/18    See problem list     Prolonged QT interval 5/2016    ? Prozac, resolved with decreased dose                                            Recurrent  genital herpes      Status post laminectomy 2007: L5-S1 diskectomy :Revision diskectomy left at L5-S1     Status post LEEP (loop electrosurgical excision procedure) of cervix 2011    for persistent LSIL     Supervision of other normal pregnancy      - vaginal     Trigger finger, right     right ring finger - surgically treated 2012     Vestibular neuronitis of left ear 2016        Family History has been reviewed with the patient.     Past Surgical History:   Procedure Laterality Date     C DEXA INTERPRETATION, AXIAL  3/2009    T score lumbar -0.2, femoral neck -1.4/-1.3     COLONOSCOPY  2009    normal, repeat 10 years     COLONOSCOPY N/A 7/15/2019    Procedure: COLONOSCOPY, WITH POLYPECTOMY AND BIOPSY;  Surgeon: Danyell Peters MD;  Location:  GI     CONIZATION LEEP  2011    chronic endocervicitis     DEXA  3/2011    T score lumbar -0.7, femoral neck -1.7/-1.9 with BMD 0.769     DISKECTOMY, LUMBAR, SINGLE SP  2007    L5-S1 diskectomy     HC COLP CERVIX/UPPER VAGINA W BX CERVIX/ENDOCERV CURETT  3/2009    ECC bx with chronic inflammation and squamous atypia, favor reactive     HC COLP CERVIX/UPPER VAGINA W BX CERVIX/ENDOCERV CURETT  2010    acute inflammation, no dysplasia.  ECC negative - repeat pap 6 months     HC COLP CERVIX/UPPER VAGINA W ENDOCERV CURETT  3/2009    negative, reactive process with no dysplasia, repeat pap 6 months     HC CT HEAD WO CONTRAST  2009    normal - done for acute vertigo     HC DUPLEX EXTREMITY VENOUS, LIMITED UNILATERAL  2006    left lower extremity - normal     HC INJ TRANSFORAMIN EPIDURAL, CERV/THOR SINGLE  2006    C7-T1 epidural steroid     HC MRI CERVICAL SPINE W/O CONTRAST  3/2006    C3-4 small central disc /C4-5 broad based disk/C5-6 central to rt disc all with mild to mod central stenosis     INJECTION, ANESTHETIC/STEROID, TRANSFORAMINAL EPIDURAL; LUMBAR/SACRAL, SINGLE LEVEL  ,2007    left L5 selective nerve  root injection under fluoroscopy with corticosteroid.      INJECTION, ANESTHETIC/STEROID, TRANSFORAMINAL EPIDURAL; LUMBAR/SACRAL, SINGLE LEVEL  10/2007    right L5-S1 translaminar epidural injection     LAMINECTOMY LUMBAR ONE LEVEL  3/6/2012    Procedure:LAMINECTOMY LUMBAR ONE LEVEL; REVISION DISCECTOMY L5-S1 ON THE LEFT ; Surgeon:ROSE PRITCHETT; Location: OR     LAPAROSCOPIC CHOLECYSTECTOMY  7/99     MR LUMBAR SPINE W/O CONTRAST  1/2012    Moderate-sized left posterolateral caudally extruded L5-S1disc herniation impinging on left S1 nerve, mild degenerative disc changes at L4-5     RELEASE TRIGGER FINGER  5/17/2012    Procedure:RELEASE TRIGGER FINGER; right ring finger trigger release  (latex allergy:contact); Surgeon:QUYEN FUNK; Location: [facility]     SONO ABDOMEN LIMITED  9/2012    Increased echotexture liver c/w fatty infiltration     TUBAL LIGATION  2000     XR LUMBAR SACRAL TRANSFORAMINAL INJ RIGHT  1/2012    Left L5-S1 transforaminal epidural steroid/anesthetic along left L5 nerve root         Social History     Tobacco Use     Smoking status: Never Smoker     Smokeless tobacco: Never Used   Substance Use Topics     Alcohol use: No     Alcohol/week: 0.0 standard drinks     Drug use: No        Review of Symptoms - Pertinent findings noted in the HPI.     OBJECTIVE:   /88 (BP Location: Right arm, Patient Position: Sitting, Cuff Size: Adult Large)   Pulse 88   Temp 97.8  F (36.6  C) (Oral)   Ht 5' (1.524 m)   Wt 130 lb (59 kg)   LMP 03/24/2009   SpO2 99%   BMI 25.39 kg/m     Body mass index is 25.39 kg/m .     Imaging:     MRI CERVICAL SPINE WITHOUT CONTRAST 12/23/2019 8:22 AM     Findings, per radiologist read, notable for:      Multilevel degenerative disc disease and uncovertebral and  facet arthropathy of the cervical spine, as detailed. The most  significant spinal stenosis is seen at the C3-C4 and C4-C5 levels,  moderate in degree. The most significant neural foraminal  narrowing  appears to be at C5-C6 bilaterally and on the right at C6-C7. Please  see the body of the report for additional details and level-by-level  findings.       Full radiological report in chart. Imaging reviewed with with patient today.     Exam:     Patient appears comfortable, conversational, and in no apparent distress.   Head: Normocephalic, without obvious abnormality, atraumatic, no facial asymmetry.   Eyes: conjunctivae/corneas clear. PERRL, EOM's intact.   Throat: lips, mucosa, and tongue normal; teeth and gums normal.   Neck: supple, symmetrical, trachea midline, no adenopathy and thyroid: not enlarged, symmetric, no tenderness/mass/nodules.   Lungs: clear to auscultation bilaterally.   Heart: regular rate and rhythm.   Abdomen: soft, non-tender; bowel sounds normal; no masses, no organomegaly.   Pulses: 2+ and symmetric.   Skin: Skin color, texture, turgor normal. No rashes or lesions.     CN II-XII grossly intact, alert and appropriate with conversation and following commands.   Gait is non-antalgic. Able to tandem walk. Able to walk on toes and heels without difficulty.   Cervical spine is non tender to palpation. Appropriate range of motion of neck, not concerning for lhermitte's phenomenon.   Bilateral bicep 2/4 and tricep reflexes 1/4. Sensation intact throughout upper extremities.     UE muscle strength  Right  Left    Deltoid  5/5  5/5    Biceps  5/5  5/5    Triceps  5/5  5/5    Hand intrinsics  5/5  5/5    Hand grasp  5/5  5/5    Becerra signs  neg  neg      Lumbar spine is non tender to palpation   Intact sensation throughout lower extremities.   Bilateral patellar 2/4 and achilles reflex 1/4. Negative for pain with straight leg raise.     LE muscle strength  Right  Left    Iliopsoas (hip flexion)  5/5  5/5    Quad (knee extension)  5/5  5/5    Hamstring (knee flexion)  5/5  5/5    Gastrocnemius (PF)  5/5  5/5    Tibialis Ant. (DF)  5/5  5/5    EHL  5/5  5/5      Negative Babinski  bilaterally. Negative for clonus.   Calves are soft and non-tender bilaterally.     ASSESSMENT/PLAN:     Fredy Parker is a 59 year old female who presents to the clinic for consultation on some cervical spine pain with proximal radiculopathy. The patient's most recent imaging was reviewed with her today. It was explained that images show multilevel degenerative disc disease and uncovertebral and  facet arthropathy of the cervical spine. The most significant spinal stenosis is seen at the C3-C4 and C4-C5 levels, moderate in degree. The most significant neural foraminal narrowing appears to be at C5-C6 bilaterally and on the right at C6-C7. This certainly does correlate to today's clinical examination. On exam, the patient is noted to have diminished cervical spine range of motion. The patient has attempted conservative management with physical therapy, traction, chiropractic care, NSAIDs and acupunctur, without resolution of symptoms.     Based on her physical exam, imaging review and past treatments, we feel that it would be in Ms. Parker's best interest to try a conservative approach by participating in a program initiated by [facility]. She did inquire about possible treatment options that [facility] may consider so we briefly discussed core stretching and strengthening exercises in conjunction with cervical traction as possibilities. If the traction proves to provide alleviation, it would be appropriate to issue a home device. We also discussed the possibility of obtaining an epidural steroid injection, but again, we explained that treatments will be determined by the team at [facility]. We would like to see her back in three months if needed to further discuss possible surgical interventions. In the event that patient's symptoms worsen or change we would like to see her sooner.        Respectfully,     JUAN LUIS West, BARBIE     Exam, imaging, and plan reviewed by Dr. Staples.

## 2020-01-03 NOTE — TELEPHONE ENCOUNTER
Pre-screening Questions for Radiology Injections:    Injection to be done at which interventional clinic site? [facility] [place]    Instruct patient to arrive as directed prior to the scheduled appointment time:    [place] minutes before      [place]: 30 minutes before; if IV needed 1 hour before     Procedure ordered by Josh Heredia PA-C    Procedure ordered? LAVERNE      Transforaminal Cervical NALLELY - Dr. Tiffany Thorpe ONLY    What insurance would patient like us to bill for this procedure? [employer]      Worker's comp or MVA (motor vehicle accident) -Any injection DO NOT SCHEDULE and route to Kassandra Boston.      [employer] insurance - For SI joint injections, DO NOT SCHEDULE and route Kassandra Boston.       [employer] - Any injection besides hip/shoulder/knee joint DO NOT SCHEDULE and route to Kassandra Boston. She will obtain PA and call pt back to schedule procedure or notify pt of denial.       [employer]-Route to [place] for review      **BCBS- ALL need to be routed to [place] for review if a PA is needed**      IF SCHEDULING IN [place] AND NEEDS A PA, IT IS OKAY TO SCHEDULE. [place] HANDLES THEIR OWN PA'S AFTER THE PATIENT IS SCHEDULED. PLEASE SCHEDULE AT LEAST 1 WEEK OUT SO A PA CAN BE OBTAINED.    Any chance of pregnancy? Not Applicable   If YES, do NOT schedule and route to RN [place]    Is an  needed? No     Patient has a drive home? (mandatory) YES: Informed    Is patient taking any blood thinners (i.e. plavix, coumadin, jantoven, warfarin, heparin, pradaxa or dabigatran, etc)? No   If hold needed, do NOT schedule, route to RN pool     Is patient taking any aspirin products (includes Excedrin and Fiorinal)? No     If more than 325mg/day do NOT schedule; route to RN pool     For CERVICAL procedures, hold all aspirin products for 6 days.     Tell pt that if aspirin product is not held for 6 days, the procedure WILL BE cancelled.      Does the patient have a bleeding or clotting disorder? No      If YES, okay to schedule AND route to RN nurse pool    For any patients with platelet count <100, must be forwarded to provider    Is patient diabetic?  No  If YES, instruct them to bring their glucometer.    Does patient have an active infection or treated for one within the past week? No     Is patient currently taking any antibiotics?  No     For patients on chronic, preventative, or prophylactic antibiotics, procedures may be scheduled.     For patients on antibiotics for active or recent infection:antibiotic course must have been completed for 4 days    Is patient currently taking any steroid medications? (i.e. Prednisone, Medrol)  No     For patients on steroid medications, course must have been completed for 4 days    Reviewed with patient:  If you are started on any steroids or antibiotics between now and your appointment, you must contact us because the procedure may need to be cancelled.  Yes    Is patient actively being treated for cancer or immunocompromised? No  If YES, do NOT schedule and route to RN pool     Are you able to get on and off an exam table with minimal or no assistance? Yes  If NO, do NOT schedule and route to RN pool    Are you able to roll over and lay on your stomach with minimal or no assistance? Yes  If NO, do NOT schedule and route to RN pool     Any allergies to contrast dye, iodine, shellfish, or numbing and steroid medications? No  If YES, route to RN pool AND add allergy information to appointment notes    Allergies: Aspirin; Latex; Nsaids; and Sulfasalazine      Has the patient had a flu shot or any other vaccinations within 7 days before or after the procedure.  No     Does patient have an MRI/CT?  YES: 2019  Check Procedure Scheduling Grid to see if required.      Was the MRI done within the last 3 years?  Yes    If yes, where was the MRI done i.e.[facility], [facility], [facility], [facility] etc? [facility]      If no, do not schedule and route to RN pool    If MRI was not  done at [facility], [facility] or [facility] do NOT schedule and route to RN pool.      If pt has an imaging disc, the injection MAY be scheduled but pt has to bring disc to appt.     If they show up without the disc the injection cannot be done    Reminders (please tell patient if applicable):       Instructed pt to arrive 30 minutes early for IV start if required. (Check Procedure Scheduling Grid)  YES: Informed      If celiac plexus block, informed patient NPO for 6 hours and that it is okay to take medications with sips of water, especially blood pressure medications  Not Applicable         If this is for a cervical procedure, informed patient that aspirin needs to be held for 6 days.   Not Applicable      For all patients not having spinal cord stimulator (SCS) trials or radiofrequency ablations (RFAs), informed patient:    IV sedation is not provided for this procedure.  If you feel that an oral anti-anxiety medication is needed, you can discuss this further with your referring provider or primary care provider.  The Pain Clinic provider will discuss specifics of what the procedure includes at your appointment.  Most procedures last 10-20 minutes.  We use numbing medications to help with any discomfort during the procedure.  Not Applicable      Do not schedule procedures requiring IV placement in the first appointment of the day or first appointment after lunch. Do NOT schedule at 0745, 0815 or 1245.       For patients 85 or older we recommend having an adult stay w/ them for the remainder of the day.       Does the patient have any questions?  NO  Addie Boudreaux  [facility]

## 2020-01-03 NOTE — LETTER
1/3/2020         RE: Fredy Parker  [address]  [place] [address]        Dear Colleague,    Thank you for referring your patient, Fredy Parker, to the [facility] NEUROS[facility]GERY [facility]INIC. Please see a copy of my visit note below.    NEUROSURGERY CLINIC CONSULT NOTE     DATE OF VISIT: 1/3/2020     SUBJECTIVE:     Fredy Parker is a pleasant 59 year old female who presents to the clinic today for consultation on her cervical spine pain. She is referred to the Neurosurgery Clinic by Dr. Mishra in .  Today, Ms. Parker reports a 3 year history of symptoms with a 1 month history of exacerbatioin. She describes an intermittant, sharp aching pain that initiates in the upper cervical region and radiates proximally in the C3 distribution. This pain is not accompanied by paresthesias and numbness in the same distribution. Prolonged cervical flexion aggravate the symptoms, while alleviation is obtained by stretching. No mechanism of injury such as trauma or a fall is associated with the onset of the pain. There are no bowel or bladder changes. The patient has participated in conservative therapies to include physical therapy, traction, chiropractic care, NSAIDs and acupuncture. None of these modalities have provided any significant long term relief.       Current Outpatient Medications:      CALTRATE 600+D PLUS 600-400 MG-UNIT OR TABS, 1 TABLET TWICE DAILY , Disp: 3 MONTHS, Rfl: PRN     CENTRUM OR TABS, 1 TABLET DAILY, Disp: 30, Rfl: 0     cetirizine (ZYRTEC) 10 MG tablet, Take 1 tablet (10 mg) by mouth daily, Disp: 90 tablet, Rfl: 3     cyclobenzaprine (FLEXERIL) 5 MG tablet, Take 1-2 tablets (5-10 mg) by mouth 3 times daily as needed for muscle spasms, Disp: 60 tablet, Rfl: 1     estradiol (ESTRACE VAGINAL) 0.1 MG/GM vaginal cream, Place 0.5 g vaginally twice a week Place 0.5mg vaginally every night for a week, than twice weekly., Disp: 42.5 g, Rfl: 6     FLUoxetine (PROZAC) 40 MG  capsule, Take 1 capsule (40 mg) by mouth daily, Disp: 90 capsule, Rfl: 3     hydrocortisone (ANUSOL-HC) 2.5 % cream, Place rectally daily as needed for hemorrhoids, Disp: 30 g, Rfl: 0     omeprazole (PRILOSEC) 40 MG DR capsule, TAKE 1 CAPSULE DAILY 30 TO 60 MINUTES BEFORE A MEAL, Disp: 90 capsule, Rfl: 2     valACYclovir (VALTREX) 500 MG tablet, Take 1 tablet (500 mg) by mouth daily, Disp: 90 tablet, Rfl: 3     VITAMIN D 1000 UNIT OR CAPS, 2 CAPSULES DAILY, Disp: 180 Cap, Rfl: PRN     Allergies   Allergen Reactions     Aspirin Rash     Latex      Nsaids GI Disturbance     Sulfasalazine Rash        Past Medical History:   Diagnosis Date     Acne      Allergic rhinitis     grasses, birch, dust mite, cat, dog - previous immunotherapy     ASCUS with positive high risk HPV 09/2014    colposcopy benign     Atrophic vaginitis 2012    with dyspareunia     Chronic gastric ulcer without mention of hemorrhage, perforation, without mention of obstruction 1994    gastric erosion dx'ed by endoscopy. pt was also treated for pos H.pylori     Disc disorder of cervical region 2006    mild disc changes C3-4/C4-5 and C5-6 with chronic myofascial cervicoscapular pain     Elevated glucose 2012    one fasting glc of 136, A1C 6.3     Elevated transaminase level 2012    AST/ALT 2-3 x normal; fatty infiltration liver on ultrasound; negative testing for infectious hepatitis; normal iron levels, ELP, CK     Inflammatory arthritis 2009    likely a spondyloarthropathy, IP joints and [place]'s tendons     Latent tuberculosis 10/2009    started INH      LSIL (low grade squamous intraepithelial lesion) on Pap smear 3/2009, 3/2010, 12/2010    HPV 56 (high risk) detected 2010     Lumbar disc herniation with radiculopathy 2007    left extruded L5-S1 disc herniation, treated with diskectomy 12/2007     Major depressive disorder, single episode, moderate (H) 2006     Mixed hyperlipidemia 2009    elevated TG and LDL     Osteopenia 2009    mild,  femoral neck     Papanicolaou smear of cervix with low grade squamous intraepithelial lesion (LGSIL) 12/02/15 & 2017, 10/2/18    See problem list     Prolonged QT interval 2016    ? Prozac, resolved with decreased dose                                            Recurrent genital herpes      Status post laminectomy 2007: L5-S1 diskectomy :Revision diskectomy left at L5-S1     Status post LEEP (loop electrosurgical excision procedure) of cervix 2011    for persistent LSIL     Supervision of other normal pregnancy      - vaginal     Trigger finger, right 2012    right ring finger - surgically treated 2012     Vestibular neuronitis of left ear 2016        Family History has been reviewed with the patient.     Past Surgical History:   Procedure Laterality Date     C DEXA INTERPRETATION, AXIAL  3/2009    T score lumbar -0.2, femoral neck -1.4/-1.3     COLONOSCOPY  2009    normal, repeat 10 years     COLONOSCOPY N/A 7/15/2019    Procedure: COLONOSCOPY, WITH POLYPECTOMY AND BIOPSY;  Surgeon: Danyell Peters MD;  Location:  GI     CONIZATION LEEP  2011    chronic endocervicitis     DEXA  3/2011    T score lumbar -0.7, femoral neck -1.7/-1.9 with BMD 0.769     DISKECTOMY, LUMBAR, SINGLE SP  2007    L5-S1 diskectomy     HC COLP CERVIX/UPPER VAGINA W BX CERVIX/ENDOCERV CURETT  3/2009    ECC bx with chronic inflammation and squamous atypia, favor reactive     HC COLP CERVIX/UPPER VAGINA W BX CERVIX/ENDOCERV CURETT  2010    acute inflammation, no dysplasia.  ECC negative - repeat pap 6 months     HC COLP CERVIX/UPPER VAGINA W ENDOCERV CURETT  3/2009    negative, reactive process with no dysplasia, repeat pap 6 months     HC CT HEAD WO CONTRAST  2009    normal - done for acute vertigo     HC DUPLEX EXTREMITY VENOUS, LIMITED UNILATERAL  2006    left lower extremity - normal     HC INJ TRANSFORAMIN EPIDURAL, CERV/THOR SINGLE  2006    C7-T1 epidural steroid     HC MRI  CERVICAL SPINE W/O CONTRAST  3/2006    C3-4 small central disc /C4-5 broad based disk/C5-6 central to rt disc all with mild to mod central stenosis     INJECTION, ANESTHETIC/STEROID, TRANSFORAMINAL EPIDURAL; LUMBAR/SACRAL, SINGLE LEVEL  8,9/2007    left L5 selective nerve root injection under fluoroscopy with corticosteroid.      INJECTION, ANESTHETIC/STEROID, TRANSFORAMINAL EPIDURAL; LUMBAR/SACRAL, SINGLE LEVEL  10/2007    right L5-S1 translaminar epidural injection     LAMINECTOMY LUMBAR ONE LEVEL  3/6/2012    Procedure:LAMINECTOMY LUMBAR ONE LEVEL; REVISION DISCECTOMY L5-S1 ON THE LEFT ; Surgeon:ROSE PRITCHETT; Location: OR     LAPAROSCOPIC CHOLECYSTECTOMY  7/99     MR LUMBAR SPINE W/O CONTRAST  1/2012    Moderate-sized left posterolateral caudally extruded L5-S1disc herniation impinging on left S1 nerve, mild degenerative disc changes at L4-5     RELEASE TRIGGER FINGER  5/17/2012    Procedure:RELEASE TRIGGER FINGER; right ring finger trigger release  (latex allergy:contact); Surgeon:QUYEN FUNK; Location: [facility]     SONO ABDOMEN LIMITED  9/2012    Increased echotexture liver c/w fatty infiltration     TUBAL LIGATION  2000     XR LUMBAR SACRAL TRANSFORAMINAL INJ RIGHT  1/2012    Left L5-S1 transforaminal epidural steroid/anesthetic along left L5 nerve root         Social History     Tobacco Use     Smoking status: Never Smoker     Smokeless tobacco: Never Used   Substance Use Topics     Alcohol use: No     Alcohol/week: 0.0 standard drinks     Drug use: No        Review of Symptoms - Pertinent findings noted in the HPI.     OBJECTIVE:   /88 (BP Location: Right arm, Patient Position: Sitting, Cuff Size: Adult Large)   Pulse 88   Temp 97.8  F (36.6  C) (Oral)   Ht 5' (1.524 m)   Wt 130 lb (59 kg)   LMP 03/24/2009   SpO2 99%   BMI 25.39 kg/m      Body mass index is 25.39 kg/m .     Imaging:     MRI CERVICAL SPINE WITHOUT CONTRAST 12/23/2019 8:22 AM     Findings, per radiologist read, notable  for:      Multilevel degenerative disc disease and uncovertebral and  facet arthropathy of the cervical spine, as detailed. The most  significant spinal stenosis is seen at the C3-C4 and C4-C5 levels,  moderate in degree. The most significant neural foraminal narrowing  appears to be at C5-C6 bilaterally and on the right at C6-C7. Please  see the body of the report for additional details and level-by-level  findings.       Full radiological report in chart. Imaging reviewed with with patient today.     Exam:     Patient appears comfortable, conversational, and in no apparent distress.   Head: Normocephalic, without obvious abnormality, atraumatic, no facial asymmetry.   Eyes: conjunctivae/corneas clear. PERRL, EOM's intact.   Throat: lips, mucosa, and tongue normal; teeth and gums normal.   Neck: supple, symmetrical, trachea midline, no adenopathy and thyroid: not enlarged, symmetric, no tenderness/mass/nodules.   Lungs: clear to auscultation bilaterally.   Heart: regular rate and rhythm.   Abdomen: soft, non-tender; bowel sounds normal; no masses, no organomegaly.   Pulses: 2+ and symmetric.   Skin: Skin color, texture, turgor normal. No rashes or lesions.     CN II-XII grossly intact, alert and appropriate with conversation and following commands.   Gait is non-antalgic. Able to tandem walk. Able to walk on toes and heels without difficulty.   Cervical spine is non tender to palpation. Appropriate range of motion of neck, not concerning for lhermitte's phenomenon.   Bilateral bicep 2/4 and tricep reflexes 1/4. Sensation intact throughout upper extremities.     UE muscle strength  Right  Left    Deltoid  5/5  5/5    Biceps  5/5  5/5    Triceps  5/5  5/5    Hand intrinsics  5/5  5/5    Hand grasp  5/5  5/5    Becerra signs  neg  neg      Lumbar spine is non tender to palpation   Intact sensation throughout lower extremities.   Bilateral patellar 2/4 and achilles reflex 1/4. Negative for pain with straight leg raise.      LE muscle strength  Right  Left    Iliopsoas (hip flexion)  5/5  5/5    Quad (knee extension)  5/5  5/5    Hamstring (knee flexion)  5/5  5/5    Gastrocnemius (PF)  5/5  5/5    Tibialis Ant. (DF)  5/5  5/5    EHL  5/5  5/5      Negative Babinski bilaterally. Negative for clonus.   Calves are soft and non-tender bilaterally.     ASSESSMENT/PLAN:     Fredy Parker is a 59 year old female who presents to the clinic for consultation on some cervical spine pain with proximal radiculopathy. The patient's most recent imaging was reviewed with her today. It was explained that images show multilevel degenerative disc disease and uncovertebral and  facet arthropathy of the cervical spine. The most significant spinal stenosis is seen at the C3-C4 and C4-C5 levels, moderate in degree. The most significant neural foraminal narrowing appears to be at C5-C6 bilaterally and on the right at C6-C7. This certainly does correlate to today's clinical examination. On exam, the patient is noted to have diminished cervical spine range of motion. The patient has attempted conservative management with physical therapy, traction, chiropractic care, NSAIDs and acupunctur, without resolution of symptoms.     Based on her physical exam, imaging review and past treatments, we feel that it would be in Ms. Parker's best interest to try a conservative approach by participating in a program initiated by [facility]. She did inquire about possible treatment options that [facility] may consider so we briefly discussed core stretching and strengthening exercises in conjunction with cervical traction as possibilities. If the traction proves to provide alleviation, it would be appropriate to issue a home device. We also discussed the possibility of obtaining an epidural steroid injection, but again, we explained that treatments will be determined by the team at [facility]. We would like to see her back in three months if  needed to further discuss possible surgical interventions. In the event that patient's symptoms worsen or change we would like to see her sooner.        Respectfully,     JUAN LUIS West PA-C     Exam, imaging, and plan reviewed by Dr. Staples.     Again, thank you for allowing me to participate in the care of your patient.        Sincerely,        Josh Heredia PA-C

## 2020-01-03 NOTE — NURSING NOTE
Fredy Parker is a 59 year old female who presents for:  Chief Complaint   Patient presents with     Consult For     Spinal stenosis of the cervical region.         Initial Vitals:  /88 (BP Location: Right arm, Patient Position: Sitting, Cuff Size: Adult Large)   Pulse 88   Temp 97.8  F (36.6  C) (Oral)   Ht 5' (1.524 m)   Wt 130 lb (59 kg)   LMP 03/24/2009   SpO2 99%   BMI 25.39 kg/m   Estimated body mass index is 25.39 kg/m  as calculated from the following:    Height as of this encounter: 5' (1.524 m).    Weight as of this encounter: 130 lb (59 kg).. Body surface area is 1.58 meters squared. BP completed using cuff size: large  Severe Pain (7)    Nursing Comments: Patient states that she has neck pain only.     Brenda Lux, CMA

## 2020-01-03 NOTE — TELEPHONE ENCOUNTER
PA pending additional information - please specify exact level, laterality and approach of injection.            Kassandra BASILIO    [facility]

## 2020-01-03 NOTE — PATIENT INSTRUCTIONS
-Order placed for epidural steroid injection. The steroid can take 10-14 days to reach max effect. You can call [facility] to schedule your injection: [phone].    ROBIN Cordova  [facility]   Phone: [phone]  Fax: [phone]

## 2020-01-06 NOTE — TELEPHONE ENCOUNTER
Service Order:[id number]  Auth Effective Date:01/06/2020  Auth End Date:04/05/2020  Initiated Date:01/06/2020  Decision Date:01/06/2020  Decision Type :InitialCase Status:Approved    Jose E to schedule with Dr. Cordero in [place]          Kassandra BASILIO    [facility]

## 2020-01-06 NOTE — TELEPHONE ENCOUNTER
ANDREW to schedule C7-T1 cervical epidural steroid injection with Dr. Cordero in [place].    Addie AGUILLON    [facility]

## 2020-01-14 DIAGNOSIS — M54.12 CERVICAL RADICULOPATHY: Primary | ICD-10-CM

## 2020-01-14 NOTE — PROGRESS NOTES
[facility] - Procedure Note    Date of Visit: 1/15/2020    Procedure performed: C7-T1 interlaminar epidural steroid injection with fluoroscopic guidance  Diagnosis: Cervical spondylosis; Cervical radiculitis/radiculopathy  : Skyler Cordero DO   Anesthesia: none    Indications: Fredy Parker is a 59 year old female who is seen at the request of Josh Heredia PA-C for cervical epidural steroid injection. The patient describes pain in her neck that radiates to her shoulders. The patient has been exhibiting symptoms consistent with cervical intraspinal inflammation and degenrative disc disease. Symptoms have been persistent, disabling, and intermittently severe. The patient reports minimal improvement with conservative treatment, including oral medications and physical therapy.    Cervical MRI was done on 12/23/19 which showed   FINDINGS: There is straightening of the normal cervical lordosis,  without significant spondylolisthesis. No fracture is evident. No  concerning focal marrow signal abnormalities. Visualized portions of  the posterior fossa are within normal limits. No gross cord signal  abnormalities are identified. Multilevel disc height loss, mild to  moderate in degree, most pronounced at C3-C4, C4-C5 and to a lesser  degree at C5-C6 and C6-C7. The paravertebral soft tissues are normal.     Segmental analysis:     C2-C3: Shallow posterior disc bulge without gross disc herniation. No  mass effect on the cord. Normal facets. No spinal or neural foraminal  stenosis.     C3-C4: There is a disc bulge slightly eccentric to the left with a  superimposed small central disc protrusion. Left more than right  uncovertebral arthropathy and minimal facet arthropathy. There is mild  to moderate indentation of the ventral aspect of the cord without cord  signal change. Overall moderate degree of spinal stenosis. No  significant neural foraminal narrowing.      C4-C5:  Small diffuse disc bulge with superimposed tiny central disc  protrusion, bilateral uncovertebral and facet arthropathy. There is  moderate degree of mass effect on the cord without cord signal change.  Overall moderate spinal stenosis. Mild right more than left neural  foraminal narrowing.     C5-C6: Diffuse disc bulge with bilateral uncovertebral and facet  arthropathy. Mild mass effect on the ventral cord without cord signal  change. Overall mild spinal stenosis. Moderate bilateral neural  foraminal stenosis.     C6-C7: Small diffuse disc bulge with bilateral uncovertebral and facet  arthropathy. No significant spinal stenosis. No mass effect on the  cord or cord signal change. Moderate right and mild left neural  foraminal stenosis.     C7-T1: No significant disc bulge or herniation. No significant spinal  or neural foraminal stenosis.                                                                      IMPRESSION: Multilevel degenerative disc disease and uncovertebral and  facet arthropathy of the cervical spine, as detailed. The most  significant spinal stenosis is seen at the C3-C4 and C4-C5 levels,  moderate in degree. The most significant neural foraminal narrowing  appears to be at C5-C6 bilaterally and on the right at C6-C7. Please  see the body of the report for additional details and level-by-level  findings.      JOBY PRESSLEY MD    Allergies:      Allergies   Allergen Reactions     Aspirin Rash     Latex      Nsaids GI Disturbance     Sulfasalazine Rash        Vitals:  Vital Signs     Row Name  01/15/20  1338           Vital Signs   Pulse  85           BP  137/77           BP Location  Right arm           Oxygen Therapy   SpO2  99 %               Review of Systems: The patient denies recent fever, chills, illness, use of antibiotics or anticoagulants. All other 10-point review of systems negative.     Procedure: The procedure and risks were explained, and informed written consent was obtained from  the patient. Risks include but are not limited to: infection, bleeding, increased pain, and damage to soft tissue, nerve, muscle, and vasculature structures. After getting informed consent, patient was brought into the procedure suite and was placed in a prone position on the procedure table. A Pause for the Cause was performed. Patient was prepped and draped in sterile fashion.     The C7-T1 interspace was identified with use of fluoroscopy in AP view. A 25-gauge, 1.5 inch needle was used to anesthetize the skin and subcutaneous tissue entry site with a total of 2 ml of 1% lidocaine. Under fluoroscopic visualization, a 22-gauge, 3.5 inch Tuohy epidural needle was slowly advanced towards the epidural space a few millimeters left-sided of midline. The latter part of the needle advancement was guided with fluoroscopy in the lateral view. The epidural space was identified using loss of resistance technique. After negative aspiration for heme and cerebrospinal fluid, a total of 1 mL of non-ionic contrast was injected to confirm needle placement. 9 mL of contrast was wasted. Epidurogram confirmed spread within the posterior epidural space. A solution containing 1ml of 40mg/ml of Triamcinolone and 2 ml of preservative free saline was injected. The needle was removed.  Images were saved to PACS.    The patient tolerated the procedure well, and there was no evidence of procedural complications. No new sensory or motor deficits were noted following the procedure. The patient was stable and able to ambulate on discharge home. Post-procedure instructions were provided.     Pre-procedure pain score: 7/10 in the neck, 5/10 in the shoulders  Post-procedure pain score: 5/10 in the neck, 5/10 in the shoulders    Assessment/Plan: Fredy Parker is a 59 year old female s/p cervical interlaminar epidural steroid injection today for cervical spondylosis and radiculitis/radiculopathy.     1. Following today's procedure, the  patient was advised to contact the [facility] for any of the following:   Fever, chills, or night sweats   New onset of pain, numbness, or weakness   Any questions/concerns regarding the procedure  If unable to contact the Pain Center, the patient was instructed to go to a local Emergency Room for any complications.   2. The patient will receive a follow-up call in 1 week.   3. Follow-up with the referring provider in 2 weeks for post-procedure evaluation.    Skyler Cordero DO  [facility] [place]

## 2020-01-15 ENCOUNTER — HOSPITAL ENCOUNTER (OUTPATIENT)
Facility: CLINIC | Age: 60
Discharge: HOME | End: 2020-01-15
Attending: PHYSICAL MEDICINE & REHABILITATION | Admitting: PHYSICAL MEDICINE & REHABILITATION
Payer: COMMERCIAL

## 2020-01-15 ENCOUNTER — HOSPITAL ENCOUNTER (OUTPATIENT)
Dept: GENERAL RADIOLOGY | Facility: CLINIC | Age: 60
End: 2020-01-15
Attending: PHYSICAL MEDICINE & REHABILITATION | Admitting: PHYSICAL MEDICINE & REHABILITATION
Payer: COMMERCIAL

## 2020-01-15 ENCOUNTER — RADIOLOGY INJECTION OFFICE VISIT (OUTPATIENT)
Dept: PALLIATIVE MEDICINE | Facility: CLINIC | Age: 60
End: 2020-01-15
Payer: COMMERCIAL

## 2020-01-15 VITALS — DIASTOLIC BLOOD PRESSURE: 74 MMHG | SYSTOLIC BLOOD PRESSURE: 128 MMHG

## 2020-01-15 VITALS — HEART RATE: 85 BPM | SYSTOLIC BLOOD PRESSURE: 137 MMHG | OXYGEN SATURATION: 99 % | DIASTOLIC BLOOD PRESSURE: 77 MMHG

## 2020-01-15 DIAGNOSIS — M54.12 CERVICAL RADICULOPATHY: ICD-10-CM

## 2020-01-15 DIAGNOSIS — M50.30 DDD (DEGENERATIVE DISC DISEASE), CERVICAL: Primary | ICD-10-CM

## 2020-01-15 PROCEDURE — 62321 NJX INTERLAMINAR CRV/THRC: CPT | Mod: TC

## 2020-01-15 PROCEDURE — 25500064 ZZH RX 255 OP 636: Performed by: PHYSICAL MEDICINE & REHABILITATION

## 2020-01-15 PROCEDURE — 25000125 ZZHC RX 250: Performed by: PHYSICAL MEDICINE & REHABILITATION

## 2020-01-15 PROCEDURE — 40000863 ZZH STATISTIC RADIOLOGY XRAY, US, CT, MAR, NM

## 2020-01-15 PROCEDURE — 25000128 H RX IP 250 OP 636: Performed by: PHYSICAL MEDICINE & REHABILITATION

## 2020-01-15 PROCEDURE — 62321 NJX INTERLAMINAR CRV/THRC: CPT | Performed by: PHYSICAL MEDICINE & REHABILITATION

## 2020-01-15 RX ORDER — TRIAMCINOLONE ACETONIDE 40 MG/ML
40 INJECTION, SUSPENSION INTRA-ARTICULAR; INTRAMUSCULAR ONCE
Status: COMPLETED | OUTPATIENT
Start: 2020-01-15 | End: 2020-01-15

## 2020-01-15 RX ORDER — IOPAMIDOL 408 MG/ML
10 INJECTION, SOLUTION INTRATHECAL ONCE
Status: COMPLETED | OUTPATIENT
Start: 2020-01-15 | End: 2020-01-15

## 2020-01-15 RX ORDER — LIDOCAINE HYDROCHLORIDE 10 MG/ML
3 INJECTION, SOLUTION EPIDURAL; INFILTRATION; INTRACAUDAL; PERINEURAL ONCE
Status: COMPLETED | OUTPATIENT
Start: 2020-01-15 | End: 2020-01-15

## 2020-01-15 RX ADMIN — TRIAMCINOLONE ACETONIDE 40 MG: 40 INJECTION, SUSPENSION INTRA-ARTICULAR; INTRAMUSCULAR at 14:03

## 2020-01-15 RX ADMIN — IOPAMIDOL 1 ML: 408 INJECTION, SOLUTION INTRATHECAL at 14:02

## 2020-01-15 RX ADMIN — LIDOCAINE HYDROCHLORIDE 3 ML: 10 INJECTION, SOLUTION EPIDURAL; INFILTRATION; INTRACAUDAL; PERINEURAL at 14:01

## 2020-01-15 NOTE — DISCHARGE INSTRUCTIONS
[facility] Procedure Discharge Instructions    Today you saw:   Dr Skyler Cordero    Procedure:   Cervical Epidural Steroid Injection     Medications used:   Kenalog -   Isovue M200 -  Normal saline    After you go home:      You may resume your normal diet    Care of Puncture Site:      If you have a bandaid on your puncture site, you may remove it the next morning    You may shower     No bath tubs, whirlpools or swimming for 24 hours after your procedure     Activity:      You may go back to normal activity in 24 hours    Avoid strenuous activity for the first 24 hours.    Be cautious when walking. Numbness and/or weakness in the lower extremities may occur for up to 6-8 hours after the procedure due to effect of the numbing medication used.    Do not drive for 6 hours.  The effect of the numbing medication could slow your reflexes.    Medicines:      You may resume all medications      If you are taking a different blood thinner, follow the directions provided by the Pain Clinic RN for restarting the medication    For minor pain, you may use anti-inflammatory medications - (such as Ibuprofen, Aleve, Advil) or Acetaminophen (Tylenol) for pain control if necessary.    Pain:       You may have a mild to moderate increase in pain for several days following the injection.    It may take up to 14 days for the steroid medication to start working although you may feel the effect as early as a few days after the procedure.    You may use ice packs for 10-15 minutes, 3 to 4 times a day at the injection site for comfort.    Do not use heat to painful areas for 6 to 8 hours.  This will give the numbing medication time to wear off and prevent you from accidentally burning your skin.    Call the Pain Clinic if you experience any of the following:      You have chills or a fever greater than 100 F     Swelling, bleeding, redness, drainage, warmth at the injection site    Progressive weakness or numbness  in your legs or arms    If lumbar, call if you have a loss of bowel or bladder function    If cervical, call if you have any unusual headache that is not relieved by Tylenol or other pain medication    Unusual new onset of pain that is not improving    Other Instructions:      If you are diabetic, check your blood sugar more frequently than usual as your blood sugar may be higher than normal for 10-14 days following a steroid injection.  Contact the provider who manages your diabetes to help you control your blood sugar if needed.      If you have questions call:        Pain Clinic @ [phone] during business hours  Monday-Thursday 8 AM-5:30 PM and Friday 8 AM-4:30 PM    Provider Line @ [phone] after hours

## 2020-01-15 NOTE — BRIEF OP NOTE
[facility] Brief Operative Note  RADIOLOGY POST PROCEDURE NOTE    Patient name: Fredy Parker  MRN: [id number]  : 1960    Pre-procedure diagnosis: cervical disc disease  Post-procedure diagnosis: Same    Procedure Date/Time: January 15, 2020  2:06 PM  Procedure: cervical epidural steroid injection   Estimated blood loss: None  Specimen(s) collected with description: none    The patient tolerated the procedure well with no immediate complications.  Significant findings:none    See imaging dictation for procedural details.    Provider name: Skyler Cordero DO  Assistant(s):None

## 2020-01-15 NOTE — PROGRESS NOTES
Care Suites Post-Procedure Note    Procedure:  LAVERNE  CS arrival time:  1410  Accompanied by:  Sridevi hsu  Concerns/abnormal assessment after procedure:  No/no  Plan:  Recovery in [facility].    Band-aid site upper back/lower neck area is CDI, no bleeding or swelling.  Rates pain 5/10 neck, radiating to 4/10 shoulders.  OJ given to patient to go.  Ambulated with SBA, then independently, without difficulty.  Discharge instructions given/AVS to patient.    1425  Discharged, ambulatory, to meet her /.

## 2020-01-15 NOTE — Clinical Note
Completed cervical epidural steroid injection. Consider cervical medial branch block/radiofrequency ablation if cervical epidural steroid injection is ineffective.Thanks,[facility]

## 2020-01-22 ENCOUNTER — TELEPHONE (OUTPATIENT)
Dept: PALLIATIVE MEDICINE | Facility: CLINIC | Age: 60
End: 2020-01-22

## 2020-01-22 NOTE — TELEPHONE ENCOUNTER
Patient had a C7-T1 interlaminar epidural steroid injection  on 1/15/20.  Called patient for an update.      Left message that we were calling for an update about how she was doing after the injection.  LM that if she has any problems or questions to call the clinic at [phone].

## 2020-02-18 ENCOUNTER — OFFICE VISIT (OUTPATIENT)
Dept: FAMILY MEDICINE | Facility: CLINIC | Age: 60
End: 2020-02-18
Payer: COMMERCIAL

## 2020-02-18 VITALS
OXYGEN SATURATION: 100 % | SYSTOLIC BLOOD PRESSURE: 130 MMHG | DIASTOLIC BLOOD PRESSURE: 82 MMHG | HEART RATE: 100 BPM | TEMPERATURE: 98 F | HEIGHT: 60 IN | WEIGHT: 133.38 LBS | BODY MASS INDEX: 26.19 KG/M2

## 2020-02-18 DIAGNOSIS — M54.2 CERVICALGIA: ICD-10-CM

## 2020-02-18 DIAGNOSIS — R51.9 NONINTRACTABLE HEADACHE, UNSPECIFIED CHRONICITY PATTERN, UNSPECIFIED HEADACHE TYPE: Primary | ICD-10-CM

## 2020-02-18 PROCEDURE — 99214 OFFICE O/P EST MOD 30 MIN: CPT | Performed by: PHYSICIAN ASSISTANT

## 2020-02-18 RX ORDER — CYCLOBENZAPRINE HCL 5 MG
10 TABLET ORAL 3 TIMES DAILY PRN
Qty: 60 TABLET | Refills: 1 | Status: SHIPPED | OUTPATIENT
Start: 2020-02-18 | End: 2020-04-27

## 2020-02-18 ASSESSMENT — MIFFLIN-ST. JEOR: SCORE: 1091.49

## 2020-02-18 NOTE — PROGRESS NOTES
Subjective     Fredy Parker is a 59 year old female who presents to clinic today for the following health issues:    HPI   Headache  Onset: 2 weeks    Description:   Location: unilateral in the bilateral frontal area   Character: sharp pain, squeezing pain  Frequency:  Constant for 2 weeks  Duration:  2 weeks    Intensity: severe, 9/10    Progression of Symptoms:  constant    Accompanying Signs & Symptoms:  Stiff neck: no   Neck or upper back pain: YES  Fever: no   Sinus pressure: no   Nausea or vomiting: no   Dizziness: no   Numbness: no   Weakness: no   Visual changes: no     History:   Head trauma: no   Family history of migraines: no   Previous tests for headaches: no   Neurologist evaluations: no   Able to do daily activities: YES  Wake with a headaches: YES  Do headaches wake you up: YES  Daily pain medication use: Yes, Tylenol  Work/school stressors/changes: no     Precipitating factors:   Does light make it worse: no   Does sound make it worse: YES    Alleviating factors:  Does sleep help: no     Therapies Tried and outcome: Annette Concepcion has been experiencing a constant parietal/ occipital headache that radiates into her neck over the past 2 weeks that is 6/10 in intensity and does not improve with OTC analgesics. Headaches are waking her up throughout the night and she is unable to get comfortable.  She recently had a cervical spine injection about 1 month ago for cervical radiculopathy.   Prior to this injection, she was having similar headaches and symptoms but tells me that these would generally improve with medication and were not as severe.  She is not experiencing dizziness, n/v, photophobia or weakness of her UE, no recent fevers or URI symptoms, no changes in vision.           Patient Active Problem List   Diagnosis     Inflammatory arthritis     Osteopenia     Papanicolaou smear of cervix with low grade squamous intraepithelial lesion (LGSIL)     Latent tuberculosis     Cervicalgia      Atrophic vaginitis     Elevated transaminase level     Recurrent genital herpes     Posterior neck pain     Hyperlipidemia LDL goal <160     Gastroesophageal reflux disease without esophagitis     Major depressive disorder, single episode, moderate (H)     Osteoarthritis of finger, unspecified laterality     Perennial allergic rhinitis     Advanced directives, counseling/discussion     Vertigo     Thoracic segment dysfunction     Head region somatic dysfunction     Cephalgia     Past Surgical History:   Procedure Laterality Date     C DEXA INTERPRETATION, AXIAL  3/2009    T score lumbar -0.2, femoral neck -1.4/-1.3     COLONOSCOPY  07/2009    normal, repeat 10 years     COLONOSCOPY N/A 7/15/2019    Procedure: COLONOSCOPY, WITH POLYPECTOMY AND BIOPSY;  Surgeon: Danyell Peters MD;  Location:  GI     CONIZATION LEEP  2/2011    chronic endocervicitis     DEXA  3/2011    T score lumbar -0.7, femoral neck -1.7/-1.9 with BMD 0.769     DISKECTOMY, LUMBAR, SINGLE SP  12/2007    L5-S1 diskectomy     HC COLP CERVIX/UPPER VAGINA W BX CERVIX/ENDOCERV CURETT  3/2009    ECC bx with chronic inflammation and squamous atypia, favor reactive     HC COLP CERVIX/UPPER VAGINA W BX CERVIX/ENDOCERV CURETT  4/2010    acute inflammation, no dysplasia.  ECC negative - repeat pap 6 months     HC COLP CERVIX/UPPER VAGINA W ENDOCERV CURETT  3/2009    negative, reactive process with no dysplasia, repeat pap 6 months     HC CT HEAD WO CONTRAST  4/2009    normal - done for acute vertigo     HC DUPLEX EXTREMITY VENOUS, LIMITED UNILATERAL  4/2006    left lower extremity - normal     HC INJ TRANSFORAMIN EPIDURAL, CERV/THOR SINGLE  5/2006    C7-T1 epidural steroid     HC MRI CERVICAL SPINE W/O CONTRAST  3/2006    C3-4 small central disc /C4-5 broad based disk/C5-6 central to rt disc all with mild to mod central stenosis     INJECTION, ANESTHETIC/STEROID, TRANSFORAMINAL EPIDURAL; LUMBAR/SACRAL, SINGLE LEVEL  8,9/2007    left L5 selective nerve  root injection under fluoroscopy with corticosteroid.      INJECTION, ANESTHETIC/STEROID, TRANSFORAMINAL EPIDURAL; LUMBAR/SACRAL, SINGLE LEVEL  10/2007    right L5-S1 translaminar epidural injection     LAMINECTOMY LUMBAR ONE LEVEL  3/6/2012    Procedure:LAMINECTOMY LUMBAR ONE LEVEL; REVISION DISCECTOMY L5-S1 ON THE LEFT ; Surgeon:ROSE PRITCHETT; Location: OR     LAPAROSCOPIC CHOLECYSTECTOMY  7/99     MR LUMBAR SPINE W/O CONTRAST  1/2012    Moderate-sized left posterolateral caudally extruded L5-S1disc herniation impinging on left S1 nerve, mild degenerative disc changes at L4-5     RELEASE TRIGGER FINGER  5/17/2012    Procedure:RELEASE TRIGGER FINGER; right ring finger trigger release  (latex allergy:contact); Surgeon:QUYEN FUNK; Location: [facility]     SONO ABDOMEN LIMITED  9/2012    Increased echotexture liver c/w fatty infiltration     TUBAL LIGATION  2000     XR LUMBAR SACRAL TRANSFORAMINAL INJ RIGHT  1/2012    Left L5-S1 transforaminal epidural steroid/anesthetic along left L5 nerve root        Social History     Tobacco Use     Smoking status: Never Smoker     Smokeless tobacco: Never Used   Substance Use Topics     Alcohol use: No     Alcohol/week: 0.0 standard drinks     Family History   Problem Relation Age of Onset     Hypertension Mother      Lipids Mother      Hypertension Father      Lipids Father      Lipids Sister          Current Outpatient Medications   Medication Sig Dispense Refill     cyclobenzaprine 5 MG PO tablet Take 2 tablets (10 mg) by mouth 3 times daily as needed for muscle spasms 60 tablet 1     CALTRATE 600+D PLUS 600-400 MG-UNIT OR TABS 1 TABLET TWICE DAILY  3 MONTHS PRN     CENTRUM OR TABS 1 TABLET DAILY 30 0     cetirizine (ZYRTEC) 10 MG tablet Take 1 tablet (10 mg) by mouth daily 90 tablet 3     estradiol (ESTRACE VAGINAL) 0.1 MG/GM vaginal cream Place 0.5 g vaginally twice a week Place 0.5mg vaginally every night for a week, than twice weekly. 42.5 g 6     FLUoxetine  (PROZAC) 40 MG capsule Take 1 capsule (40 mg) by mouth daily 90 capsule 3     hydrocortisone (ANUSOL-HC) 2.5 % cream Place rectally daily as needed for hemorrhoids 30 g 0     omeprazole (PRILOSEC) 40 MG DR capsule TAKE 1 CAPSULE DAILY 30 TO 60 MINUTES BEFORE A MEAL 90 capsule 2     valACYclovir (VALTREX) 500 MG tablet Take 1 tablet (500 mg) by mouth daily 90 tablet 3     VITAMIN D 1000 UNIT OR CAPS 2 CAPSULES DAILY 180 Cap PRN     Allergies   Allergen Reactions     Aspirin Rash     Latex      Nsaids GI Disturbance     Sulfasalazine Rash         Reviewed and updated as needed this visit by Provider         Review of Systems   ROS COMP: Constitutional, HEENT, cardiovascular, pulmonary, GI, , musculoskeletal, neuro, skin, endocrine and psych systems are negative, except as otherwise noted.      Objective    /82   Pulse 100   Temp 98  F (36.7  C) (Oral)   Ht 1.524 m (5')   Wt 60.5 kg (133 lb 6 oz)   LMP 03/24/2009   SpO2 100%   BMI 26.05 kg/m    Body mass index is 26.05 kg/m .  Physical Exam   GENERAL: healthy, alert and no distress  EYES: Eyes grossly normal to inspection, PERRL and conjunctivae and sclerae normal  HENT: ear canals and TM's normal, nose and mouth without ulcers or lesions  NECK: no adenopathy, no asymmetry, masses, or scars and thyroid normal to palpation  RESP: lungs clear to auscultation - no rales, rhonchi or wheezes  CV: regular rate and rhythm, normal S1 S2, no S3 or S4, no murmur, click or rub  MS: diffuse TTP of bilateral paraspinal neck muscles, no gross musculoskeletal defects noted, no edema  NEURO: Normal strength and tone, mentation intact and speech normal  PSYCH: mentation appears normal, affect normal/bright    Diagnostic Test Results:  Labs reviewed in Epic        Assessment & Plan     Carlene seems uncomfortable today on examination though her neurologic exam is WNL.  I suspect the her headache is secondary to cervical radiculopathy and have suggested that she try a muscle  relaxant.  She tells me that flexeril has worked better in the past than Tizanidine. Flexeril ordered for her.  Given the severity of her headache, the long duration I am going to get imaging to r/o pathology. If normal, I have advised the she follow up with the pain clinic.       1. Nonintractable headache, unspecified chronicity pattern, unspecified headache type  - MR Brain w/o Contrast; Future    2. Cervicalgia  - cyclobenzaprine 5 MG PO tablet; Take 2 tablets (10 mg) by mouth 3 times daily as needed for muscle spasms  Dispense: 60 tablet; Refill: 1     BMI:   Estimated body mass index is 26.05 kg/m  as calculated from the following:    Height as of this encounter: 1.524 m (5').    Weight as of this encounter: 60.5 kg (133 lb 6 oz).       Follow up as above    No follow-ups on file.    Pedro Rice PA-C  [facility] [place]

## 2020-02-18 NOTE — PATIENT INSTRUCTIONS
MR Brain scheduled for Wednesday, Feb 19   Check in at 5:30 for a 6:00 test - no prep  Check in at the [facility] Desk

## 2020-02-19 ENCOUNTER — HOSPITAL ENCOUNTER (OUTPATIENT)
Dept: MRI IMAGING | Facility: CLINIC | Age: 60
Discharge: HOME | End: 2020-02-19
Attending: PHYSICIAN ASSISTANT | Admitting: PHYSICIAN ASSISTANT
Payer: COMMERCIAL

## 2020-02-19 DIAGNOSIS — R51.9 NONINTRACTABLE HEADACHE, UNSPECIFIED CHRONICITY PATTERN, UNSPECIFIED HEADACHE TYPE: ICD-10-CM

## 2020-02-19 PROCEDURE — 70551 MRI BRAIN STEM W/O DYE: CPT

## 2020-02-20 ENCOUNTER — TELEPHONE (OUTPATIENT)
Dept: FAMILY MEDICINE | Facility: CLINIC | Age: 60
End: 2020-02-20

## 2020-02-20 NOTE — TELEPHONE ENCOUNTER
Please call Carlene with the results of her MRI.  These results are negative for any bleeding or acute process that could be causing her headaches.  It is most likely that her headaches are caused by her cervical radiculopathy.  She should follow up with the pain clinic if these are persisting despite the muscle relaxant that was prescribed

## 2020-02-20 NOTE — TELEPHONE ENCOUNTER
Left a non-detailed message and call back number [phone].     Cathy Ceron RN, BSN  [facility] [place]

## 2020-02-21 ENCOUNTER — TELEPHONE (OUTPATIENT)
Dept: FAMILY MEDICINE | Facility: CLINIC | Age: 60
End: 2020-02-21

## 2020-02-21 DIAGNOSIS — M54.2 CERVICALGIA: Primary | ICD-10-CM

## 2020-02-21 NOTE — TELEPHONE ENCOUNTER
Left a non-detailed message and call back number [phone].     Cathy Ceron RN, BSN  [facility] [place]

## 2020-02-21 NOTE — TELEPHONE ENCOUNTER
Routing to PCP to advise on note below. PAteint was seen recently with provider.     Cathy Ceron RN, BSN  [facility]

## 2020-02-21 NOTE — TELEPHONE ENCOUNTER
2nd attempt: Left a non-detailed message and call back number [phone].     Cathy Ceron RN, BSN  [facility] [place]

## 2020-02-21 NOTE — TELEPHONE ENCOUNTER
Patient given result message from Pedro Rice PA-C. Patient states that the muscle relaxant is not helping. Advised to follow up with the pain clinic. Given phone number for [facility].  Yady Hu RN

## 2020-02-21 NOTE — TELEPHONE ENCOUNTER
Reason for Call:  Other call back    Detailed comments: Pt called this afternoon and would like Dr. Mishra to place a referral/ order for her to get another neck injection. Pt normally goes and gets it done at [facility]. Please give pt a call for more details if needed. Thank you.    Phone Number Patient can be reached at: Home number on file [phone] (home)    Best Time:     Can we leave a detailed message on this number? YES    Call taken on 2/21/2020 at 2:00 PM by Mei Rodríguez

## 2020-02-24 NOTE — TELEPHONE ENCOUNTER
Your provider has referred you to: [facility]: [facility] -         For any questions, contact the [facility] [place] at [phone].         Called patient and informed her of BARBIE Rice response below. Patient verbalized understanding and agrees with plan.       Cathy Ceron RN, BSN  [facility] [place]

## 2020-03-02 NOTE — TELEPHONE ENCOUNTER
She just had an injection in January.  The pain clinic already did a prior auth (see encounter 2/24) and it was not approved since she reported she didn't get any relief from the first injection and isn't doing PT.  I don't think I have any pull in changing the insurance decision, and I wouldn't recommend getting another injection if the first one didn't help.  She can certainly come in to see me or see the neurosurgeon for follow up of the pain.     Lakshmi Mishra MD

## 2020-03-02 NOTE — TELEPHONE ENCOUNTER
Pt came into the clinic. She called the [facility] Management Center and they told her the injection is not covered by insurance. Told her to talk to referring clinic. Pt needs prior authorization for the injection.    Pt also stated she is in a lot of pain, so if there is any way this can be rushed that would be helpful for her. Thank you!

## 2020-03-02 NOTE — TELEPHONE ENCOUNTER
Left a non-detailed message and call back number [phone].     Cathy Ceron RN, BSN  [facility] [place]

## 2020-03-03 NOTE — TELEPHONE ENCOUNTER
Called patient and informed her of MD response below. Patient verbalized understanding and agrees with plan. Patient would prefer to follow up with Dr. Mishra. Scheduled patient with Dr. Mishra tomorrow per patient request.     Routing to PCP as NICHOLE Ceron RN, BSN  [facility] [place]

## 2020-03-04 ENCOUNTER — OFFICE VISIT (OUTPATIENT)
Dept: FAMILY MEDICINE | Facility: CLINIC | Age: 60
End: 2020-03-04
Payer: COMMERCIAL

## 2020-03-04 VITALS
OXYGEN SATURATION: 97 % | DIASTOLIC BLOOD PRESSURE: 78 MMHG | SYSTOLIC BLOOD PRESSURE: 122 MMHG | WEIGHT: 135 LBS | HEART RATE: 101 BPM | BODY MASS INDEX: 26.37 KG/M2 | TEMPERATURE: 98.3 F

## 2020-03-04 DIAGNOSIS — G44.52 NEW PERSISTENT DAILY HEADACHE: Primary | ICD-10-CM

## 2020-03-04 DIAGNOSIS — M54.2 CERVICALGIA: ICD-10-CM

## 2020-03-04 PROCEDURE — 99213 OFFICE O/P EST LOW 20 MIN: CPT | Performed by: INTERNAL MEDICINE

## 2020-03-04 RX ORDER — GABAPENTIN 300 MG/1
300 CAPSULE ORAL AT BEDTIME
Qty: 30 CAPSULE | Refills: 1 | Status: SHIPPED | OUTPATIENT
Start: 2020-03-04 | End: 2020-06-09

## 2020-03-04 RX ORDER — SUMATRIPTAN 25 MG/1
25 TABLET, FILM COATED ORAL
Qty: 9 TABLET | Refills: 1 | Status: SHIPPED | OUTPATIENT
Start: 2020-03-04 | End: 2021-09-27

## 2020-03-04 ASSESSMENT — PATIENT HEALTH QUESTIONNAIRE - PHQ9
SUM OF ALL RESPONSES TO PHQ QUESTIONS 1-9: 1
5. POOR APPETITE OR OVEREATING: NOT AT ALL

## 2020-03-04 ASSESSMENT — ANXIETY QUESTIONNAIRES
6. BECOMING EASILY ANNOYED OR IRRITABLE: NOT AT ALL
2. NOT BEING ABLE TO STOP OR CONTROL WORRYING: NOT AT ALL
1. FEELING NERVOUS, ANXIOUS, OR ON EDGE: SEVERAL DAYS
IF YOU CHECKED OFF ANY PROBLEMS ON THIS QUESTIONNAIRE, HOW DIFFICULT HAVE THESE PROBLEMS MADE IT FOR YOU TO DO YOUR WORK, TAKE CARE OF THINGS AT HOME, OR GET ALONG WITH OTHER PEOPLE: NOT DIFFICULT AT ALL
5. BEING SO RESTLESS THAT IT IS HARD TO SIT STILL: NOT AT ALL
3. WORRYING TOO MUCH ABOUT DIFFERENT THINGS: NOT AT ALL
GAD7 TOTAL SCORE: 1
7. FEELING AFRAID AS IF SOMETHING AWFUL MIGHT HAPPEN: NOT AT ALL

## 2020-03-04 NOTE — PROGRESS NOTES
Subjective     Fredy Parker is a 59 year old female who presents to clinic today for the following health issues:    HPI   Headache  Onset: 2 weeks ago     Description:   Location: bilateral in the frontal area, bilateral in the temporal area, bilateral in the occipital area   Character: sharp pain, squeezing pain  Frequency:  Ongoing   Duration:  All day and night     Intensity: severe    Progression of Symptoms:  improving    Accompanying Signs & Symptoms:  Stiff neck: YES  Neck or upper back pain: YES  Fever: no   Sinus pressure: no   Nausea or vomiting: no   Dizziness: no   Numbness: no   Weakness: no   Visual changes: no     History:   Head trauma: no   Family history of migraines: no   Previous tests for headaches: YES  Neurologist evaluations: no   Able to do daily activities: no   Wake with a headaches: YES  Do headaches wake you up: YES  Daily pain medication use: YES  Work/school stressors/changes: no     Precipitating factors:   Does light make it worse: no   Does sound make it worse: no     Alleviating factors:  Does sleep help: no     Therapies Tried and outcome: Tylduc Concepcion is here with headache.  Feels that there is a pain inside her head, head feels heavy.  Started couple weeks ago, several weeks after she had had a cervical spine injection.  Her neck, unfortunately, is not feeling any better.  There is no associated photo or phonophobia, nausea, vision changes, focal numbness tingling or weakness.  It does not change with lying down or standing up.  It has not been getting worse at work.  It does wake her up in the middle of the night sometimes.  She is using a muscle relaxant for the neck and the headache, does not seem to be doing much besides making her drowsy.  Also taking tylenol. She did see RASHAAD Torres recently for the same headache.  She had an MRI of the brain which was thankfully unremarkable.        Reviewed and updated as needed this visit by Provider         Review of  Systems   Msk, neuro reviewed,  otherwise negative unless noted above.        Objective    /78   Pulse 101   Temp 98.3  F (36.8  C) (Oral)   Wt 61.2 kg (135 lb)   LMP 03/24/2009   SpO2 97%   BMI 26.37 kg/m    Body mass index is 26.37 kg/m .  Physical Exam   Gen: pleasant, well appearing woman, no distress            Assessment & Plan     1. New persistent daily headache  Treatment is limited since she has an allergy to aspirin. Will try imitrex.   - SUMAtriptan (IMITREX) 25 MG tablet; Take 1 tablet (25 mg) by mouth at onset of headache for migraine May repeat in 2 hours. Max 8 tablets/24 hours.  Dispense: 9 tablet; Refill: 1    2. Cervicalgia  Her neck is still bothering her. Discussed a trial of steroid for neck and headache. She recalls taking this in the past and it didn't help, so she isn't keen to try that again.  Will try Gabapentin, start at a low dose.  Recommended she contact neurosurgery for referral to neck program they mention in their note.   - gabapentin (NEURONTIN) 300 MG capsule; Take 1 capsule (300 mg) by mouth At Bedtime  Dispense: 30 capsule; Refill: 1       Return in about 1 week (around 3/11/2020) for let me know how you're doing next week .    Lakshmi Mishra MD  [facility] [place]

## 2020-03-05 ASSESSMENT — ANXIETY QUESTIONNAIRES: GAD7 TOTAL SCORE: 1

## 2020-05-26 DIAGNOSIS — M54.2 CERVICALGIA: ICD-10-CM

## 2020-05-27 RX ORDER — CYCLOBENZAPRINE HCL 5 MG
TABLET ORAL
Qty: 30 TABLET | Refills: 0 | Status: SHIPPED | OUTPATIENT
Start: 2020-05-27 | End: 2020-07-08

## 2020-05-27 NOTE — TELEPHONE ENCOUNTER
Routing refill request to provider for review/approval because:  Drug not on the [facility] refill protocol   Sumi Schilling RN   [facility] - Triage

## 2020-06-09 DIAGNOSIS — K21.9 GASTROESOPHAGEAL REFLUX DISEASE WITHOUT ESOPHAGITIS: ICD-10-CM

## 2020-06-09 RX ORDER — OMEPRAZOLE 40 MG/1
CAPSULE, DELAYED RELEASE ORAL
Qty: 90 CAPSULE | Refills: 0 | Status: SHIPPED | OUTPATIENT
Start: 2020-06-09 | End: 2020-09-17

## 2020-06-09 NOTE — TELEPHONE ENCOUNTER
Prescription approved per [facility] Refill Protocol.    Barb VITAL RN  EP Triage

## 2020-07-31 ENCOUNTER — OFFICE VISIT (OUTPATIENT)
Dept: FAMILY MEDICINE | Facility: CLINIC | Age: 60
End: 2020-07-31
Payer: COMMERCIAL

## 2020-07-31 VITALS
WEIGHT: 133.6 LBS | TEMPERATURE: 98 F | DIASTOLIC BLOOD PRESSURE: 72 MMHG | HEIGHT: 59 IN | BODY MASS INDEX: 26.93 KG/M2 | RESPIRATION RATE: 18 BRPM | SYSTOLIC BLOOD PRESSURE: 126 MMHG | HEART RATE: 95 BPM | OXYGEN SATURATION: 98 %

## 2020-07-31 DIAGNOSIS — Z13.220 SCREENING FOR HYPERLIPIDEMIA: ICD-10-CM

## 2020-07-31 DIAGNOSIS — Z13.1 SCREENING FOR DIABETES MELLITUS: ICD-10-CM

## 2020-07-31 DIAGNOSIS — Z12.39 SCREENING FOR BREAST CANCER: ICD-10-CM

## 2020-07-31 DIAGNOSIS — Z00.00 ROUTINE GENERAL MEDICAL EXAMINATION AT A HEALTH CARE FACILITY: Primary | ICD-10-CM

## 2020-07-31 DIAGNOSIS — Z23 NEED FOR VACCINATION: ICD-10-CM

## 2020-07-31 DIAGNOSIS — R87.612 PAPANICOLAOU SMEAR OF CERVIX WITH LOW GRADE SQUAMOUS INTRAEPITHELIAL LESION (LGSIL): ICD-10-CM

## 2020-07-31 PROCEDURE — 36415 COLL VENOUS BLD VENIPUNCTURE: CPT | Performed by: INTERNAL MEDICINE

## 2020-07-31 PROCEDURE — 82947 ASSAY GLUCOSE BLOOD QUANT: CPT | Performed by: INTERNAL MEDICINE

## 2020-07-31 PROCEDURE — 87624 HPV HI-RISK TYP POOLED RSLT: CPT | Performed by: INTERNAL MEDICINE

## 2020-07-31 PROCEDURE — 90750 HZV VACC RECOMBINANT IM: CPT | Performed by: INTERNAL MEDICINE

## 2020-07-31 PROCEDURE — 90471 IMMUNIZATION ADMIN: CPT | Performed by: INTERNAL MEDICINE

## 2020-07-31 PROCEDURE — 88175 CYTOPATH C/V AUTO FLUID REDO: CPT | Performed by: INTERNAL MEDICINE

## 2020-07-31 PROCEDURE — 88141 CYTOPATH C/V INTERPRET: CPT | Performed by: INTERNAL MEDICINE

## 2020-07-31 PROCEDURE — 80061 LIPID PANEL: CPT | Performed by: INTERNAL MEDICINE

## 2020-07-31 PROCEDURE — 99396 PREV VISIT EST AGE 40-64: CPT | Mod: 25 | Performed by: INTERNAL MEDICINE

## 2020-07-31 ASSESSMENT — MIFFLIN-ST. JEOR: SCORE: 1067.67

## 2020-07-31 NOTE — LETTER
August 3, 2020      Fredy Parker  [address]  [place] [address]        Dear ,    We are writing to inform you of your test results.    Lipid panel - LDL (bad cholesterol) is higher than ideal, but not in the range of requiring a medication.      Glucose (blood sugar) is also elevated, but you weren't fasting, so I am not too concerned. We can check fasting blood work next year.      Resulted Orders   Lipid panel reflex to direct LDL Non-fasting   Result Value Ref Range    Cholesterol 239 (H) <200 mg/dL      Comment:      Desirable:       <200 mg/dl    Triglycerides 161 (H) <150 mg/dL      Comment:      Borderline high:  150-199 mg/dl  High:             200-499 mg/dl  Very high:       >499 mg/dl      HDL Cholesterol 55 >49 mg/dL    LDL Cholesterol Calculated 152 (H) <100 mg/dL      Comment:      Above desirable:  100-129 mg/dl  Borderline High:  130-159 mg/dL  High:             160-189 mg/dL  Very high:       >189 mg/dl      Non HDL Cholesterol 184 (H) <130 mg/dL      Comment:      Above Desirable:  130-159 mg/dl  Borderline high:  160-189 mg/dl  High:             190-219 mg/dl  Very high:       >219 mg/dl     Glucose   Result Value Ref Range    Glucose 128 (H) 70 - 99 mg/dL       If you have any questions or concerns, please call the clinic at the number listed above.       Sincerely,        Lakshmi Mishra MD

## 2020-07-31 NOTE — PROGRESS NOTES
SUBJECTIVE:   CC: Fredy Parker is an 60 year old woman who presents for preventive health visit.     Fredy lives with her . She works doing assembly.      Healthy Habits:    Do you get at least three servings of calcium containing foods daily (dairy, green leafy vegetables, etc.)? yes    Amount of exercise or daily activities, outside of work: No    Problems taking medications regularly No    Medication side effects: No    Have you had an eye exam in the past two years? no    Do you see a dentist twice per year? yes    Do you have sleep apnea, excessive snoring or daytime drowsiness?no    Neck pain and headaches are a little better with the gabapentin.  Her boss has been good about letting her get up to stretch when needed.     Today's PHQ-2 Score:   PHQ-2 ( 1999 [employer]) 7/31/2020 4/29/2019   Q1: Little interest or pleasure in doing things 0 0   Q2: Feeling down, depressed or hopeless 0 0   PHQ-2 Score 0 0       Abuse: Current or Past(Physical, Sexual or Emotional)- No  Do you feel safe in your environment? Yes        Social History     Tobacco Use     Smoking status: Never Smoker     Smokeless tobacco: Never Used   Substance Use Topics     Alcohol use: No     Alcohol/week: 0.0 standard drinks     If you drink alcohol do you typically have >3 drinks per day or >7 drinks per week? No                     Reviewed orders with patient.  Reviewed health maintenance and updated orders accordingly - Yes  Patient Active Problem List   Diagnosis     Inflammatory arthritis     Osteopenia     Papanicolaou smear of cervix with low grade squamous intraepithelial lesion (LGSIL)     Latent tuberculosis     Cervicalgia     Recurrent genital herpes     Posterior neck pain     Hyperlipidemia LDL goal <160     Gastroesophageal reflux disease without esophagitis     Major depressive disorder, single episode, moderate (H)     Osteoarthritis of finger, unspecified laterality     Perennial allergic rhinitis     Advanced  directives, counseling/discussion     Past Surgical History:   Procedure Laterality Date     COLONOSCOPY N/A 7/15/2019    Procedure: COLONOSCOPY, WITH POLYPECTOMY AND BIOPSY;  Surgeon: Danyell Peters MD;  Location:  GI     CONIZATION LEEP  2/2011    chronic endocervicitis     DISKECTOMY, LUMBAR, SINGLE SP  12/2007    L5-S1 diskectomy     HC INJ TRANSFORAMIN EPIDURAL, CERV/THOR SINGLE  5/2006    C7-T1 epidural steroid     INJECTION, ANESTHETIC/STEROID, TRANSFORAMINAL EPIDURAL; LUMBAR/SACRAL, SINGLE LEVEL  8,9/2007    left L5 selective nerve root injection under fluoroscopy with corticosteroid.      INJECTION, ANESTHETIC/STEROID, TRANSFORAMINAL EPIDURAL; LUMBAR/SACRAL, SINGLE LEVEL  10/2007    right L5-S1 translaminar epidural injection     LAMINECTOMY LUMBAR ONE LEVEL  3/6/2012    Procedure:LAMINECTOMY LUMBAR ONE LEVEL; REVISION DISCECTOMY L5-S1 ON THE LEFT ; Surgeon:ROSE PRITCHETT; Location: OR     LAPAROSCOPIC CHOLECYSTECTOMY  7/99     MR LUMBAR SPINE W/O CONTRAST  1/2012    Moderate-sized left posterolateral caudally extruded L5-S1disc herniation impinging on left S1 nerve, mild degenerative disc changes at L4-5     RELEASE TRIGGER FINGER  5/17/2012    Procedure:RELEASE TRIGGER FINGER; right ring finger trigger release  (latex allergy:contact); Surgeon:QUYEN FUNK; Location: [facility]     SONO ABDOMEN LIMITED  9/2012    Increased echotexture liver c/w fatty infiltration     TUBAL LIGATION  2000       Social History     Tobacco Use     Smoking status: Never Smoker     Smokeless tobacco: Never Used   Substance Use Topics     Alcohol use: No     Alcohol/week: 0.0 standard drinks     Family History   Problem Relation Age of Onset     Hypertension Mother      Lipids Mother      Hypertension Father      Lipids Father      Lipids Sister          Current Outpatient Medications   Medication Sig Dispense Refill     CALTRATE 600+D PLUS 600-400 MG-UNIT OR TABS 1 TABLET TWICE DAILY  3 MONTHS PRN     CENTRUM OR  TABS 1 TABLET DAILY 30 0     cetirizine (ZYRTEC) 10 MG tablet Take 1 tablet (10 mg) by mouth daily 90 tablet 3     cyclobenzaprine (FLEXERIL) 5 MG tablet Take 1-2 tablets (5-10 mg) by mouth 3 times daily as needed for muscle spasms 60 tablet 2     estradiol (ESTRACE VAGINAL) 0.1 MG/GM vaginal cream Place 0.5 g vaginally twice a week Place 0.5mg vaginally every night for a week, than twice weekly. 42.5 g 6     FLUoxetine (PROZAC) 40 MG capsule Take 1 capsule (40 mg) by mouth daily 90 capsule 3     gabapentin (NEURONTIN) 300 MG capsule TAKE ONE CAPSULE BY MOUTH ONCE DAILY AT BEDTIME  (Patient taking differently: Take 300 mg by mouth 2 times daily ) 30 capsule 1     hydrocortisone (ANUSOL-HC) 2.5 % cream Place rectally daily as needed for hemorrhoids 30 g 0     omeprazole (PRILOSEC) 40 MG DR capsule TAKE ONE CAPSULE BY MOUTH ONE TIME DAILY 30-60 MINUTES BEFORE A MEAL 90 capsule 0     SUMAtriptan (IMITREX) 25 MG tablet Take 1 tablet (25 mg) by mouth at onset of headache for migraine May repeat in 2 hours. Max 8 tablets/24 hours. 9 tablet 1     valACYclovir (VALTREX) 500 MG tablet Take 1 tablet (500 mg) by mouth daily 90 tablet 3     VITAMIN D 1000 UNIT OR CAPS 2 CAPSULES DAILY 180 Cap PRN       Mammogram Screening: Patient over age 50, mutual decision to screen reflected in health maintenance.  Last mammogram 5/29/19    Pertinent mammograms are reviewed under the imaging tab.  History of abnormal Pap smear: Last pap 5/14/19. Pap due yearly.  PAP / HPV Latest Ref Rng & Units 5/14/2019 10/2/2018 3/19/2018   PAP - ASC-US(A) LSIL(A) ASC-H(A)   HPV 16 DNA NEG:Negative Negative Negative Negative   HPV 18 DNA NEG:Negative Negative Negative Negative   OTHER HR HPV NEG:Negative Positive(A) Positive(A) Positive(A)     Reviewed and updated as needed this visit by clinical staff  Tobacco  Allergies  Meds  Problems  Med Hx  Surg Hx  Fam Hx  Soc Hx          Reviewed and updated as needed this visit by Provider  Tobacco  Meds  " Problems  Med Hx  Surg Hx  Fam Hx  Soc Hx           ROS:  CONSTITUTIONAL: NEGATIVE for fever, chills, change in weight  INTEGUMENTARU/SKIN: NEGATIVE for worrisome rashes, moles or lesions  EYES: NEGATIVE for vision changes or irritation  ENT: NEGATIVE for ear, mouth and throat problems  RESP: NEGATIVE for significant cough or SOB  BREAST: NEGATIVE for masses, tenderness or discharge  CV: NEGATIVE for chest pain, palpitations or peripheral edema  GI: NEGATIVE for nausea, abdominal pain, heartburn, or change in bowel habits  : NEGATIVE for unusual urinary or vaginal symptoms. Periods are regular.  MUSCULOSKELETAL: NEGATIVE for significant arthralgias or myalgia  NEURO: NEGATIVE for weakness, dizziness or paresthesias  PSYCHIATRIC: NEGATIVE for changes in mood or affect    OBJECTIVE:   /72 (BP Location: Right arm, Patient Position: Sitting, Cuff Size: Adult Regular)   Pulse 95   Temp 98  F (36.7  C) (Oral)   Resp 18   Ht 1.492 m (4' 10.75\")   Wt 60.6 kg (133 lb 9.6 oz)   LMP 03/24/2009   SpO2 98%   BMI 27.21 kg/m    EXAM:  GENERAL APPEARANCE: healthy, alert and no distress  EYES: Eyes grossly normal to inspection, PERRL and conjunctivae and sclerae normal  HENT: ear canals and TM's normal, mouth without ulcers or lesions, oropharynx clear and oral mucous membranes moist  NECK: no adenopathy  RESP: lungs clear to auscultation - no rales, rhonchi or wheezes  BREAST: normal without masses, tenderness or nipple discharge and no palpable axillary masses or adenopathy  CV: regular rate and rhythm, normal S1 S2, no S3 or S4, no murmur, click or rub, no peripheral edema and peripheral pulses strong  ABDOMEN: soft, nontender, no hepatosplenomegaly, no masses and bowel sounds normal   (female): normal female external genitalia, normal urethral meatus, vaginal mucosal atrophy noted, normal cervix  MS: no musculoskeletal defects are noted and gait is age appropriate without ataxia  SKIN: no suspicious " "lesions or rashes  NEURO: Normal strength and tone, sensory exam grossly normal, mentation intact and speech normal  PSYCH: mentation appears normal and affect normal/bright        ASSESSMENT/PLAN:   1. Routine general medical examination at a health care facility  - SHINGRIX [[id number]]  - 1st  Administration  [[id number]]    2. Screening for breast cancer  - *MA Screening Digital Bilateral; Future    3. Screening for diabetes mellitus  - Glucose    4. Screening for hyperlipidemia  - Lipid panel reflex to direct LDL Non-fasting    5. Papanicolaou smear of cervix with low grade squamous intraepithelial lesion (LGSIL)  - Pap imaged thin layer diagnostic with HPV (select HPV order below)  - HPV High Risk Types DNA Cervical    6. Need for vaccination  - SHINGRIX [[id number]]  - 1st  Administration  [[id number]]    COUNSELING:   Reviewed preventive health counseling, as reflected in patient instructions       Regular exercise       Healthy diet/nutrition       Osteoporosis Prevention/Bone Health    Estimated body mass index is 27.21 kg/m  as calculated from the following:    Height as of this encounter: 1.492 m (4' 10.75\").    Weight as of this encounter: 60.6 kg (133 lb 9.6 oz).         reports that she has never smoked. She has never used smokeless tobacco.      Counseling Resources:  ATP IV Guidelines  Pooled Cohorts Equation Calculator  Breast Cancer Risk Calculator  FRAX Risk Assessment  ICSI Preventive Guidelines  Dietary Guidelines for Americans, 2010  USDA's MyPlate  ASA Prophylaxis  Lung CA Screening    Lakshmi Mishra MD  [facility] [place]  "

## 2020-08-01 LAB
CHOLEST SERPL-MCNC: 239 MG/DL
GLUCOSE SERPL-MCNC: 128 MG/DL (ref 70–99)
HDLC SERPL-MCNC: 55 MG/DL
LDLC SERPL CALC-MCNC: 152 MG/DL
NONHDLC SERPL-MCNC: 184 MG/DL
TRIGL SERPL-MCNC: 161 MG/DL

## 2020-08-02 PROBLEM — R51.9 CEPHALGIA: Status: RESOLVED | Noted: 2019-03-17 | Resolved: 2020-08-02

## 2020-08-02 PROBLEM — M99.00 HEAD REGION SOMATIC DYSFUNCTION: Status: RESOLVED | Noted: 2019-03-17 | Resolved: 2020-08-02

## 2020-08-02 PROBLEM — M99.02 THORACIC SEGMENT DYSFUNCTION: Status: RESOLVED | Noted: 2019-03-17 | Resolved: 2020-08-02

## 2020-08-04 LAB
COPATH REPORT: ABNORMAL
PAP: ABNORMAL

## 2020-08-11 ENCOUNTER — PATIENT OUTREACH (OUTPATIENT)
Dept: FAMILY MEDICINE | Facility: CLINIC | Age: 60
End: 2020-08-11

## 2020-08-11 DIAGNOSIS — R87.612 PAPANICOLAOU SMEAR OF CERVIX WITH LOW GRADE SQUAMOUS INTRAEPITHELIAL LESION (LGSIL): ICD-10-CM

## 2020-08-11 NOTE — TELEPHONE ENCOUNTER
2/27/07 ASCUS/ Neg HPV  6/8/07 ASCUS/Neg HPV  3/20/09 LSIL  5/2009 COLP- Negative  3/26/10 LSIL/+ HR HPV  12/7/10 LSIL/Neg HPV  2/2011 LEEP  2011, 2012, 2013 NIL paps  09/29/14 ASCUS, +HR HPV.   10/21/14 [place] = Cervical Bx and ECC= Benign. Repeat co-test 1 yr  12/02/15 LSIL/ +HR HPV. Plan: colposcopy  12/15/15 [place]= Negative. 1 yr pap  2/20/17 LSIL/+ HR HPV (not 16 or 18).   04/17/17 [place]= CORBIN 1. Referred to ObGyn for further evaluation  3/19/18  ASC-H, LSIL/+ HR HPV (not 16/18).  Plan: colposcopy by 6/19/18  4/10/18 [place]- Normal.   10/2/18 LSIL pap, + HR HPV (not 16/18). Plan cotest in 6 months  5/14/19 Pap: ASCUS, +HR HPV (not 16/18). Plan colpo  8/11/20 Left msg to call back  7/8/19 [place] - Mild dysplasia. Plan 1 co-test   7/31/20 ASCUS, +HR HPV, not 16/18. Plan [place] bef 10/31/20  8/11/20 Left msg to call back  8/17/20 Patient states she was notified of results and recommendation for follow up by [facility] clinic and has a Colposcopy scheduled for 8/19/20.

## 2020-08-19 ENCOUNTER — OFFICE VISIT (OUTPATIENT)
Dept: OBGYN | Facility: CLINIC | Age: 60
End: 2020-08-19
Payer: COMMERCIAL

## 2020-08-19 DIAGNOSIS — B97.7: Primary | ICD-10-CM

## 2020-08-19 PROCEDURE — 57456 ENDOCERV CURETTAGE W/SCOPE: CPT | Performed by: OBSTETRICS & GYNECOLOGY

## 2020-08-19 PROCEDURE — 88305 TISSUE EXAM BY PATHOLOGIST: CPT | Performed by: OBSTETRICS & GYNECOLOGY

## 2020-08-19 NOTE — PROGRESS NOTES
"INDICATIONS:                                                        Fredy Parker, is a 60 year old female, who had a recent ASCUS pap, +HR-HPV. Patient has prior history of abnormal paps, see Overview below. Here today for colposcopy. Discussed indication, risks of infection and bleeding.  Patient has chronic ongoing hpv  We have been seeing her almost yearly for colposcopy.  Had a leep cone biopsy years ago in 2011 so has little visible residual cervix remaining, flush with vaginal walls.   Most recent years she has had CORBIN 1 with + \"other type hpv\" but not type 16 or 18. We did biopsy last years and it still shows CORBIN 1.  Surgical treatment not recommended for only CIN1.  However if developed severe, then would need hysterectomy. So far that has not happened so we just keep retesting yearly.       Her last pap was   Lab Results   Component Value Date    PAP ASC-US, +HR-HPV 07/31/2020     Overview  2/27/07 ASCUS/ Neg HPV  6/8/07 ASCUS/Neg HPV  3/20/09 LSIL  5/2009 COLP- Negative  3/26/10 LSIL/+ HR HPV  12/7/10 LSIL/Neg HPV  2/2011 LEEP  2011, 2012, 2013 NIL paps  09/29/14 ASCUS, +HR HPV.   10/21/14 [place] = Cervical Bx and ECC= Benign. Repeat co-test 1 yr  12/02/15 LSIL/ +HR HPV. Plan: colposcopy  12/15/15 [place]= Negative. 1 yr pap  2/20/17 LSIL/+ HR HPV (not 16 or 18).   04/17/17 [place]= CORBIN 1.   3/19/18  ASC-H, LSIL/+ HR HPV (not 16/18).  Plan: colposcopy by 6/19/18  4/10/18 [place]- Normal.   10/2/18 LSIL pap, + HR HPV (not 16/18). Plan cotest in 6 months  5/14/19 Pap: ASCUS, +HR HPV (not 16/18). Plan colpo  7/8/19 [place] - Mild dysplasia. Plan 1 co-test   7/31/20 ASCUS, +HR HPV, not 16/18. Plan [place]    Was a consent obtained?  Yes      PROCEDURE:                                                      Cervix is stained with acetic acid and viewed colposcopically. Squamocolumnar junction is visualized in it's entirety. no visible lesions . Biopsy done No. Endocervical curretage Done    No lesions seen so just did ECC " from endocervical canal.   Path sent for testing.      POST PROCEDURE:                                                      IMPRESSION: colposcopy adequate    PLAN : Await the results of the biopsies.  She tolerated the procedure well with minimal discomfort. There were no complications. Patient was discharged in stable condition.    Patient advised to call the clinic if excessive bleeding, pelvic pain, or fever.     Follow-up based on pathology results.  Kristine Diaz MD

## 2020-08-19 NOTE — Clinical Note
We are doing annual colposcopies because she has persistent hpv present.   Biopsy was done today and we are awaiting path. I attached her note    Kristine Diaz MD

## 2020-08-19 NOTE — LETTER
Fredy Parker, female, 1960  [address] [address]          Your colposcopy biopsy tissue was normal so this is good new. Dr. Diaz would like you to have anther pap smear with HPV testing in one year.               Best,   Haley KELLY  On behalf of Dr. Diaz

## 2020-08-31 DIAGNOSIS — M54.2 CERVICALGIA: ICD-10-CM

## 2020-09-01 RX ORDER — CYCLOBENZAPRINE HCL 5 MG
TABLET ORAL
Qty: 60 TABLET | Refills: 0 | Status: SHIPPED | OUTPATIENT
Start: 2020-09-01 | End: 2021-07-20

## 2020-09-01 RX ORDER — GABAPENTIN 300 MG/1
CAPSULE ORAL
Qty: 30 CAPSULE | Refills: 0 | Status: SHIPPED | OUTPATIENT
Start: 2020-09-01 | End: 2021-03-09

## 2020-09-01 NOTE — TELEPHONE ENCOUNTER
Routing refill request to provider for review/approval because:  Drug not on the [facility] refill protocol     Barb VITAL RN  EP Triage

## 2020-09-03 ENCOUNTER — PATIENT OUTREACH (OUTPATIENT)
Dept: OBGYN | Facility: CLINIC | Age: 60
End: 2020-09-03

## 2020-09-03 DIAGNOSIS — R87.612 PAPANICOLAOU SMEAR OF CERVIX WITH LOW GRADE SQUAMOUS INTRAEPITHELIAL LESION (LGSIL): ICD-10-CM

## 2020-09-17 ENCOUNTER — OFFICE VISIT (OUTPATIENT)
Dept: FAMILY MEDICINE | Facility: CLINIC | Age: 60
End: 2020-09-17
Payer: COMMERCIAL

## 2020-09-17 VITALS
BODY MASS INDEX: 27.09 KG/M2 | WEIGHT: 133 LBS | OXYGEN SATURATION: 98 % | TEMPERATURE: 98.1 F | DIASTOLIC BLOOD PRESSURE: 85 MMHG | HEART RATE: 92 BPM | SYSTOLIC BLOOD PRESSURE: 122 MMHG

## 2020-09-17 DIAGNOSIS — M54.2 CERVICALGIA: ICD-10-CM

## 2020-09-17 DIAGNOSIS — K21.9 GASTROESOPHAGEAL REFLUX DISEASE WITHOUT ESOPHAGITIS: ICD-10-CM

## 2020-09-17 DIAGNOSIS — M77.8 TENDINITIS OF LEFT SHOULDER: Primary | ICD-10-CM

## 2020-09-17 DIAGNOSIS — F32.1 MAJOR DEPRESSIVE DISORDER, SINGLE EPISODE, MODERATE (H): ICD-10-CM

## 2020-09-17 PROCEDURE — 99214 OFFICE O/P EST MOD 30 MIN: CPT | Performed by: FAMILY MEDICINE

## 2020-09-17 RX ORDER — FLUOXETINE 40 MG/1
40 CAPSULE ORAL DAILY
Qty: 90 CAPSULE | Refills: 1 | Status: SHIPPED | OUTPATIENT
Start: 2020-09-17 | End: 2021-04-13

## 2020-09-17 RX ORDER — OMEPRAZOLE 40 MG/1
CAPSULE, DELAYED RELEASE ORAL
Qty: 90 CAPSULE | Refills: 1 | Status: SHIPPED | OUTPATIENT
Start: 2020-09-17 | End: 2021-01-27

## 2020-09-17 RX ORDER — METHOCARBAMOL 500 MG/1
500 TABLET, FILM COATED ORAL 3 TIMES DAILY
Qty: 60 TABLET | Refills: 0 | Status: SHIPPED | OUTPATIENT
Start: 2020-09-17 | End: 2021-07-17

## 2020-09-17 RX ORDER — PREDNISONE 20 MG/1
TABLET ORAL
Qty: 20 TABLET | Refills: 0 | Status: SHIPPED | OUTPATIENT
Start: 2020-09-17 | End: 2021-07-26

## 2020-09-17 NOTE — PROGRESS NOTES
Subjective     Fredy Parker is a 60 year old female who presents to clinic today for the following health issues:    HPI       Musculoskeletal problem/pain  Onset/Duration:  2 weeks   Description  Location: shoulder  - right  Joint Swelling: no  Redness: no  Pain: no  Warmth: no  Intensity:  severe  Progression of Symptoms:  worsening  Accompanying signs and symptoms:   Fevers: no  Numbness/tingling/weakness: YES  History  Trauma to the area: no  Recent illness:  no  Previous similar problem: no  Previous evaluation:  no  Precipitating or alleviating factors:  Aggravating factors include: lifting  Therapies tried and outcome: heat and acetaminophen    Review of Systems   Constitutional, HEENT, cardiovascular, pulmonary, gi and gu systems are negative, except as otherwise noted.      Objective    /85   Pulse 92   Temp 98.1  F (36.7  C) (Tympanic)   Wt 60.3 kg (133 lb)   LMP 03/24/2009   SpO2 98%   BMI 27.09 kg/m    Body mass index is 27.09 kg/m .  Physical Exam   GENERAL: healthy, alert and no distress  NECK: no adenopathy, no asymmetry, masses, or scars and thyroid normal to palpation  RESP: lungs clear to auscultation - no rales, rhonchi or wheezes  CV: regular rate and rhythm, normal S1 S2, no S3 or S4, no murmur, click or rub, no peripheral edema and peripheral pulses strong  ABDOMEN: soft, nontender, no hepatosplenomegaly, no masses and bowel sounds normal  MS: FROM with pain, point tenderness on left anterior shoulder , negative Martínez and Neers sign            Assessment & Plan     Tendinitis of left shoulder  On left shoulder started without particular etiology   Will have her to try prednisone tapering dose, encouraged her to RTC in 2 weeks if not improving   - predniSONE (DELTASONE) 20 MG tablet; Take 3 tabs by mouth daily x 3 days, then 2 tabs daily x 3 days, then 1 tab daily x 3 days, then 1/2 tab daily x 3 days.    Major depressive disorder, single episode, moderate (H)  Stable, will  keep monitoring   - FLUoxetine (PROZAC) 40 MG capsule; Take 1 capsule (40 mg) by mouth daily    Gastroesophageal reflux disease without esophagitis  Stable, keep monitoring   - omeprazole (PRILOSEC) 40 MG DR capsule; TAKE ONE CAPSULE BY MOUTH ONE TIME DAILY 30-60 MINUTES BEFORE A MEAL    Cervicalgia  worsening with current muscle relaxant, will have her to switch to different muscle relaxant   - methocarbamol (ROBAXIN) 500 MG tablet; Take 1 tablet (500 mg) by mouth 3 times daily       FUTURE APPOINTMENTS:       - Follow-up visit in 2 weeks if not improving     No follow-ups on file.    Blu Henry MD  [facility] [place]

## 2020-10-12 NOTE — TELEPHONE ENCOUNTER
ECC Specimen from 8/19 is still in process. I called the pathology lab and they are checking into it. They will call me back.      Paola Carlisle RN BSN, Pap Tracking

## 2020-10-14 LAB — COPATH REPORT: NORMAL

## 2020-10-16 NOTE — TELEPHONE ENCOUNTER
2/27/07 ASCUS/ Neg HPV  6/8/07 ASCUS/Neg HPV  3/20/09 LSIL  5/2009 COLP- Negative  3/26/10 LSIL/+ HR HPV  12/7/10 LSIL/Neg HPV  2/2011 LEEP  2011, 2012, 2013 NIL paps  09/29/14 ASCUS, +HR HPV.   10/21/14 [place] = Cervical Bx and ECC= Benign. Repeat co-test 1 yr  12/02/15 LSIL/ +HR HPV. Plan: colposcopy  12/15/15 [place]= Negative. 1 yr pap  2/20/17 LSIL/+ HR HPV (not 16 or 18).   04/17/17 [place]= CORBIN 1. Referred to ObGyn for further evaluation  3/19/18  ASC-H, LSIL/+ HR HPV (not 16/18).  Plan: colposcopy by 6/19/18  4/10/18 [place]- Normal.   10/2/18 LSIL pap, + HR HPV (not 16/18). Plan cotest in 6 months  5/14/19 Pap: ASCUS, +HR HPV (not 16/18). Plan colpo  7/8/19 [place] - Mild dysplasia. Plan 1 co-test   7/31/20 ASCUS, +HR HPV, not 16/18. Plan [place] bef 10/31/20  8/11/20 Left msg  8/17/20 Pt notified  8/19/20 [place] ECC neg. Plan: cotest in 1 year

## 2021-01-26 DIAGNOSIS — K21.9 GASTROESOPHAGEAL REFLUX DISEASE WITHOUT ESOPHAGITIS: ICD-10-CM

## 2021-01-27 RX ORDER — OMEPRAZOLE 40 MG/1
CAPSULE, DELAYED RELEASE ORAL
Qty: 90 CAPSULE | Refills: 1 | Status: SHIPPED | OUTPATIENT
Start: 2021-01-27 | End: 2021-09-27

## 2021-03-09 DIAGNOSIS — M54.2 CERVICALGIA: ICD-10-CM

## 2021-03-09 NOTE — TELEPHONE ENCOUNTER
Left non-detailed message to call the clinic back at [phone] and ask to speak with a nurse. Looks like break in medication. Yady Hu RN

## 2021-03-10 RX ORDER — GABAPENTIN 300 MG/1
CAPSULE ORAL
Qty: 180 CAPSULE | Refills: 1 | Status: SHIPPED | OUTPATIENT
Start: 2021-03-10 | End: 2023-06-19

## 2021-03-10 NOTE — TELEPHONE ENCOUNTER
Pt called back, she takes this regularly. Needs refill. Susannah Schaefer RN on 3/10/2021 at 1:03 PM

## 2021-03-31 ENCOUNTER — ANCILLARY PROCEDURE (OUTPATIENT)
Dept: MAMMOGRAPHY | Facility: CLINIC | Age: 61
End: 2021-03-31
Payer: COMMERCIAL

## 2021-03-31 DIAGNOSIS — Z12.39 SCREENING FOR BREAST CANCER: ICD-10-CM

## 2021-03-31 PROCEDURE — 77063 BREAST TOMOSYNTHESIS BI: CPT | Mod: TC | Performed by: RADIOLOGY

## 2021-03-31 PROCEDURE — 77067 SCR MAMMO BI INCL CAD: CPT | Mod: TC | Performed by: RADIOLOGY

## 2021-04-12 DIAGNOSIS — F32.1 MAJOR DEPRESSIVE DISORDER, SINGLE EPISODE, MODERATE (H): ICD-10-CM

## 2021-04-13 RX ORDER — FLUOXETINE 40 MG/1
CAPSULE ORAL
Qty: 90 CAPSULE | Refills: 1 | Status: SHIPPED | OUTPATIENT
Start: 2021-04-13 | End: 2021-09-27

## 2021-04-13 ASSESSMENT — PATIENT HEALTH QUESTIONNAIRE - PHQ9: SUM OF ALL RESPONSES TO PHQ QUESTIONS 1-9: 1

## 2021-04-13 NOTE — TELEPHONE ENCOUNTER
Pt called back and went through PHQ 9.    Pt has updated PHQ 9 for panel management    PHQ-9 SCORE 3/4/2020 9/15/2020 4/13/2021   PHQ-9 Total Score - - -   PHQ-9 Total Score 1 4 1   PHQ-9 Total Score - - -     Prescription approved per [facility] Refill Protocol.    Barb VITAL RN  EP Triage

## 2021-07-16 DIAGNOSIS — M54.2 CERVICALGIA: ICD-10-CM

## 2021-07-17 RX ORDER — METHOCARBAMOL 500 MG/1
TABLET, FILM COATED ORAL
Qty: 30 TABLET | Refills: 0 | Status: SHIPPED | OUTPATIENT
Start: 2021-07-17 | End: 2021-09-27

## 2021-07-17 NOTE — TELEPHONE ENCOUNTER
Refill done. Need follow up for further refills.    Covering Physician for Tesha Issa MD on 7/17/2021 at 10:52 AM

## 2021-07-20 DIAGNOSIS — M54.2 CERVICALGIA: ICD-10-CM

## 2021-07-20 RX ORDER — CYCLOBENZAPRINE HCL 5 MG
TABLET ORAL
Qty: 60 TABLET | Refills: 0 | Status: SHIPPED | OUTPATIENT
Start: 2021-07-20 | End: 2021-09-27

## 2021-07-21 ENCOUNTER — TELEPHONE (OUTPATIENT)
Dept: FAMILY MEDICINE | Facility: CLINIC | Age: 61
End: 2021-07-21

## 2021-07-21 NOTE — TELEPHONE ENCOUNTER
First attempt to contact patient. M for patient callback.    Reason: need to follow up with provider prior to any more refills.     Susan AGUIAR CMA

## 2021-07-21 NOTE — TELEPHONE ENCOUNTER
Pt called back; notified that Robaxin had been refilled for 30 pills but would need to be seen for further refills.   Pt scheduled to see Dr. Henry in clinic next week for med check.     Amina Burciaga RN

## 2021-07-26 ENCOUNTER — OFFICE VISIT (OUTPATIENT)
Dept: FAMILY MEDICINE | Facility: CLINIC | Age: 61
End: 2021-07-26
Payer: COMMERCIAL

## 2021-07-26 VITALS
HEIGHT: 60 IN | DIASTOLIC BLOOD PRESSURE: 82 MMHG | SYSTOLIC BLOOD PRESSURE: 128 MMHG | WEIGHT: 133 LBS | RESPIRATION RATE: 10 BRPM | HEART RATE: 82 BPM | TEMPERATURE: 97.5 F | BODY MASS INDEX: 26.11 KG/M2 | OXYGEN SATURATION: 98 %

## 2021-07-26 DIAGNOSIS — M54.2 CERVICALGIA: ICD-10-CM

## 2021-07-26 DIAGNOSIS — S16.1XXD STRAIN OF NECK MUSCLE, SUBSEQUENT ENCOUNTER: ICD-10-CM

## 2021-07-26 DIAGNOSIS — M48.02 CERVICAL STENOSIS OF SPINAL CANAL: Primary | ICD-10-CM

## 2021-07-26 DIAGNOSIS — M77.8 TENDINITIS OF LEFT SHOULDER: ICD-10-CM

## 2021-07-26 DIAGNOSIS — H60.391 INFECTIVE OTITIS EXTERNA, RIGHT: ICD-10-CM

## 2021-07-26 PROCEDURE — 99214 OFFICE O/P EST MOD 30 MIN: CPT | Performed by: FAMILY MEDICINE

## 2021-07-26 RX ORDER — PREDNISONE 20 MG/1
TABLET ORAL
Qty: 20 TABLET | Refills: 0 | Status: SHIPPED | OUTPATIENT
Start: 2021-07-26 | End: 2021-09-27

## 2021-07-26 RX ORDER — TIZANIDINE 2 MG/1
2-4 TABLET ORAL 2 TIMES DAILY
Qty: 120 TABLET | Refills: 1 | Status: SHIPPED | OUTPATIENT
Start: 2021-07-26 | End: 2021-09-27

## 2021-07-26 RX ORDER — NEOMYCIN SULFATE, POLYMYXIN B SULFATE, HYDROCORTISONE 3.5; 10000; 1 MG/ML; [USP'U]/ML; MG/ML
3 SOLUTION/ DROPS AURICULAR (OTIC) 3 TIMES DAILY
Qty: 10 ML | Refills: 0 | Status: SHIPPED | OUTPATIENT
Start: 2021-07-26 | End: 2021-09-27

## 2021-07-26 ASSESSMENT — PATIENT HEALTH QUESTIONNAIRE - PHQ9
SUM OF ALL RESPONSES TO PHQ QUESTIONS 1-9: 1
SUM OF ALL RESPONSES TO PHQ QUESTIONS 1-9: 1
10. IF YOU CHECKED OFF ANY PROBLEMS, HOW DIFFICULT HAVE THESE PROBLEMS MADE IT FOR YOU TO DO YOUR WORK, TAKE CARE OF THINGS AT HOME, OR GET ALONG WITH OTHER PEOPLE: NOT DIFFICULT AT ALL

## 2021-07-26 ASSESSMENT — PAIN SCALES - GENERAL: PAINLEVEL: MODERATE PAIN (5)

## 2021-07-26 ASSESSMENT — ANXIETY QUESTIONNAIRES
GAD7 TOTAL SCORE: 0
7. FEELING AFRAID AS IF SOMETHING AWFUL MIGHT HAPPEN: NOT AT ALL
5. BEING SO RESTLESS THAT IT IS HARD TO SIT STILL: NOT AT ALL
8. IF YOU CHECKED OFF ANY PROBLEMS, HOW DIFFICULT HAVE THESE MADE IT FOR YOU TO DO YOUR WORK, TAKE CARE OF THINGS AT HOME, OR GET ALONG WITH OTHER PEOPLE?: NOT DIFFICULT AT ALL
GAD7 TOTAL SCORE: 0
1. FEELING NERVOUS, ANXIOUS, OR ON EDGE: NOT AT ALL
6. BECOMING EASILY ANNOYED OR IRRITABLE: NOT AT ALL
4. TROUBLE RELAXING: NOT AT ALL
3. WORRYING TOO MUCH ABOUT DIFFERENT THINGS: NOT AT ALL
2. NOT BEING ABLE TO STOP OR CONTROL WORRYING: NOT AT ALL
7. FEELING AFRAID AS IF SOMETHING AWFUL MIGHT HAPPEN: NOT AT ALL
GAD7 TOTAL SCORE: 0

## 2021-07-26 ASSESSMENT — MIFFLIN-ST. JEOR: SCORE: 1079.78

## 2021-07-26 NOTE — PROGRESS NOTES
Assessment & Plan     Tendinitis of left shoulder    - predniSONE (DELTASONE) 20 MG tablet; Take 3 tabs by mouth daily x 3 days, then 2 tabs daily x 3 days, then 1 tab daily x 3 days, then 1/2 tab daily x 3 days.  - tiZANidine (ZANAFLEX) 2 MG tablet; Take 1-2 tablets (2-4 mg) by mouth 2 times daily    Cervicalgia  Worsening again with unclear etiology  Has no radiculopathy, she has past h/o multiple C spine stenosis, and did PT, which did not help control her sx unfortunately   Will have her to try another course of prednisone with different muscle relaxant,   Will also refer her to physiatry for pain control and potential NALLELY  - predniSONE (DELTASONE) 20 MG tablet; Take 3 tabs by mouth daily x 3 days, then 2 tabs daily x 3 days, then 1 tab daily x 3 days, then 1/2 tab daily x 3 days.  - tiZANidine (ZANAFLEX) 2 MG tablet; Take 1-2 tablets (2-4 mg) by mouth 2 times daily  - Pain Management Referral; Future    Strain of neck muscle, subsequent encounter  Mentioned above   - predniSONE (DELTASONE) 20 MG tablet; Take 3 tabs by mouth daily x 3 days, then 2 tabs daily x 3 days, then 1 tab daily x 3 days, then 1/2 tab daily x 3 days.  - tiZANidine (ZANAFLEX) 2 MG tablet; Take 1-2 tablets (2-4 mg) by mouth 2 times daily  - Pain Management Referral; Future    Cervical stenosis of spinal canal    - Pain Management Referral; Future    Infective otitis externa, right  On right ear with sign of erythema and swelling  Will have her to try topical antibiotics   - neomycin-polymyxin-hydrocortisone (CORTISPORIN) 3.5-81879-1 otic solution; Place 3 drops into the right ear 3 times daily             BMI:   Estimated body mass index is 25.97 kg/m  as calculated from the following:    Height as of this encounter: 1.524 m (5').    Weight as of this encounter: 60.3 kg (133 lb).   Weight management plan: Discussed healthy diet and exercise guidelines    FUTURE APPOINTMENTS:       - Follow-up visit in 6 months for CPE    No follow-ups on  file.    Blu Henry MD  [facility] JERMAN Concepcion is a 61 year old who presents for the following health issues     HPI     Medication Followup of Robaxin    Taking Medication as prescribed: yes    Side Effects:  None    Medication Helping Symptoms: Yes     Patient would like to discuss ear infection on right ear - popping noise and water came out. Currently painful. Started this morning.    Patient would like to discuss neck pain  Back of neck  Going on for a long time        Review of Systems   Constitutional, HEENT, cardiovascular, pulmonary, gi and gu systems are negative, except as otherwise noted.      Objective    /82   Pulse 82   Temp 97.5  F (36.4  C) (Tympanic)   Resp 10   Ht 1.524 m (5')   Wt 60.3 kg (133 lb)   LMP 03/24/2009   SpO2 98%   BMI 25.97 kg/m    Body mass index is 25.97 kg/m .  Physical Exam   GENERAL: healthy, alert and no distress  NECK: no adenopathy, no asymmetry, masses, or scars and thyroid normal to palpation  RESP: lungs clear to auscultation - no rales, rhonchi or wheezes  CV: regular rate and rhythm, normal S1 S2, no S3 or S4, no murmur, click or rub, no peripheral edema and peripheral pulses strong  ABDOMEN: soft, nontender, no hepatosplenomegaly, no masses and bowel sounds normal  MS: no gross musculoskeletal defects noted, no edema                Answers for HPI/ROS submitted by the patient on 7/26/2021  If you checked off any problems, how difficult have these problems made it for you to do your work, take care of things at home, or get along with other people?: Not difficult at all  PHQ9 TOTAL SCORE: 1  KEYLA 7 TOTAL SCORE: 0

## 2021-07-27 ASSESSMENT — ANXIETY QUESTIONNAIRES: GAD7 TOTAL SCORE: 0

## 2021-07-28 ENCOUNTER — TRANSFERRED RECORDS (OUTPATIENT)
Dept: HEALTH INFORMATION MANAGEMENT | Facility: CLINIC | Age: 61
End: 2021-07-28

## 2021-08-05 ENCOUNTER — PATIENT OUTREACH (OUTPATIENT)
Dept: FAMILY MEDICINE | Facility: CLINIC | Age: 61
End: 2021-08-05

## 2021-08-05 NOTE — LETTER
August 5, 2021      Fredy HO Elena  [address] [place] [address]        Dear ,    This letter is to remind you that you are due for your follow-up Pap smear and Human Papillomavirus (HPV) test.    Please call [phone] to schedule your appointment at your earliest convenience.    If you have completed the appointment outside of the [facility] system, please have the records forwarded to our office. We will update your chart for your provider to review before your next annual wellness visit.     Thank you for choosing [facility]!      Sincerely,    Your [facility] Care Team

## 2021-09-20 ENCOUNTER — TELEPHONE (OUTPATIENT)
Dept: FAMILY MEDICINE | Facility: CLINIC | Age: 61
End: 2021-09-20

## 2021-09-27 ENCOUNTER — OFFICE VISIT (OUTPATIENT)
Dept: FAMILY MEDICINE | Facility: CLINIC | Age: 61
End: 2021-09-27
Payer: COMMERCIAL

## 2021-09-27 VITALS
RESPIRATION RATE: 12 BRPM | HEIGHT: 59 IN | DIASTOLIC BLOOD PRESSURE: 84 MMHG | BODY MASS INDEX: 26.21 KG/M2 | HEART RATE: 67 BPM | SYSTOLIC BLOOD PRESSURE: 146 MMHG | WEIGHT: 130 LBS | OXYGEN SATURATION: 97 % | TEMPERATURE: 98 F

## 2021-09-27 DIAGNOSIS — A60.00 RECURRENT GENITAL HERPES: ICD-10-CM

## 2021-09-27 DIAGNOSIS — R87.612 PAPANICOLAOU SMEAR OF CERVIX WITH LOW GRADE SQUAMOUS INTRAEPITHELIAL LESION (LGSIL): ICD-10-CM

## 2021-09-27 DIAGNOSIS — Z12.4 SCREENING FOR CERVICAL CANCER: ICD-10-CM

## 2021-09-27 DIAGNOSIS — Z00.00 ROUTINE GENERAL MEDICAL EXAMINATION AT A HEALTH CARE FACILITY: Primary | ICD-10-CM

## 2021-09-27 DIAGNOSIS — F32.1 MAJOR DEPRESSIVE DISORDER, SINGLE EPISODE, MODERATE (H): ICD-10-CM

## 2021-09-27 DIAGNOSIS — R73.01 IFG (IMPAIRED FASTING GLUCOSE): ICD-10-CM

## 2021-09-27 DIAGNOSIS — E78.5 HYPERLIPIDEMIA LDL GOAL <130: ICD-10-CM

## 2021-09-27 LAB — HBA1C MFR BLD: 6 % (ref 0–5.6)

## 2021-09-27 PROCEDURE — 36415 COLL VENOUS BLD VENIPUNCTURE: CPT | Performed by: PHYSICIAN ASSISTANT

## 2021-09-27 PROCEDURE — 80053 COMPREHEN METABOLIC PANEL: CPT | Performed by: PHYSICIAN ASSISTANT

## 2021-09-27 PROCEDURE — 99396 PREV VISIT EST AGE 40-64: CPT | Mod: 25 | Performed by: PHYSICIAN ASSISTANT

## 2021-09-27 PROCEDURE — 83036 HEMOGLOBIN GLYCOSYLATED A1C: CPT | Performed by: PHYSICIAN ASSISTANT

## 2021-09-27 PROCEDURE — 80061 LIPID PANEL: CPT | Performed by: PHYSICIAN ASSISTANT

## 2021-09-27 PROCEDURE — 87624 HPV HI-RISK TYP POOLED RSLT: CPT | Performed by: PHYSICIAN ASSISTANT

## 2021-09-27 PROCEDURE — 90682 RIV4 VACC RECOMBINANT DNA IM: CPT | Performed by: PHYSICIAN ASSISTANT

## 2021-09-27 PROCEDURE — 90471 IMMUNIZATION ADMIN: CPT | Performed by: PHYSICIAN ASSISTANT

## 2021-09-27 PROCEDURE — G0124 SCREEN C/V THIN LAYER BY MD: HCPCS | Performed by: PATHOLOGY

## 2021-09-27 PROCEDURE — 99214 OFFICE O/P EST MOD 30 MIN: CPT | Mod: 25 | Performed by: PHYSICIAN ASSISTANT

## 2021-09-27 PROCEDURE — G0145 SCR C/V CYTO,THINLAYER,RESCR: HCPCS | Performed by: PHYSICIAN ASSISTANT

## 2021-09-27 RX ORDER — VALACYCLOVIR HYDROCHLORIDE 500 MG/1
500 TABLET, FILM COATED ORAL DAILY
Qty: 90 TABLET | Refills: 3 | Status: SHIPPED | OUTPATIENT
Start: 2021-09-27 | End: 2021-10-18

## 2021-09-27 RX ORDER — FLUOXETINE 40 MG/1
40 CAPSULE ORAL DAILY
Qty: 90 CAPSULE | Refills: 1 | Status: SHIPPED | OUTPATIENT
Start: 2021-09-27 | End: 2022-03-18

## 2021-09-27 ASSESSMENT — ENCOUNTER SYMPTOMS
ARTHRALGIAS: 1
NAUSEA: 0
DYSURIA: 0
HEMATURIA: 0
HEARTBURN: 0
PARESTHESIAS: 0
SHORTNESS OF BREATH: 0
BREAST MASS: 0
WEAKNESS: 0
NERVOUS/ANXIOUS: 0
EYE PAIN: 0
DIARRHEA: 0
DIZZINESS: 0
HEADACHES: 0
CHILLS: 0
JOINT SWELLING: 0
PALPITATIONS: 0
HEMATOCHEZIA: 0
FEVER: 0
MYALGIAS: 0
COUGH: 0
SORE THROAT: 0
CONSTIPATION: 0
ABDOMINAL PAIN: 0
FREQUENCY: 0

## 2021-09-27 ASSESSMENT — PAIN SCALES - GENERAL: PAINLEVEL: NO PAIN (0)

## 2021-09-27 ASSESSMENT — MIFFLIN-ST. JEOR: SCORE: 1050.31

## 2021-09-27 NOTE — PROGRESS NOTES
SUBJECTIVE:   CC: Fredy Parker is an 61 year old woman who presents for preventive health visit.     Patient has been advised of split billing requirements and indicates understanding: Yes  Healthy Habits:     Getting at least 3 servings of Calcium per day:  Yes    Bi-annual eye exam:  Yes    Dental care twice a year:  Yes    Sleep apnea or symptoms of sleep apnea:  None    Diet:  Regular (no restrictions)    Frequency of exercise:  1 day/week    Taking medications regularly:  No    Medication side effects:  None    PHQ-2 Total Score: 0    Additional concerns today:  No        Today's PHQ-2 Score:   PHQ-2 ( 1999 [employer]) 9/27/2021   Q1: Little interest or pleasure in doing things 0   Q2: Feeling down, depressed or hopeless 0   PHQ-2 Score 0   Q1: Little interest or pleasure in doing things Not at all   Q2: Feeling down, depressed or hopeless Not at all   PHQ-2 Score 0     - Would like to increase fluoxetine, sx generally well-controlled but has noticed increase in irritability.    Abuse: Current or Past (Physical, Sexual or Emotional) - NO  Do you feel safe in your environment? Yes    Have you ever done Advance Care Planning? (For example, a Health Directive, POLST, or a discussion with a medical provider or your loved ones about your wishes): Yes, advance care planning is on file.    Social History     Tobacco Use     Smoking status: Never Smoker     Smokeless tobacco: Never Used   Substance Use Topics     Alcohol use: No     Alcohol/week: 0.0 standard drinks     Alcohol Use 9/27/2021   Prescreen: >3 drinks/day or >7 drinks/week? No   Prescreen: >3 drinks/day or >7 drinks/week? -     Reviewed orders with patient.  Reviewed health maintenance and updated orders accordingly - Yes  BP Readings from Last 3 Encounters:   09/27/21 (!) 146/84   07/26/21 128/82   09/17/20 122/85    Wt Readings from Last 3 Encounters:   09/27/21 59 kg (130 lb)   07/26/21 60.3 kg (133 lb)   09/17/20 60.3 kg (133 lb)          Patient Active  Problem List   Diagnosis     Inflammatory arthritis     Osteopenia     Papanicolaou smear of cervix with low grade squamous intraepithelial lesion (LGSIL)     Latent tuberculosis     Cervicalgia     Recurrent genital herpes     Posterior neck pain     Hyperlipidemia LDL goal <160     Gastroesophageal reflux disease without esophagitis     Major depressive disorder, single episode, moderate (H)     Osteoarthritis of finger, unspecified laterality     Perennial allergic rhinitis     Advanced directives, counseling/discussion     Past Surgical History:   Procedure Laterality Date     COLONOSCOPY N/A 7/15/2019    Procedure: COLONOSCOPY, WITH POLYPECTOMY AND BIOPSY;  Surgeon: Danyell Peters MD;  Location:  GI     CONIZATION LEEP  2/2011    chronic endocervicitis     DISKECTOMY, LUMBAR, SINGLE SP  12/2007    L5-S1 diskectomy     HC INJ TRANSFORAMIN EPIDURAL, CERV/THOR SINGLE  5/2006    C7-T1 epidural steroid     INJECTION, ANESTHETIC/STEROID, TRANSFORAMINAL EPIDURAL; LUMBAR/SACRAL, SINGLE LEVEL  8,9/2007    left L5 selective nerve root injection under fluoroscopy with corticosteroid.      INJECTION, ANESTHETIC/STEROID, TRANSFORAMINAL EPIDURAL; LUMBAR/SACRAL, SINGLE LEVEL  10/2007    right L5-S1 translaminar epidural injection     LAMINECTOMY LUMBAR ONE LEVEL  3/6/2012    Procedure:LAMINECTOMY LUMBAR ONE LEVEL; REVISION DISCECTOMY L5-S1 ON THE LEFT ; Surgeon:ROSE PRITCHETT; Location: OR     LAPAROSCOPIC CHOLECYSTECTOMY  7/99     MR LUMBAR SPINE W/O CONTRAST  1/2012    Moderate-sized left posterolateral caudally extruded L5-S1disc herniation impinging on left S1 nerve, mild degenerative disc changes at L4-5     RELEASE TRIGGER FINGER  5/17/2012    Procedure:RELEASE TRIGGER FINGER; right ring finger trigger release  (latex allergy:contact); Surgeon:QUYEN FUNK; Location: [facility]     SONO ABDOMEN LIMITED  9/2012    Increased echotexture liver c/w fatty infiltration     TUBAL LIGATION  2000       Social  History     Tobacco Use     Smoking status: Never Smoker     Smokeless tobacco: Never Used   Substance Use Topics     Alcohol use: No     Alcohol/week: 0.0 standard drinks     Family History   Problem Relation Age of Onset     Hypertension Mother      Lipids Mother      Hypertension Father      Lipids Father      Lipids Sister          Breast Cancer Screening:    FHS-7:   Breast CA Risk Assessment (FHS-7) 9/27/2021   Did any of your first-degree relatives have breast or ovarian cancer? No   Did any of your relatives have bilateral breast cancer? No   Did any man in your family have breast cancer? No   Did any woman in your family have breast and ovarian cancer? No   Did any woman in your family have breast cancer before age 50 y? No   Do you have 2 or more relatives with breast and/or ovarian cancer? No   Do you have 2 or more relatives with breast and/or bowel cancer? No     Mammogram Screening: Recommended mammography every 1-2 years with patient discussion and risk factor consideration  Pertinent mammograms are reviewed under the imaging tab.    History of abnormal Pap smear: YES - updated in Problem List and Health Maintenance accordingly  PAP / HPV Latest Ref Rng & Units 7/31/2020 5/14/2019 10/2/2018   PAP (Historical) - ASC-US(A) ASC-US(A) LSIL(A)   HPV16 NEG:Negative Negative Negative Negative   HPV18 NEG:Negative Negative Negative Negative   HRHPV NEG:Negative Positive(A) Positive(A) Positive(A)     Reviewed and updated as needed this visit by clinical staff  Tobacco  Allergies  Meds  Problems  Med Hx  Surg Hx  Fam Hx          Reviewed and updated as needed this visit by Provider  Tobacco  Allergies  Meds  Problems  Med Hx  Surg Hx  Fam Hx             Review of Systems   Constitutional: Negative for chills and fever.   HENT: Negative for congestion, ear pain and sore throat.    Eyes: Negative for pain and visual disturbance.   Respiratory: Negative for cough and shortness of breath.   "  Cardiovascular: Negative for chest pain, palpitations and peripheral edema.   Gastrointestinal: Negative for abdominal pain, constipation, diarrhea, heartburn, hematochezia and nausea.   Breasts:  Negative for tenderness, breast mass and discharge.   Genitourinary: Negative for dysuria, frequency, genital sores, hematuria, pelvic pain, urgency, vaginal bleeding and vaginal discharge.   Musculoskeletal: Positive for arthralgias. Negative for joint swelling and myalgias.   Skin: Negative for rash.   Neurological: Negative for dizziness, weakness, headaches and paresthesias.   Psychiatric/Behavioral: Negative for mood changes. The patient is not nervous/anxious.           OBJECTIVE:   BP (!) 146/84   Pulse 67   Temp 98  F (36.7  C) (Tympanic)   Resp 12   Ht 1.499 m (4' 11\")   Wt 59 kg (130 lb)   LMP 03/24/2009   SpO2 97%   BMI 26.26 kg/m    Physical Exam  GENERAL APPEARANCE: healthy, alert and no distress  EYES: Eyes grossly normal to inspection, PERRL and conjunctivae and sclerae normal  HENT: ear canals and TM's normal, nose and mouth without ulcers or lesions, oropharynx clear and oral mucous membranes moist  NECK: no adenopathy, no asymmetry, masses, or scars and thyroid normal to palpation  RESP: lungs clear to auscultation - no rales, rhonchi or wheezes  BREAST: normal without masses, tenderness or nipple discharge and no palpable axillary masses or adenopathy  CV: regular rate and rhythm, normal S1 S2, no S3 or S4, no murmur, click or rub, no peripheral edema and peripheral pulses strong   (female): normal female external genitalia, normal urethral meatus, vaginal mucosal atrophy noted, normal cervix, adnexae, and uterus without masses or abnormal discharge  MS: no musculoskeletal defects are noted and gait is age appropriate without ataxia  SKIN: no suspicious lesions or rashes  NEURO: Normal strength and tone, sensory exam grossly normal, mentation intact and speech normal  PSYCH: mentation appears " "normal and affect normal/bright        ASSESSMENT/PLAN:       ICD-10-CM    1. Routine general medical examination at a health care facility  Z00.00    2. Major depressive disorder, single episode, moderate (H)  F32.1 Comprehensive metabolic panel (BMP + Alb, Alk Phos, ALT, AST, Total. Bili, TP)     FLUoxetine (PROZAC) 40 MG capsule     FLUoxetine (PROZAC) 20 MG capsule   3. Recurrent genital herpes  A60.00 valACYclovir (VALTREX) 500 MG tablet     Comprehensive metabolic panel (BMP + Alb, Alk Phos, ALT, AST, Total. Bili, TP)     Comprehensive metabolic panel (BMP + Alb, Alk Phos, ALT, AST, Total. Bili, TP)   4. IFG (impaired fasting glucose)  R73.01 Hemoglobin A1c     Hemoglobin A1c   5. Hyperlipidemia LDL goal <130  E78.5 Lipid panel reflex to direct LDL Fasting     Lipid panel reflex to direct LDL Fasting   6. Papanicolaou smear of cervix with low grade squamous intraepithelial lesion (LGSIL)  R87.612 Pap screen with HPV - recommended age 30 - 65 years   7. Screening for cervical cancer  Z12.4      - Increase fluoxetine to 60 mg every day. Discussed anticipated clinical effects and potential SE, notify me of anything severe.  - Valtrex remains effective at herpes suppression, refills as above    COUNSELING:  Reviewed preventive health counseling, as reflected in patient instructions       Immunizations    Vaccinated for: Influenza          Estimated body mass index is 26.26 kg/m  as calculated from the following:    Height as of this encounter: 1.499 m (4' 11\").    Weight as of this encounter: 59 kg (130 lb).    Weight management plan: Discussed healthy diet and exercise guidelines    She reports that she has never smoked. She has never used smokeless tobacco.      Counseling Resources:  ATP IV Guidelines  Pooled Cohorts Equation Calculator  Breast Cancer Risk Calculator  BRCA-Related Cancer Risk Assessment: FHS-7 Tool  FRAX Risk Assessment  ICSI Preventive Guidelines  Dietary Guidelines for Americans, 2010  USDA's " MyPlate  ASA Prophylaxis  Lung CA Screening    Marcie Ferrell PA-C  [facility]

## 2021-09-29 LAB
ALBUMIN SERPL-MCNC: 3.9 G/DL (ref 3.4–5)
ALP SERPL-CCNC: 138 U/L (ref 40–150)
ALT SERPL W P-5'-P-CCNC: 57 U/L (ref 0–50)
ANION GAP SERPL CALCULATED.3IONS-SCNC: 6 MMOL/L (ref 3–14)
AST SERPL W P-5'-P-CCNC: 25 U/L (ref 0–45)
BILIRUB SERPL-MCNC: 0.5 MG/DL (ref 0.2–1.3)
BUN SERPL-MCNC: 20 MG/DL (ref 7–30)
CALCIUM SERPL-MCNC: 9.7 MG/DL (ref 8.5–10.1)
CHLORIDE BLD-SCNC: 109 MMOL/L (ref 94–109)
CHOLEST SERPL-MCNC: 258 MG/DL
CO2 SERPL-SCNC: 25 MMOL/L (ref 20–32)
CREAT SERPL-MCNC: 0.63 MG/DL (ref 0.52–1.04)
FASTING STATUS PATIENT QL REPORTED: YES
GFR SERPL CREATININE-BSD FRML MDRD: >90 ML/MIN/1.73M2
GLUCOSE BLD-MCNC: 94 MG/DL (ref 70–99)
HDLC SERPL-MCNC: 54 MG/DL
LDLC SERPL CALC-MCNC: 158 MG/DL
NONHDLC SERPL-MCNC: 204 MG/DL
POTASSIUM BLD-SCNC: 3.6 MMOL/L (ref 3.4–5.3)
PROT SERPL-MCNC: 7.3 G/DL (ref 6.8–8.8)
SODIUM SERPL-SCNC: 140 MMOL/L (ref 133–144)
TRIGL SERPL-MCNC: 230 MG/DL

## 2021-09-30 LAB
BKR LAB AP GYN ADEQUACY: ABNORMAL
BKR LAB AP GYN INTERPRETATION: ABNORMAL
BKR LAB AP HPV REFLEX: ABNORMAL
BKR LAB AP PREVIOUS ABNL DX: ABNORMAL
BKR LAB AP PREVIOUS ABNORMAL: ABNORMAL
PATH REPORT.COMMENTS IMP SPEC: ABNORMAL
PATH REPORT.RELEVANT HX SPEC: ABNORMAL

## 2021-10-01 ENCOUNTER — TELEPHONE (OUTPATIENT)
Dept: FAMILY MEDICINE | Facility: CLINIC | Age: 61
End: 2021-10-01

## 2021-10-01 ENCOUNTER — TELEPHONE (OUTPATIENT)
Dept: NURSING | Facility: CLINIC | Age: 61
End: 2021-10-01

## 2021-10-01 DIAGNOSIS — E78.5 HYPERLIPIDEMIA LDL GOAL <130: Primary | ICD-10-CM

## 2021-10-01 RX ORDER — ROSUVASTATIN CALCIUM 20 MG/1
20 TABLET, COATED ORAL DAILY
Qty: 90 TABLET | Refills: 0 | Status: SHIPPED | OUTPATIENT
Start: 2021-10-01 | End: 2023-05-03

## 2021-10-01 NOTE — TELEPHONE ENCOUNTER
[facility] is returning a missed call from the clinic.    Notified of the lab message, (noted below), from Marcie Ferrell PA-C    Notified of [facility].   She will call [facility] to ask that Rx be transferred from [facility]  (She reports that she is changing to [facility])f    She asked about PAP smear results. Notified - awaiting HPV results.    Beronica Gillis RN  [facility]

## 2021-10-01 NOTE — TELEPHONE ENCOUNTER
----- Message from Marcie Ferrell PA-C sent at 10/1/2021  8:32 AM CDT -----  Please call patient with results:    - Your cholesterol is high and the American Heart Association does recommend starting a medication to lower this at this time to decrease your risk of heart attack and stroke. I have sent a prescription to your pharmacy; please begin taking once daily and follow-up with a lab-only appointment (fasting) in 6-8 weeks to make sure this medication is helping.  - Your metabolic panel shows:  normal electrolytes (sodium, potassium, calcium) normal kidney function (creatinine and GFR) normal liver function (AST/ALT)  - Your a1c (measures average blood sugar over past 3 months) was high. An a1c 6.5% or higher is needed to diagnose diabetes. An a1c between 5.7-6.4% is considered impaired fasting glucose (pre-diabetes), and puts you at risk for developing diabetes. To help decrease your risk of diabetes, we recommend decreasing carbohydrates and sugars in your diet, weight loss, and regular aerobic exercise. We will continue to monitor this yearly.

## 2021-10-01 NOTE — TELEPHONE ENCOUNTER
Left non-detailed message to call the clinic back at [phone] and ask to speak with a nurse. Yady Hu RN

## 2021-10-01 NOTE — TELEPHONE ENCOUNTER
See Addendum to Telephone encounter with MD Beronica Ashby, RN  [facility]     no

## 2021-10-04 LAB
HUMAN PAPILLOMA VIRUS 16 DNA: NEGATIVE
HUMAN PAPILLOMA VIRUS 18 DNA: NEGATIVE
HUMAN PAPILLOMA VIRUS FINAL DIAGNOSIS: ABNORMAL
HUMAN PAPILLOMA VIRUS OTHER HR: POSITIVE

## 2021-10-07 ENCOUNTER — PATIENT OUTREACH (OUTPATIENT)
Dept: FAMILY MEDICINE | Facility: CLINIC | Age: 61
End: 2021-10-07

## 2021-10-08 ENCOUNTER — NURSE TRIAGE (OUTPATIENT)
Dept: FAMILY MEDICINE | Facility: CLINIC | Age: 61
End: 2021-10-08

## 2021-10-08 NOTE — TELEPHONE ENCOUNTER
"Call from patient who reports severe bilateral hand pain that is 10/10. States her hands are swollen and her joints hurt. Denies fever. RN advised she be seen in Urgent Care due to no available appointments in clinic. She verbalizes understanding and will go to .    Reason for Disposition    SEVERE pain (e.g., excruciating, unable to use hand at all)    Additional Information    Negative: Similar pain previously and it was from 'heart attack'    Negative: Similar pain previously from 'angina' and not relieved by nitroglycerin    Negative: Sounds like a life-threatening emergency to the triager    Negative: Followed a hand or wrist injury    Negative: Chest pain    Negative: Caused by an animal bite    Negative: Wound looks infected    Negative: Fever and red area (or area very tender to touch)    Negative: Fever and swollen joint    Negative: Patient sounds very sick or weak to the triager    Answer Assessment - Initial Assessment Questions  1. ONSET: \"When did the pain start?\"        A few days ago     2. LOCATION: \"Where is the pain located?\"        Both hands     3. PAIN: \"How bad is the pain?\" (Scale 1-10; or mild, moderate, severe)    - MILD (1-3): doesn't interfere with normal activities    - MODERATE (4-7): interferes with normal activities (e.g., work or school) or awakens from sleep    - SEVERE (8-10): excruciating pain, unable to use hand at all        Rates pain as 10/10     4. WORK OR EXERCISE: \"Has there been any recent work or exercise that involved this part of the body?\"        Tried to squeeze and exercise and it hurts and she cannot do it     5. CAUSE: \"What do you think is causing the pain?\"        Unsure   - concerned about the joints     6. AGGRAVATING FACTORS: \"What makes the pain worse?\" (e.g., using computer)      Moving the hands makes it worse   Nothing makes it better       7. OTHER SYMPTOMS: \"Do you have any other symptoms?\" (e.g., neck pain, swelling, rash, numbness, fever)    Denies " "neck pain, rash, fever, numbness     Yes swelling, hurts in the middle bone     8. PREGNANCY: \"Is there any chance you are pregnant?\" \"When was your last menstrual period?\"        No    Protocols used: HAND AND WRIST PAIN-A-OH      "

## 2021-10-11 ENCOUNTER — OFFICE VISIT (OUTPATIENT)
Dept: FAMILY MEDICINE | Facility: CLINIC | Age: 61
End: 2021-10-11
Payer: COMMERCIAL

## 2021-10-11 VITALS
SYSTOLIC BLOOD PRESSURE: 150 MMHG | WEIGHT: 130 LBS | BODY MASS INDEX: 26.21 KG/M2 | DIASTOLIC BLOOD PRESSURE: 90 MMHG | RESPIRATION RATE: 18 BRPM | TEMPERATURE: 98.9 F | OXYGEN SATURATION: 98 % | HEART RATE: 71 BPM | HEIGHT: 59 IN

## 2021-10-11 DIAGNOSIS — R03.0 ELEVATED BLOOD PRESSURE READING WITHOUT DIAGNOSIS OF HYPERTENSION: ICD-10-CM

## 2021-10-11 DIAGNOSIS — M19.90 INFLAMMATORY ARTHRITIS: Primary | ICD-10-CM

## 2021-10-11 PROCEDURE — 99213 OFFICE O/P EST LOW 20 MIN: CPT | Performed by: PHYSICIAN ASSISTANT

## 2021-10-11 RX ORDER — PREDNISONE 20 MG/1
20 TABLET ORAL DAILY
Qty: 5 TABLET | Refills: 0 | Status: SHIPPED | OUTPATIENT
Start: 2021-10-11 | End: 2021-10-11

## 2021-10-11 RX ORDER — PREDNISONE 20 MG/1
20 TABLET ORAL DAILY
Qty: 5 TABLET | Refills: 0 | Status: SHIPPED | OUTPATIENT
Start: 2021-10-11 | End: 2021-10-27

## 2021-10-11 ASSESSMENT — MIFFLIN-ST. JEOR: SCORE: 1050.31

## 2021-10-11 NOTE — PATIENT INSTRUCTIONS
[facility] #: Tel: [phone]    Check your blood pressure daily and write it down, bring readings to next visit with Dr. Mishra    See Dr. Mishra in one month for follow-up    Start prednisone 20mg daily for five days    Take tylenol as needed for pain up to 1000mg three times daily, do not exceed 3000mg in 24 hour period

## 2021-10-11 NOTE — PROGRESS NOTES
Assessment and Plan:     (M19.90) Inflammatory arthritis  (primary encounter diagnosis)  Comment: stopped following with rheum about 10 years ago, previously was on methotrexate, prednisone and enbrel, stopped on her own  Plan: Rheumatology Referral, predniSONE (DELTASONE)         20 MG tablet, DISCONTINUED: predniSONE         (DELTASONE) 20 MG tablet  Referred back to rheum, discussed also using tylenol    (R03.0) Elevated blood pressure reading without diagnosis of hypertension  Comment: mildly elevated x 2 today, could be 2/2 pain  Plan: take daily and record, bring to next visit with Dr. Mishra in one month      Alexsandra Roberto PA-C        Mellissa Concepcion is a 61 year old who presents for the following health issues     HPI     Bilateral Hand pain and swelling -   Has had issues for years---this episode started last week, diffuse hands, very stiff in the am  History of inflammatory arthritis diagnosed in 2009,  w/mildly positive rheumatoid factor and has been on enbrel, prednisone and methotrexate in the past.  She was followed by [facility] for years but stopped following up and stopped medications because she was concerned about her liver.    She has been using tylenol as needed for pain.    BP is elevated today, could be related to pain  Denies history of HTN    She denies fever/chills, recent hand injury, skin changes, headache, chest pain, sob    Review of Systems   See above      Objective    LMP 03/24/2009   There is no height or weight on file to calculate BMI.     Physical Exam   GENERAL: healthy, alert and no distress  RESP: lungs clear to auscultation - no rales, no rhonchi, no wheezes  CV: regular rates and rhythm, normal S1 S2, no S3 or S4 and no murmur, no click or rub   MS: extremities- prominent IP joints of all fingers of both hands, no erythema or skin changes, no ulnar deviation, full ROM of all fingers  SKIN: no suspicious lesions, no rashes

## 2021-10-15 DIAGNOSIS — A60.00 RECURRENT GENITAL HERPES: ICD-10-CM

## 2021-10-15 DIAGNOSIS — M19.90 INFLAMMATORY ARTHRITIS: ICD-10-CM

## 2021-10-18 RX ORDER — PREDNISONE 20 MG/1
20 TABLET ORAL DAILY
Qty: 5 TABLET | Refills: 0 | OUTPATIENT
Start: 2021-10-18

## 2021-10-18 RX ORDER — VALACYCLOVIR HYDROCHLORIDE 500 MG/1
TABLET, FILM COATED ORAL
Qty: 30 TABLET | Refills: 3 | Status: SHIPPED | OUTPATIENT
Start: 2021-10-18 | End: 2022-10-20

## 2021-10-18 NOTE — TELEPHONE ENCOUNTER
Short-term rx, no refill. Patient should follow-up with rheumatology as discussed with provider seen on 10/11/2021

## 2021-10-18 NOTE — TELEPHONE ENCOUNTER
LOV 10- with same day provider JOEL NEIL.  Patient to re-establish with rheumatology.  Patient to follow up with PCP Dr Mishra in one month.    No future OV scheduled.    Forwarding to PCP - please review.    Carmelina Ferrera, RT (R)

## 2021-10-21 DIAGNOSIS — M19.90 INFLAMMATORY ARTHRITIS: ICD-10-CM

## 2021-10-25 RX ORDER — PREDNISONE 20 MG/1
20 TABLET ORAL DAILY
Qty: 5 TABLET | Refills: 0 | OUTPATIENT
Start: 2021-10-25

## 2021-10-25 NOTE — TELEPHONE ENCOUNTER
Has she been able to make an appointment with rheumatology?    Did the prednisone help?    If needed we can keep a low dose of prednisone going until the rheumatology appointment.     Lakshmi Mishra MD

## 2021-10-25 NOTE — TELEPHONE ENCOUNTER
Pharmacy called and faxed again.    See 10- encounter - Rx was denied.  Was for short term use only; patient was to see PCP and rheumatology.    LOV 10- Keke    Next OV with ob/gyn 10-    Forwarding to PCP for review.    Carmelina Ferrera, RT (R)

## 2021-10-25 NOTE — TELEPHONE ENCOUNTER
Routing refill request to provider for review/approval because:  Drug not on the [facility] refill protocol , last refill was 10/11/2021.     Ariana Lakhani RN  [facility] Triage

## 2021-10-25 NOTE — TELEPHONE ENCOUNTER
Please have patient schedule with pcp or rheumatology for further prednisone prescriptions.  THanks.

## 2021-10-26 NOTE — TELEPHONE ENCOUNTER
Pt called back to check the status of the refill. Read her the note below. Pt does have an appt with Rheumatology on 12/8 but needs something to carry her over until the appt. Her hands are very painful. If approved please send to [facility] in [place].  Jasmin Bliss,

## 2021-10-27 DIAGNOSIS — M19.90 INFLAMMATORY ARTHRITIS: ICD-10-CM

## 2021-10-27 RX ORDER — PREDNISONE 20 MG/1
TABLET ORAL
Qty: 20 TABLET | Refills: 0 | Status: SHIPPED | OUTPATIENT
Start: 2021-10-27 | End: 2021-12-01

## 2021-10-27 RX ORDER — PREDNISONE 20 MG/1
TABLET ORAL
Qty: 20 TABLET | Refills: 0 | Status: CANCELLED | OUTPATIENT
Start: 2021-10-27 | End: 2021-12-01

## 2021-10-27 NOTE — TELEPHONE ENCOUNTER
Refilled order to take 20mg for 5 days, then 10mg daily. Please have her follow up with me in 2 weeks or so to see how she's feeling with that, since Dec 8 is a ways off.     Lakshmi Mishra MD

## 2021-10-27 NOTE — TELEPHONE ENCOUNTER
Pt is calling again stating that the pharmacy doesn't have her prescription. Pt is saying her hands are in extreme pain. Pt is very frustrated and states this is unreal, that her hands have been in pain for already a week and no one is helping. Asked to send this message to Dr. Henry as well, states that he is her Dr too. Requests a call back to [phone] when the prescription is sent.

## 2021-10-27 NOTE — TELEPHONE ENCOUNTER
Spoke with patient and relayed MD message.  - informed patient that the medication has been sent today.    At this time patient is not sure, but scheduled for 11/19/21. Provider flagged MA down and said E visit would be fine. Patient will set up MyChart. Cancelled appointment for November.    Patient states at this time she does not have any prescription, states that taking tylenol with prednisone helps a lot more than just tylenol alone.     Susan AGUIAR CMA

## 2021-11-05 ENCOUNTER — TELEPHONE (OUTPATIENT)
Dept: FAMILY MEDICINE | Facility: CLINIC | Age: 61
End: 2021-11-05

## 2021-11-05 DIAGNOSIS — K21.9 GASTROESOPHAGEAL REFLUX DISEASE WITHOUT ESOPHAGITIS: Primary | ICD-10-CM

## 2021-11-05 RX ORDER — OMEPRAZOLE 40 MG/1
40 CAPSULE, DELAYED RELEASE ORAL DAILY PRN
Qty: 60 CAPSULE | Refills: 1 | Status: SHIPPED | OUTPATIENT
Start: 2021-11-05 | End: 2021-12-30

## 2021-11-05 NOTE — TELEPHONE ENCOUNTER
Patient request new rx for omeprazole dr 40 mg capsule. Medication is not on patients list. Needs new rx, please advise.    Monica Hart. MA

## 2021-12-08 ENCOUNTER — TRANSFERRED RECORDS (OUTPATIENT)
Dept: HEALTH INFORMATION MANAGEMENT | Facility: CLINIC | Age: 61
End: 2021-12-08
Payer: COMMERCIAL

## 2021-12-08 LAB
ALT SERPL-CCNC: 55 IU/L (ref 5–35)
AST SERPL-CCNC: 38 U/L (ref 5–34)
CREATININE (EXTERNAL): 0.52 MG/DL (ref 0.5–1.3)
GFR ESTIMATED (EXTERNAL): 126.6 ML/MIN/1.73M2

## 2021-12-30 DIAGNOSIS — K21.9 GASTROESOPHAGEAL REFLUX DISEASE WITHOUT ESOPHAGITIS: ICD-10-CM

## 2021-12-30 RX ORDER — OMEPRAZOLE 40 MG/1
40 CAPSULE, DELAYED RELEASE ORAL DAILY PRN
Qty: 60 CAPSULE | Refills: 8 | Status: SHIPPED | OUTPATIENT
Start: 2021-12-30 | End: 2023-01-19

## 2022-05-05 ENCOUNTER — NURSE TRIAGE (OUTPATIENT)
Dept: NURSING | Facility: CLINIC | Age: 62
End: 2022-05-05
Payer: COMMERCIAL

## 2022-05-05 NOTE — TELEPHONE ENCOUNTER
Nurse Triage SBAR    Is this a 2nd Level Triage? NO    Situation: Patient calling requesting to have anti-viral treatment for COVID-19.     Background: Sx started on 5/2/22. Tested positive for COVID-19 on 5/2/22.     Assessment:     Dry cough, intermittent for the most part, but also bothersome.   Sore throat  Runny nose  Headache, mild  No chest pain currently.   No breathing concerns currently.   No fevers currently     RN went through questions regarding setting up virtual visit with provider to discuss treatment options for COVID-19.     Monoclonal Antibody Administration    You may be eligible to receive a new treatment with a monoclonal antibody for preventing hospitalization in patients at high risk for complications from COVID-19. This medication is still experimental and available on a limited basis; it is given through an IV and must be given at an infusion center. Please note that not all people who are eligible will receive the medication since it is in limited supply.  Is the patient symptomatic and onset of symptoms within the last 7 days?  Yes  Is the patient interested in a visit with a provider to discuss treatment options?: Yes  Is the patient seen at [facility]?  No: Warm transfer caller to [phone] to be scheduled with a virtual urgent provider.  During transfer, instruct  on appropriate time frame for visit     Protocol Recommended Disposition: Telephone advice    Recommendation: Per protocol, home care advised, but patient would like to have medication treatment for COVID-19. Patient will need to do virtual visit with provider to discuss the treatment options. Patient advised to call back with any new or worsening sx. Patient verbalized understanding and agrees with plan. Warm transferred to scheduling to assist with setting up visit.       Cathy Ceron RN, Nurse Triage Advisor 9:31 AM 5/5/2022     Reason for Disposition    [1] COVID-19 diagnosed by positive lab  test (e.g., PCR, rapid self-test kit) AND [2] mild symptoms (e.g., cough, fever, others) AND [3] no complications or SOB    Additional Information    Negative: SEVERE difficulty breathing (e.g., struggling for each breath, speaks in single words)    Negative: Difficult to awaken or acting confused (e.g., disoriented, slurred speech)    Negative: Bluish (or gray) lips or face now    Negative: Shock suspected (e.g., cold/pale/clammy skin, too weak to stand, low BP, rapid pulse)    Negative: Sounds like a life-threatening emergency to the triager    Negative: [1] Diagnosed or suspected COVID-19 AND [2] symptoms lasting 3 or more weeks    Negative: [1] COVID-19 exposure AND [2] no symptoms    Negative: COVID-19 vaccine reaction suspected (e.g., fever, headache, muscle aches) occurring 1 to 3 days after getting vaccine    Negative: COVID-19 vaccine, questions about    Negative: [1] Lives with someone known to have influenza (flu test positive) AND [2] flu-like symptoms (e.g., cough, runny nose, sore throat, SOB; with or without fever)    Negative: [1] Adult with possible COVID-19 symptoms AND [2] triager concerned about severity of symptoms or other causes    Negative: COVID-19 and breastfeeding, questions about    Negative: SEVERE or constant chest pain or pressure  (Exception: Mild central chest pain, present only when coughing.)    Negative: MODERATE difficulty breathing (e.g., speaks in phrases, SOB even at rest, pulse 100-120)    Negative: Headache and stiff neck (can't touch chin to chest)    Negative: Oxygen level (e.g., pulse oximetry) 90 percent or lower    Negative: Chest pain or pressure    Negative: Patient sounds very sick or weak to the triager    Negative: MILD difficulty breathing (e.g., minimal/no SOB at rest, SOB with walking, pulse <100)    Negative: Fever > 103 F (39.4 C)    Negative: [1] Fever > 101 F (38.3 C) AND [2] over 60 years of age    Negative: [1] Fever > 100.0 F (37.8 C) AND [2] bedridden  (e.g., nursing home patient, CVA, chronic illness, recovering from surgery)    Negative: HIGH RISK for severe COVID complications (e.g., weak immune system, age > 64 years, obesity with BMI > 25, pregnant, chronic lung disease or other chronic medical condition) (Exception: Already seen by PCP and no new or worsening symptoms.)    Negative: [1] HIGH RISK patient AND [2] influenza is widespread in the community AND [3] ONE OR MORE respiratory symptoms: cough, sore throat, runny or stuffy nose    Negative: [1] HIGH RISK patient AND [2] influenza exposure within the last 7 days AND [3] ONE OR MORE respiratory symptoms: cough, sore throat, runny or stuffy nose    Negative: Oxygen level (e.g., pulse oximetry) 91 to 94 percent    Negative: [1] COVID-19 infection suspected by caller or triager AND [2] mild symptoms (cough, fever, or others) AND [3] negative COVID-19 rapid test    Negative: Fever present > 3 days (72 hours)    Negative: [1] Fever returns after gone for over 24 hours AND [2] symptoms worse or not improved    Negative: [1] Continuous (nonstop) coughing interferes with work or school AND [2] no improvement using cough treatment per Care Advice    Negative: Cough present > 3 weeks    Negative: [1] COVID-19 diagnosed by positive lab test (e.g., PCR, rapid self-test kit) AND [2] NO symptoms (e.g., cough, fever, others)    Protocols used: CORONAVIRUS (COVID-19) DIAGNOSED OR [id number] 1.18.2022  COVID 19 Nurse Triage Plan/Patient Instructions    Please be aware that novel coronavirus (COVID-19) may be circulating in the community. If you develop symptoms such as fever, cough, or SOB or if you have concerns about the presence of another infection including coronavirus (COVID-19), please contact your health care provider or visit [email or web address].     Disposition/Instructions    Virtual Visit with provider recommended. Reference Visit Selection Guide.    Thank you for taking steps to prevent the  spread of this virus.  o Limit your contact with others.  o Wear a simple mask to cover your cough.  o Wash your hands well and often.    Resources    [facility]: About COVID-19: [email or web address]    CDC: What to Do If You're Sick: www.cdc.gov/coronavirus/2019-ncov/about/steps-when-sick.html    CDC: Ending Home Isolation: www.cdc.gov/coronavirus/2019-ncov/hcp/disposition-in-home-patients.html     CDC: Caring for Someone: www.cdc.gov/coronavirus/2019-ncov/if-you-are-sick/care-for-someone.html     [facility]: Interim Guidance for Hospital Discharge to Home: www.[facility].[place]/diseases/coronavirus/hcp/hospdischarge.pdf    [facility] clinical trials (COVID-19 research studies): clinicalaffairs.[facility]-clinical-trials     Below are the COVID-19 hotlines at the [place] [facility] ([facility]). Interpreters are available.   o For health questions: Call 025-840-1727 or 1-615.892.4239 (7 a.m. to 7 p.m.)  o For questions about schools and childcare: Call 478-489-1513 or 1-957.588.1012 (7 a.m. to 7 p.m.)

## 2022-06-05 ENCOUNTER — HEALTH MAINTENANCE LETTER (OUTPATIENT)
Age: 62
End: 2022-06-05

## 2022-06-07 DIAGNOSIS — F32.1 MAJOR DEPRESSIVE DISORDER, SINGLE EPISODE, MODERATE (H): ICD-10-CM

## 2022-06-09 NOTE — TELEPHONE ENCOUNTER
Routing refill request to provider for review/approval because:  PHQ-9 score:    PHQ 7/26/2021   PHQ-9 Total Score 1   Q9: Thoughts of better off dead/self-harm past 2 weeks Not at all     No visit last 6 months        Alexis Thayer, RN  [facility] Triage Nurse

## 2022-06-10 RX ORDER — FLUOXETINE 40 MG/1
CAPSULE ORAL
Qty: 90 CAPSULE | Refills: 0 | Status: SHIPPED | OUTPATIENT
Start: 2022-06-10 | End: 2022-08-29

## 2022-08-25 DIAGNOSIS — F32.1 MAJOR DEPRESSIVE DISORDER, SINGLE EPISODE, MODERATE (H): ICD-10-CM

## 2022-08-25 NOTE — LETTER
September 9, 2022      Fredy Parker  [address]  [place] [address]          Dear Ms. Parker,    Our records indicate that it is time to schedule a visit with your primary care provider.  You are due to be seen for a follow-up of medications.  We have sent to the pharmacy a 3 month refill of your medication until you can be seen by your provider.  You may call [phone] to schedule or via [employer] using the appointment tab.  If you are no longer a [facility] patient; please contact us and let us know that as well.  You will need to let the pharmacy know the name of your new provider so that they can send future refill requests to them.    Sincerely,    [facility] - Lidia Dawes

## 2022-08-29 RX ORDER — FLUOXETINE 40 MG/1
CAPSULE ORAL
Qty: 90 CAPSULE | Refills: 0 | Status: SHIPPED | OUTPATIENT
Start: 2022-08-29 | End: 2022-12-01

## 2022-08-29 NOTE — TELEPHONE ENCOUNTER
Routing refill request to provider for review/approval because:  SSRIs Protocol Failed 08/25/2022 12:20 PM   Protocol Details  PHQ-9 score less than 5 in past 6 months    Recent (6 mo) or future (30 days) visit within the authorizing provider's specialty

## 2022-08-29 NOTE — TELEPHONE ENCOUNTER
Due for office visit, only #90 supply submitted.     Please call and inform pt to schedule an annual exam.

## 2022-09-14 ENCOUNTER — PATIENT OUTREACH (OUTPATIENT)
Dept: FAMILY MEDICINE | Facility: CLINIC | Age: 62
End: 2022-09-14

## 2022-09-14 DIAGNOSIS — R87.612 PAPANICOLAOU SMEAR OF CERVIX WITH LOW GRADE SQUAMOUS INTRAEPITHELIAL LESION (LGSIL): ICD-10-CM

## 2022-10-15 ENCOUNTER — HEALTH MAINTENANCE LETTER (OUTPATIENT)
Age: 62
End: 2022-10-15

## 2022-11-16 NOTE — TELEPHONE ENCOUNTER
Marcie,    Patient is lost to pap tracking follow-up. Attempts to contact pt have been made per reminder process and there has been no reply and/or no appt scheduled.  If you are wanting any additional contact attempts please send to your care team staff.     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 Pathology = No evidence of dysplasia.  2011, 2012, 2013 NIL paps  09/29/14 ASCUS, +HR HPV.   10/21/14 [place] = Cervical Bx and ECC= Benign. Repeat co-test 1 yr  12/02/15 LSIL/ +HR HPV. Plan: colposcopy  12/15/15 [place]= Negative. 1 yr pap  2/20/17 LSIL/+ HR HPV (not 16 or 18).   04/17/17 [place]= CORBIN 1. Referred to ObGyn for further evaluation  3/19/18  ASC-H, LSIL/+ HR HPV (not 16/18).  Plan: colposcopy by 6/19/18  4/10/18 [place]- Normal.   10/2/18 LSIL pap, + HR HPV (not 16/18). Plan cotest in 6 months  5/14/19 Pap: ASCUS, +HR HPV (not 16/18). Plan colpo  7/8/19 [place] - Mild dysplasia. Plan 1 co-test   7/31/20 ASCUS, +HR HPV, not 16/18. Plan [place] bef 10/31/20  8/19/20 [place] ECC neg. Plan: cotest in 1 year  9/27/21 ASCUS, +HR HPV, not 16/18. Plan 1 yr co-test    9/14/2022 Reminder MyChart  10/17/2022 Reminder call - lm  11/16/2022 Lost to follow-up for pap tracking

## 2022-11-19 ENCOUNTER — OFFICE VISIT (OUTPATIENT)
Dept: URGENT CARE | Facility: URGENT CARE | Age: 62
End: 2022-11-19
Payer: COMMERCIAL

## 2022-11-19 VITALS
RESPIRATION RATE: 15 BRPM | SYSTOLIC BLOOD PRESSURE: 146 MMHG | BODY MASS INDEX: 25.25 KG/M2 | DIASTOLIC BLOOD PRESSURE: 85 MMHG | WEIGHT: 125 LBS | HEART RATE: 67 BPM | TEMPERATURE: 96.6 F | OXYGEN SATURATION: 100 %

## 2022-11-19 DIAGNOSIS — H65.191 ACUTE MUCOID OTITIS MEDIA OF RIGHT EAR: Primary | ICD-10-CM

## 2022-11-19 PROCEDURE — 99213 OFFICE O/P EST LOW 20 MIN: CPT | Performed by: NURSE PRACTITIONER

## 2022-11-19 RX ORDER — ACETAMINOPHEN 500 MG
1000 TABLET ORAL ONCE
Status: COMPLETED | OUTPATIENT
Start: 2022-11-19 | End: 2022-11-19

## 2022-11-19 RX ORDER — AMOXICILLIN 875 MG
875 TABLET ORAL 2 TIMES DAILY
Qty: 20 TABLET | Refills: 0 | Status: SHIPPED | OUTPATIENT
Start: 2022-11-19 | End: 2022-11-29

## 2022-11-19 RX ADMIN — Medication 1000 MG: at 10:10

## 2022-11-19 NOTE — PATIENT INSTRUCTIONS
Patient Education     Acute Otitis Media with Infection (Child)    Your child has a middle ear infection (acute otitis media). It's caused by bacteria or viruses. The middle ear is the space behind the eardrum. The eustachian tube connects the ear to the nasal passage. The eustachian tubes help drain fluid from the ears. They also keep the air pressure equal inside and outside the ears. These tubes are shorter and more horizontal in children. This makes it more likely for the tubes to become blocked. A blockage lets fluid and pressure build up in the middle ear. Bacteria or fungi can grow in this fluid and cause an ear infection. This infection is commonly known as an earache.   The main symptom of an ear infection is ear pain. Other symptoms may include pulling at the ear, being more fussy than usual, fever, decreased appetite, and vomiting or diarrhea. Your child s hearing may also be affected. Your child may have had a respiratory infection first.   An ear infection may clear up on its own. Or your child may need to take medicine. After the infection goes away, your child may still have fluid in the middle ear. It may take weeks or months for this fluid to go away. During that time, your child may have temporary hearing loss. But all other symptoms of the earache should be gone.   Home care  Follow these guidelines when caring for your child at home:  The healthcare provider will likely prescribe medicines for pain. The provider may also prescribe antibiotics to treat the infection. These may be liquid medicines to give by mouth. Or they may be ear drops. Follow the provider s instructions for giving these medicines to your child.  Don't give your child any other medicine without first asking your child's healthcare provider, especially the first time.  Because ear infections can clear up on their own, the provider may suggest waiting for a few days before giving your child medicines for infection.  To reduce  pain, have your child rest in an upright position. Hot or cold compresses held against the ear may help ease pain.  Don't smoke in the house or around your child. Keep your child away from secondhand smoke.  To help prevent future infections:  Don't smoke near your child. Secondhand smoke raises the risk for ear infections in children.  Make sure your child gets all appropriate vaccines.  Don't bottle-feed while your baby is lying on his or her back. (This position can cause middle ear infections because it allows milk to run into the eustachian tubes.)      If you breastfeed, continue until your child is 6 to 12 months of age.  To apply ear drops:  Put the bottle in warm water if the medicine is kept in the refrigerator. Cold drops in the ear are uncomfortable.  Have your child lie down on a flat surface. Gently hold your child s head to one side.  Remove any drainage from the ear with a clean tissue or cotton swab. Clean only the outer ear. Don t put the cotton swab into the ear canal.  Straighten the ear canal by gently pulling the earlobe up and back.  Keep the dropper a half-inch above the ear canal. This will keep the dropper from becoming contaminated. Put the drops against the side of the ear canal.  Have your child stay lying down for 2 to 3 minutes. This gives time for the medicine to enter the ear canal. If your child doesn t have pain, gently massage the outer ear near the opening.  Wipe any extra medicine away from the outer ear with a clean cotton ball.    Follow-up care  Follow up with your child s healthcare provider as directed. Your child will need to have the ear rechecked to make sure the infection has gone away. Check with the healthcare provider to see when they want to see your child.   Special note to parents  If your child continues to get earaches, he or she may need ear tubes. The provider will put small tubes in your child s eardrum to help keep fluid from building up. This procedure is a  simple and works well.   When to seek medical advice  Call your child's healthcare provider for any of the following:   Fever (see Fever and children, below)  New symptoms, especially swelling around the ear or weakness of face muscles  Severe pain  Infection seems to get worse, not better   Neck pain  Your child acts very sick or not himself or herself  Fever or pain don't improve with antibiotics after 48 hours  Fever and children  Use a digital thermometer to check your child s temperature. Don t use a mercury thermometer. There are different kinds and uses of digital thermometers. They include:   Rectal. For children younger than 3 years, a rectal temperature is the most accurate.  Forehead (temporal). This works for children age 3 months and older. If a child under 3 months old has signs of illness, this can be used for a first pass. The provider may want to confirm with a rectal temperature.  Ear (tympanic). Ear temperatures are accurate after 6 months of age, but not before.  Armpit (axillary). This is the least reliable but may be used for a first pass to check a child of any age with signs of illness. The provider may want to confirm with a rectal temperature.  Mouth (oral). Don t use a thermometer in your child s mouth until he or she is at least 4 years old.  Use the rectal thermometer with care. Follow the product maker s directions for correct use. Insert it gently. Label it and make sure it s not used in the mouth. It may pass on germs from the stool. If you don t feel OK using a rectal thermometer, ask the healthcare provider what type to use instead. When you talk with any healthcare provider about your child s fever, tell him or her which type you used.   Below are guidelines to know if your young child has a fever. Your child s healthcare provider may give you different numbers for your child. Follow your provider s specific instructions.   Fever readings for a baby under 3 months old:   First, ask  your child s healthcare provider how you should take the temperature.  Rectal or forehead: 100.4 F (38 C) or higher  Armpit: 99 F (37.2 C) or higher  Fever readings for a child age 3 months to 36 months (3 years):   Rectal, forehead, or ear: 102 F (38.9 C) or higher  Armpit: 101 F (38.3 C) or higher  Call the healthcare provider in these cases:   Repeated temperature of 104 F (40 C) or higher in a child of any age  Fever of 100.4  F (38  C) or higher in baby younger than 3 months  Fever that lasts more than 24 hours in a child under age 2  Fever that lasts for 3 days in a child age 2 or older    [employer] last reviewed this educational content on 4/1/2020 2000-2021 [employer]. All rights reserved. This information is not intended as a substitute for professional medical care. Always follow your healthcare professional's instructions.

## 2022-11-19 NOTE — PROGRESS NOTES
Chief Complaint   Patient presents with     Ear Pressure     Right ear pressure and painful since last month, unable to sleep at night.          ICD-10-CM    1. Acute mucoid otitis media of right ear  H65.111 amoxicillin (AMOXIL) 875 MG tablet     acetaminophen (TYLENOL) tablet 1,000 mg      Antibiotics, Tylenol as needed for pain.  Recheck in 10 days if symptoms are not improved.    Subjective     Fredy Parker is an 62 year old female who presents to clinic today for intermittent right ear pain for the last month.  Has become continuous over the last several days and more severe.  She denies any trauma or signs of upper respiratory infections.       Objective    BP (!) 146/85 (BP Location: Left arm, Patient Position: Sitting, Cuff Size: Adult Regular)   Pulse 67   Temp (!) 96.6  F (35.9  C) (Tympanic)   Resp 15   Wt 56.7 kg (125 lb)   LMP 03/24/2009   SpO2 100%   BMI 25.25 kg/m    Nurses notes and VS have been reviewed.    Physical Exam       GENERAL APPEARANCE: alert and mild distress     EYES: PERRL, EOMI, sclera non-icteric     HENT: nose and mouth without ulcers or lesions, pharynx appears normal, left tympanic membrane is dull, right tympanic membrane is purulent yellow with pink around 1 edge     NECK: no adenopathy or asymmetry, thyroid normal to palpation     SKIN: no suspicious lesions or rashes    Patient Instructions   Patient Education    Acute Otitis Media with Infection (Child)    Your child has a middle ear infection (acute otitis media). It's caused by bacteria or viruses. The middle ear is the space behind the eardrum. The eustachian tube connects the ear to the nasal passage. The eustachian tubes help drain fluid from the ears. They also keep the air pressure equal inside and outside the ears. These tubes are shorter and more horizontal in children. This makes it more likely for the tubes to become blocked. A blockage lets fluid and pressure build up in the middle ear. Bacteria or fungi can  grow in this fluid and cause an ear infection. This infection is commonly known as an earache.   The main symptom of an ear infection is ear pain. Other symptoms may include pulling at the ear, being more fussy than usual, fever, decreased appetite, and vomiting or diarrhea. Your child s hearing may also be affected. Your child may have had a respiratory infection first.   An ear infection may clear up on its own. Or your child may need to take medicine. After the infection goes away, your child may still have fluid in the middle ear. It may take weeks or months for this fluid to go away. During that time, your child may have temporary hearing loss. But all other symptoms of the earache should be gone.   Home care  Follow these guidelines when caring for your child at home:    The healthcare provider will likely prescribe medicines for pain. The provider may also prescribe antibiotics to treat the infection. These may be liquid medicines to give by mouth. Or they may be ear drops. Follow the provider s instructions for giving these medicines to your child.  Don't give your child any other medicine without first asking your child's healthcare provider, especially the first time.    Because ear infections can clear up on their own, the provider may suggest waiting for a few days before giving your child medicines for infection.    To reduce pain, have your child rest in an upright position. Hot or cold compresses held against the ear may help ease pain.    Don't smoke in the house or around your child. Keep your child away from secondhand smoke.  To help prevent future infections:    Don't smoke near your child. Secondhand smoke raises the risk for ear infections in children.    Make sure your child gets all appropriate vaccines.    Don't bottle-feed while your baby is lying on his or her back. (This position can cause middle ear infections because it allows milk to run into the eustachian tubes.)        If you  breastfeed, continue until your child is 6 to 12 months of age.  To apply ear drops:  1. Put the bottle in warm water if the medicine is kept in the refrigerator. Cold drops in the ear are uncomfortable.  2. Have your child lie down on a flat surface. Gently hold your child s head to one side.  3. Remove any drainage from the ear with a clean tissue or cotton swab. Clean only the outer ear. Don t put the cotton swab into the ear canal.  4. Straighten the ear canal by gently pulling the earlobe up and back.  5. Keep the dropper a half-inch above the ear canal. This will keep the dropper from becoming contaminated. Put the drops against the side of the ear canal.  6. Have your child stay lying down for 2 to 3 minutes. This gives time for the medicine to enter the ear canal. If your child doesn t have pain, gently massage the outer ear near the opening.  7. Wipe any extra medicine away from the outer ear with a clean cotton ball.    Follow-up care  Follow up with your child s healthcare provider as directed. Your child will need to have the ear rechecked to make sure the infection has gone away. Check with the healthcare provider to see when they want to see your child.   Special note to parents  If your child continues to get earaches, he or she may need ear tubes. The provider will put small tubes in your child s eardrum to help keep fluid from building up. This procedure is a simple and works well.   When to seek medical advice  Call your child's healthcare provider for any of the following:     Fever (see Fever and children, below)    New symptoms, especially swelling around the ear or weakness of face muscles    Severe pain    Infection seems to get worse, not better     Neck pain    Your child acts very sick or not himself or herself    Fever or pain don't improve with antibiotics after 48 hours  Fever and children  Use a digital thermometer to check your child s temperature. Don t use a mercury thermometer.  There are different kinds and uses of digital thermometers. They include:     Rectal. For children younger than 3 years, a rectal temperature is the most accurate.    Forehead (temporal). This works for children age 3 months and older. If a child under 3 months old has signs of illness, this can be used for a first pass. The provider may want to confirm with a rectal temperature.    Ear (tympanic). Ear temperatures are accurate after 6 months of age, but not before.    Armpit (axillary). This is the least reliable but may be used for a first pass to check a child of any age with signs of illness. The provider may want to confirm with a rectal temperature.    Mouth (oral). Don t use a thermometer in your child s mouth until he or she is at least 4 years old.  Use the rectal thermometer with care. Follow the product maker s directions for correct use. Insert it gently. Label it and make sure it s not used in the mouth. It may pass on germs from the stool. If you don t feel OK using a rectal thermometer, ask the healthcare provider what type to use instead. When you talk with any healthcare provider about your child s fever, tell him or her which type you used.   Below are guidelines to know if your young child has a fever. Your child s healthcare provider may give you different numbers for your child. Follow your provider s specific instructions.   Fever readings for a baby under 3 months old:     First, ask your child s healthcare provider how you should take the temperature.    Rectal or forehead: 100.4 F (38 C) or higher    Armpit: 99 F (37.2 C) or higher  Fever readings for a child age 3 months to 36 months (3 years):     Rectal, forehead, or ear: 102 F (38.9 C) or higher    Armpit: 101 F (38.3 C) or higher  Call the healthcare provider in these cases:     Repeated temperature of 104 F (40 C) or higher in a child of any age    Fever of 100.4  F (38  C) or higher in baby younger than 3 months    Fever that lasts  more than 24 hours in a child under age 2    Fever that lasts for 3 days in a child age 2 or older    [employer] last reviewed this educational content on 4/1/2020 2000-2021 [employer]. All rights reserved. This information is not intended as a substitute for professional medical care. Always follow your healthcare professional's instructions.               MARIA DEL CARMEN Ray, CNP  [facility] Provider    The use of Dragon/[employer] dictation services may have been used to construct the content in this note; any grammatical or spelling errors are non-intentional. Please contact the author of this note directly if you are in need of any clarification.

## 2022-11-24 DIAGNOSIS — F32.1 MAJOR DEPRESSIVE DISORDER, SINGLE EPISODE, MODERATE (H): ICD-10-CM

## 2022-11-27 ENCOUNTER — HEALTH MAINTENANCE LETTER (OUTPATIENT)
Age: 62
End: 2022-11-27

## 2022-11-28 NOTE — TELEPHONE ENCOUNTER
Routing refill request to provider for review/approval because:  Patient needs to be seen because it has been more than 6 months since last office visit. Patient has an appointment with Dr. Henry on 12/1/22.  Patient's PHQ9 score is not current. Weibu message with questionnaire was sent.     Courtney Shelton RN

## 2022-12-01 ENCOUNTER — OFFICE VISIT (OUTPATIENT)
Dept: FAMILY MEDICINE | Facility: CLINIC | Age: 62
End: 2022-12-01
Payer: COMMERCIAL

## 2022-12-01 VITALS
BODY MASS INDEX: 24.55 KG/M2 | HEIGHT: 61 IN | SYSTOLIC BLOOD PRESSURE: 138 MMHG | TEMPERATURE: 97.5 F | OXYGEN SATURATION: 99 % | HEART RATE: 74 BPM | RESPIRATION RATE: 12 BRPM | WEIGHT: 130 LBS | DIASTOLIC BLOOD PRESSURE: 88 MMHG

## 2022-12-01 DIAGNOSIS — H65.191 ACUTE MUCOID OTITIS MEDIA OF RIGHT EAR: Primary | ICD-10-CM

## 2022-12-01 DIAGNOSIS — H72.91 PERFORATION OF TYMPANIC MEMBRANE, RIGHT: ICD-10-CM

## 2022-12-01 DIAGNOSIS — F32.1 MAJOR DEPRESSIVE DISORDER, SINGLE EPISODE, MODERATE (H): ICD-10-CM

## 2022-12-01 PROCEDURE — 99214 OFFICE O/P EST MOD 30 MIN: CPT | Performed by: FAMILY MEDICINE

## 2022-12-01 RX ORDER — DOXYCYCLINE HYCLATE 100 MG
100 TABLET ORAL 2 TIMES DAILY
Qty: 20 TABLET | Refills: 0 | Status: SHIPPED | OUTPATIENT
Start: 2022-12-01 | End: 2023-06-19

## 2022-12-01 RX ORDER — DULOXETIN HYDROCHLORIDE 30 MG/1
CAPSULE, DELAYED RELEASE ORAL
Qty: 134 CAPSULE | Refills: 0 | Status: SHIPPED | OUTPATIENT
Start: 2022-12-01 | End: 2023-03-30

## 2022-12-01 RX ORDER — MELOXICAM 15 MG/1
15 TABLET ORAL DAILY
Qty: 10 TABLET | Refills: 0 | Status: SHIPPED | OUTPATIENT
Start: 2022-12-01 | End: 2023-06-19

## 2022-12-01 ASSESSMENT — PATIENT HEALTH QUESTIONNAIRE - PHQ9
10. IF YOU CHECKED OFF ANY PROBLEMS, HOW DIFFICULT HAVE THESE PROBLEMS MADE IT FOR YOU TO DO YOUR WORK, TAKE CARE OF THINGS AT HOME, OR GET ALONG WITH OTHER PEOPLE: NOT DIFFICULT AT ALL
SUM OF ALL RESPONSES TO PHQ QUESTIONS 1-9: 13
SUM OF ALL RESPONSES TO PHQ QUESTIONS 1-9: 13

## 2022-12-01 ASSESSMENT — PAIN SCALES - GENERAL: PAINLEVEL: EXTREME PAIN (8)

## 2022-12-01 NOTE — PROGRESS NOTES
"  Assessment & Plan     Major depressive disorder, single episode, moderate (H)  Not improving with selective serotonin reuptake inhibitor, will switch to SNRI  Encouraged her to contact us in 4-6 weeks for dose titration   - DULoxetine (CYMBALTA) 30 MG capsule; Take 1 capsule (30 mg) by mouth 2 times daily for 7 days, THEN 2 capsules (60 mg) 2 times daily for 30 days.    Acute mucoid otitis media of right ear  Has perforated TM with mucoid discharge, will have her to switch abx to doxycycline and f/u with ENT in 1 week  - doxycycline hyclate (VIBRA-TABS) 100 MG tablet; Take 1 tablet (100 mg) by mouth 2 times daily  - Adult ENT  Referral; Future  - meloxicam (MOBIC) 15 MG tablet; Take 1 tablet (15 mg) by mouth daily    Perforation of tympanic membrane, right  mentioned above   - doxycycline hyclate (VIBRA-TABS) 100 MG tablet; Take 1 tablet (100 mg) by mouth 2 times daily  - Adult ENT  Referral; Future  - meloxicam (MOBIC) 15 MG tablet; Take 1 tablet (15 mg) by mouth daily           FUTURE APPOINTMENTS:       - Follow-up visit in 6 weeks     No follow-ups on file.    Blu Henry MD  [facility] JERMAN Concepcion is a 62 year old presenting for the following health issues:  No chief complaint on file.      History of Present Illness       Headaches:   Since the patient's last clinic visit, headaches are: worsened  The patient is getting headaches:  Most the time  She is not able to do normal daily activities when she has a migraine.  The patient is taking the following rescue/relief medications:  Tylenol   Patient states \"I get some relief\" from the rescue/relief medications.   The patient is taking the following medications to prevent migraines:  Other  In the past 4 weeks, the patient has gone to an Urgent Care or Emergency Room 1 time times due to headaches.    She eats 2-3 servings of fruits and vegetables daily.She consumes 2 sweetened beverage(s) daily.She " "exercises with enough effort to increase her heart rate 9 or less minutes per day.  She exercises with enough effort to increase her heart rate 3 or less days per week.   She is taking medications regularly.    Today's PHQ-9         PHQ-9 Total Score: 13    PHQ-9 Q9 Thoughts of better off dead/self-harm past 2 weeks :   Not at all    How difficult have these problems made it for you to do your work, take care of things at home, or get along with other people: Not difficult at all       ED/UC Followup:    Facility:  [facility]  Date of visit: 11/19  Reason for visit: ear pain  Current Status: no relief      Review of Systems   Constitutional, HEENT, cardiovascular, pulmonary, gi and gu systems are negative, except as otherwise noted.      Objective    /88   Pulse 74   Temp 97.5  F (36.4  C) (Temporal)   Resp 12   Ht 1.549 m (5' 1\")   Wt 59 kg (130 lb)   LMP 03/24/2009   SpO2 99%   BMI 24.56 kg/m    There is no height or weight on file to calculate BMI.  Physical Exam   GENERAL: healthy, alert and no distress  HENT: normal cephalic/atraumatic, right ear: perforation, purulent drainage in canal and red and boggy canal, nose and mouth without ulcers or lesions, oropharynx clear and oral mucous membranes moist  NECK: no adenopathy, no asymmetry, masses, or scars and thyroid normal to palpation  RESP: lungs clear to auscultation - no rales, rhonchi or wheezes  CV: regular rate and rhythm, normal S1 S2, no S3 or S4, no murmur, click or rub, no peripheral edema and peripheral pulses strong  ABDOMEN: soft, nontender, no hepatosplenomegaly, no masses and bowel sounds normal  MS: no gross musculoskeletal defects noted, no edema                    Answers for HPI/ROS submitted by the patient on 12/1/2022  If you checked off any problems, how difficult have these problems made it for you to do your work, take care of things at home, or get along with other people?: Not difficult at all  PHQ9 TOTAL SCORE: " 13

## 2022-12-23 DIAGNOSIS — F32.1 MAJOR DEPRESSIVE DISORDER, SINGLE EPISODE, MODERATE (H): ICD-10-CM

## 2022-12-23 RX ORDER — DULOXETIN HYDROCHLORIDE 30 MG/1
CAPSULE, DELAYED RELEASE ORAL
Qty: 120 CAPSULE | Refills: 1 | OUTPATIENT
Start: 2022-12-23 | End: 2023-01-29

## 2022-12-23 NOTE — TELEPHONE ENCOUNTER
Please unpend meds and close encounter, patient is now taking fluoxetine     Coreen Lauren/Priscilla-  Lidia Upshurstefano Stephenson

## 2023-01-03 ENCOUNTER — TRANSFERRED RECORDS (OUTPATIENT)
Dept: HEALTH INFORMATION MANAGEMENT | Facility: CLINIC | Age: 63
End: 2023-01-03

## 2023-01-05 ENCOUNTER — TRANSFERRED RECORDS (OUTPATIENT)
Dept: FAMILY MEDICINE | Facility: CLINIC | Age: 63
End: 2023-01-05

## 2023-01-18 DIAGNOSIS — A60.00 RECURRENT GENITAL HERPES: ICD-10-CM

## 2023-01-18 RX ORDER — VALACYCLOVIR HYDROCHLORIDE 500 MG/1
TABLET, FILM COATED ORAL
Qty: 90 TABLET | Refills: 0 | Status: SHIPPED | OUTPATIENT
Start: 2023-01-18 | End: 2023-03-31

## 2023-01-19 DIAGNOSIS — K21.9 GASTROESOPHAGEAL REFLUX DISEASE WITHOUT ESOPHAGITIS: ICD-10-CM

## 2023-01-19 RX ORDER — OMEPRAZOLE 40 MG/1
40 CAPSULE, DELAYED RELEASE ORAL DAILY PRN
Qty: 60 CAPSULE | Refills: 8 | Status: SHIPPED | OUTPATIENT
Start: 2023-01-19 | End: 2023-05-03

## 2023-03-30 DIAGNOSIS — F32.1 MAJOR DEPRESSIVE DISORDER, SINGLE EPISODE, MODERATE (H): ICD-10-CM

## 2023-03-30 DIAGNOSIS — A60.00 RECURRENT GENITAL HERPES: ICD-10-CM

## 2023-03-30 RX ORDER — DULOXETIN HYDROCHLORIDE 60 MG/1
60 CAPSULE, DELAYED RELEASE ORAL 2 TIMES DAILY
Qty: 60 CAPSULE | Refills: 1 | Status: SHIPPED | OUTPATIENT
Start: 2023-03-30 | End: 2023-04-25

## 2023-03-30 RX ORDER — DULOXETIN HYDROCHLORIDE 30 MG/1
CAPSULE, DELAYED RELEASE ORAL
Qty: 120 CAPSULE | Refills: 1 | OUTPATIENT
Start: 2023-03-30 | End: 2023-05-06

## 2023-03-31 RX ORDER — VALACYCLOVIR HYDROCHLORIDE 500 MG/1
TABLET, FILM COATED ORAL
Qty: 90 TABLET | Refills: 0 | Status: SHIPPED | OUTPATIENT
Start: 2023-03-31 | End: 2023-06-12

## 2023-04-24 ENCOUNTER — TELEPHONE (OUTPATIENT)
Dept: FAMILY MEDICINE | Facility: CLINIC | Age: 63
End: 2023-04-24
Payer: COMMERCIAL

## 2023-04-25 DIAGNOSIS — F32.1 MAJOR DEPRESSIVE DISORDER, SINGLE EPISODE, MODERATE (H): Primary | ICD-10-CM

## 2023-04-25 RX ORDER — FLUOXETINE HYDROCHLORIDE 60 MG/1
60 TABLET, FILM COATED ORAL; ORAL DAILY
Qty: 90 TABLET | Refills: 1 | Status: SHIPPED | OUTPATIENT
Start: 2023-04-25 | End: 2023-05-03

## 2023-04-25 NOTE — TELEPHONE ENCOUNTER
Patient Returning Call    Reason for call:  PT asked for a refill from [facility] last week. PT has not received it yet.  PT states that she needs Fluxetine and is out. Informed PT that that medication is not longer avaible.  Changed it to Duloxetine, PT states that she  Is not able to take this due to side effects.   Please call PT to talk about this issues - thank you     Information relayed to patient:    PT will need to get a refill     Patient has additional questions:  No      Could we send this information to you in [employer] or would you prefer to receive a phone call?:   Patient would prefer a phone call   Okay to leave a detailed message?: Yes at Cell number on file:    Telephone Information:   Mobile [phone]

## 2023-04-25 NOTE — TELEPHONE ENCOUNTER
Patient states that she stopped duloxetine due to side effect of dry mouth.    Patient states that she has been on fluoxetine 60 mg once a day since January.    Please order and cancel duloxetine if OK.     Patient is scheduled for follow up in May with Dr. Henry.    Patient is totally out of fluoxetine.    Yady Hu RN

## 2023-04-27 NOTE — TELEPHONE ENCOUNTER
Refill request already entered.     Will close this encounter.       Ariana Lakhani RN  [facility]

## 2023-05-03 ENCOUNTER — OFFICE VISIT (OUTPATIENT)
Dept: FAMILY MEDICINE | Facility: CLINIC | Age: 63
End: 2023-05-03
Payer: COMMERCIAL

## 2023-05-03 VITALS
SYSTOLIC BLOOD PRESSURE: 142 MMHG | HEIGHT: 59 IN | HEART RATE: 67 BPM | TEMPERATURE: 98.1 F | OXYGEN SATURATION: 98 % | RESPIRATION RATE: 12 BRPM | BODY MASS INDEX: 25.48 KG/M2 | WEIGHT: 126.38 LBS | DIASTOLIC BLOOD PRESSURE: 72 MMHG

## 2023-05-03 DIAGNOSIS — K21.9 GASTROESOPHAGEAL REFLUX DISEASE WITHOUT ESOPHAGITIS: ICD-10-CM

## 2023-05-03 DIAGNOSIS — M47.22 OSTEOARTHRITIS OF SPINE WITH RADICULOPATHY, CERVICAL REGION: ICD-10-CM

## 2023-05-03 DIAGNOSIS — R03.0 ELEVATED BLOOD PRESSURE READING WITHOUT DIAGNOSIS OF HYPERTENSION: ICD-10-CM

## 2023-05-03 DIAGNOSIS — E78.5 HYPERLIPIDEMIA LDL GOAL <130: ICD-10-CM

## 2023-05-03 DIAGNOSIS — M50.00 HERNIATION OF INTERVERTEBRAL DISC OF CERVICAL SPINE WITH MYELOPATHY: ICD-10-CM

## 2023-05-03 DIAGNOSIS — R73.01 IFG (IMPAIRED FASTING GLUCOSE): Primary | ICD-10-CM

## 2023-05-03 DIAGNOSIS — F32.1 MAJOR DEPRESSIVE DISORDER, SINGLE EPISODE, MODERATE (H): ICD-10-CM

## 2023-05-03 LAB
ALBUMIN SERPL BCG-MCNC: 4.5 G/DL (ref 3.5–5.2)
ALP SERPL-CCNC: 132 U/L (ref 35–104)
ALT SERPL W P-5'-P-CCNC: 26 U/L (ref 10–35)
ANION GAP SERPL CALCULATED.3IONS-SCNC: 12 MMOL/L (ref 7–15)
AST SERPL W P-5'-P-CCNC: 26 U/L (ref 10–35)
BILIRUB SERPL-MCNC: 0.4 MG/DL
BUN SERPL-MCNC: 19.3 MG/DL (ref 8–23)
CALCIUM SERPL-MCNC: 9.7 MG/DL (ref 8.8–10.2)
CHLORIDE SERPL-SCNC: 107 MMOL/L (ref 98–107)
CHOLEST SERPL-MCNC: 264 MG/DL
CREAT SERPL-MCNC: 0.58 MG/DL (ref 0.51–0.95)
DEPRECATED HCO3 PLAS-SCNC: 22 MMOL/L (ref 22–29)
GFR SERPL CREATININE-BSD FRML MDRD: >90 ML/MIN/1.73M2
GLUCOSE SERPL-MCNC: 95 MG/DL (ref 70–99)
HBA1C MFR BLD: 5.9 % (ref 0–5.6)
HDLC SERPL-MCNC: 68 MG/DL
LDLC SERPL CALC-MCNC: 163 MG/DL
NONHDLC SERPL-MCNC: 196 MG/DL
POTASSIUM SERPL-SCNC: 4.3 MMOL/L (ref 3.4–5.3)
PROT SERPL-MCNC: 7.2 G/DL (ref 6.4–8.3)
SODIUM SERPL-SCNC: 141 MMOL/L (ref 136–145)
TRIGL SERPL-MCNC: 164 MG/DL

## 2023-05-03 PROCEDURE — 36415 COLL VENOUS BLD VENIPUNCTURE: CPT | Performed by: FAMILY MEDICINE

## 2023-05-03 PROCEDURE — 83036 HEMOGLOBIN GLYCOSYLATED A1C: CPT | Performed by: FAMILY MEDICINE

## 2023-05-03 PROCEDURE — 80053 COMPREHEN METABOLIC PANEL: CPT | Performed by: FAMILY MEDICINE

## 2023-05-03 PROCEDURE — 80061 LIPID PANEL: CPT | Performed by: FAMILY MEDICINE

## 2023-05-03 PROCEDURE — 96127 BRIEF EMOTIONAL/BEHAV ASSMT: CPT | Performed by: FAMILY MEDICINE

## 2023-05-03 PROCEDURE — 99214 OFFICE O/P EST MOD 30 MIN: CPT | Performed by: FAMILY MEDICINE

## 2023-05-03 RX ORDER — FLUOXETINE HYDROCHLORIDE 60 MG/1
60 TABLET, FILM COATED ORAL; ORAL DAILY
Qty: 90 TABLET | Refills: 1 | Status: SHIPPED | OUTPATIENT
Start: 2023-05-03 | End: 2023-06-19

## 2023-05-03 RX ORDER — OMEPRAZOLE 40 MG/1
40 CAPSULE, DELAYED RELEASE ORAL DAILY PRN
Qty: 60 CAPSULE | Refills: 8 | Status: SHIPPED | OUTPATIENT
Start: 2023-05-03 | End: 2024-05-16

## 2023-05-03 RX ORDER — ROSUVASTATIN CALCIUM 40 MG/1
40 TABLET, COATED ORAL DAILY
Qty: 90 TABLET | Refills: 1 | Status: SHIPPED | OUTPATIENT
Start: 2023-05-03 | End: 2023-06-19

## 2023-05-03 ASSESSMENT — PAIN SCALES - GENERAL: PAINLEVEL: EXTREME PAIN (8)

## 2023-05-03 NOTE — PROGRESS NOTES
"  Assessment & Plan     Gastroesophageal reflux disease without esophagitis  Stable  Keep monitoring   - omeprazole (PRILOSEC) 40 MG DR capsule; Take 1 capsule (40 mg) by mouth daily as needed (GERD)    IFG (impaired fasting glucose)  Hs been fluctuating, will review the lab and update pt   - Hemoglobin A1c; Future  - Lipid panel reflex to direct LDL Fasting; Future  - Comprehensive metabolic panel (BMP + Alb, Alk Phos, ALT, AST, Total. Bili, TP); Future    Hyperlipidemia LDL goal <130  Stable  Keep monitoring   - Hemoglobin A1c; Future  - Lipid panel reflex to direct LDL Fasting; Future  - Comprehensive metabolic panel (BMP + Alb, Alk Phos, ALT, AST, Total. Bili, TP); Future    Elevated blood pressure reading without diagnosis of hypertension  Stable   - Hemoglobin A1c; Future  - Lipid panel reflex to direct LDL Fasting; Future  - Comprehensive metabolic panel (BMP + Alb, Alk Phos, ALT, AST, Total. 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    Weight as of this encounter: 57.3 kg (126 lb 6 oz).   Weight management plan: Discussed healthy diet and exercise guidelines    FUTURE APPOINTMENTS:       - Follow-up visit in 6 months     Blu Henry MD  [facility] JERMAN Concepcion is a 62 year old, presenting for the following health issues:  RECHECK (Follow up on neck pain. Headache " "continues and not much progress./Pt. Is fasting and would like cholesterol level checked./Last eye exam 2022.)        5/3/2023     9:19 AM   Additional Questions   Roomed by Gemma VIRK     History of Present Illness       Reason for visit:  Neck paint    She eats 2-3 servings of fruits and vegetables daily.She consumes 2 sweetened beverage(s) daily.She exercises with enough effort to increase her heart rate 30 to 60 minutes per day.  She exercises with enough effort to increase her heart rate 5 days per week.   She is taking medications regularly.    Today's PHQ-9         PHQ-9 Total Score: 1    PHQ-9 Q9 Thoughts of better off dead/self-harm past 2 weeks :   Not at all    How difficult have these problems made it for you to do your work, take care of things at home, or get along with other people: Not difficult at all     Neck pain recheck    Review of Systems   Constitutional, HEENT, cardiovascular, pulmonary, GI, , musculoskeletal, neuro, skin, endocrine and psych systems are negative, except as otherwise noted.      Objective    BP (!) 142/72 (BP Location: Right arm, Patient Position: Sitting, Cuff Size: Adult Regular)   Pulse 67   Temp 98.1  F (36.7  C) (Temporal)   Resp 12   Ht 1.499 m (4' 11\")   Wt 57.3 kg (126 lb 6 oz)   LMP 03/24/2009   SpO2 98%   BMI 25.52 kg/m    Body mass index is 25.52 kg/m .  Physical Exam   GENERAL: healthy, alert and no distress  EYES: Eyes grossly normal to inspection, PERRL and conjunctivae and sclerae normal  HENT: ear canals and TM's normal, nose and mouth without ulcers or lesions  NECK: no adenopathy, no asymmetry, masses, or scars and thyroid normal to palpation  RESP: lungs clear to auscultation - no rales, rhonchi or wheezes  CV: regular rate and rhythm, normal S1 S2, no S3 or S4, no murmur, click or rub, no peripheral edema and peripheral pulses strong  ABDOMEN: soft, nontender, no hepatosplenomegaly, no masses and bowel sounds normal  MS: no gross musculoskeletal " defects noted, no edema  SKIN: no suspicious lesions or rashes  NEURO: Normal strength and tone, mentation intact and speech normal                Answers for HPI/ROS submitted by the patient on 5/3/2023  If you checked off any problems, how difficult have these problems made it for you to do your work, take care of things at home, or get along with other people?: Not difficult at all  PHQ9 TOTAL SCORE: 1  What is the reason for your visit today? : neck paint  How many servings of fruits and vegetables do you eat daily?: 2-3  On average, how many sweetened beverages do you drink each day (Examples: soda, juice, sweet tea, etc.  Do NOT count diet or artificially sweetened beverages)?: 2  How many minutes a day do you exercise enough to make your heart beat faster?: 30 to 60  How many days a week do you exercise enough to make your heart beat faster?: 5  How many days per week do you miss taking your medication?: 0

## 2023-05-10 ENCOUNTER — OFFICE VISIT (OUTPATIENT)
Dept: PALLIATIVE MEDICINE | Facility: CLINIC | Age: 63
End: 2023-05-10
Attending: FAMILY MEDICINE
Payer: COMMERCIAL

## 2023-05-10 ENCOUNTER — TELEPHONE (OUTPATIENT)
Dept: FAMILY MEDICINE | Facility: CLINIC | Age: 63
End: 2023-05-10

## 2023-05-10 VITALS — SYSTOLIC BLOOD PRESSURE: 158 MMHG | HEART RATE: 54 BPM | DIASTOLIC BLOOD PRESSURE: 98 MMHG

## 2023-05-10 DIAGNOSIS — M47.812 CERVICAL SPONDYLOSIS WITHOUT MYELOPATHY: ICD-10-CM

## 2023-05-10 DIAGNOSIS — M50.00 HERNIATION OF INTERVERTEBRAL DISC OF CERVICAL SPINE WITH MYELOPATHY: ICD-10-CM

## 2023-05-10 DIAGNOSIS — M79.18 MYOFASCIAL MUSCLE PAIN: Primary | ICD-10-CM

## 2023-05-10 DIAGNOSIS — F32.1 MAJOR DEPRESSIVE DISORDER, SINGLE EPISODE, MODERATE (H): Primary | ICD-10-CM

## 2023-05-10 DIAGNOSIS — M47.22 OSTEOARTHRITIS OF SPINE WITH RADICULOPATHY, CERVICAL REGION: ICD-10-CM

## 2023-05-10 PROCEDURE — 99203 OFFICE O/P NEW LOW 30 MIN: CPT | Performed by: PAIN MEDICINE

## 2023-05-10 RX ORDER — BACLOFEN 10 MG/1
5-10 TABLET ORAL 2 TIMES DAILY PRN
Qty: 60 TABLET | Refills: 1 | Status: SHIPPED | OUTPATIENT
Start: 2023-05-10 | End: 2023-06-05

## 2023-05-10 RX ORDER — FLUOXETINE HYDROCHLORIDE 60 MG/1
60 TABLET, FILM COATED ORAL; ORAL DAILY
Qty: 90 TABLET | Refills: 1 | OUTPATIENT
Start: 2023-05-10

## 2023-05-10 RX ORDER — FLUOXETINE 40 MG/1
40 CAPSULE ORAL DAILY
Qty: 90 CAPSULE | Refills: 0 | Status: SHIPPED | OUTPATIENT
Start: 2023-05-10 | End: 2023-09-06

## 2023-05-10 ASSESSMENT — PAIN SCALES - GENERAL: PAINLEVEL: EXTREME PAIN (8)

## 2023-05-10 NOTE — PATIENT INSTRUCTIONS
- Further procedures recommended:    - order placed C3,4,5 MEDIAL BRANCH BLOCK/RFA   - Medication Management:    -start low dose baclofen 5-10mg twice a day as needed. Start with PM dose for 1 week before adding am dose as tolerated   - Physical Therapy: continue home regimen     - Follow up: for procedure

## 2023-05-10 NOTE — PROGRESS NOTES
[facility]    Date of visit: 5/10/2023    Primary Care Provider: Dr. Physician No Ref-Primary    Reason for consultation:    Fredy Parker is a 62 year old female who is seen in consultation today at the request of her primary care physician, No Ref-Primary, Physician .       Chief Complaint:    Neck pain    Pain history:  Fredy Parker is a 62 year old female with a PMH significant for chronic with acute on chronic neck pain  Last 4-6 months have been worse  Denies any inciting event   The pain is mainly axial   L>R  Worse top half of the next vs lower half  The pain radiates into her shoulder  The pt denies any radiation down into her hands   Pain varies in severity and nature  The pain is sharp   The pain is stabbing  The pain is different then her prior neck pain   + tingling   Neg burning numbness  \worse with ext  Benefit with rest  Benefit with apap  benefitg with heat  Denies overt weakness  Pain affects sleep  Pain affects quality of life   Denies red flags including: bowel or bladder symptoms, fever, chills, saddle anesthesia, profound motor loss, history of cancer, history of immune compromise, weight loss.   Has been doing PHYSICAL THERAPY  Has been doing her daily regimen   Has been doing her acupuncture mild benefit   Impact of Pain: There is significant limitation     Current medication treatments include:  Doesn't help      Previous medication treatments included:  Doesn't know whyt she stopped the gabapentin   Not sure why she stopped mobic    Other treatments have included:  Fredy Parker has been seen at a pain clinic in the past.      PT: yes      Interventional:  Acupuncture/chiro: y  TENs Unit: n  Injections: cervical epidural mild benefit c7-T1      Past Medical History:  Past Medical History:   Diagnosis Date     ASCUS with positive high risk HPV 09/2014    colposcopy benign     Chronic gastric ulcer without mention of hemorrhage, perforation, without mention of  obstruction 1994    gastric erosion dx'ed by endoscopy. pt was also treated for pos H.pylori     Disc disorder of cervical region 2006    mild disc changes C3-4/C4-5 and C5-6 with chronic myofascial cervicoscapular pain     Elevated transaminase level 2012    AST/ALT 2-3 x normal; fatty infiltration liver on ultrasound; negative testing for infectious hepatitis; normal iron levels, ELP, CK     Latent tuberculosis 10/2009    started INH      LSIL (low grade squamous intraepithelial lesion) on Pap smear     HPV 56 (high risk) detected 2010     Major depressive disorder, single episode, moderate (H) 2006     Mixed hyperlipidemia 2009    elevated TG and LDL     Osteopenia 2009    mild, femoral neck     Recurrent genital herpes 2010     Status post laminectomy 2007/2012 12/2007: L5-S1 diskectomy 2012:Revision diskectomy left at L5-S1     Past Surgical History:  Past Surgical History:   Procedure Laterality Date     COLONOSCOPY N/A 07/15/2019    Procedure: COLONOSCOPY, WITH POLYPECTOMY AND BIOPSY;  Surgeon: Danyell Peters MD;  Location:  GI     CONIZATION LEEP  02/2011    Completed for recurrent Mild Dysplasia and chronic endocervicitis     DISKECTOMY, LUMBAR, SINGLE SP  12/2007    L5-S1 diskectomy     HC INJ TRANSFORAMIN EPIDURAL, CERV/THOR SINGLE  05/2006    C7-T1 epidural steroid     INJECTION, ANESTHETIC/STEROID, TRANSFORAMINAL EPIDURAL; LUMBAR/SACRAL, SINGLE LEVEL  8,9/2007    left L5 selective nerve root injection under fluoroscopy with corticosteroid.      INJECTION, ANESTHETIC/STEROID, TRANSFORAMINAL EPIDURAL; LUMBAR/SACRAL, SINGLE LEVEL  10/2007    right L5-S1 translaminar epidural injection     LAMINECTOMY LUMBAR ONE LEVEL  03/06/2012    Procedure:LAMINECTOMY LUMBAR ONE LEVEL; REVISION DISCECTOMY L5-S1 ON THE LEFT ; Surgeon:ROSE PRITCHETT; Location: OR     LAPAROSCOPIC CHOLECYSTECTOMY  07/1999     MR LUMBAR SPINE W/O CONTRAST  01/2012    Moderate-sized left posterolateral caudally  extruded L5-S1disc herniation impinging on left S1 nerve, mild degenerative disc changes at L4-5     RELEASE TRIGGER FINGER  05/17/2012    Procedure:RELEASE TRIGGER FINGER; right ring finger trigger release  (latex allergy:contact); Surgeon:QUYEN FUNK; Location:[facility]     SONO ABDOMEN LIMITED  09/2012    Increased echotexture liver c/w fatty infiltration     TUBAL LIGATION  2000     Medications:  Current Outpatient Medications   Medication Sig Dispense Refill     CALTRATE 600+D PLUS 600-400 MG-UNIT OR TABS 1 TABLET TWICE DAILY  3 MONTHS PRN     CENTRUM OR TABS 1 TABLET DAILY 30 0     cetirizine (ZYRTEC) 10 MG tablet Take 1 tablet (10 mg) by mouth daily 90 tablet 3     doxycycline hyclate (VIBRA-TABS) 100 MG tablet Take 1 tablet (100 mg) by mouth 2 times daily 20 tablet 0     estradiol (ESTRACE VAGINAL) 0.1 MG/GM vaginal cream Place 0.5 g vaginally twice a week Place 0.5mg vaginally every night for a week, than twice weekly. 42.5 g 6     FLUoxetine HCl 60 MG TABS Take 60 mg by mouth daily 90 tablet 1     gabapentin (NEURONTIN) 300 MG capsule Take 300mg (one capsule) 1-2 times daily 180 capsule 1     hydrocortisone (ANUSOL-HC) 2.5 % cream Place rectally daily as needed for hemorrhoids 30 g 0     meloxicam (MOBIC) 15 MG tablet Take 1 tablet (15 mg) by mouth daily 10 tablet 0     omeprazole (PRILOSEC) 40 MG DR capsule Take 1 capsule (40 mg) by mouth daily as needed (GERD) 60 capsule 8     rosuvastatin (CRESTOR) 40 MG tablet Take 1 tablet (40 mg) by mouth daily 90 tablet 1     valACYclovir (VALTREX) 500 MG tablet TAKE 1 TABLET BY MOUTH EVERY DAY 90 tablet 0     VITAMIN D 1000 UNIT OR CAPS 2 CAPSULES DAILY 180 Cap PRN     Allergies:     Allergies   Allergen Reactions     Aspirin Rash     Latex      Nsaids GI Disturbance     Sulfasalazine Rash       Social History:      Chemical dependency: The patient denies any history of treatment for alcohol or drug abuse.n    Family history:  Family History   Problem Relation Age of  Onset     Hypertension Mother      Lipids Mother      Hypertension Father      Lipids Father      Lipids Sister              Diagnostic Tests:  C2-C3: Shallow posterior disc bulge without gross disc herniation. No  mass effect on the cord. Normal facets. No spinal or neural foraminal  stenosis.     C3-C4: There is a disc bulge slightly eccentric to the left with a  superimposed small central disc protrusion. Left more than right  uncovertebral arthropathy and minimal facet arthropathy. There is mild  to moderate indentation of the ventral aspect of the cord without cord  signal change. Overall moderate degree of spinal stenosis. No  significant neural foraminal narrowing.      C4-C5: Small diffuse disc bulge with superimposed tiny central disc  protrusion, bilateral uncovertebral and facet arthropathy. There is  moderate degree of mass effect on the cord without cord signal change.  Overall moderate spinal stenosis. Mild right more than left neural  foraminal narrowing.     C5-C6: Diffuse disc bulge with bilateral uncovertebral and facet  arthropathy. Mild mass effect on the ventral cord without cord signal  change. Overall mild spinal stenosis. Moderate bilateral neural  foraminal stenosis.     C6-C7: Small diffuse disc bulge with bilateral uncovertebral and facet  arthropathy. No significant spinal stenosis. No mass effect on the  cord or cord signal change. Moderate right and mild left neural  foraminal stenosis.     C7-T1: No significant disc bulge or herniation. No significant spinal  or neural foraminal stenosis.                                                                      IMPRESSION: Multilevel degenerative disc disease and uncovertebral and  facet arthropathy of the cervical spine, as detailed. The most  significant spinal stenosis is seen at the C3-C4 and C4-C5 levels,  moderate in degree. The most significant neural foraminal narrowing  appears to be at C5-C6 bilaterally and on the right at C6-C7.  Please  see the body of the report for additional details and level-by-level  findings.               Physical Exam:  Vitals:    05/10/23 0902   BP: (!) 158/98   Pulse: 54     Exam:  Constitutional: healthy, alert and no distress    Respiratory: speaking in full sentences. No accessory muscles       Psychiatric: mentation appears normal and affect normal/bright    Musculoskeletal exam:  Gait/Station/Posture: wnl  Cervical spine: decreased  Neg spurling  + reproduction with ext and rot  Thoracic spine:  Normal       Neurologic exam    Reflexes:     Biceps:     R:  2/4 L: 2/4   Brachioradialis   R:  2/4 L: 2/4     Sensory:  (upper and lower extremities):   Light touch: normal    Allodynia: absent    Hyperalgesia: absent         Assessment:  Fredy Parker is a 62 year old female with a past medical history significant for neck pain presenting with axial neck pain L>R occasionally radiating to her upper shoulders            Plan:  Diagnosis reviewed, treatment options addressed, and risks/benefits discussed. The patient was involved in shared decision making regarding the plan as laid out below.    - Further procedures recommended:    - order placed C3,4,5 MEDIAL BRANCH BLOCK/RFA   - Medication Management:    -start low dose baclofen 5-10mg twice a day as needed. Start with PM dose for 1 week before adding am dose as tolerated   - Physical Therapy: continue home regimen     - Follow up: for procedure             The total TIME spent on this patient on the day of the appointment was 30 minutes.   Time spent preparing to see the patient (reviewing records and tests)  Time spend face to face with the patient  Time spent ordering tests, medications, procedures and referrals  Time spent Referring and communicating with other healthcare professionals  Documenting clinical information in Epic  Alok Carlson MD.  Medical Spine Specialist  [facility]

## 2023-05-10 NOTE — NURSING NOTE
Patient presents to the clinic today for a follow up with Alok Carlson MD  regarding Pain Management.                        Neyda Charles MA  Pain management, [place]

## 2023-05-10 NOTE — TELEPHONE ENCOUNTER
Please see reason for call, unable to find 60 mg capsule to pend. Pharmacy pended.    Medication Request 05/10/2023 FLUoxetine HCl 60 MG TABS - can you please send FLUoxetine HCl 60 MG capsules? TABS ARE TO EXPENSIVE FOR THE PT     Claudia BASILIO RN  [facility]

## 2023-05-10 NOTE — TELEPHONE ENCOUNTER
There is no 60mg capsule, should be 40mg and 20mg capsules combo. I will send both prescription to pharmacy      x

## 2023-05-12 ENCOUNTER — TELEPHONE (OUTPATIENT)
Dept: PALLIATIVE MEDICINE | Facility: CLINIC | Age: 63
End: 2023-05-12
Payer: COMMERCIAL

## 2023-05-12 NOTE — TELEPHONE ENCOUNTER
Screening questions for MBB Injections:    Injection to be done at which interventional clinic site? [facility]    Procedure ordered by      Procedure ordered? Cervical Medial Branch Block    What insurance would patient like us to bill for this procedure? [facility]       MEDICA: REQUIRES A PA FOR BOTH MBB     Worker's comp- Any injection DO NOT SCHEDULE and route to Nohemi Moura.      [employer] insurance - If scheduling an SI joint injection DO NOT SCHEDULE and route to Kassandra Boston.       MBBs must be scheduled with elapsed time interval of at least 2 weeks and not more than 6 months between the First MBB and the Second MBB for insurance purposes       [employer] - Any injection besides hip/shoulder/knee joint DO NOT SCHEDULE and route to Kassandra Boston. She will obtain PA and call pt back to schedule procedure or notify pt of denial.       [employer]- PA required for all MBB's      **BCBS- ALL need to be routed to Kassandra for review if a PA is needed**    IF SCHEDULING IN [facility] OR [facility] PLEASE SCHEDULE AT LEAST 7-10         BUSINESS DAYS OUT SO A PA CAN BE OBTAINED      Genicular Nerve blocks- ALL insurances except for- [employer], Medicare (straight not supplement) and [employer]. Need to be reviewed by Kassandra before scheduling.       Is patient scheduled at [facility]?    If YES, route every encounter [facility] CARE NAVIGATION POOL [[phone]]    Any chance of pregnancy? Not Applicable   If YES, do NOT schedule and route to RN pool  - Dr. Hall route to Tasha Reid and PM&R Nurse  [[id number]]      Is an  needed? No     Patient has a drive home? (mandatory) Yes     Is patient taking any blood thinners (plavix, coumadin, jantoven, warfarin, heparin, pradaxa or dabigatran )? No    If hold needed, do NOT schedule, route to RN pool/ Dr. Hall's Team     Is patient taking any aspirin products? No     If more than 325mg/day, OK to schedule; Instruct pt to decrease  to less than 325 mg for 7 days AND route to RN pool/ Dr. Hall's Team    For CERVICAL procedures, hold all aspirin products for 6 days.     Tell pt that if aspirin product is not held for 6 days, the procedure WILL BE cancelled.      Does the patient have a bleeding or clotting disorder? No    If YES, okay to schedule AND route to RN nurse pool/ Dr. Hall's Team    **For any patients with platelet count <100, must be forwarded to provider**    Is patient diabetic? NO If YES, have them bring their glucometer.    Does patient have an active infection or treated for one within the past week? No    Is patient currently taking any antibiotics?  No    For patients on chronic, preventative, or prophylactic antibiotics, procedures may be scheduled.     For patients on antibiotics for active or recent infection:antibiotic course must have been completed for 4 days    Is patient currently taking any steroid medications? (i.e. Prednisone, Medrol)  No     For patients on steroid medications, course must have been completed for 4 days    Is patient actively being treated for cancer or immunocompromised? No   If YES, do NOT schedule and route to RN/ Dr. Hall's Team    Are you able to get on and off an exam table with minimal or no assistance? Yes   If NO, do NOT schedule and route to RN/ Dr. Hall's Team    Are you able to roll over and lay on your stomach with minimal or no assistance? Yes   If NO, do NOT schedule and route to RN/ Dr. Hall's Team    Any allergies to contrast dye, iodine, shellfish, or numbing and steroid medications? No  (If so, inform nursing and note in scheduling comments.)    Allergies: Aspirin, Latex, Nsaids, and Sulfasalazine     Does patient have an MRI/CT?  YES: 2023  Check Procedure Scheduling Grid to see if required.      Was the MRI done within the last 3 years?  Yes    If yes, where was the MRI done i.e.[facility], [facility], [facility], [facility] etc? [facility]       If no, do not schedule  and route to nursing/ Dr. Hall's Team    If MRI was not done at [facility], [facility] or [facility] do NOT schedule and route to nursing.      If pt has an imaging disc, the injection MAY be scheduled but pt has to bring disc to appt.     If they show up without the disc the injection cannot be done    Is patient able to transfer to a procedure table with minimal or no assistance? Yes  If NO, do NOT schedule and route to RN/ Dr. Hall's Team    Medial Branch Block Pre-Procedure Instructions    It is okay to take long acting pain medications (if you are on them) the day of the procedure but try not to take any short acting medications unless absolutely necessary.    YES: Informed    Long acting meds would include: Gabapentin (Neurontin), MS Contin, Oxycontin        Short acting meds would include:  Percocet, Oxycodone, Vicodin, Ibuprofen     The day of the procedure, you should try to do things that provoke your pain, since the injection is being done to see if it will relieve your pain . YES: Informed      If your pain level is a 4 out of 10 or less on the day of the procedu re, please call [phone] to reschedule.  YES: Informed      Reminders:      If you are started on any steroids or antibiotics between now and your appointment, you must contact us because it may affect our ability to perform your procedure       Instructed pt to arrive 30 minutes early for IV start if required. (Check Procedure Scheduling Grid) INot Applicable       If this is for a cervical MBB aspirin needs to be held for 6 days.  Not Applicable       Do not schedule procedures requiring IV placement in the first appointment of the day or first appointment after lunch. Do NOT schedule at 0745, 0815 or 1245.        For patients 85 or older we recommend having an adult stay w/ them for the remainder of the day.      Does the patient have any questions?

## 2023-05-24 ENCOUNTER — RADIOLOGY INJECTION OFFICE VISIT (OUTPATIENT)
Dept: GENERAL RADIOLOGY | Facility: CLINIC | Age: 63
End: 2023-05-24
Attending: PAIN MEDICINE
Payer: COMMERCIAL

## 2023-05-24 VITALS — SYSTOLIC BLOOD PRESSURE: 168 MMHG | HEART RATE: 90 BPM | OXYGEN SATURATION: 97 % | DIASTOLIC BLOOD PRESSURE: 88 MMHG

## 2023-05-24 DIAGNOSIS — M47.812 CERVICAL SPONDYLOSIS WITHOUT MYELOPATHY: ICD-10-CM

## 2023-05-24 DIAGNOSIS — M47.813 SPONDYLOSIS OF CERVICOTHORACIC REGION W/O MYELOPATHY OR RADICULOPATHY: ICD-10-CM

## 2023-05-24 PROCEDURE — 64491 INJ PARAVERT F JNT C/T 2 LEV: CPT | Mod: 50 | Performed by: PAIN MEDICINE

## 2023-05-24 PROCEDURE — 64490 INJ PARAVERT F JNT C/T 1 LEV: CPT | Mod: GA,50

## 2023-05-24 PROCEDURE — 64490 INJ PARAVERT F JNT C/T 1 LEV: CPT | Mod: 50 | Performed by: PAIN MEDICINE

## 2023-05-24 PROCEDURE — 99207 PR NO CHARGE LOS: CPT | Performed by: PAIN MEDICINE

## 2023-05-24 PROCEDURE — 64492 INJ PARAVERT F JNT C/T 3 LEV: CPT | Mod: GA,50

## 2023-05-24 ASSESSMENT — PAIN SCALES - GENERAL
PAINLEVEL: EXTREME PAIN (8)
PAINLEVEL: NO PAIN (1)

## 2023-05-24 NOTE — NURSING NOTE
Pre-procedure Intake  If YES to any questions or NO to having a   Please complete laminated checklist and leave on the computer keyboard for Provider, verbally inform provider if able.    For SCS Trial, RFA's or any sedation procedure:  Have you been fasting? No (pt states last meal was at noon today)    If yes, for how long?     Are you taking any any blood thinners such as Coumadin, Warfarin, Jantoven, Pradaxa Xarelto, Eliquis, Edoxaban, Enoxaparin, Lovenox, Heparin, Arixtra, Fondaparinux, or Fragmin? OR Antiplatelet medication such as Plavix, Brilinta, or Effient?   No     If yes, when did you take your last dose?     Do you take aspirin?  No    If cervical procedure, have you held aspirin for 6 days?   NA    Do you have any allergies to contrast dye, iodine, steroid and/or numbing medications?  NO    Are you currently taking antibiotics or have an active infection?  NO    Have you had a fever/elevated temperature within the past week? NO    Are you currently taking oral steroids? NO    Do you have a ? Yes    Are you pregnant or breastfeeding?  NO    Have you received the COVID-19 vaccine? Yes    If yes, was it your 1st, 2nd or only dose needed?     Date of most recent vaccine: 10/4/2022    Notify provider and RNs if systolic BP >170, diastolic BP >100, P >100 or O2 sats < 90%

## 2023-05-24 NOTE — NURSING NOTE
Discharge Information    IV Discontiued Time:  NA    Amount of Fluid Infused:  NA    Discharge Criteria = When patient returns to baseline or as per MD order    Consciousness:  Pt is fully awake    Circulation:  BP +/- 20% of pre-procedure level    Respiration:  Patient is able to breathe deeply    O2 Sat:  Patient is able to maintain O2 Sat >92% on room air    Activity:  Moves 4 extremities on command    Ambulation:  Patient is able to stand and walk or stand and pivot into wheelchair    Dressing:  Clean/dry or No Dressing    Notes:   Discharge instructions and AVS given to patient    Patient meets criteria for discharge?  YES    Admitted to PCU?  No    Responsible adult present to accompany patient home?  Yes    Signature/Title:    Helena Pizano RN  RN Care Coordinator  [facility] [place]

## 2023-05-24 NOTE — PATIENT INSTRUCTIONS
[facility]   Medial Branch Block Discharge Instructions      Your procedure was performed by: Dr. Alok Carlosn         You will need to complete the Pain Scale Log form and return it to us as soon as possible.  Once we have received the form, we will review it and call you to determine the next steps.     The form can be faxed to [phone] or mailed to:   [facility]   [id number]   [place] [address]    You may resume your regular medications  If you were holding your blood thinning medication, please restart taking it: N/A  You may resume your regular activities  Be cautious with walking as numbness and/or weakness in the lower extremities may occur for up to 6-8 hours due to the effects of the anesthetic.  Avoid driving for 6 hours. The local anesthetic could slow your reflexes.   You may shower, however no swimming or tub baths or hot tubs for 24 hours following your procedure.  Your pain will return after the numbing medications have worn off.  You may use your current pain medications as needed.  Unless you have been directed to avoid the use of anti-inflammatory medications (NSAIDS), you may use medications such as ibuprofen, Aleve or Tylenol for pain control if needed.  Some people find it helpful to alternate ibuprofen and Tylenol every 3 hours for a couple of days.  You may use ice packs 10-15 minutes three to four times a day at the injection site for comfort.   Do not use heat to painful areas for 6 to 8 hours. This will give the local anesthetic time to wear off and prevent you from accidentally burning your skin.   If you experience any of the following, call the Pain Clinic during work hours (Monday through Friday 8 am-4:30 pm) at [phone] or the Provider Line after hours at [phone]:  -Fever over 100 degree F  -Swelling, bleeding, redness, drainage, warmth at the injection site  -Progressive weakness or numbness in your  legs or arms  -If lumbar, call if you have a loss of bowel or bladder function  -If cervical, call if you have any unusual headache that is not relieved by Tylenol  -Unusual new onset of pain that is not improving

## 2023-05-25 NOTE — PROGRESS NOTES
Pre procedure Diagnosis: Cervical spondylosis   Post procedure Diagnosis: Same  Procedure performed: cervical medial branch block bilateral c3,45  Anesthesia: none  Complications: none  Operators: Alok Carlson MD    Indications:   Fredy Parker is a 62 year old female. The patient has a history of axial neck apin.  Exam shows pain with extension/rotation  and they have tried conservative treatment including meds/pt.    Options/alternatives, benefits and risks were discussed with the patient including but not limited to bleeding, infection, tissue trauma, exposure to radiation, reaction to medications, spinal cord injury, weakness, numbness and paralysis.  Questions were answered to her satisfaction and she wishes to proceed. Voluntary informed consent was obtained and signed.     Vitals were reviewed: Yes=  Allergies were reviewed:  Yes   Medications were reviewed:  Yes   Pre-procedure pain score: 8/10    Procedure:  After obtaining signed, informed consent, the patient was taken to the procedure room and placed in the prone position on the [facility] frame.  A Pause for the Cause was completed prior to procedure start.  The patient was prepped and draped in the usual sterile fashion.    The areas of interest were identified with fluoroscopy.  The skin and subcutaneous tissue were anesthetized by injecting Lidocaine 1% 1 ml at each injection site utilizing a 27 gauge 1.25 inch needle.  Then,  25 gauge 3.5 inch spinal needles with slightly curved tips were advanced tangential to the medial branch nerves, abutting the articular pillars at C3,4,5 utilizing AP and Lateral fluoroscopic guidance.  Aspiration for blood and CSF was negative at all the levels.      Then, after repeated negative aspiration, each level was injected with 0.5 ml of 0.25% bupivacaine and the needles were restyletted and withdrawn.    The injection sites were cleaned and sterile dressings were applied where indicated.    The patient tolerated  the procedure well without complications and was taken to the recovery area for continued observation.  Fluoroscopic images were saved to PACS.    The patient was re-evaluated following the procedure and they noted decreased pain and improved range of motion.    Post-procedure pain:  1/10    The patient was given a pain diary and instructed to document their pain throughout the day.  The patient was asked to return the pain diary the next day in person or by fax at which time it will be reviewed and the decision made whether or not to proceed.    Follow-up:  Pending results     Alok Carlson MD  [facility]

## 2023-05-30 NOTE — TELEPHONE ENCOUNTER
Reason for call:  Other   Patient called regarding (reason for call): appointment and call back  Additional comments: Patient wants to know if she needs to have a the 2nd CMBB or if she can just ghet schedule for the CRFA. Please clarify.     Phone number to reach patient:  Home number on file [phone] (home)    Best Time:  Any     Can we leave a detailed message on this number?  YES    Jane Jordan      [facility]  Pain Management

## 2023-05-30 NOTE — TELEPHONE ENCOUNTER
Cervical MBB#1 pain data reviewed. Max relief obtained:     At rest:                    88% hr 1, 63% hr 3  Left facet load:        67% hr 1, 55% hr 3  Right facet load:      78% hr 1, 67% hr 3  Normal activity:       88% hr 1-6     MBB to RFA order verified:  Yes  Insurance coverage verified with JOEL Boston: OK to proceed to MBB or RFA:   Please verify/copy in coverage    Do MBB scores fall within criteria to proceed per insurance?    If yes, route to (delete what does not apply):     to schedule MBB#2    OR    begin PA processing for RFA by JOEL Boston

## 2023-05-30 NOTE — TELEPHONE ENCOUNTER
[place] [facility] Policy- No PA required    Guidelines      Diagnostic Facet Joint Procedures (IA or MBB)  The primary indication of a diagnostic facet joint procedure is to diagnose whether the patient has facet syndrome. Intraarticular (IA) facet block(s) are considered reasonable and necessary as a diagnostic test only if medial branch blocks (MMB) cannot be performed due to specific documented anatomic restrictions or there is an indication to proceed with therapeutic intraarticular injections. These restrictions must be clearly documented in the medical record and made available upon request.  Diagnostic procedures should be performed with the intent that if successful, radiofrequency ablation (RFA) procedure would be considered the primary treatment goal at the diagnosed level(s).  A second diagnostic facet procedure is considered medically necessary to confirm validity of the initial diagnostic facet procedure when administered at the same level. The second diagnostic procedure may only be performed a minimum of 2 weeks after the initial diagnostic procedure. Clinical circumstances that necessitate an exception to the two-week duration may be considered on an individual basis and must be clearly documented in the medical record.    For the first diagnostic facet joint procedure:  o For the first diagnostic facet joint procedure to be considered medically reasonable and necessary, the patient must meet the criteria outlined under indications for facet joint interventions.    A second confirmatory diagnostic facet joint procedure is considered medically reasonable and necessary in patients who meet ALL the following criteria:  o The patient meets the criteria for the first diagnostic procedure; AND  o After the first diagnostic facet joint procedure, there must be a consistent positive response of at least 80% relief of primary (index) pain (with the duration of relief being consistent with the agent  used).  Frequency limitation: For each covered spinal region, no more than four (4) diagnostic joint sessions will be reimbursed per rolling 12 months, in recognition that the pain generator cannot always be identified with the initial and confirmatory diagnostic procedure.        No PA required, okay to schedule        There is also no PA required for CRFA.  Notification or Prior Authorization is not required for the requested services    Decision ID #:[id number]    Kassandra BASILIO    [facility]

## 2023-05-30 NOTE — TELEPHONE ENCOUNTER
EMILIM to schedule CRFA.          Nohemi Moura    [facility] [place]

## 2023-06-01 NOTE — TELEPHONE ENCOUNTER
Patient is required to complete 2 MBB's per policy. Please schedule for MBB#2.       Insurance guidelines entered below for nursing to review for any potential requirements for physical therapy ect prior to RFA.     Coty TERRY, RN Care Coordinator  [facility]  Pain [facility]

## 2023-06-02 NOTE — TELEPHONE ENCOUNTER
Screening questions for MBB Injections:    Injection to be done at which interventional clinic site? [facility]    Procedure ordered by Dr. Carlson     Procedure ordered? Cervical Medial Branch Block-  bilateral C3,4,5 MEDIAL BRANCH BLOCK/RFA- CMBB #2     What insurance would patient like us to bill for this procedure? [facility]       MEDICA: REQUIRES A PA FOR BOTH MBB     Worker's comp- Any injection DO NOT SCHEDULE and route to Nohemi Moura.      [employer] insurance - If scheduling an SI joint injection DO NOT SCHEDULE and route to Kassandra Boston.       MBBs must be scheduled with elapsed time interval of at least 2 weeks and not more than 6 months between the First MBB and the Second MBB for insurance purposes       [employer] - Any injection besides hip/shoulder/knee joint DO NOT SCHEDULE and route to Kassandra Boston. She will obtain PA and call pt back to schedule procedure or notify pt of denial.       [employer]- PA required for all MBB's      **BCBS- ALL need to be routed to Kassandra for review if a PA is needed**    IF SCHEDULING IN [facility] [facility] PLEASE SCHEDULE AT LEAST 7-10         BUSINESS DAYS OUT SO A PA CAN BE OBTAINED      Genicular Nerve blocks- ALL insurances except for- [employer], Medicare (straight not supplement) and [employer]. Need to be reviewed by Kassandra before scheduling.       Is patient scheduled at [facility]?    If YES, route every encounter to [facility] CARE NAVIGATION POOL [[phone]]    Any chance of pregnancy? NO   If YES, do NOT schedule and route to RN pool  - Dr. Hall route to Tasha Reid and PM&R Nurse  [[id number]]      Is an  needed? No     Patient has a drive home? (mandatory) Yes     Is patient taking any blood thinners (plavix, coumadin, jantoven, warfarin, heparin, pradaxa or dabigatran )? No    If hold needed, do NOT schedule, route to RN pool/ Dr. Hall's Team     Is patient taking any aspirin products? No     If more than  325mg/day, OK to schedule; Instruct pt to decrease to less than 325 mg for 7 days AND route to RN pool/ Dr. Hall's Team    For CERVICAL procedures, hold all aspirin products for 6 days.     Tell pt that if aspirin product is not held for 6 days, the procedure WILL BE cancelled.      Does the patient have a bleeding or clotting disorder? No    If YES, okay to schedule AND route to RN nurse pool/ Dr. Hall's Team    **For any patients with platelet count <100, must be forwarded to provider**    Is patient diabetic? NO If YES, have them bring their glucometer.    Does patient have an active infection or treated for one within the past week? No    Is patient currently taking any antibiotics?  No    For patients on chronic, preventative, or prophylactic antibiotics, procedures may be scheduled.     For patients on antibiotics for active or recent infection:antibiotic course must have been completed for 4 days    Is patient currently taking any steroid medications? (i.e. Prednisone, Medrol)  No     For patients on steroid medications, course must have been completed for 4 days    Is patient actively being treated for cancer or immunocompromised? No   If YES, do NOT schedule and route to ROBIN/ Dr. Hall's Team    Are you able to get on and off an exam table with minimal or no assistance? Yes   If NO, do NOT schedule and route to RN/ Dr. Hall's Team    Are you able to roll over and lay on your stomach with minimal or no assistance? Yes   If NO, do NOT schedule and route to RN/ Dr. Hall's Team    Any allergies to contrast dye, iodine, shellfish, or numbing and steroid medications? No  (If so, inform nursing and note in scheduling comments.)    Allergies: Aspirin, Latex, Nsaids, and Sulfasalazine     Does patient have an MRI/CT?  YES: 12/23/2019  Check Procedure Scheduling Grid to see if required.      Was the MRI done within the last 3 years?  Yes    If yes, where was the MRI done i.e.[facility], [facility], [place],  [facility] etc? [facility]       If no, do not schedule and route to nursing/ Dr. Hall's Team    If MRI was not done at [place], [facility] or [facility] do NOT schedule and route to nursing.      If pt has an imaging disc, the injection MAY be scheduled but pt has to bring disc to appt.     If they show up without the disc the injection cannot be done    Is patient able to transfer to a procedure table with minimal or no assistance? Yes  If NO, do NOT schedule and route to RN/ Dr. Hall's Team    Medial Branch Block Pre-Procedure Instructions    It is okay to take long acting pain medications (if you are on them) the day of the procedure but try not to take any short acting medications unless absolutely necessary.    YES: Informed    Long acting meds would include: Gabapentin (Neurontin), MS Contin, Oxycontin        Short acting meds would include:  Percocet, Oxycodone, Vicodin, Ibuprofen     The day of the procedure, you should try to do things that provoke your pain, since the injection is being done to see if it will relieve your pain . YES: Informed      If your pain level is a 4 out of 10 or less on the day of the procedu re, please call [phone] to reschedule.  YES: Informed      Reminders:      If you are started on any steroids or antibiotics between now and your appointment, you must contact us because it may affect our ability to perform your procedure - Informed       Instructed pt to arrive 30 minutes early for IV start if required. (Check Procedure Scheduling Grid) INot Applicable       If this is for a cervical MBB aspirin needs to be held for 6 days.  YES: Informed        Do not schedule procedures requiring IV placement in the first appointment of the day or first appointment after lunch. Do NOT schedule at 0745, 0815 or 1245.        For patients 85 or older we recommend having an adult stay w/ them for the remainder of the day.      Does the patient have any questions? CHIRAG Lara  AMANUEL Jordan      [facility]  Pain [facility]

## 2023-06-02 NOTE — TELEPHONE ENCOUNTER
EMILIM to schedule CMBB #2      Nohemi Moura    [facility] [place]

## 2023-06-05 DIAGNOSIS — M79.18 MYOFASCIAL MUSCLE PAIN: ICD-10-CM

## 2023-06-05 RX ORDER — BACLOFEN 10 MG/1
5-10 TABLET ORAL 2 TIMES DAILY PRN
Qty: 60 TABLET | Refills: 1 | Status: SHIPPED | OUTPATIENT
Start: 2023-06-05 | End: 2023-07-10

## 2023-06-05 NOTE — TELEPHONE ENCOUNTER
Received fax request from [facility] pharmacy requesting refill(s) for baclofen (LIORESAL) 10 MG tablet    Last refilled on 05/10/23    Pt last seen on 05/10/23  Next appt scheduled for : none    Will facilitate refill.

## 2023-06-11 ENCOUNTER — HEALTH MAINTENANCE LETTER (OUTPATIENT)
Age: 63
End: 2023-06-11

## 2023-06-12 DIAGNOSIS — A60.00 RECURRENT GENITAL HERPES: ICD-10-CM

## 2023-06-12 RX ORDER — VALACYCLOVIR HYDROCHLORIDE 500 MG/1
TABLET, FILM COATED ORAL
Qty: 90 TABLET | Refills: 0 | Status: SHIPPED | OUTPATIENT
Start: 2023-06-12 | End: 2023-09-11

## 2023-06-14 ENCOUNTER — RADIOLOGY INJECTION OFFICE VISIT (OUTPATIENT)
Dept: GENERAL RADIOLOGY | Facility: CLINIC | Age: 63
End: 2023-06-14
Payer: COMMERCIAL

## 2023-06-14 VITALS — DIASTOLIC BLOOD PRESSURE: 84 MMHG | OXYGEN SATURATION: 99 % | SYSTOLIC BLOOD PRESSURE: 160 MMHG | HEART RATE: 84 BPM

## 2023-06-14 DIAGNOSIS — M47.812 CERVICAL SPONDYLOSIS WITHOUT MYELOPATHY: ICD-10-CM

## 2023-06-14 PROCEDURE — 255N000002 HC RX 255 OP 636: Performed by: PAIN MEDICINE

## 2023-06-14 PROCEDURE — 64491 INJ PARAVERT F JNT C/T 2 LEV: CPT | Mod: 50 | Performed by: PAIN MEDICINE

## 2023-06-14 PROCEDURE — 99207 PR NO CHARGE LOS: CPT | Performed by: PAIN MEDICINE

## 2023-06-14 PROCEDURE — 64490 INJ PARAVERT F JNT C/T 1 LEV: CPT | Mod: 50 | Performed by: PAIN MEDICINE

## 2023-06-14 PROCEDURE — 64490 INJ PARAVERT F JNT C/T 1 LEV: CPT | Mod: 50,KX

## 2023-06-14 RX ORDER — IOPAMIDOL 408 MG/ML
10 INJECTION, SOLUTION INTRATHECAL ONCE
Status: COMPLETED | OUTPATIENT
Start: 2023-06-14 | End: 2023-06-14

## 2023-06-14 RX ADMIN — IOPAMIDOL 1 ML: 408 INJECTION, SOLUTION INTRATHECAL at 16:47

## 2023-06-14 ASSESSMENT — PAIN SCALES - GENERAL: PAINLEVEL: NO PAIN (0)

## 2023-06-14 NOTE — NURSING NOTE
Discharge Information    IV Discontiued Time:  NA    Amount of Fluid Infused:  NA    Discharge Criteria = When patient returns to baseline or as per MD order    Consciousness:  Pt is fully awake    Circulation:  BP +/- 20% of pre-procedure level    Respiration:  Patient is able to breathe deeply    O2 Sat:  Patient is able to maintain O2 Sat >92% on room air    Activity:  Moves 4 extremities on command    Ambulation:  Patient is able to stand and walk or stand and pivot into wheelchair    Dressing:  Clean/dry or No Dressing    Notes:   Discharge instructions and AVS given to patient    Patient meets criteria for discharge?  YES    Admitted to PCU?  No    Responsible adult present to accompany patient home?  Yes    Signature/Title:    Helena Pizano RN  RN Care Coordinator  [facility] [place]

## 2023-06-14 NOTE — PATIENT INSTRUCTIONS
[facility]   Medial Branch Block Discharge Instructions      Your procedure was performed by: Dr. Alok Carlson         You will need to complete the Pain Scale Log form and return it to us as soon as possible.  Once we have received the form, we will review it and call you to determine the next steps.     The form can be faxed to [phone] or mailed to:   [facility]   [address]   [place] [address]    You may resume your regular medications  If you were holding your blood thinning medication, please restart taking it: N/A  You may resume your regular activities  Be cautious with walking as numbness and/or weakness in the lower extremities may occur for up to 6-8 hours due to the effects of the anesthetic.  Avoid driving for 6 hours. The local anesthetic could slow your reflexes.   You may shower, however no swimming or tub baths or hot tubs for 24 hours following your procedure.  Your pain will return after the numbing medications have worn off.  You may use your current pain medications as needed.  Unless you have been directed to avoid the use of anti-inflammatory medications (NSAIDS), you may use medications such as ibuprofen, Aleve or Tylenol for pain control if needed.  Some people find it helpful to alternate ibuprofen and Tylenol every 3 hours for a couple of days.  You may use ice packs 10-15 minutes three to four times a day at the injection site for comfort.   Do not use heat to painful areas for 6 to 8 hours. This will give the local anesthetic time to wear off and prevent you from accidentally burning your skin.   If you experience any of the following, call the Pain Clinic during work hours (Monday through Friday 8 am-4:30 pm) at [phone] or the Provider Line after hours at [phone]:  -Fever over 100 degree F  -Swelling, bleeding, redness, drainage, warmth at the injection site  -Progressive weakness or numbness in your  legs or arms  -If lumbar, call if you have a loss of bowel or bladder function  -If cervical, call if you have any unusual headache that is not relieved by Tylenol  -Unusual new onset of pain that is not improving

## 2023-06-14 NOTE — NURSING NOTE
Pre-procedure Intake  If YES to any questions or NO to having a   Please complete laminated checklist and leave on the computer keyboard for Provider, verbally inform provider if able.    For SCS Trial, RFA's or any sedation procedure:  Have you been fasting? No     If yes, for how long?     Are you taking any any blood thinners such as Coumadin, Warfarin, Jantoven, Pradaxa Xarelto, Eliquis, Edoxaban, Enoxaparin, Lovenox, Heparin, Arixtra, Fondaparinux, or Fragmin? OR Antiplatelet medication such as Plavix, Brilinta, or Effient?   No     If yes, when did you take your last dose?     Do you take aspirin?  No    If cervical procedure, have you held aspirin for 6 days?   NA    Do you have any allergies to contrast dye, iodine, steroid and/or numbing medications?  NO    Are you currently taking antibiotics or have an active infection?  NO    Have you had a fever/elevated temperature within the past week? NO    Are you currently taking oral steroids? NO    Do you have a ? Yes    Are you pregnant or breastfeeding?  NO        Notify provider and RNs if systolic BP >170, diastolic BP >100, P >100 or O2 sats < 90%

## 2023-06-15 NOTE — PROGRESS NOTES
Pre procedure Diagnosis: Cervical spondylosis   Post procedure Diagnosis: Same  Procedure performed: cervical medial branch block bilateral c3,45  Anesthesia: none  Complications: none  Operators: Alok Carlson MD    Indications:   Fredy Parker is a 63 year old female. The patient has a history of axial neck apin.  Exam shows pain with extension/rotation  and they have tried conservative treatment including meds/pt.    Options/alternatives, benefits and risks were discussed with the patient including but not limited to bleeding, infection, tissue trauma, exposure to radiation, reaction to medications, spinal cord injury, weakness, numbness and paralysis.  Questions were answered to her satisfaction and she wishes to proceed. Voluntary informed consent was obtained and signed.     Vitals were reviewed: Yes=  Allergies were reviewed:  Yes   Medications were reviewed:  Yes   Pre-procedure pain score: 8/10    Procedure:  After obtaining signed, informed consent, the patient was taken to the procedure room and placed in the prone position on the [facility] frame.  A Pause for the Cause was completed prior to procedure start.  The patient was prepped and draped in the usual sterile fashion.    The areas of interest were identified with fluoroscopy.  The skin and subcutaneous tissue were anesthetized by injecting Lidocaine 1% 1 ml at each injection site utilizing a 27 gauge 1.25 inch needle.  Then,  25 gauge 3.5 inch spinal needles with slightly curved tips were advanced tangential to the medial branch nerves, abutting the articular pillars at C3,4,5 utilizing AP and Lateral fluoroscopic guidance.  Aspiration for blood and CSF was negative at all the levels.      Then, after repeated negative aspiration, each level was injected with 0.5 ml of 0.25% bupivacaine and the needles were restyletted and withdrawn.    The injection sites were cleaned and sterile dressings were applied where indicated.    The patient tolerated  the procedure well without complications and was taken to the recovery area for continued observation.  Fluoroscopic images were saved to PACS.    The patient was re-evaluated following the procedure and they noted decreased pain and improved range of motion.    Post-procedure pain:  0/10    The patient was given a pain diary and instructed to document their pain throughout the day.  The patient was asked to return the pain diary the next day in person or by fax at which time it will be reviewed and the decision made whether or not to proceed.    Follow-up:  Pending results     Alok Carlson MD  [facility]

## 2023-06-19 ENCOUNTER — OFFICE VISIT (OUTPATIENT)
Dept: FAMILY MEDICINE | Facility: CLINIC | Age: 63
End: 2023-06-19
Payer: COMMERCIAL

## 2023-06-19 VITALS
DIASTOLIC BLOOD PRESSURE: 70 MMHG | HEART RATE: 77 BPM | OXYGEN SATURATION: 99 % | SYSTOLIC BLOOD PRESSURE: 138 MMHG | TEMPERATURE: 98 F | WEIGHT: 128 LBS | HEIGHT: 59 IN | RESPIRATION RATE: 13 BRPM | BODY MASS INDEX: 25.8 KG/M2

## 2023-06-19 DIAGNOSIS — Z12.31 VISIT FOR SCREENING MAMMOGRAM: ICD-10-CM

## 2023-06-19 DIAGNOSIS — M54.2 CERVICALGIA: ICD-10-CM

## 2023-06-19 DIAGNOSIS — E78.5 HYPERLIPIDEMIA LDL GOAL <130: ICD-10-CM

## 2023-06-19 DIAGNOSIS — Z12.4 CERVICAL CANCER SCREENING: ICD-10-CM

## 2023-06-19 DIAGNOSIS — Z00.00 ENCOUNTER FOR MEDICARE ANNUAL WELLNESS EXAM: Primary | ICD-10-CM

## 2023-06-19 DIAGNOSIS — Z23 NEED FOR VACCINATION: ICD-10-CM

## 2023-06-19 PROCEDURE — G0402 INITIAL PREVENTIVE EXAM: HCPCS | Performed by: PHYSICIAN ASSISTANT

## 2023-06-19 PROCEDURE — G0124 SCREEN C/V THIN LAYER BY MD: HCPCS | Performed by: PATHOLOGY

## 2023-06-19 PROCEDURE — 90677 PCV20 VACCINE IM: CPT | Performed by: PHYSICIAN ASSISTANT

## 2023-06-19 PROCEDURE — G0145 SCR C/V CYTO,THINLAYER,RESCR: HCPCS | Performed by: PHYSICIAN ASSISTANT

## 2023-06-19 PROCEDURE — 87624 HPV HI-RISK TYP POOLED RSLT: CPT | Performed by: PHYSICIAN ASSISTANT

## 2023-06-19 PROCEDURE — G0009 ADMIN PNEUMOCOCCAL VACCINE: HCPCS | Performed by: PHYSICIAN ASSISTANT

## 2023-06-19 RX ORDER — GABAPENTIN 300 MG/1
CAPSULE ORAL
Qty: 180 CAPSULE | Refills: 1 | Status: SHIPPED | OUTPATIENT
Start: 2023-06-19 | End: 2023-11-13

## 2023-06-19 RX ORDER — ROSUVASTATIN CALCIUM 40 MG/1
40 TABLET, COATED ORAL DAILY
Qty: 90 TABLET | Refills: 1 | Status: SHIPPED | OUTPATIENT
Start: 2023-06-19 | End: 2024-02-23

## 2023-06-19 ASSESSMENT — ENCOUNTER SYMPTOMS
PARESTHESIAS: 0
ARTHRALGIAS: 1
FEVER: 0
EYE PAIN: 0
SHORTNESS OF BREATH: 0
ABDOMINAL PAIN: 0
MYALGIAS: 0
NERVOUS/ANXIOUS: 0
HEARTBURN: 1
DIARRHEA: 0
HEMATOCHEZIA: 0
SORE THROAT: 0
NAUSEA: 0
HEADACHES: 0
JOINT SWELLING: 0
COUGH: 0
HEMATURIA: 0
PALPITATIONS: 0
DIZZINESS: 0
DYSURIA: 0
CONSTIPATION: 0
WEAKNESS: 0
FREQUENCY: 0
BREAST MASS: 0
CHILLS: 0

## 2023-06-19 ASSESSMENT — ACTIVITIES OF DAILY LIVING (ADL): CURRENT_FUNCTION: NO ASSISTANCE NEEDED

## 2023-06-19 ASSESSMENT — PATIENT HEALTH QUESTIONNAIRE - PHQ9
SUM OF ALL RESPONSES TO PHQ QUESTIONS 1-9: 2
SUM OF ALL RESPONSES TO PHQ QUESTIONS 1-9: 2
10. IF YOU CHECKED OFF ANY PROBLEMS, HOW DIFFICULT HAVE THESE PROBLEMS MADE IT FOR YOU TO DO YOUR WORK, TAKE CARE OF THINGS AT HOME, OR GET ALONG WITH OTHER PEOPLE: NOT DIFFICULT AT ALL

## 2023-06-19 ASSESSMENT — PAIN SCALES - GENERAL: PAINLEVEL: NO PAIN (0)

## 2023-06-19 NOTE — PROGRESS NOTES
"SUBJECTIVE:   Carlene is a 63 year old who presents for Preventive Visit.      6/19/2023     3:21 PM   Additional Questions   Roomed by Shae PINO     Are you in the first 12 months of your Medicare coverage?  Yes,  Visual Acuity:  Right Eye: 20/50   Left Eye: 20/40  Both Eyes: 20/40; no glasses    Healthy Habits:     In general, how would you rate your overall health?  Good    Frequency of exercise:  2-3 days/week    Duration of exercise:  15-30 minutes    Do you usually eat at least 4 servings of fruit and vegetables a day, include whole grains    & fiber and avoid regularly eating high fat or \"junk\" foods?  Yes    Taking medications regularly:  Yes    Medication side effects:  None and Lightheadedness    Ability to successfully perform activities of daily living:  No assistance needed    Home Safety:  No safety concerns identified    Hearing Impairment:  No hearing concerns    In the past 6 months, have you been bothered by leaking of urine?  No    In general, how would you rate your overall mental or emotional health?  Good      PHQ-2 Total Score: 0    Additional concerns today:  No    Had labs checked with Dr Henry last month, but didn't receive results.  Requests refill of gabapentin    Have you ever done Advance Care Planning? (For example, a Health Directive, POLST, or a discussion with a medical provider or your loved ones about your wishes):  No       Fall risk  Fallen 2 or more times in the past year?: No  Any fall with injury in the past year?: No    Cognitive Screening   1) Repeat 3 items (Leader, Season, Table)    2) Clock draw: NORMAL  3) 3 item recall: Recalls 1 object   Results: NORMAL clock, 1-2 items recalled: COGNITIVE IMPAIRMENT LESS LIKELY    Mini-CogTM Copyright MAHOGANY Sullivan. Licensed by the author for use in [facility]; reprinted with permission (ana laura@.[facility]). All rights reserved.      Do you have sleep apnea, excessive snoring or daytime drowsiness?: no    Reviewed and updated as needed " this visit by clinical staff   Tobacco  Allergies  Meds  Problems  Med Hx  Surg Hx  Fam Hx          Reviewed and updated as needed this visit by Provider   Tobacco  Allergies  Meds  Problems  Med Hx  Surg Hx  Fam Hx         Social History     Tobacco Use     Smoking status: Never     Smokeless tobacco: Never   Vaping Use     Vaping status: Not on file   Substance Use Topics     Alcohol use: No     Alcohol/week: 0.0 standard drinks of alcohol             6/19/2023     3:19 PM   Alcohol Use   Prescreen: >3 drinks/day or >7 drinks/week? No          View : No data to display.              Do you have a current opioid prescription? No  Do you use any other controlled substances or medications that are not prescribed by a provider? None        Current providers sharing in care for this patient include:   Patient Care Team:  No Ref-Primary, Physician as PCP - Blu Rees MD as Assigned PCP    The following health maintenance items are reviewed in Epic and correct as of today:  Health Maintenance   Topic Date Due     MAMMO SCREENING  03/31/2022     PAP FOLLOW-UP  09/27/2022     HPV FOLLOW-UP  09/27/2022     COVID-19 Vaccine (5 - Moderna series) 05/25/2023     MEDICARE ANNUAL WELLNESS VISIT  05/25/2023     Pneumococcal Vaccine: 65+ Years (1 - PCV) 05/25/2023     LIPID  11/03/2023     PHQ-9  12/19/2023     ANNUAL REVIEW OF HM ORDERS  05/03/2024     FALL RISK ASSESSMENT  06/19/2024     COLORECTAL CANCER SCREENING  07/15/2024     DEXA  03/21/2026     ADVANCE CARE PLANNING  09/27/2026     DTAP/TDAP/TD IMMUNIZATION (3 - Td or Tdap) 02/20/2027     HEPATITIS C SCREENING  Completed     DEPRESSION ACTION PLAN  Completed     INFLUENZA VACCINE  Completed     ZOSTER IMMUNIZATION  Completed     IPV IMMUNIZATION  Aged Out     MENINGITIS IMMUNIZATION  Aged Out     HIV SCREENING  Discontinued     PAP  Discontinued     BP Readings from Last 3 Encounters:   06/19/23 138/70   06/14/23 (!) 160/84   05/24/23 (!) 168/88     Wt Readings from Last 3 Encounters:   06/19/23 58.1 kg (128 lb)   05/03/23 57.3 kg (126 lb 6 oz)   12/01/22 59 kg (130 lb)                  Patient Active Problem List   Diagnosis     Inflammatory arthritis     Osteopenia     Papanicolaou smear of cervix with low grade squamous intraepithelial lesion (LGSIL)     Latent tuberculosis     Cervicalgia     Recurrent genital herpes     Posterior neck pain     Hyperlipidemia LDL goal <160     Gastroesophageal reflux disease without esophagitis     Major depressive disorder, single episode, moderate (H)     Osteoarthritis of finger, unspecified laterality     Perennial allergic rhinitis     Advanced directives, counseling/discussion     Past Surgical History:   Procedure Laterality Date     COLONOSCOPY N/A 07/15/2019    Procedure: COLONOSCOPY, WITH POLYPECTOMY AND BIOPSY;  Surgeon: Danyell Peters MD;  Location:  GI     CONIZATION LEEP  02/2011    Completed for recurrent Mild Dysplasia and chronic endocervicitis     DISKECTOMY, LUMBAR, SINGLE SP  12/2007    L5-S1 diskectomy     HC INJ TRANSFORAMIN EPIDURAL, CERV/THOR SINGLE  05/2006    C7-T1 epidural steroid     INJECTION, ANESTHETIC/STEROID, TRANSFORAMINAL EPIDURAL; LUMBAR/SACRAL, SINGLE LEVEL  8,9/2007    left L5 selective nerve root injection under fluoroscopy with corticosteroid.      INJECTION, ANESTHETIC/STEROID, TRANSFORAMINAL EPIDURAL; LUMBAR/SACRAL, SINGLE LEVEL  10/2007    right L5-S1 translaminar epidural injection     LAMINECTOMY LUMBAR ONE LEVEL  03/06/2012    Procedure:LAMINECTOMY LUMBAR ONE LEVEL; REVISION DISCECTOMY L5-S1 ON THE LEFT ; Surgeon:ROSE PRITCHETT; Location: OR     LAPAROSCOPIC CHOLECYSTECTOMY  07/1999     MR LUMBAR SPINE W/O CONTRAST  01/2012    Moderate-sized left posterolateral caudally extruded L5-S1disc herniation impinging on left S1 nerve, mild degenerative disc changes at L4-5     RELEASE TRIGGER FINGER  05/17/2012    Procedure:RELEASE TRIGGER FINGER; right ring finger  trigger release  (latex allergy:contact); Surgeon:QUYEN FUNK; Location:[facility]     SONO ABDOMEN LIMITED  09/2012    Increased echotexture liver c/w fatty infiltration     TUBAL LIGATION  2000       Social History     Tobacco Use     Smoking status: Never     Smokeless tobacco: Never   Vaping Use     Vaping status: Not on file   Substance Use Topics     Alcohol use: No     Alcohol/week: 0.0 standard drinks of alcohol     Family History   Problem Relation Age of Onset     Hypertension Mother      Lipids Mother      Hypertension Father      Lipids Father      Lipids Sister          Mammogram Screening: Mammogram Screening: Recommended mammography every 1-2 years with patient discussion and risk factor consideration  History of abnormal Pap smear: YES - updated in Problem List and Health Maintenance accordingly  Last 3 Pap and HPV Results:       Latest Ref Rng & Units 9/27/2021    10:30 AM 7/31/2020     2:51 PM 7/31/2020     2:50 PM   PAP / HPV   PAP  Atypical squamous cells of undetermined significance (ASC-US)       PAP (Historical)   ASC-US      HPV 16 DNA Negative Negative    Negative     HPV 18 DNA Negative Negative    Negative     Other HR HPV Negative Positive    Positive             9/27/2021    10:11 AM 6/19/2023     3:07 PM 6/19/2023     3:19 PM   Breast CA Risk Assessment (FHS-7)   Do you have a family history of breast, colon, or ovarian cancer? Yes No / Unknown No / Unknown         Mammogram Screening: Recommended mammography every 1-2 years with patient discussion and risk factor consideration  Pertinent mammograms are reviewed under the imaging tab.    Review of Systems   Constitutional: Negative for chills and fever.   HENT: Positive for hearing loss. Negative for congestion, ear pain and sore throat.    Eyes: Positive for visual disturbance. Negative for pain.   Respiratory: Negative for cough and shortness of breath.    Cardiovascular: Negative for chest pain, palpitations and peripheral edema.  "  Gastrointestinal: Positive for heartburn. Negative for abdominal pain, constipation, diarrhea, hematochezia and nausea.   Breasts:  Negative for tenderness, breast mass and discharge.   Genitourinary: Negative for dysuria, frequency, genital sores, hematuria, pelvic pain, urgency, vaginal bleeding and vaginal discharge.   Musculoskeletal: Positive for arthralgias. Negative for joint swelling and myalgias.   Skin: Negative for rash.   Neurological: Negative for dizziness, weakness, headaches and paresthesias.   Psychiatric/Behavioral: Negative for mood changes. The patient is not nervous/anxious.          OBJECTIVE:   /70 (BP Location: Right arm, Patient Position: Sitting, Cuff Size: Adult Regular)   Pulse 77   Temp 98  F (36.7  C) (Tympanic)   Resp 13   Ht 1.493 m (4' 10.78\")   Wt 58.1 kg (128 lb)   LMP 03/24/2009   SpO2 99%   BMI 26.05 kg/m   Estimated body mass index is 26.05 kg/m  as calculated from the following:    Height as of this encounter: 1.493 m (4' 10.78\").    Weight as of this encounter: 58.1 kg (128 lb).  Physical Exam  GENERAL APPEARANCE: healthy, alert and no distress  EYES: Eyes grossly normal to inspection, PERRL and conjunctivae and sclerae normal  HENT: ear canals and TM's normal, nose and mouth without ulcers or lesions, oropharynx clear and oral mucous membranes moist  NECK: no adenopathy, no asymmetry, masses, or scars and thyroid normal to palpation  RESP: lungs clear to auscultation - no rales, rhonchi or wheezes  CV: regular rate and rhythm, normal S1 S2, no S3 or S4, no murmur, click or rub, no peripheral edema and peripheral pulses strong   (female): normal female external genitalia, normal urethral meatus, vaginal mucosal atrophy noted, normal cervix, adnexae, and uterus without masses or abnormal discharge  MS: no musculoskeletal defects are noted and gait is age appropriate without ataxia  SKIN: no suspicious lesions or rashes  NEURO: Normal strength and tone, sensory " exam grossly normal, mentation intact and speech normal  PSYCH: mentation appears normal and affect normal/bright    Office Visit on 05/03/2023   Component Date Value Ref Range Status     Hemoglobin A1C 05/03/2023 5.9 (H)  0.0 - 5.6 % Final    Normal <5.7%   Prediabetes 5.7-6.4%    Diabetes 6.5% or higher     Note: Adopted from ADA consensus guidelines.     Cholesterol 05/03/2023 264 (H)  <200 mg/dL Final     Triglycerides 05/03/2023 164 (H)  <150 mg/dL Final     Direct Measure HDL 05/03/2023 68  >=50 mg/dL Final     LDL Cholesterol Calculated 05/03/2023 163 (H)  <=100 mg/dL Final     Non HDL Cholesterol 05/03/2023 196 (H)  <130 mg/dL Final     Sodium 05/03/2023 141  136 - 145 mmol/L Final     Potassium 05/03/2023 4.3  3.4 - 5.3 mmol/L Final     Chloride 05/03/2023 107  98 - 107 mmol/L Final     Carbon Dioxide (CO2) 05/03/2023 22  22 - 29 mmol/L Final     Anion Gap 05/03/2023 12  7 - 15 mmol/L Final     Urea Nitrogen 05/03/2023 19.3  8.0 - 23.0 mg/dL Final     Creatinine 05/03/2023 0.58  0.51 - 0.95 mg/dL Final     Calcium 05/03/2023 9.7  8.8 - 10.2 mg/dL Final     Glucose 05/03/2023 95  70 - 99 mg/dL Final     Alkaline Phosphatase 05/03/2023 132 (H)  35 - 104 U/L Final     AST 05/03/2023 26  10 - 35 U/L Final     ALT 05/03/2023 26  10 - 35 U/L Final     Protein Total 05/03/2023 7.2  6.4 - 8.3 g/dL Final     Albumin 05/03/2023 4.5  3.5 - 5.2 g/dL Final     Bilirubin Total 05/03/2023 0.4  <=1.2 mg/dL Final     GFR Estimate 05/03/2023 >90  >60 mL/min/1.73m2 Final    eGFR calculated using 2021 CKD-EPI equation.           ASSESSMENT / PLAN:       ICD-10-CM    1. Encounter for Medicare annual wellness exam  Z00.00       2. Hyperlipidemia LDL goal <130  E78.5 rosuvastatin (CRESTOR) 40 MG tablet      3. Cervicalgia  M54.2 gabapentin (NEURONTIN) 300 MG capsule      4. Visit for screening mammogram  Z12.31 MA SCREENING DIGITAL BILAT - Future  (s+30)      5. Cervical cancer screening  Z12.4 Pap Screen with HPV - recommended  age 30 - 65 years      6. Need for vaccination  Z23 Pneumococcal 20 Valent Conjugate (PCV20)        Reviewed recent labs with patient and recommended dose adjustment of rosuvastatin, rx resent to patient's pharmacy as she states she was never contacted to pick it up.    Gabapentin refilled as requested.      COUNSELING:  Reviewed preventive health counseling, as reflected in patient instructions       Immunizations    Vaccinated for: Pneumococcal              She reports that she has never smoked. She has never used smokeless tobacco.      Appropriate preventive services were discussed with this patient, including applicable screening as appropriate for cardiovascular disease, diabetes, osteopenia/osteoporosis, and glaucoma.  As appropriate for age/gender, discussed screening for colorectal cancer, prostate cancer, breast cancer, and cervical cancer. Checklist reviewing preventive services available has been given to the patient.    Reviewed patients plan of care and provided an AVS. The Basic Care Plan (routine screening as documented in Health Maintenance) for [facility] meets the Care Plan requirement. This Care Plan has been established and reviewed with the Patient.      Marcie Ferrell PA-C  [facility]    Identified Health Risks:    I have reviewed Opioid Use Disorder and Substance Use Disorder risk factors and made any needed referrals.     Answers for HPI/ROS submitted by the patient on 6/19/2023  If you checked off any problems, how difficult have these problems made it for you to do your work, take care of things at home, or get along with other people?: Not difficult at all  PHQ9 TOTAL SCORE: 2

## 2023-06-19 NOTE — PATIENT INSTRUCTIONS
Patient Education   Personalized Prevention Plan  You are due for the preventive services outlined below.  Your care team is available to assist you in scheduling these services.  If you have already completed any of these items, please share that information with your care team to update in your medical record.  Health Maintenance Due   Topic Date Due     Mammogram  03/31/2022     PAP  09/27/2022     HPV Follow Up  09/27/2022     COVID-19 Vaccine (5 - Moderna series) 05/25/2023     Annual Wellness Visit  05/25/2023     Pneumococcal Vaccine (1 - PCV) 05/25/2023     Preventive Health Recommendations    See your health care provider every year to    Review health changes.     Discuss preventive care.      Review your medicines if your doctor has prescribed any.    You no longer need a yearly Pap test unless you've had an abnormal Pap test in the past 10 years. If you have vaginal symptoms, such as bleeding or discharge, be sure to talk with your provider about a Pap test.    Every 1 to 2 years, have a mammogram.  If you are over 69, talk with your health care provider about whether or not you want to continue having screening mammograms.    Every 10 years, have a colonoscopy. Or, have a yearly FIT test (stool test). These exams will check for colon cancer.     Have a cholesterol test every 5 years, or more often if your doctor advises it.     Have a diabetes test (fasting glucose) every three years. If you are at risk for diabetes, you should have this test more often.     At age 65, have a bone density scan (DEXA) to check for osteoporosis (brittle bone disease).    Shots:    Get a flu shot each year.    Get a tetanus shot every 10 years.    Talk to your doctor about your pneumonia vaccines. There are now two you should receive - Pneumovax (PPSV 23) and Prevnar (PCV 13).    Talk to your pharmacist about the shingles vaccine.    Talk to your doctor about the hepatitis B vaccine.    Nutrition:     Eat at least 5  servings of fruits and vegetables each day.    Eat whole-grain bread, whole-wheat pasta and brown rice instead of white grains and rice.    Get adequate Calcium and Vitamin D.     Lifestyle    Exercise at least 150 minutes a week (30 minutes a day, 5 days a week). This will help you control your weight and prevent disease.    Limit alcohol to one drink per day.    No smoking.     Wear sunscreen to prevent skin cancer.     See your dentist twice a year for an exam and cleaning.    See your eye doctor every 1 to 2 years to screen for conditions such as glaucoma, macular degeneration and cataracts.    Personalized Prevention Plan  You are due for the preventive services outlined below.  Your care team is available to assist you in scheduling these services.  If you have already completed any of these items, please share that information with your care team to update in your medical record.  Health Maintenance   Topic Date Due     MAMMO SCREENING  03/31/2022     PAP FOLLOW-UP  09/27/2022     HPV FOLLOW-UP  09/27/2022     COVID-19 Vaccine (5 - Moderna series) 05/25/2023     MEDICARE ANNUAL WELLNESS VISIT  05/25/2023     Pneumococcal Vaccine: 65+ Years (1 - PCV) 05/25/2023     LIPID  11/03/2023     PHQ-9  12/19/2023     ANNUAL REVIEW OF HM ORDERS  05/03/2024     FALL RISK ASSESSMENT  06/19/2024     COLORECTAL CANCER SCREENING  07/15/2024     DEXA  03/21/2026     ADVANCE CARE PLANNING  09/27/2026     DTAP/TDAP/TD IMMUNIZATION (3 - Td or Tdap) 02/20/2027     HEPATITIS C SCREENING  Completed     DEPRESSION ACTION PLAN  Completed     INFLUENZA VACCINE  Completed     ZOSTER IMMUNIZATION  Completed     IPV IMMUNIZATION  Aged Out     MENINGITIS IMMUNIZATION  Aged Out     HIV SCREENING  Discontinued     PAP  Discontinued

## 2023-06-22 LAB
BKR LAB AP GYN ADEQUACY: ABNORMAL
BKR LAB AP GYN INTERPRETATION: ABNORMAL
BKR LAB AP HPV REFLEX: ABNORMAL
BKR LAB AP PREVIOUS ABNL DX: ABNORMAL
BKR LAB AP PREVIOUS ABNORMAL: ABNORMAL
PATH REPORT.COMMENTS IMP SPEC: ABNORMAL
PATH REPORT.COMMENTS IMP SPEC: ABNORMAL
PATH REPORT.RELEVANT HX SPEC: ABNORMAL

## 2023-06-27 ENCOUNTER — PATIENT OUTREACH (OUTPATIENT)
Dept: FAMILY MEDICINE | Facility: CLINIC | Age: 63
End: 2023-06-27
Payer: COMMERCIAL

## 2023-06-27 ENCOUNTER — TELEPHONE (OUTPATIENT)
Dept: PALLIATIVE MEDICINE | Facility: CLINIC | Age: 63
End: 2023-06-27

## 2023-06-27 DIAGNOSIS — R87.612 PAPANICOLAOU SMEAR OF CERVIX WITH LOW GRADE SQUAMOUS INTRAEPITHELIAL LESION (LGSIL): Primary | ICD-10-CM

## 2023-06-27 NOTE — TELEPHONE ENCOUNTER
Pt had a bilateralCervical  medial branch block # 2 on 6/14/2023.  The post medial branch block form was received.    Form received was completed with numerical indicators rather than indicating percent relief or words describing relief.     Attempted to contact patient on 6/27 and 6/30.    Detailed voice message requesting return call was left.       Addie Kay RN  [facility]  [phone]

## 2023-07-05 ENCOUNTER — ANCILLARY PROCEDURE (OUTPATIENT)
Dept: MAMMOGRAPHY | Facility: CLINIC | Age: 63
End: 2023-07-05
Payer: COMMERCIAL

## 2023-07-05 DIAGNOSIS — Z12.31 VISIT FOR SCREENING MAMMOGRAM: ICD-10-CM

## 2023-07-05 PROCEDURE — 77067 SCR MAMMO BI INCL CAD: CPT | Mod: TC | Performed by: RADIOLOGY

## 2023-07-05 PROCEDURE — 77063 BREAST TOMOSYNTHESIS BI: CPT | Mod: TC | Performed by: RADIOLOGY

## 2023-07-06 DIAGNOSIS — M79.18 MYOFASCIAL MUSCLE PAIN: ICD-10-CM

## 2023-07-06 NOTE — TELEPHONE ENCOUNTER
[facility] Call Center    Phone Message    May a detailed message be left on voicemail: yes     Reason for Call: Other: Patient is returning call to the nurse, please call back.      Action Taken: Message routed to:  Other: [facility]    Travel Screening: Not Applicable

## 2023-07-06 NOTE — TELEPHONE ENCOUNTER
Third attempt to contact patient.  LVM requesting return call to clinic to clarify pain diary.    Pain diary placed in nursing in-box at [place] location.     Addie Kay RN  [facility] [place]  [phone]

## 2023-07-10 RX ORDER — BACLOFEN 10 MG/1
5-10 TABLET ORAL 2 TIMES DAILY PRN
Qty: 60 TABLET | Refills: 1 | Status: SHIPPED | OUTPATIENT
Start: 2023-07-10 | End: 2023-09-12

## 2023-07-11 NOTE — TELEPHONE ENCOUNTER
"Spoke with patient who identifies that responses on pain diary are pain ratings.   Patient verbalizes that she would like to go forward with RFA.     Pt had a bilateral Cervical  medial branch block # 2 on 6/14/2023.  The post medial branch block form was  received.    Max % of pain relief from medial branch block #1:           88  Max relief from block #2 is:     Max relief from the block: % in first 10 minutes on procedure day.  0-50% 3 hours following procedure.   Physical therapy:    Last done in?:  \"Last year\" .   How many sessions?:  \"3\".    Where was it done?  \"[facility].  Across the street from the clinic.\"   Patient notified that release will likely be needed from PT provider.   Called pt and informed him/her that insurance would be checked and will be  called once we get a response.  Done  Fax the pain diary to Kassandra.   Done    Routed to Kassandra to check insurance coverage.      Addie Kay RN  [facility]  [phone]    "

## 2023-07-12 NOTE — TELEPHONE ENCOUNTER
There is also no PA required for CRFA.  Notification or Prior Authorization is not required for the requested services     Decision ID #:[id number]      [facility] Medical Policy- No PA required    Guidelines      Facet Joint Denervation  The thermal radiofrequency destruction of cervical, thoracic, or lumbar paravertebral facet joint (medial branch) nerves are considered medically reasonable and necessary for patients who meet ALL the following criteria:    Initial thermal RFA:  After the patient has had at least two (2) medically reasonable and necessary diagnostic MBBs, with each one providing a consistent minimum of 80% sustained relief of primary (index) pain (with the duration of relief being consistent with the agent used) AND  Repeat thermal facet joint RFA at the same anatomic site is considered medically reasonable and necessary provided the patient had a minimum of consistent 50% improvement in pain for at least six (6) months or at least 50% consistent improvement in the ability to perform previously painful movements and ADLs as compared to baseline measurement using the same scale;  Frequency Limitation: For each covered spinal region no more than two (2) radiofrequency sessions will be reimbursed per rolling 12 months.                     Kassandra BASILIO    [facility]

## 2023-08-17 NOTE — TELEPHONE ENCOUNTER
Abby,    Please send 7/12/23 letter to pt certified. Thanks!    Helena Mcdonough RN  Pap Tracking

## 2023-09-06 DIAGNOSIS — F32.1 MAJOR DEPRESSIVE DISORDER, SINGLE EPISODE, MODERATE (H): ICD-10-CM

## 2023-09-06 RX ORDER — FLUOXETINE 40 MG/1
40 CAPSULE ORAL DAILY
Qty: 90 CAPSULE | Refills: 0 | Status: SHIPPED | OUTPATIENT
Start: 2023-09-06 | End: 2023-12-04

## 2023-09-09 DIAGNOSIS — A60.00 RECURRENT GENITAL HERPES: ICD-10-CM

## 2023-09-11 RX ORDER — VALACYCLOVIR HYDROCHLORIDE 500 MG/1
TABLET, FILM COATED ORAL
Qty: 90 TABLET | Refills: 0 | Status: SHIPPED | OUTPATIENT
Start: 2023-09-11 | End: 2023-12-11

## 2023-09-12 ENCOUNTER — OFFICE VISIT (OUTPATIENT)
Dept: FAMILY MEDICINE | Facility: CLINIC | Age: 63
End: 2023-09-12
Payer: COMMERCIAL

## 2023-09-12 VITALS
DIASTOLIC BLOOD PRESSURE: 77 MMHG | WEIGHT: 135.2 LBS | SYSTOLIC BLOOD PRESSURE: 126 MMHG | HEIGHT: 59 IN | OXYGEN SATURATION: 98 % | HEART RATE: 72 BPM | BODY MASS INDEX: 27.26 KG/M2 | RESPIRATION RATE: 16 BRPM | TEMPERATURE: 97.4 F

## 2023-09-12 DIAGNOSIS — H25.9 AGE-RELATED CATARACT OF BOTH EYES, UNSPECIFIED AGE-RELATED CATARACT TYPE: ICD-10-CM

## 2023-09-12 DIAGNOSIS — Z01.818 PREOPERATIVE CLEARANCE: Primary | ICD-10-CM

## 2023-09-12 DIAGNOSIS — Z23 NEED FOR VACCINATION: ICD-10-CM

## 2023-09-12 DIAGNOSIS — R73.03 PREDIABETES: ICD-10-CM

## 2023-09-12 DIAGNOSIS — M79.18 MYOFASCIAL MUSCLE PAIN: ICD-10-CM

## 2023-09-12 LAB
ERYTHROCYTE [DISTWIDTH] IN BLOOD BY AUTOMATED COUNT: 13.3 % (ref 10–15)
HBA1C MFR BLD: 6.4 % (ref 0–5.6)
HCT VFR BLD AUTO: 42.7 % (ref 35–47)
HGB BLD-MCNC: 13.8 G/DL (ref 11.7–15.7)
MCH RBC QN AUTO: 29.1 PG (ref 26.5–33)
MCHC RBC AUTO-ENTMCNC: 32.3 G/DL (ref 31.5–36.5)
MCV RBC AUTO: 90 FL (ref 78–100)
PLATELET # BLD AUTO: 184 10E3/UL (ref 150–450)
RBC # BLD AUTO: 4.75 10E6/UL (ref 3.8–5.2)
WBC # BLD AUTO: 6.5 10E3/UL (ref 4–11)

## 2023-09-12 PROCEDURE — 85027 COMPLETE CBC AUTOMATED: CPT | Performed by: INTERNAL MEDICINE

## 2023-09-12 PROCEDURE — 90677 PCV20 VACCINE IM: CPT | Performed by: INTERNAL MEDICINE

## 2023-09-12 PROCEDURE — G0009 ADMIN PNEUMOCOCCAL VACCINE: HCPCS | Performed by: INTERNAL MEDICINE

## 2023-09-12 PROCEDURE — 36415 COLL VENOUS BLD VENIPUNCTURE: CPT | Performed by: INTERNAL MEDICINE

## 2023-09-12 PROCEDURE — 99214 OFFICE O/P EST MOD 30 MIN: CPT | Mod: 25 | Performed by: INTERNAL MEDICINE

## 2023-09-12 PROCEDURE — 90662 IIV NO PRSV INCREASED AG IM: CPT | Performed by: INTERNAL MEDICINE

## 2023-09-12 PROCEDURE — 83036 HEMOGLOBIN GLYCOSYLATED A1C: CPT | Performed by: INTERNAL MEDICINE

## 2023-09-12 PROCEDURE — G0008 ADMIN INFLUENZA VIRUS VAC: HCPCS | Performed by: INTERNAL MEDICINE

## 2023-09-12 ASSESSMENT — PAIN SCALES - GENERAL: PAINLEVEL: NO PAIN (0)

## 2023-09-12 NOTE — PROGRESS NOTES
/[facility] [place]  [address] [facility] [address]  Phone: [phone]  Primary Provider: No Ref-Primary, Physician  Pre-op Performing Provider: BENJAMIN ROMERO      PREOPERATIVE EVALUATION:  Today's date: 9/12/2023    Fredy Parker is a 63 year old female who presents for a preoperative evaluation.      9/12/2023     2:48 PM   Additional Questions   Roomed by amaal       Surgical Information:  Surgery/Procedure: Left eye cataract 09/26/23 / R eye -10/03/23  Surgery Location: [facility]  Surgeon: Mc Green  Surgery Date: 09/26/23  Time of Surgery: TBD  Where patient plans to recover: At home with family  Fax number for surgical facility: [phone]    Assessment & Plan     The proposed surgical procedure is considered LOW risk.    Assessment and Plan  1. Preoperative clearance  2. Age-related cataract of both eyes, unspecified age-related cataract type  Pt is here for preoperative clearance of Cataract surgery. Does have risk factors of Neck pain on gabapentin Otherwise no other risk factors. Last lab work in 5/2023 with prediabetes , normal CMP No recent CBC> Can recheck CBC and A1C . BP normal. No Cardiopulmonary concerns. Last EKG in 2016 .   - CBC with platelets; Future  - Hemoglobin A1c; Future    3. Prediabetes  - Hemoglobin A1c; Future    4. Need for vaccination  - INFLUENZA VACCINE 65+ (FLUZONE HD)  - Pneumococcal 20 Valent Conjugate (PCV20)         Please note that this note consists of symbols derived from keyboarding, dictation and/or voice recognition software. As a result, there may be errors in the script that have gone undetected. Please consider this when interpreting information found in this chart.    Patient Instructions   Please do pertinent work up for your preoperative clearance  ==========================  For informational purposes only. Not to replace the advice of your health care provider. Copyright   2003, 2019 [facility]  [employer]. All rights reserved. Clinically reviewed by Alta Mahmood MD. [employer] [id number] - REV .  Preparing for Your Surgery  Getting started  A nurse will call you to review your health history and instructions. They will give you an arrival time based on your scheduled surgery time. Please be ready to share:  Your doctor's clinic name and phone number  Your medical, surgical, and anesthesia history  A list of allergies and sensitivities  A list of medicines, including herbal treatments and over-the-counter drugs  Whether the patient has a legal guardian (ask how to send us the papers in advance)  Please tell us if you're pregnant--or if there's any chance you might be pregnant. Some surgeries may injure a fetus (unborn baby), so they require a pregnancy test. Surgeries that are safe for a fetus don't always need a test, and you can choose whether to have one.   If you have a child who's having surgery, please ask for a copy of Preparing for Your Child's Surgery.    Preparing for surgery  Within 10 to 30 days of surgery: Have a pre-op exam (sometimes called an H&P, or History and Physical). This can be done at a clinic or pre-operative center.  If you're having a , you may not need this exam. Talk to your care team.  At your pre-op exam, talk to your care team about all medicines you take. If you need to stop any medicines before surgery, ask when to start taking them again.  We do this for your safety. Many medicines can make you bleed too much during surgery. Some change how well surgery (anesthesia) drugs work.  Call your insurance company to let them know you're having surgery. (If you don't have insurance, call [phone].)  Call your clinic if there's any change in your health. This includes signs of a cold or flu (sore throat, runny nose, cough, rash, fever). It also includes a scrape or scratch near the surgery site.  If you have questions on the day of surgery, call your hospital or surgery  center.  Eating and drinking guidelines  For your safety: Unless your surgeon tells you otherwise, follow the guidelines below.  Eat and drink as usual until 8 hours before you arrive for surgery. After that, no food or milk.  Drink clear liquids until 2 hours before you arrive. These are liquids you can see through, like water, Gatorade, and Propel Water. They also include plain black coffee and tea (no cream or milk), candy, and breath mints. You can spit out gum when you arrive.  If you drink alcohol: Stop drinking it the night before surgery.  If your care team tells you to take medicine on the morning of surgery, it's okay to take it with a sip of water.  Preventing infection  Shower or bathe the night before and morning of your surgery. Follow the instructions your clinic gave you. (If no instructions, use regular soap.)  Don't shave or clip hair near your surgery site. We'll remove the hair if needed.  Don't smoke or vape the morning of surgery. You may chew nicotine gum up to 2 hours before surgery. A nicotine patch is okay.  Note: Some surgeries require you to completely quit smoking and nicotine. Check with your surgeon.  Your care team will make every effort to keep you safe from infection. We will:  Clean our hands often with soap and water (or an alcohol-based hand rub).  Clean the skin at your surgery site with a special soap that kills germs.  Give you a special gown to keep you warm. (Cold raises the risk of infection.)  Wear special hair covers, masks, gowns and gloves during surgery.  Give antibiotic medicine, if prescribed. Not all surgeries need antibiotics.  What to bring on the day of surgery  Photo ID and insurance card  Copy of your health care directive, if you have one  Glasses and hearing aids (bring cases)  You can't wear contacts during surgery  Inhaler and eye drops, if you use them (tell us about these when you arrive)  CPAP machine or breathing device, if you use them  A few personal  items, if spending the night  If you have . . .  A pacemaker, ICD (cardiac defibrillator) or other implant: Bring the ID card.  An implanted stimulator: Bring the remote control.  A legal guardian: Bring a copy of the certified (court-stamped) guardianship papers.  Please remove any jewelry, including body piercings. Leave jewelry and other valuables at home.  If you're going home the day of surgery  You must have a responsible adult drive you home. They should stay with you overnight as well.  If you don't have someone to stay with you, and you aren't safe to go home alone, we may keep you overnight. Insurance often won't pay for this.  After surgery  If it's hard to control your pain or you need more pain medicine, please call your surgeon's office.  Questions?   If you have any questions for your care team, list them here: _________________________________________________________________________________________________________________________________________________________________________ ____________________________________ ____________________________________ ____________________________________    Return in about 8 months (around 5/12/2024), or if symptoms worsen or fail to improve, for Annual Wellness Exam.    Tesha Dinh MD  [facility] [place]         Risks and Recommendations:  The patient has the following additional risks and recommendations for perioperative complications:   - No identified additional risk factors other than previously addressed    Antiplatelet or Anticoagulation Medication Instructions:   - Patient is on no antiplatelet or anticoagulation medications.    Additional Medication Instructions:  Patient is to take all scheduled medications on the day of surgery    RECOMMENDATION:  APPROVAL GIVEN to proceed with proposed procedure, without further diagnostic evaluation.    Review of external notes as documented elsewhere in note  30 minutes spent by me on the date of  the encounter doing chart review, review of outside records, review of test results, interpretation of tests, patient visit, and documentation       Subjective       HPI related to upcoming procedure:     Pt is new to me, last seen PCP for AWV in 6/2023.         9/12/2023     2:41 PM   Preop Questions   1. Have you ever had a heart attack or stroke? No   2. Have you ever had surgery on your heart or blood vessels, such as a stent placement, a coronary artery bypass, or surgery on an artery in your head, neck, heart, or legs? No   3. Do you have chest pain with activity? No   4. Do you have a history of  heart failure? No   5. Do you currently have a cold, bronchitis or symptoms of other infection? No   6. Do you have a cough, shortness of breath, or wheezing? No   7. Do you or anyone in your family have previous history of blood clots? No   8. Do you or does anyone in your family have a serious bleeding problem such as prolonged bleeding following surgeries or cuts? No   9. Have you ever had problems with anemia or been told to take iron pills? No   10. Have you had any abnormal blood loss such as black, tarry or bloody stools, or abnormal vaginal bleeding? No   11. Have you ever had a blood transfusion? No   12. Are you willing to have a blood transfusion if it is medically needed before, during, or after your surgery? NO    13. Have you or any of your relatives ever had problems with anesthesia? No   14. Do you have sleep apnea, excessive snoring or daytime drowsiness? No   15. Do you have any artifical heart valves or other implanted medical devices like a pacemaker, defibrillator, or continuous glucose monitor? No   16. Do you have artificial joints? No   17. Are you allergic to latex? YES       Health Care Directive:  Patient does not have a Health Care Directive or Living Will: N/A     Preoperative Review of :   reviewed - no record of controlled substances prescribed.      Status of Chronic  Conditions:  See problem list for active medical problems.  Problems all longstanding and stable, except as noted/documented.  See ROS for pertinent symptoms related to these conditions.    Review of Systems  CONSTITUTIONAL: NEGATIVE for fever, chills, change in weight  INTEGUMENTARY/SKIN: NEGATIVE for worrisome rashes, moles or lesions  EYES: NEGATIVE for vision changes or irritation  ENT/MOUTH: NEGATIVE for ear, mouth and throat problems  RESP: NEGATIVE for significant cough or SOB  CV: NEGATIVE for chest pain, palpitations or peripheral edema  GI: NEGATIVE for nausea, abdominal pain, heartburn, or change in bowel habits  : NEGATIVE for frequency, dysuria, or hematuria  MUSCULOSKELETAL: NEGATIVE for significant arthralgias or myalgia  NEURO: NEGATIVE for weakness, dizziness or paresthesias  ENDOCRINE: NEGATIVE for temperature intolerance, skin/hair changes  HEME: NEGATIVE for bleeding problems  PSYCHIATRIC: NEGATIVE for changes in mood or affect    Patient Active Problem List    Diagnosis Date Noted    Advanced directives, counseling/discussion 12/10/2015     Priority: Medium     Advance Care Planning 12/10/2015: ACP Review of Chart:  Reviewed chart for advance care plan.  Fredy Parker has no plan or code status on file. Patient declined per [facility] referral. Added by Jocelyn Vo            Gastroesophageal reflux disease without esophagitis 12/03/2015     Priority: Medium    Major depressive disorder, single episode, moderate (H) 12/03/2015     Priority: Medium    Osteoarthritis of finger, unspecified laterality 12/03/2015     Priority: Medium    Perennial allergic rhinitis 12/03/2015     Priority: Medium    Hyperlipidemia LDL goal <160 05/03/2015     Priority: Medium    Posterior neck pain 09/30/2014     Priority: Medium    Recurrent genital herpes      Priority: Medium    Cervicalgia 02/15/2012     Priority: Medium     Problem list name updated by automated process. Provider to review       Latent tuberculosis 10/01/2009     Priority: Medium    Osteopenia      Priority: Medium     mild, femoral neck      Papanicolaou smear of cervix with low grade squamous intraepithelial lesion (LGSIL) 03/01/2009     Priority: Medium     2/27/07 ASCUS/ Neg HPV  6/8/07 ASCUS/Neg HPV  3/20/09 LSIL  5/2009 COLP- Negative  3/26/10 LSIL/+ HR HPV  12/7/10 LSIL/Neg HPV  2/2011 LEEP for recurrent mild dysplasia. Pathology = No evidence of dysplasia.  2011, 2012, 2013 NIL paps  09/29/14 ASCUS, +HR HPV.   10/21/14 [place] = Cervical Bx and ECC= Benign. Repeat co-test 1 yr  12/02/15 LSIL/ +HR HPV. Plan: colposcopy  12/15/15 [place]= Negative. 1 yr pap  2/20/17 LSIL/+ HR HPV (not 16 or 18).   04/17/17 [place]= CORBIN 1. Referred to ObGyn for further evaluation  3/19/18  ASC-H, LSIL/+ HR HPV (not 16/18).  Plan: colposcopy by 6/19/18  4/10/18 [place]- Normal.   10/2/18 LSIL pap, + HR HPV (not 16/18). Plan cotest in 6 months  5/14/19 Pap: ASCUS, +HR HPV (not 16/18). Plan colpo  7/8/19 [place] - Mild dysplasia. Plan 1 co-test   7/31/20 ASCUS, +HR HPV, not 16/18. Plan [place] bef 10/31/20  8/19/20 [place] ECC neg. Plan: cotest in 1 year  9/27/21 ASCUS, +HR HPV, not 16/18. Plan 1 yr co-test    11/16/2022 Lost to follow-up for pap tracking  6/19/23 LSIL Pap, + HR HPV (neg 16/18). [place] due by 9/19/2023 6/27/2023 Message left to return call. Locu result note sent.  7/5/2023 Message left to return call.   7/12/23 Letter sent to pt  8/17/23 Certified ltr sent. Tracking # [id number]      Inflammatory arthritis      Priority: Medium     IP joints and Angel's tendons        Past Medical History:   Diagnosis Date    ASCUS with positive high risk HPV 09/2014    colposcopy benign    Chronic gastric ulcer without mention of hemorrhage, perforation, without mention of obstruction 1994    gastric erosion dx'ed by endoscopy. pt was also treated for pos H.pylori    Disc disorder of cervical region 2006    mild disc changes C3-4/C4-5 and C5-6 with chronic  myofascial cervicoscapular pain    Elevated transaminase level 2012    AST/ALT 2-3 x normal; fatty infiltration liver on ultrasound; negative testing for infectious hepatitis; normal iron levels, ELP, CK    Latent tuberculosis 10/2009    started INH     LSIL (low grade squamous intraepithelial lesion) on Pap smear     HPV 56 (high risk) detected 2010    Major depressive disorder, single episode, moderate (H) 2006    Mixed hyperlipidemia 2009    elevated TG and LDL    Osteopenia 2009    mild, femoral neck    Recurrent genital herpes 2010    Status post laminectomy 2007/2012 12/2007: L5-S1 diskectomy 2012:Revision diskectomy left at L5-S1     Past Surgical History:   Procedure Laterality Date    COLONOSCOPY N/A 07/15/2019    Procedure: COLONOSCOPY, WITH POLYPECTOMY AND BIOPSY;  Surgeon: Danyell Peters MD;  Location:  GI    CONIZATION LEEP  02/2011    Completed for recurrent Mild Dysplasia and chronic endocervicitis    DISKECTOMY, LUMBAR, SINGLE SP  12/2007    L5-S1 diskectomy    HC INJ TRANSFORAMIN EPIDURAL, CERV/THOR SINGLE  05/2006    C7-T1 epidural steroid    INJECTION, ANESTHETIC/STEROID, TRANSFORAMINAL EPIDURAL; LUMBAR/SACRAL, SINGLE LEVEL  8,9/2007    left L5 selective nerve root injection under fluoroscopy with corticosteroid.     INJECTION, ANESTHETIC/STEROID, TRANSFORAMINAL EPIDURAL; LUMBAR/SACRAL, SINGLE LEVEL  10/2007    right L5-S1 translaminar epidural injection    LAMINECTOMY LUMBAR ONE LEVEL  03/06/2012    Procedure:LAMINECTOMY LUMBAR ONE LEVEL; REVISION DISCECTOMY L5-S1 ON THE LEFT ; Surgeon:ROSE PRITCHETT; Location: OR    LAPAROSCOPIC CHOLECYSTECTOMY  07/1999    MR LUMBAR SPINE W/O CONTRAST  01/2012    Moderate-sized left posterolateral caudally extruded L5-S1disc herniation impinging on left S1 nerve, mild degenerative disc changes at L4-5    RELEASE TRIGGER FINGER  05/17/2012    Procedure:RELEASE TRIGGER FINGER; right ring finger trigger release  (latex allergy:contact);  "Surgeon:QUYEN FUNK; Location:[facility]    SONO ABDOMEN LIMITED  09/2012    Increased echotexture liver c/w fatty infiltration    TUBAL LIGATION  2000     Current Outpatient Medications   Medication Sig Dispense Refill    baclofen (LIORESAL) 10 MG tablet Take 0.5-1 tablets (5-10 mg) by mouth 2 times daily as needed for muscle spasms 60 tablet 1    CALTRATE 600+D PLUS 600-400 MG-UNIT OR TABS 1 TABLET TWICE DAILY  3 MONTHS PRN    CENTRUM OR TABS 1 TABLET DAILY 30 0    cetirizine (ZYRTEC) 10 MG tablet Take 1 tablet (10 mg) by mouth daily 90 tablet 3    FLUoxetine (PROZAC) 20 MG capsule Take 1 capsule (20 mg) by mouth daily Take it with 40mg capsule to make 60mg in total 90 capsule 0    FLUoxetine (PROZAC) 40 MG capsule TAKE 1 CAPSULE (40 MG) BY MOUTH DAILY TAKE IT WITH 20MG CAPSULE TO MAKE 60MG IN TOTAL 90 capsule 0    gabapentin (NEURONTIN) 300 MG capsule Take 300mg (one capsule) 1-2 times daily 180 capsule 1    omeprazole (PRILOSEC) 40 MG DR capsule Take 1 capsule (40 mg) by mouth daily as needed (GERD) 60 capsule 8    rosuvastatin (CRESTOR) 40 MG tablet Take 1 tablet (40 mg) by mouth daily 90 tablet 1    valACYclovir (VALTREX) 500 MG tablet TAKE 1 TABLET BY MOUTH EVERY DAY 90 tablet 0    VITAMIN D 1000 UNIT OR CAPS 2 CAPSULES DAILY 180 Cap PRN       Allergies   Allergen Reactions    Aspirin Rash    Latex     Nsaids GI Disturbance    Sulfasalazine Rash        Social History     Tobacco Use    Smoking status: Never    Smokeless tobacco: Never   Substance Use Topics    Alcohol use: No     Alcohol/week: 0.0 standard drinks of alcohol     Family History   Problem Relation Age of Onset    Hypertension Mother     Lipids Mother     Hypertension Father     Lipids Father     Lipids Sister      History   Drug Use No         Objective     /77   Pulse 72   Temp 97.4  F (36.3  C) (Temporal)   Resp 16   Ht 1.486 m (4' 10.5\")   Wt 61.3 kg (135 lb 3.2 oz)   LMP 03/24/2009   SpO2 98%   BMI 27.78 kg/m      Physical Exam    " GENERAL APPEARANCE: healthy, alert and no distress     EYES: EOMI, PERRL     HENT: ear canals and TM's normal and nose and mouth without ulcers or lesions     NECK: no adenopathy, no asymmetry, masses, or scars and thyroid normal to palpation     RESP: lungs clear to auscultation - no rales, rhonchi or wheezes     CV: regular rates and rhythm, normal S1 S2, no S3 or S4 and no murmur, click or rub     ABDOMEN:  soft, nontender, no HSM or masses and bowel sounds normal     MS: extremities normal- no gross deformities noted, no evidence of inflammation in joints, FROM in all extremities.     SKIN: no suspicious lesions or rashes     NEURO: Normal strength and tone, sensory exam grossly normal, mentation intact and speech normal     PSYCH: mentation appears normal. and affect normal/bright     LYMPHATICS: No cervical adenopathy    Recent Labs   Lab Test 05/03/23  1009 09/27/21  1055    140   POTASSIUM 4.3 3.6   CR 0.58 0.63   A1C 5.9* 6.0*        Diagnostics:  Labs pending at this time.  Results will be reviewed when available.  No results found for this or any previous visit (from the past 720 hour(s)).   No EKG required for low risk surgery (cataract, skin procedure, breast biopsy, etc).    Revised Cardiac Risk Index (RCRI):  The patient has the following serious cardiovascular risks for perioperative complications:   - No serious cardiac risks = 0 points     RCRI Interpretation: 0 points: Class I (very low risk - 0.4% complication rate)         Signed Electronically by: Tesha Dinh MD  Copy of this evaluation report is provided to requesting physician.

## 2023-09-12 NOTE — PATIENT INSTRUCTIONS
Please do pertinent work up for your preoperative clearance  ==========================  For informational purposes only. Not to replace the advice of your health care provider. Copyright   ,  [facility]. All rights reserved. Clinically reviewed by Alta Mahmood MD. [employer] [id number] - REV .  Preparing for Your Surgery  Getting started  A nurse will call you to review your health history and instructions. They will give you an arrival time based on your scheduled surgery time. Please be ready to share:  Your doctor's clinic name and phone number  Your medical, surgical, and anesthesia history  A list of allergies and sensitivities  A list of medicines, including herbal treatments and over-the-counter drugs  Whether the patient has a legal guardian (ask how to send us the papers in advance)  Please tell us if you're pregnant--or if there's any chance you might be pregnant. Some surgeries may injure a fetus (unborn baby), so they require a pregnancy test. Surgeries that are safe for a fetus don't always need a test, and you can choose whether to have one.   If you have a child who's having surgery, please ask for a copy of Preparing for Your Child's Surgery.    Preparing for surgery  Within 10 to 30 days of surgery: Have a pre-op exam (sometimes called an H&P, or History and Physical). This can be done at a clinic or pre-operative center.  If you're having a , you may not need this exam. Talk to your care team.  At your pre-op exam, talk to your care team about all medicines you take. If you need to stop any medicines before surgery, ask when to start taking them again.  We do this for your safety. Many medicines can make you bleed too much during surgery. Some change how well surgery (anesthesia) drugs work.  Call your insurance company to let them know you're having surgery. (If you don't have insurance, call [phone].)  Call your clinic if there's any change in your health.  This includes signs of a cold or flu (sore throat, runny nose, cough, rash, fever). It also includes a scrape or scratch near the surgery site.  If you have questions on the day of surgery, call your hospital or surgery center.  Eating and drinking guidelines  For your safety: Unless your surgeon tells you otherwise, follow the guidelines below.  Eat and drink as usual until 8 hours before you arrive for surgery. After that, no food or milk.  Drink clear liquids until 2 hours before you arrive. These are liquids you can see through, like water, Gatorade, and Propel Water. They also include plain black coffee and tea (no cream or milk), candy, and breath mints. You can spit out gum when you arrive.  If you drink alcohol: Stop drinking it the night before surgery.  If your care team tells you to take medicine on the morning of surgery, it's okay to take it with a sip of water.  Preventing infection  Shower or bathe the night before and morning of your surgery. Follow the instructions your clinic gave you. (If no instructions, use regular soap.)  Don't shave or clip hair near your surgery site. We'll remove the hair if needed.  Don't smoke or vape the morning of surgery. You may chew nicotine gum up to 2 hours before surgery. A nicotine patch is okay.  Note: Some surgeries require you to completely quit smoking and nicotine. Check with your surgeon.  Your care team will make every effort to keep you safe from infection. We will:  Clean our hands often with soap and water (or an alcohol-based hand rub).  Clean the skin at your surgery site with a special soap that kills germs.  Give you a special gown to keep you warm. (Cold raises the risk of infection.)  Wear special hair covers, masks, gowns and gloves during surgery.  Give antibiotic medicine, if prescribed. Not all surgeries need antibiotics.  What to bring on the day of surgery  Photo ID and insurance card  Copy of your health care directive, if you have  one  Glasses and hearing aids (bring cases)  You can't wear contacts during surgery  Inhaler and eye drops, if you use them (tell us about these when you arrive)  CPAP machine or breathing device, if you use them  A few personal items, if spending the night  If you have . . .  A pacemaker, ICD (cardiac defibrillator) or other implant: Bring the ID card.  An implanted stimulator: Bring the remote control.  A legal guardian: Bring a copy of the certified (court-stamped) guardianship papers.  Please remove any jewelry, including body piercings. Leave jewelry and other valuables at home.  If you're going home the day of surgery  You must have a responsible adult drive you home. They should stay with you overnight as well.  If you don't have someone to stay with you, and you aren't safe to go home alone, we may keep you overnight. Insurance often won't pay for this.  After surgery  If it's hard to control your pain or you need more pain medicine, please call your surgeon's office.  Questions?   If you have any questions for your care team, list them here: _________________________________________________________________________________________________________________________________________________________________________ ____________________________________ ____________________________________ ____________________________________

## 2023-09-12 NOTE — TELEPHONE ENCOUNTER
Provider huddled with this RN and stated that patient was requesting a refill of her Baclofen. Medication is pended but unsure what pharmacy patient would like it sent to. Attempted to call patient for clarification of pharmacy but received VM. Left VM to call the clinic back.    On call back please clarify pharmacy and route to refill pool.    Courtney WRAY RN  [facility] Triage Team

## 2023-09-13 NOTE — RESULT ENCOUNTER NOTE
Your lab work is POSITIVE for Prediabetes. Please follow the diet below for improvement. Will need follow up on this.

## 2023-09-15 NOTE — TELEPHONE ENCOUNTER
Patient Contact    Attempt # 1    Was call answered?  No.  Left message on voicemail with information to call triage back at [phone], option 2.     On call back: see below.  Yady Hu RN

## 2023-09-20 NOTE — TELEPHONE ENCOUNTER
Patient Contact    Attempt # 2    Was call answered?  No.  Left message on voicemail with information to call clinic back.    Crystal Black RN  [facility]

## 2023-09-22 NOTE — TELEPHONE ENCOUNTER
Patient Contact    Attempt # 3    Was call answered?  No.  Left message on voicemail with information to call triage back at [phone], option 2.     Yady Hu RN

## 2023-09-26 RX ORDER — BACLOFEN 10 MG/1
5-10 TABLET ORAL 2 TIMES DAILY PRN
Qty: 60 TABLET | Refills: 1 | Status: SHIPPED | OUTPATIENT
Start: 2023-09-26 | End: 2023-10-25

## 2023-09-26 NOTE — TELEPHONE ENCOUNTER
Pt called back - states she needs it sent to [facility] on [address]    Asking if request can be sent to Dr Henry, wants him listed as PCP     Paola FLORENTINO, Triage RN  [facility]

## 2023-10-25 DIAGNOSIS — M79.18 MYOFASCIAL MUSCLE PAIN: ICD-10-CM

## 2023-10-25 RX ORDER — BACLOFEN 10 MG/1
5-10 TABLET ORAL 2 TIMES DAILY PRN
Qty: 60 TABLET | Refills: 1 | Status: SHIPPED | OUTPATIENT
Start: 2023-10-25 | End: 2023-11-28

## 2023-10-25 NOTE — TELEPHONE ENCOUNTER
Received fax from pharmacy requesting refill(s) for     baclofen (LIORESAL) 10 MG tablet     Last refilled on 9/26/2023.    Pt last seen on 5/10/2023.  Next appt scheduled for NONE.    E-prescribe to:    [facility]/PHARMACY #[id number] - [place], [place] - [address]     Will facilitate refill.    Pharmacy requesting 90 day Rx.

## 2023-11-06 ENCOUNTER — OFFICE VISIT (OUTPATIENT)
Dept: FAMILY MEDICINE | Facility: CLINIC | Age: 63
End: 2023-11-06
Payer: COMMERCIAL

## 2023-11-06 VITALS
WEIGHT: 137.4 LBS | HEART RATE: 70 BPM | RESPIRATION RATE: 16 BRPM | SYSTOLIC BLOOD PRESSURE: 180 MMHG | HEIGHT: 59 IN | DIASTOLIC BLOOD PRESSURE: 99 MMHG | TEMPERATURE: 97.4 F | BODY MASS INDEX: 27.7 KG/M2 | OXYGEN SATURATION: 98 %

## 2023-11-06 DIAGNOSIS — R03.0 ELEVATED BLOOD PRESSURE READING WITHOUT DIAGNOSIS OF HYPERTENSION: ICD-10-CM

## 2023-11-06 DIAGNOSIS — R87.612 LGSIL ON PAP SMEAR OF CERVIX: Primary | ICD-10-CM

## 2023-11-06 PROCEDURE — 88305 TISSUE EXAM BY PATHOLOGIST: CPT | Performed by: PATHOLOGY

## 2023-11-06 PROCEDURE — 57505 ENDOCERVICAL CURETTAGE: CPT | Performed by: FAMILY MEDICINE

## 2023-11-06 PROCEDURE — 56820 COLPOSCOPY VULVA: CPT | Performed by: FAMILY MEDICINE

## 2023-11-06 RX ORDER — RESPIRATORY SYNCYTIAL VIRUS VACCINE 120MCG/0.5
0.5 KIT INTRAMUSCULAR ONCE
Qty: 1 EACH | Refills: 0 | Status: CANCELLED | OUTPATIENT
Start: 2023-11-06 | End: 2023-11-06

## 2023-11-06 RX ORDER — BRIMONIDINE TARTRATE 2 MG/ML
SOLUTION/ DROPS OPHTHALMIC
COMMUNITY
Start: 2023-09-19

## 2023-11-06 ASSESSMENT — PAIN SCALES - GENERAL: PAINLEVEL: NO PAIN (0)

## 2023-11-08 LAB
PATH REPORT.COMMENTS IMP SPEC: NORMAL
PATH REPORT.COMMENTS IMP SPEC: NORMAL
PATH REPORT.FINAL DX SPEC: NORMAL
PATH REPORT.GROSS SPEC: NORMAL
PATH REPORT.MICROSCOPIC SPEC OTHER STN: NORMAL
PATH REPORT.RELEVANT HX SPEC: NORMAL
PHOTO IMAGE: NORMAL

## 2023-11-08 NOTE — RESULT ENCOUNTER NOTE
Please notify the patient that her colposcopy testing shows findings.  No precancerous or cancerous changes noted.  Repeat Pap with HPV testing in 1 year recommended    Erna Patrick MD

## 2023-11-09 ENCOUNTER — PATIENT OUTREACH (OUTPATIENT)
Dept: FAMILY MEDICINE | Facility: CLINIC | Age: 63
End: 2023-11-09
Payer: COMMERCIAL

## 2023-11-13 ENCOUNTER — OFFICE VISIT (OUTPATIENT)
Dept: FAMILY MEDICINE | Facility: CLINIC | Age: 63
End: 2023-11-13
Payer: COMMERCIAL

## 2023-11-13 VITALS
HEART RATE: 74 BPM | RESPIRATION RATE: 13 BRPM | BODY MASS INDEX: 27.42 KG/M2 | TEMPERATURE: 98 F | DIASTOLIC BLOOD PRESSURE: 84 MMHG | SYSTOLIC BLOOD PRESSURE: 152 MMHG | HEIGHT: 59 IN | OXYGEN SATURATION: 97 % | WEIGHT: 136 LBS

## 2023-11-13 DIAGNOSIS — I10 BENIGN ESSENTIAL HYPERTENSION: Primary | ICD-10-CM

## 2023-11-13 DIAGNOSIS — R20.2 PARESTHESIA OF BILATERAL LEGS: ICD-10-CM

## 2023-11-13 PROCEDURE — 99214 OFFICE O/P EST MOD 30 MIN: CPT | Mod: 24 | Performed by: FAMILY MEDICINE

## 2023-11-13 RX ORDER — GABAPENTIN 300 MG/1
CAPSULE ORAL
Qty: 180 CAPSULE | Refills: 1 | Status: SHIPPED | OUTPATIENT
Start: 2023-11-13

## 2023-11-13 RX ORDER — LOSARTAN POTASSIUM 25 MG/1
25 TABLET ORAL DAILY
Qty: 30 TABLET | Refills: 3 | Status: SHIPPED | OUTPATIENT
Start: 2023-11-13 | End: 2024-02-22

## 2023-11-13 ASSESSMENT — PAIN SCALES - GENERAL: PAINLEVEL: NO PAIN (0)

## 2023-11-13 NOTE — PROGRESS NOTES
"  Assessment & Plan     Benign essential hypertension  New diagnosis.  Started patient on losartan 25 mg daily.  Side effect profile reviewed.  Instructed to eat low-salt diet.  Recommending to start blood pressure monitor and exercise regularly.  Follow-up in 4 weeks for recheck.  Patient agrees to the plan  - losartan (COZAAR) 25 MG tablet; Take 1 tablet (25 mg) by mouth daily    Paresthesia of bilateral legs  Patient been using gabapentin for paresthesias in the legs.  Symptoms are fairly well managed.  - gabapentin (NEURONTIN) 300 MG capsule; Take 300mg (one capsule) 1-2 times daily             BMI:   Estimated body mass index is 27.64 kg/m  as calculated from the following:    Height as of this encounter: 1.494 m (4' 10.82\").    Weight as of this encounter: 61.7 kg (136 lb).           Erna Patrick MD  [facility] [place] is a 63 year old, presenting for the following health issues:  Hypertension        11/13/2023     3:37 PM   Additional Questions   Roomed by Shae PINO       History of Present Illness       Hypertension: She presents for follow up of hypertension.  She does not check blood pressure  regularly outside of the clinic. Outside blood pressures have been over 140/90. She does not follow a low salt diet.     She eats 0-1 servings of fruits and vegetables daily.She consumes 2 sweetened beverage(s) daily.She exercises with enough effort to increase her heart rate 9 or less minutes per day.  She exercises with enough effort to increase her heart rate 3 or less days per week.   She is taking medications regularly.                 Review of Systems   CONSTITUTIONAL: NEGATIVE for fever, chills, change in weight  ENT/MOUTH: NEGATIVE for ear, mouth and throat problems  RESP: NEGATIVE for significant cough or SOB  CV: NEGATIVE for chest pain, palpitations or peripheral edema      Objective    BP (!) 152/84 (BP Location: Left arm, Patient Position: Sitting, Cuff Size: Adult " "Regular)   Pulse 74   Temp 98  F (36.7  C) (Tympanic)   Resp 13   Ht 1.494 m (4' 10.82\")   Wt 61.7 kg (136 lb)   LMP 03/24/2009   SpO2 97%   BMI 27.64 kg/m    Body mass index is 27.64 kg/m .  Physical Exam   GENERAL: healthy, alert and no distress  NECK: no adenopathy, no asymmetry, masses, or scars and thyroid normal to palpation  RESP: lungs clear to auscultation - no rales, rhonchi or wheezes  CV: regular rate and rhythm, normal S1 S2, no S3 or S4, no murmur, click or rub, no peripheral edema and peripheral pulses strong  ABDOMEN: soft, nontender, no hepatosplenomegaly, no masses and bowel sounds normal  MS: no gross musculoskeletal defects noted, no edema                      "

## 2023-11-28 DIAGNOSIS — M79.18 MYOFASCIAL MUSCLE PAIN: ICD-10-CM

## 2023-11-28 NOTE — TELEPHONE ENCOUNTER
Received fax request from [facility] pharmacy requesting refill(s) for baclofen (LIORESAL) 10 MG tablet -Requesting 90 day fill.    Last refilled on 10/25/23    Pt last seen on 05/10/23  Next appt scheduled for none    Will facilitate refill.    Routing to RN pool to discuss with provider and adjust prescription accordingly.

## 2023-11-29 RX ORDER — BACLOFEN 10 MG/1
5-10 TABLET ORAL 2 TIMES DAILY PRN
Qty: 180 TABLET | Refills: 1 | Status: SHIPPED | OUTPATIENT
Start: 2023-11-29 | End: 2024-05-22

## 2023-12-04 DIAGNOSIS — F32.1 MAJOR DEPRESSIVE DISORDER, SINGLE EPISODE, MODERATE (H): ICD-10-CM

## 2023-12-04 RX ORDER — FLUOXETINE 40 MG/1
40 CAPSULE ORAL DAILY
Qty: 90 CAPSULE | Refills: 0 | Status: SHIPPED | OUTPATIENT
Start: 2023-12-04 | End: 2024-02-22

## 2023-12-07 PROBLEM — I10 BENIGN ESSENTIAL HYPERTENSION: Status: ACTIVE | Noted: 2023-12-07

## 2023-12-09 DIAGNOSIS — A60.00 RECURRENT GENITAL HERPES: ICD-10-CM

## 2023-12-11 RX ORDER — VALACYCLOVIR HYDROCHLORIDE 500 MG/1
TABLET, FILM COATED ORAL
Qty: 90 TABLET | Refills: 0 | Status: SHIPPED | OUTPATIENT
Start: 2023-12-11 | End: 2024-03-12

## 2023-12-13 ENCOUNTER — ALLIED HEALTH/NURSE VISIT (OUTPATIENT)
Dept: FAMILY MEDICINE | Facility: CLINIC | Age: 63
End: 2023-12-13
Payer: COMMERCIAL

## 2023-12-13 VITALS — SYSTOLIC BLOOD PRESSURE: 119 MMHG | HEART RATE: 74 BPM | OXYGEN SATURATION: 98 % | DIASTOLIC BLOOD PRESSURE: 76 MMHG

## 2023-12-13 DIAGNOSIS — Z01.30 BP CHECK: Primary | ICD-10-CM

## 2023-12-13 PROCEDURE — 99207 PR NO CHARGE NURSE ONLY: CPT

## 2023-12-13 ASSESSMENT — PATIENT HEALTH QUESTIONNAIRE - PHQ9: SUM OF ALL RESPONSES TO PHQ QUESTIONS 1-9: 3

## 2023-12-13 NOTE — PROGRESS NOTES
Fredy Parker is a 63 year old patient who comes in today for a Blood Pressure check.  Initial BP:  /76 (BP Location: Right arm, Patient Position: Sitting, Cuff Size: Adult Regular)   Pulse 74   LMP 03/24/2009   SpO2 98%      Data Unavailable  Disposition: provider notified while patient in the clinic, results routed to provider, and pt notified about refills available at pharmacy.     Nestor Rajan MA on 12/13/2023 at 3:16 PM

## 2024-02-12 ENCOUNTER — TRANSFERRED RECORDS (OUTPATIENT)
Dept: MULTI SPECIALTY CLINIC | Facility: CLINIC | Age: 64
End: 2024-02-12

## 2024-02-12 LAB — RETINOPATHY: NORMAL

## 2024-02-22 DIAGNOSIS — F32.1 MAJOR DEPRESSIVE DISORDER, SINGLE EPISODE, MODERATE (H): ICD-10-CM

## 2024-02-22 DIAGNOSIS — I10 BENIGN ESSENTIAL HYPERTENSION: ICD-10-CM

## 2024-02-22 RX ORDER — FLUOXETINE 40 MG/1
40 CAPSULE ORAL DAILY
Qty: 90 CAPSULE | Refills: 0 | Status: SHIPPED | OUTPATIENT
Start: 2024-02-22 | End: 2024-05-16

## 2024-02-22 RX ORDER — LOSARTAN POTASSIUM 25 MG/1
25 TABLET ORAL DAILY
Qty: 90 TABLET | Refills: 0 | Status: SHIPPED | OUTPATIENT
Start: 2024-02-22 | End: 2024-05-16

## 2024-02-22 NOTE — TELEPHONE ENCOUNTER
Prescription approved per [facility] [place].  Samara Becerra RN  [facility]

## 2024-02-23 DIAGNOSIS — E78.5 HYPERLIPIDEMIA LDL GOAL <130: ICD-10-CM

## 2024-02-23 RX ORDER — ROSUVASTATIN CALCIUM 40 MG/1
40 TABLET, COATED ORAL DAILY
Qty: 90 TABLET | Refills: 1 | Status: SHIPPED | OUTPATIENT
Start: 2024-02-23 | End: 2024-05-16

## 2024-03-09 DIAGNOSIS — A60.00 RECURRENT GENITAL HERPES: ICD-10-CM

## 2024-03-12 RX ORDER — VALACYCLOVIR HYDROCHLORIDE 500 MG/1
TABLET, FILM COATED ORAL
Qty: 90 TABLET | Refills: 0 | Status: SHIPPED | OUTPATIENT
Start: 2024-03-12 | End: 2024-05-16

## 2024-03-12 NOTE — TELEPHONE ENCOUNTER
Prescription approved per [facility] [place]ill [place].  Samara Becerra RN  [facility]

## 2024-04-24 ENCOUNTER — TRANSFERRED RECORDS (OUTPATIENT)
Dept: HEALTH INFORMATION MANAGEMENT | Facility: CLINIC | Age: 64
End: 2024-04-24
Payer: COMMERCIAL

## 2024-05-16 ENCOUNTER — TELEPHONE (OUTPATIENT)
Dept: FAMILY MEDICINE | Facility: CLINIC | Age: 64
End: 2024-05-16

## 2024-05-16 ENCOUNTER — OFFICE VISIT (OUTPATIENT)
Dept: FAMILY MEDICINE | Facility: CLINIC | Age: 64
End: 2024-05-16
Payer: COMMERCIAL

## 2024-05-16 VITALS
OXYGEN SATURATION: 98 % | SYSTOLIC BLOOD PRESSURE: 134 MMHG | RESPIRATION RATE: 20 BRPM | BODY MASS INDEX: 27.03 KG/M2 | HEART RATE: 65 BPM | WEIGHT: 133 LBS | DIASTOLIC BLOOD PRESSURE: 80 MMHG | TEMPERATURE: 96.2 F

## 2024-05-16 DIAGNOSIS — K21.9 GASTROESOPHAGEAL REFLUX DISEASE WITHOUT ESOPHAGITIS: ICD-10-CM

## 2024-05-16 DIAGNOSIS — I10 BENIGN ESSENTIAL HYPERTENSION: ICD-10-CM

## 2024-05-16 DIAGNOSIS — M77.8 THUMB TENDONITIS: ICD-10-CM

## 2024-05-16 DIAGNOSIS — A60.00 RECURRENT GENITAL HERPES: ICD-10-CM

## 2024-05-16 DIAGNOSIS — F32.1 MAJOR DEPRESSIVE DISORDER, SINGLE EPISODE, MODERATE (H): ICD-10-CM

## 2024-05-16 DIAGNOSIS — E78.5 HYPERLIPIDEMIA LDL GOAL <160: Primary | ICD-10-CM

## 2024-05-16 DIAGNOSIS — E78.5 HYPERLIPIDEMIA LDL GOAL <130: ICD-10-CM

## 2024-05-16 DIAGNOSIS — Z23 HIGH PRIORITY FOR 2019-NCOV VACCINE: ICD-10-CM

## 2024-05-16 LAB
ALBUMIN SERPL BCG-MCNC: 4.5 G/DL (ref 3.5–5.2)
ALP SERPL-CCNC: 117 U/L (ref 40–150)
ALT SERPL W P-5'-P-CCNC: 41 U/L (ref 0–50)
ANION GAP SERPL CALCULATED.3IONS-SCNC: 11 MMOL/L (ref 7–15)
AST SERPL W P-5'-P-CCNC: 35 U/L (ref 0–45)
BILIRUB SERPL-MCNC: 0.3 MG/DL
BUN SERPL-MCNC: 25.6 MG/DL (ref 8–23)
CALCIUM SERPL-MCNC: 9.5 MG/DL (ref 8.8–10.2)
CHLORIDE SERPL-SCNC: 108 MMOL/L (ref 98–107)
CHOLEST SERPL-MCNC: 150 MG/DL
CREAT SERPL-MCNC: 0.6 MG/DL (ref 0.51–0.95)
DEPRECATED HCO3 PLAS-SCNC: 22 MMOL/L (ref 22–29)
EGFRCR SERPLBLD CKD-EPI 2021: >90 ML/MIN/1.73M2
ERYTHROCYTE [DISTWIDTH] IN BLOOD BY AUTOMATED COUNT: 13.4 % (ref 10–15)
FASTING STATUS PATIENT QL REPORTED: YES
FASTING STATUS PATIENT QL REPORTED: YES
GLUCOSE SERPL-MCNC: 122 MG/DL (ref 70–99)
HCT VFR BLD AUTO: 41.3 % (ref 35–47)
HDLC SERPL-MCNC: 67 MG/DL
HGB BLD-MCNC: 13.7 G/DL (ref 11.7–15.7)
LDLC SERPL CALC-MCNC: 68 MG/DL
MCH RBC QN AUTO: 29.8 PG (ref 26.5–33)
MCHC RBC AUTO-ENTMCNC: 33.2 G/DL (ref 31.5–36.5)
MCV RBC AUTO: 90 FL (ref 78–100)
NONHDLC SERPL-MCNC: 83 MG/DL
PLATELET # BLD AUTO: 178 10E3/UL (ref 150–450)
POTASSIUM SERPL-SCNC: 4.3 MMOL/L (ref 3.4–5.3)
PROT SERPL-MCNC: 6.9 G/DL (ref 6.4–8.3)
RBC # BLD AUTO: 4.6 10E6/UL (ref 3.8–5.2)
SODIUM SERPL-SCNC: 141 MMOL/L (ref 135–145)
TRIGL SERPL-MCNC: 76 MG/DL
WBC # BLD AUTO: 6.1 10E3/UL (ref 4–11)

## 2024-05-16 PROCEDURE — 90480 ADMN SARSCOV2 VAC 1/ONLY CMP: CPT | Performed by: FAMILY MEDICINE

## 2024-05-16 PROCEDURE — 80053 COMPREHEN METABOLIC PANEL: CPT | Performed by: FAMILY MEDICINE

## 2024-05-16 PROCEDURE — 80061 LIPID PANEL: CPT | Performed by: FAMILY MEDICINE

## 2024-05-16 PROCEDURE — 85027 COMPLETE CBC AUTOMATED: CPT | Performed by: FAMILY MEDICINE

## 2024-05-16 PROCEDURE — 99214 OFFICE O/P EST MOD 30 MIN: CPT | Mod: 25 | Performed by: FAMILY MEDICINE

## 2024-05-16 PROCEDURE — 91320 SARSCV2 VAC 30MCG TRS-SUC IM: CPT | Performed by: FAMILY MEDICINE

## 2024-05-16 PROCEDURE — 36415 COLL VENOUS BLD VENIPUNCTURE: CPT | Performed by: FAMILY MEDICINE

## 2024-05-16 RX ORDER — FLUOXETINE 40 MG/1
40 CAPSULE ORAL DAILY
Qty: 90 CAPSULE | Refills: 1 | Status: SHIPPED | OUTPATIENT
Start: 2024-05-16

## 2024-05-16 RX ORDER — LOSARTAN POTASSIUM 25 MG/1
25 TABLET ORAL DAILY
Qty: 90 TABLET | Refills: 1 | Status: SHIPPED | OUTPATIENT
Start: 2024-05-16

## 2024-05-16 RX ORDER — OMEPRAZOLE 40 MG/1
40 CAPSULE, DELAYED RELEASE ORAL DAILY PRN
Qty: 90 CAPSULE | Refills: 1 | Status: SHIPPED | OUTPATIENT
Start: 2024-05-16

## 2024-05-16 RX ORDER — ROSUVASTATIN CALCIUM 40 MG/1
40 TABLET, COATED ORAL DAILY
Qty: 90 TABLET | Refills: 1 | Status: SHIPPED | OUTPATIENT
Start: 2024-05-16

## 2024-05-16 RX ORDER — RESPIRATORY SYNCYTIAL VIRUS VACCINE 120MCG/0.5
0.5 KIT INTRAMUSCULAR ONCE
Qty: 1 EACH | Refills: 0 | Status: CANCELLED | OUTPATIENT
Start: 2024-05-16 | End: 2024-05-16

## 2024-05-16 RX ORDER — VALACYCLOVIR HYDROCHLORIDE 500 MG/1
500 TABLET, FILM COATED ORAL DAILY
Qty: 90 TABLET | Refills: 1 | Status: SHIPPED | OUTPATIENT
Start: 2024-05-16

## 2024-05-16 ASSESSMENT — ENCOUNTER SYMPTOMS: LEG PAIN: 1

## 2024-05-16 ASSESSMENT — PAIN SCALES - GENERAL: PAINLEVEL: SEVERE PAIN (7)

## 2024-05-16 NOTE — TELEPHONE ENCOUNTER
Reason for Call:  Appointment Request    Patient requesting this type of appt: Procedure: Colposcopy    Requested provider:  Patient didn't say who she wanted to see    Reason patient unable to be scheduled: Needs to be scheduled by clinic    When does patient want to be seen/preferred time:  Anytime    Comments: When can I schedule this?    Could we send this information to you in [employer] or would you prefer to receive a phone call?:   Patient would prefer a phone call   Okay to leave a detailed message?: Yes at Home number on file [phone] (home)    Call taken on 5/16/2024 at 9:54 AM by Karuna Torres

## 2024-05-16 NOTE — PROGRESS NOTES
"  Assessment & Plan     Hyperlipidemia LDL goal <160    - Lipid panel reflex to direct LDL Non-fasting; Future  - Comprehensive metabolic panel; Future    Major depressive disorder, single episode, moderate (H)    - FLUoxetine (PROZAC) 40 MG capsule; Take 1 capsule (40 mg) by mouth daily    Benign essential hypertension    - Comprehensive metabolic panel; Future  - CBC with platelets; Future  - losartan (COZAAR) 25 MG tablet; Take 1 tablet (25 mg) by mouth daily    Thumb tendonitis  Suggested to use thumb brace for immobilization use over-the-counter ibuprofen to see if that helps  - Wrist/Arm/Hand Bracking Supplies Order Thumb Keeper Brace    Recurrent genital herpes    - valACYclovir (VALTREX) 500 MG tablet; Take 1 tablet (500 mg) by mouth daily    Gastroesophageal reflux disease without esophagitis    - omeprazole (PRILOSEC) 40 MG DR capsule; Take 1 capsule (40 mg) by mouth daily as needed (GERD)    Hyperlipidemia LDL goal <130    - rosuvastatin (CRESTOR) 40 MG tablet; Take 1 tablet (40 mg) by mouth daily    The longitudinal plan of care for the diagnosis(es)/condition(s) as documented were addressed during this visit. Due to the added complexity in care, I will continue to support Carlene in the subsequent management and with ongoing continuity of care.      BMI  Estimated body mass index is 27.03 kg/m  as calculated from the following:    Height as of 11/13/23: 1.494 m (4' 10.82\").    Weight as of this encounter: 60.3 kg (133 lb).       Mellissa Concepcion is a 63 year old, presenting for the following health issues:  Musculoskeletal Problem and Leg Pain (At night, burning and tingling )  Refill on her chronic medical conditions including depression recurrent herpes, cholesterol as well as GERD medication.  She feels chronic medical history continue to complain of some leg pain.  Recently have some pain in the thumb area with limited range of motion on the left side.          5/16/2024     8:50 AM   Additional " Questions   Roomed by Long SHETH     Musculoskeletal Problem    Leg Pain    History of Present Illness       Reason for visit:  My hand hurt  Symptom onset:  More than a month  Symptom intensity:  Severe  Symptom progression:  Staying the same  Had these symptoms before:  No  What makes it worse:  No  What makes it better:  None    She eats 2-3 servings of fruits and vegetables daily.She consumes 2 sweetened beverage(s) daily.She exercises with enough effort to increase her heart rate 10 to 19 minutes per day.  She exercises with enough effort to increase her heart rate 3 or less days per week.   She is taking medications regularly.         Pain History:  When did you first notice your pain? 1 month ago   Have you seen anyone else for your pain? No  How has your pain affected your ability to work? Pain does not limit ability to work   Where in your body do you have pain? Musculoskeletal problem/pain  Onset/Duration: 1 month  Description  Location: hand - left  Joint Swelling: No  Redness: No  Pain: YES  Warmth: No  Intensity:  moderate  Progression of Symptoms:  worsening  Accompanying signs and symptoms:   Fevers: No  Numbness/tingling/weakness: YES- weakness - tingling      Review of Systems  Constitutional, HEENT, cardiovascular, pulmonary, gi and gu systems are negative, except as otherwise noted.      Objective    /80   Pulse 65   Temp (!) 96.2  F (35.7  C) (Tympanic)   Resp 20   Wt 60.3 kg (133 lb)   LMP 03/24/2009   SpO2 98%   BMI 27.03 kg/m    Body mass index is 27.03 kg/m .  Physical Exam   GENERAL: alert and no distress  NECK: no adenopathy, no asymmetry, masses, or scars  RESP: lungs clear to auscultation - no rales, rhonchi or wheezes  CV: regular rate and rhythm, normal S1 S2, no S3 or S4, no murmur, click or rub, no peripheral edema  ABDOMEN: soft, nontender, no hepatosplenomegaly, no masses and bowel sounds normal  Range of motion is limited on the left side.  Flexion extension is somewhat  painful.  Wrist range of motion is normal.          Signed Electronically by: Stevie Graves MD

## 2024-05-16 NOTE — LETTER
May 20, 2024      Carlene Parker  [address]  [place] [address]        Dear ,    I have reviewed your recent labs. Here are the results:     -Normal red blood cell (hgb) levels, normal white blood cell count and normal platelet levels.   -Cholesterol levels (LDL,HDL, Triglycerides) are normal.  ADVISE: rechecking in 1 year.     -Liver kidney functions are normal.  Blood glucose is mildly elevated however not in diabetic range.  Please follow-up with your primary doctor in future to discuss labs.  No change in medications.     Resulted Orders   Lipid panel reflex to direct LDL Non-fasting   Result Value Ref Range    Cholesterol 150 <200 mg/dL    Triglycerides 76 <150 mg/dL    Direct Measure HDL 67 >=50 mg/dL    LDL Cholesterol Calculated 68 <=100 mg/dL    Non HDL Cholesterol 83 <130 mg/dL    Patient Fasting > 8hrs? Yes     Narrative    Cholesterol  Desirable:  <200 mg/dL    Triglycerides  Normal:  Less than 150 mg/dL  Borderline High:  150-199 mg/dL  High:  200-499 mg/dL  Very High:  Greater than or equal to 500 mg/dL    Direct Measure HDL  Female:  Greater than or equal to 50 mg/dL   Male:  Greater than or equal to 40 mg/dL    LDL Cholesterol  Desirable:  <100mg/dL  Above Desirable:  100-129 mg/dL   Borderline High:  130-159 mg/dL   High:  160-189 mg/dL   Very High:  >= 190 mg/dL    Non HDL Cholesterol  Desirable:  130 mg/dL  Above Desirable:  130-159 mg/dL  Borderline High:  160-189 mg/dL  High:  190-219 mg/dL  Very High:  Greater than or equal to 220 mg/dL   Comprehensive metabolic panel   Result Value Ref Range    Sodium 141 135 - 145 mmol/L      Comment:      Reference intervals for this test were updated on 09/26/2023 to more accurately reflect our healthy population. There may be differences in the flagging of prior results with similar values performed with this method. Interpretation of those prior results can be made in the context of the updated reference intervals.     Potassium 4.3  3.4 - 5.3 mmol/L    Carbon Dioxide (CO2) 22 22 - 29 mmol/L    Anion Gap 11 7 - 15 mmol/L    Urea Nitrogen 25.6 (H) 8.0 - 23.0 mg/dL    Creatinine 0.60 0.51 - 0.95 mg/dL    GFR Estimate >90 >60 mL/min/1.73m2    Calcium 9.5 8.8 - 10.2 mg/dL    Chloride 108 (H) 98 - 107 mmol/L    Glucose 122 (H) 70 - 99 mg/dL    Alkaline Phosphatase 117 40 - 150 U/L      Comment:      Reference intervals for this test were updated on 11/14/2023 to more accurately reflect our healthy population. There may be differences in the flagging of prior results with similar values performed with this method. Interpretation of those prior results can be made in the context of the updated reference intervals.    AST 35 0 - 45 U/L      Comment:      Reference intervals for this test were updated on 6/12/2023 to more accurately reflect our healthy population. There may be differences in the flagging of prior results with similar values performed with this method. Interpretation of those prior results can be made in the context of the updated reference intervals.    ALT 41 0 - 50 U/L      Comment:      Reference intervals for this test were updated on 6/12/2023 to more accurately reflect our healthy population. There may be differences in the flagging of prior results with similar values performed with this method. Interpretation of those prior results can be made in the context of the updated reference intervals.      Protein Total 6.9 6.4 - 8.3 g/dL    Albumin 4.5 3.5 - 5.2 g/dL    Bilirubin Total 0.3 <=1.2 mg/dL    Patient Fasting > 8hrs? Yes    CBC with platelets   Result Value Ref Range    WBC Count 6.1 4.0 - 11.0 10e3/uL    RBC Count 4.60 3.80 - 5.20 10e6/uL    Hemoglobin 13.7 11.7 - 15.7 g/dL    Hematocrit 41.3 35.0 - 47.0 %    MCV 90 78 - 100 fL    MCH 29.8 26.5 - 33.0 pg    MCHC 33.2 31.5 - 36.5 g/dL    RDW 13.4 10.0 - 15.0 %    Platelet Count 178 150 - 450 10e3/uL       If you have any questions or concerns, please call the clinic at the  number listed above.       Sincerely,      Stevie Graves MD

## 2024-05-16 NOTE — TELEPHONE ENCOUNTER
Last September was due for colpo, please check on with her if she did colpo last September.   If not she needs to do pap and colpo both. If she wants to do with me(male provider), please schedule any 30 minutes opening but not doublebook(or closing the remaining double book slot). If she wants with female provider, please check on with Dr Darnell murray

## 2024-05-20 NOTE — TELEPHONE ENCOUNTER
Left voicemail for patient to call back.   Few questions about scheduling- see Dr Henry's message.

## 2024-05-22 DIAGNOSIS — M79.18 MYOFASCIAL MUSCLE PAIN: ICD-10-CM

## 2024-05-22 NOTE — TELEPHONE ENCOUNTER
Received fax from pharmacy requesting refill(s) for baclofen (LIORESAL) 10 MG tablet     Last refilled on 02/25/24    Patient last seen on 05/10/23  Next appt scheduled for None    E-prescribe to:     [facility]/PHARMACY #[id number] - [place], [place] - [address]     Will facilitate refill.      Barbara Hardin MA  [facility]

## 2024-05-23 ENCOUNTER — PATIENT OUTREACH (OUTPATIENT)
Dept: CARE COORDINATION | Facility: CLINIC | Age: 64
End: 2024-05-23
Payer: COMMERCIAL

## 2024-05-23 RX ORDER — BACLOFEN 10 MG/1
5-10 TABLET ORAL 2 TIMES DAILY PRN
Qty: 180 TABLET | Refills: 1 | Status: SHIPPED | OUTPATIENT
Start: 2024-05-23

## 2024-06-05 ENCOUNTER — PATIENT OUTREACH (OUTPATIENT)
Dept: CARE COORDINATION | Facility: CLINIC | Age: 64
End: 2024-06-05
Payer: COMMERCIAL

## 2024-06-06 ENCOUNTER — PATIENT OUTREACH (OUTPATIENT)
Dept: CARE COORDINATION | Facility: CLINIC | Age: 64
End: 2024-06-06
Payer: COMMERCIAL

## 2024-06-18 ENCOUNTER — TELEPHONE (OUTPATIENT)
Dept: FAMILY MEDICINE | Facility: CLINIC | Age: 64
End: 2024-06-18
Payer: COMMERCIAL

## 2024-06-18 NOTE — TELEPHONE ENCOUNTER
Reason for Call:  Appointment Request    Patient requesting this type of appt: Procedure: colposcopy    Requested provider:  Dr. Miller    Reason patient unable to be scheduled: Needs to be scheduled by clinic    When does patient want to be seen/preferred time:  any    Comments: Pt need follow up colposcopy    Could we send this information to you in [facility] or would you prefer to receive a phone call?:   Patient would prefer a phone call   Okay to leave a detailed message?: Yes at Home number on file [phone] (home)    Call taken on 6/18/2024 at 12:12 PM by Nelly Dean

## 2024-06-25 ENCOUNTER — OFFICE VISIT (OUTPATIENT)
Dept: FAMILY MEDICINE | Facility: CLINIC | Age: 64
End: 2024-06-25
Payer: COMMERCIAL

## 2024-06-25 VITALS
OXYGEN SATURATION: 97 % | TEMPERATURE: 98.6 F | SYSTOLIC BLOOD PRESSURE: 124 MMHG | DIASTOLIC BLOOD PRESSURE: 74 MMHG | WEIGHT: 130 LBS | BODY MASS INDEX: 26.42 KG/M2 | RESPIRATION RATE: 15 BRPM | HEART RATE: 75 BPM

## 2024-06-25 DIAGNOSIS — R87.612 LGSIL ON PAP SMEAR OF CERVIX: Primary | ICD-10-CM

## 2024-06-25 PROCEDURE — G2211 COMPLEX E/M VISIT ADD ON: HCPCS | Performed by: FAMILY MEDICINE

## 2024-06-25 PROCEDURE — 99213 OFFICE O/P EST LOW 20 MIN: CPT | Performed by: FAMILY MEDICINE

## 2024-06-25 RX ORDER — RESPIRATORY SYNCYTIAL VIRUS VACCINE 120MCG/0.5
0.5 KIT INTRAMUSCULAR ONCE
Qty: 1 EACH | Refills: 0 | Status: CANCELLED | OUTPATIENT
Start: 2024-06-25 | End: 2024-06-25

## 2024-06-25 ASSESSMENT — PAIN SCALES - GENERAL: PAINLEVEL: NO PAIN (0)

## 2024-06-25 ASSESSMENT — PATIENT HEALTH QUESTIONNAIRE - PHQ9
SUM OF ALL RESPONSES TO PHQ QUESTIONS 1-9: 0
10. IF YOU CHECKED OFF ANY PROBLEMS, HOW DIFFICULT HAVE THESE PROBLEMS MADE IT FOR YOU TO DO YOUR WORK, TAKE CARE OF THINGS AT HOME, OR GET ALONG WITH OTHER PEOPLE: NOT DIFFICULT AT ALL
SUM OF ALL RESPONSES TO PHQ QUESTIONS 1-9: 0

## 2024-06-25 NOTE — PROGRESS NOTES
"  Assessment & Plan     LGSIL on Pap smear of cervix    Patient is status post colposcopy in November 2023.  Colposcopy findings were benign.  Patient was instructed to follow-up in 1 year for recheck and HPV test.  Patient came in today to see if she may need to get a Pap or colposcopy repeated but as it is too early based on the guidelines I have recommended against testing at this time.  Patient verbalized understanding and agreement.    The longitudinal plan of care for the diagnosis(es)/condition(s) as documented were addressed during this visit. Due to the added complexity in care, I will continue to support Carlene in the subsequent management and with ongoing continuity of care.    BMI  Estimated body mass index is 26.42 kg/m  as calculated from the following:    Height as of 11/13/23: 1.494 m (4' 10.82\").    Weight as of this encounter: 59 kg (130 lb).             Mellissa Concepcion is a 64 year old, presenting for the following health issues:  Consult        6/25/2024    10:29 AM   Additional Questions   Roomed by Long SUN   Patient has had abnormal Pap smear in the past.  She comes in today wondering if she is due for a recheck on Pap or colposcopy.      Objective    /74   Pulse 75   Temp 98.6  F (37  C) (Tympanic)   Resp 15   Wt 59 kg (130 lb)   LMP 03/24/2009   SpO2 97%   BMI 26.42 kg/m    Body mass index is 26.42 kg/m .  Physical Exam   GENERAL: alert and no distress  RESP: lungs clear to auscultation - no rales, rhonchi or wheezes  CV: regular rate and rhythm, normal S1 S2, no S3 or S4, no murmur, click or rub, no peripheral edema  ABDOMEN: soft, nontender, no hepatosplenomegaly, no masses and bowel sounds normal            Signed Electronically by: Erna Patrick MD    "

## 2024-07-23 ENCOUNTER — ANCILLARY PROCEDURE (OUTPATIENT)
Dept: MAMMOGRAPHY | Facility: CLINIC | Age: 64
End: 2024-07-23
Attending: FAMILY MEDICINE
Payer: COMMERCIAL

## 2024-07-23 DIAGNOSIS — Z12.31 VISIT FOR SCREENING MAMMOGRAM: ICD-10-CM

## 2024-07-23 PROCEDURE — 77067 SCR MAMMO BI INCL CAD: CPT | Mod: TC | Performed by: RADIOLOGY

## 2024-07-23 PROCEDURE — 77063 BREAST TOMOSYNTHESIS BI: CPT | Mod: TC | Performed by: RADIOLOGY

## 2024-08-04 ENCOUNTER — HEALTH MAINTENANCE LETTER (OUTPATIENT)
Age: 64
End: 2024-08-04

## 2024-08-14 ENCOUNTER — OFFICE VISIT (OUTPATIENT)
Dept: FAMILY MEDICINE | Facility: CLINIC | Age: 64
End: 2024-08-14
Payer: COMMERCIAL

## 2024-08-14 VITALS
BODY MASS INDEX: 26.85 KG/M2 | WEIGHT: 133.2 LBS | HEIGHT: 59 IN | RESPIRATION RATE: 17 BRPM | HEART RATE: 77 BPM | DIASTOLIC BLOOD PRESSURE: 76 MMHG | TEMPERATURE: 98 F | OXYGEN SATURATION: 99 % | SYSTOLIC BLOOD PRESSURE: 115 MMHG

## 2024-08-14 DIAGNOSIS — K21.9 GASTROESOPHAGEAL REFLUX DISEASE WITHOUT ESOPHAGITIS: ICD-10-CM

## 2024-08-14 DIAGNOSIS — I10 BENIGN ESSENTIAL HYPERTENSION: ICD-10-CM

## 2024-08-14 DIAGNOSIS — R73.03 PREDIABETES: ICD-10-CM

## 2024-08-14 DIAGNOSIS — Z12.11 SCREEN FOR COLON CANCER: ICD-10-CM

## 2024-08-14 DIAGNOSIS — H91.91 HEARING DEFICIT, RIGHT: ICD-10-CM

## 2024-08-14 DIAGNOSIS — E11.65 TYPE 2 DIABETES MELLITUS WITH HYPERGLYCEMIA, WITHOUT LONG-TERM CURRENT USE OF INSULIN (H): ICD-10-CM

## 2024-08-14 DIAGNOSIS — Z00.00 ENCOUNTER FOR MEDICARE ANNUAL WELLNESS EXAM: Primary | ICD-10-CM

## 2024-08-14 DIAGNOSIS — M85.80 AGE-RELATED BONE LOSS: ICD-10-CM

## 2024-08-14 DIAGNOSIS — E78.5 DYSLIPIDEMIA: ICD-10-CM

## 2024-08-14 DIAGNOSIS — F32.1 MAJOR DEPRESSIVE DISORDER, SINGLE EPISODE, MODERATE (H): ICD-10-CM

## 2024-08-14 PROBLEM — E11.9 DIABETES MELLITUS, TYPE 2 (H): Status: ACTIVE | Noted: 2024-08-14

## 2024-08-14 LAB
ERYTHROCYTE [DISTWIDTH] IN BLOOD BY AUTOMATED COUNT: 13.5 % (ref 10–15)
HBA1C MFR BLD: 6.5 % (ref 0–5.6)
HCT VFR BLD AUTO: 41.3 % (ref 35–47)
HGB BLD-MCNC: 13.8 G/DL (ref 11.7–15.7)
MCH RBC QN AUTO: 30.5 PG (ref 26.5–33)
MCHC RBC AUTO-ENTMCNC: 33.4 G/DL (ref 31.5–36.5)
MCV RBC AUTO: 91 FL (ref 78–100)
PLATELET # BLD AUTO: 187 10E3/UL (ref 150–450)
RBC # BLD AUTO: 4.53 10E6/UL (ref 3.8–5.2)
VIT D+METAB SERPL-MCNC: 44 NG/ML (ref 20–50)
WBC # BLD AUTO: 7.5 10E3/UL (ref 4–11)

## 2024-08-14 PROCEDURE — 85027 COMPLETE CBC AUTOMATED: CPT | Performed by: INTERNAL MEDICINE

## 2024-08-14 PROCEDURE — 99214 OFFICE O/P EST MOD 30 MIN: CPT | Mod: 25 | Performed by: INTERNAL MEDICINE

## 2024-08-14 PROCEDURE — 36415 COLL VENOUS BLD VENIPUNCTURE: CPT | Performed by: INTERNAL MEDICINE

## 2024-08-14 PROCEDURE — 83036 HEMOGLOBIN GLYCOSYLATED A1C: CPT | Performed by: INTERNAL MEDICINE

## 2024-08-14 PROCEDURE — 82306 VITAMIN D 25 HYDROXY: CPT | Performed by: INTERNAL MEDICINE

## 2024-08-14 PROCEDURE — G0439 PPPS, SUBSEQ VISIT: HCPCS | Performed by: INTERNAL MEDICINE

## 2024-08-14 SDOH — HEALTH STABILITY: PHYSICAL HEALTH: ON AVERAGE, HOW MANY MINUTES DO YOU ENGAGE IN EXERCISE AT THIS LEVEL?: 10 MIN

## 2024-08-14 SDOH — HEALTH STABILITY: PHYSICAL HEALTH: ON AVERAGE, HOW MANY DAYS PER WEEK DO YOU ENGAGE IN MODERATE TO STRENUOUS EXERCISE (LIKE A BRISK WALK)?: 4 DAYS

## 2024-08-14 ASSESSMENT — PAIN SCALES - GENERAL: PAINLEVEL: NO PAIN (0)

## 2024-08-14 ASSESSMENT — SOCIAL DETERMINANTS OF HEALTH (SDOH): HOW OFTEN DO YOU GET TOGETHER WITH FRIENDS OR RELATIVES?: ONCE A WEEK

## 2024-08-14 NOTE — PROGRESS NOTES
Preventive Care Visit  [facility] JERMAN Dinh MD, Internal Medicine  Aug 14, 2024        Assessment and Plan  1. Encounter for Medicare annual wellness exam    Last seen patient in 9/2023 for preoperative clearance of cataract , she is here for annual physical.  Visit medical conditions ofDepression anxiety on Prozac 40 mg daily, olmesartan 20 mg daily, Crestor 40 mg daily.  Last lab work done in May 2024 showing normal CMP, IFG, normal CBC.  Recent lipid panel in May 2024 showing normal.    Most part of this appointment patient was concerned that she needs colposcopy today along with the Pap smear.  Reviewed the past records of her PCP who has done the colposcopy in November 2023 and the documentation of recheck in 1 year is for Pap smear.  Shared decision for patient to take a Pap only appointment with her PCP Dr. Patrick to discuss on if she needs a colposcopy which she is opting.    - Colonoscopy Screening  Referral; Future  - CBC with platelets; Future  - Vitamin D deficiency screening; Future  - Hemoglobin A1c; Future  - CBC with platelets  - Vitamin D deficiency screening  - Hemoglobin A1c    2. Type 2 diabetes mellitus with hyperglycemia, without long-term current use of insulin (H)  New diagnosis added to patient problem list, on the A1c check done today for her prediabetes below.  Given it is borderline A1c at 6.5 will consider recommending her diet and lifestyle modifications to normalize this.  Placed referral to diabetic educator as well as diabetic health maintenance ordered.  Patient will be notified.  - Adult Diabetes Education  Referral; Future  - Albumin Random Urine Quantitative with Creat Ratio; Future  - Adult Eye  Referral; Future    3. Major depressive disorder, single episode, moderate (H)  Chronic stable, continue current Prozac.    4. Hearing deficit, right  New problem, patient requesting for an ENT referral as she has hearing  deficits on the right side.  - Adult ENT  Referral; Future    5. Gastroesophageal reflux disease without esophagitis  - CBC with platelets; Future  - CBC with platelets    6. Prediabetes  - Hemoglobin A1c; Future  - Hemoglobin A1c    7. Dyslipidemia  Chronic stable, continue current Crestor at 40 mg daily.  Will recheck and do further recommendations.    8. Age-related bone loss  - Vitamin D deficiency screening; Future  - Vitamin D deficiency screening    9. Screen for colon cancer  - Colonoscopy Screening  Referral; Future    10. Benign essential hypertension  Chronic stable, patient blood pressure remaining in 100/70s on current losartan 25 mg daily.  Shared decision for BP recheck at home and submit the BP log for considering to come down on the medication dosage if she does not need it.  Patient confirms that she is not fasting at this time.      Please note that this note consists of symbols derived from keyboarding, dictation and/or voice recognition software. As a result, there may be errors in the script that have gone undetected. Please consider this when interpreting information found in this chart.    Patient Instructions   As discussed, please do Non fasting LABs today  .     Refills will be taken care.     You will need recheck Colonoscopy of 5 years follow up due at this time. Will sign the orders for you    ========================    Patient Education  Preventive Care Advice   This is general advice given by our system to help you stay healthy. However, your care team may have specific advice just for you. Please talk to your care team about your preventive care needs.  Nutrition  Eat 5 or more servings of fruits and vegetables each day.  Try wheat bread, brown rice and whole grain pasta (instead of white bread, rice, and pasta).  Get enough calcium and vitamin D. Check the label on foods and aim for 100% of the RDA (recommended daily allowance).  Lifestyle  Exercise at least 150  minutes each week  (30 minutes a day, 5 days a week).  Do muscle strengthening activities 2 days a week. These help control your weight and prevent disease.  No smoking.  Wear sunscreen to prevent skin cancer.  Have a dental exam and cleaning every 6 months.  Yearly exams  See your health care team every year to talk about:  Any changes in your health.  Any medicines your care team has prescribed.  Preventive care, family planning, and ways to prevent chronic diseases.  Shots (vaccines)   HPV shots (up to age 26), if you've never had them before.  Hepatitis B shots (up to age 59), if you've never had them before.  COVID-19 shot: Get this shot when it's due.  Flu shot: Get a flu shot every year.  Tetanus shot: Get a tetanus shot every 10 years.  Pneumococcal, hepatitis A, and RSV shots: Ask your care team if you need these based on your risk.  Shingles shot (for age 50 and up)  General health tests  Diabetes screening:  Starting at age 35, Get screened for diabetes at least every 3 years.  If you are younger than age 35, ask your care team if you should be screened for diabetes.  Cholesterol test: At age 39, start having a cholesterol test every 5 years, or more often if advised.  Bone density scan (DEXA): At age 50, ask your care team if you should have this scan for osteoporosis (brittle bones).  Hepatitis C: Get tested at least once in your life.  STIs (sexually transmitted infections)  Before age 24: Ask your care team if you should be screened for STIs.  After age 24: Get screened for STIs if you're at risk. You are at risk for STIs (including HIV) if:  You are sexually active with more than one person.  You don't use condoms every time.  You or a partner was diagnosed with a sexually transmitted infection.  If you are at risk for HIV, ask about PrEP medicine to prevent HIV.  Get tested for HIV at least once in your life, whether you are at risk for HIV or not.  Cancer screening tests  Cervical cancer screening:  If you have a cervix, begin getting regular cervical cancer screening tests starting at age 21.  Breast cancer scan (mammogram): If you've ever had breasts, begin having regular mammograms starting at age 40. This is a scan to check for breast cancer.  Colon cancer screening: It is important to start screening for colon cancer at age 45.  Have a colonoscopy test every 10 years (or more often if you're at risk) Or, ask your provider about stool tests like a FIT test every year or Cologuard test every 3 years.  To learn more about your testing options, visit:   .  For help making a decision, visit:   https://bit.ly/ik34314.  Prostate cancer screening test: If you have a prostate, ask your care team if a prostate cancer screening test (PSA) at age 55 is right for you.  Lung cancer screening: If you are a current or former smoker ages 50 to 80, ask your care team if ongoing lung cancer screenings are right for you.  For informational purposes only. Not to replace the advice of your health care provider. Copyright   2023 [employer]. All rights reserved. Clinically reviewed by the [facility] Transitions Program. [employer] 451991 - REV 01/24.  Hearing Loss: Care Instructions  Overview     Hearing loss is a sudden or slow decrease in how well you hear. It can range from slight to profound. Permanent hearing loss can occur with aging. It also can happen when you are exposed long-term to loud noise. Examples include listening to loud music, riding motorcycles, or being around other loud machines.  Hearing loss can affect your work and home life. It can make you feel lonely or depressed. You may feel that you have lost your independence. But hearing aids and other devices can help you hear better and feel connected to others.  Follow-up care is a key part of your treatment and safety. Be sure to make and go to all appointments, and call your doctor if you are having problems. It's also a good idea to know  your test results and keep a list of the medicines you take.  How can you care for yourself at home?  Avoid loud noises whenever possible. This helps keep your hearing from getting worse.  Always wear hearing protection around loud noises.  Wear a hearing aid as directed.  A professional can help you pick a hearing aid that will work best for you.  You can also get hearing aids over the counter for mild to moderate hearing loss.  Have hearing tests as your doctor suggests. They can show whether your hearing has changed. Your hearing aid may need to be adjusted.  Use other devices as needed. These may include:  Telephone amplifiers and hearing aids that can connect to a television, stereo, radio, or microphone.  Devices that use lights or vibrations. These alert you to the doorbell, a ringing telephone, or a baby monitor.  Television closed-captioning. This shows the words at the bottom of the screen. Most new TVs can do this.  TTY (text telephone). This lets you type messages back and forth on the telephone instead of talking or listening. These devices are also called TDD. When messages are typed on the keyboard, they are sent over the phone line to a receiving TTY. The message is shown on a monitor.  Use text messaging, social media, and email if it is hard for you to communicate by telephone.  Try to learn a listening technique called speechreading. It is not lipreading. You pay attention to people's gestures, expressions, posture, and tone of voice. These clues can help you understand what a person is saying. Face the person you are talking to, and have them face you. Make sure the lighting is good. You need to see the other person's face clearly.  Think about counseling if you need help to adjust to your hearing loss.  When should you call for help?  Watch closely for changes in your health, and be sure to contact your doctor if:    You think your hearing is getting worse.     You have new symptoms, such as  "dizziness or nausea.   Where can you learn more?  Go to https://www.[employer].net/patiented  Enter R798 in the search box to learn more about \"Hearing Loss: Care Instructions.\"  Current as of: September 27, 2023               Content Version: 14.0    3621-1209 [employer].   Care instructions adapted under license by your healthcare professional. If you have questions about a medical condition or this instruction, always ask your healthcare professional. [employer] disclaims any warranty or liability for your use of this information.         Return in about 12 weeks (around 11/6/2024), or if symptoms worsen or fail to improve, for PAP only .    Tesha Dinh MD  [facility] JERMAN Concepcion is a 64 year old, presenting for the following:  Physical (Mammogram results?? Changed for breast exam and pap )        8/14/2024     3:00 PM   Additional Questions   Roomed by Fransisca HAGAN         Health Care Directive  Patient does not have a Health Care Directive or Living Will: Discussed advance care planning with patient; information given to patient to review.    HPI        8/14/2024   General Health   How would you rate your overall physical health? Good   Feel stress (tense, anxious, or unable to sleep) Not at all            8/14/2024   Nutrition   Diet: I don't know            8/14/2024   Exercise   Days per week of moderate/strenous exercise 4 days   Average minutes spent exercising at this level 10 min            8/14/2024   Social Factors   Frequency of gathering with friends or relatives Once a week   Worry food won't last until get money to buy more No   Food not last or not have enough money for food? No   Do you have housing? (Housing is defined as stable permanent housing and does not include staying ouside in a car, in a tent, in an abandoned building, in an overnight shelter, or couch-surfing.) No   Are you worried about losing your housing? No   Lack " of transportation? Yes   Unable to get utilities (heat,electricity)? No   Want help with housing or utility concern? No       (!) TRANSPORTATION CONCERN PRESENT(!) HOUSING CONCERN PRESENT      8/14/2024   Fall Risk   Fallen 2 or more times in the past year? No   Trouble with walking or balance? No             8/14/2024   Activities of Daily Living- Home Safety   Needs help with the following daily activites None of the above   Safety concerns in the home None of the above            8/14/2024   Dental   Dentist two times every year? Yes            8/14/2024   Hearing Screening   Hearing concerns? (!) IT'S HARD TO FOLLOW A CONVERSATION IN A NOISY RESTAURANT OR CROWDED ROOM.            8/14/2024   Driving Risk Screening   Patient/family members have concerns about driving No            8/14/2024   General Alertness/Fatigue Screening   Have you been more tired than usual lately? No            8/14/2024   Urinary Incontinence Screening   Bothered by leaking urine in past 6 months No            8/14/2024   TB Screening   Were you born outside of the [place]? Yes          Today's PHQ-9 Score:       8/14/2024     2:41 PM   PHQ-9 SCORE   PHQ-9 Total Score MyChart 0   PHQ-9 Total Score 0         8/14/2024   Substance Use   Alcohol more than 3/day or more than 7/wk No   Do you have a current opioid prescription? No   How severe/bad is pain from 1 to 10? 0/10 (No Pain)   Do you use any other substances recreationally? No        Social History     Tobacco Use    Smoking status: Never    Smokeless tobacco: Never   Vaping Use    Vaping status: Never Used   Substance Use Topics    Alcohol use: No     Alcohol/week: 0.0 standard drinks of alcohol    Drug use: No           7/23/2024   LAST FHS-7 RESULTS   1st degree relative breast or ovarian cancer No   Any relative bilateral breast cancer No   Any male have breast cancer No   Any ONE woman have BOTH breast AND ovarian cancer No   Any woman with breast cancer before 50yrs No   2 or more  relatives with breast AND/OR ovarian cancer No   2 or more relatives with breast AND/OR bowel cancer No           Mammogram Screening - Annual screen due to greater than 20% lifetime risk as estimated by Breast Cancer Risk Calculator      History of abnormal Pap smear: YES - reflected in Problem List and Health Maintenance accordingly        Latest Ref Rng & Units 6/19/2023     3:46 PM 9/27/2021    10:30 AM 7/31/2020     2:51 PM   PAP / HPV   PAP  Low-grade squamous intraepithelial lesion (LSIL) encompassing HPV/mild dysplasia/CIN1  Atypical squamous cells of undetermined significance (ASC-US)     PAP (Historical)    ASC-US    HPV 16 DNA Negative Negative  Negative     HPV 18 DNA Negative Negative  Negative     Other HR HPV Negative Positive  Positive       ASCVD Risk   The 10-year ASCVD risk score (Emir SAEED, et al., 2019) is: 10.3%    Values used to calculate the score:      Age: 64 years      Sex: Female      Is Non- : No      Diabetic: Yes      Tobacco smoker: No      Systolic Blood Pressure: 115 mmHg      Is BP treated: Yes      HDL Cholesterol: 67 mg/dL      Total Cholesterol: 150 mg/dL    Reviewed and updated as needed this visit by Provider   Tobacco  Allergies  Meds  Problems  Med Hx  Surg Hx  Fam Hx            Past Medical History:   Diagnosis Date    ASCUS with positive high risk HPV 09/2014    colposcopy benign    Chronic gastric ulcer without mention of hemorrhage, perforation, without mention of obstruction 1994    gastric erosion dx'ed by endoscopy. pt was also treated for pos H.pylori    Disc disorder of cervical region 2006    mild disc changes C3-4/C4-5 and C5-6 with chronic myofascial cervicoscapular pain    Elevated transaminase level 2012    AST/ALT 2-3 x normal; fatty infiltration liver on ultrasound; negative testing for infectious hepatitis; normal iron levels, ELP, CK    Latent tuberculosis 10/2009    started INH     LSIL (low grade squamous  intraepithelial lesion) on Pap smear     HPV 56 (high risk) detected 2010    Major depressive disorder, single episode, moderate (H) 2006    Mixed hyperlipidemia 2009    elevated TG and LDL    Osteopenia 2009    mild, femoral neck    Recurrent genital herpes 2010    Status post laminectomy 2007/2012 12/2007: L5-S1 diskectomy 2012:Revision diskectomy left at L5-S1     Past Surgical History:   Procedure Laterality Date    COLONOSCOPY N/A 07/15/2019    Procedure: COLONOSCOPY, WITH POLYPECTOMY AND BIOPSY;  Surgeon: Danyell Peters MD;  Location:  GI    CONIZATION LEEP  02/2011    Completed for recurrent Mild Dysplasia and chronic endocervicitis    DISKECTOMY, LUMBAR, SINGLE SP  12/2007    L5-S1 diskectomy    HC INJ TRANSFORAMIN EPIDURAL, CERV/THOR SINGLE  05/2006    C7-T1 epidural steroid    INJECTION, ANESTHETIC/STEROID, TRANSFORAMINAL EPIDURAL; LUMBAR/SACRAL, SINGLE LEVEL  8,9/2007    left L5 selective nerve root injection under fluoroscopy with corticosteroid.     INJECTION, ANESTHETIC/STEROID, TRANSFORAMINAL EPIDURAL; LUMBAR/SACRAL, SINGLE LEVEL  10/2007    right L5-S1 translaminar epidural injection    LAMINECTOMY LUMBAR ONE LEVEL  03/06/2012    Procedure:LAMINECTOMY LUMBAR ONE LEVEL; REVISION DISCECTOMY L5-S1 ON THE LEFT ; Surgeon:ROSE PRITCHETT; Location: OR    LAPAROSCOPIC CHOLECYSTECTOMY  07/1999    MR LUMBAR SPINE W/O CONTRAST  01/2012    Moderate-sized left posterolateral caudally extruded L5-S1disc herniation impinging on left S1 nerve, mild degenerative disc changes at L4-5    RELEASE TRIGGER FINGER  05/17/2012    Procedure:RELEASE TRIGGER FINGER; right ring finger trigger release  (latex allergy:contact); Surgeon:QUYEN FUNK; Location: [facility]    SONO ABDOMEN LIMITED  09/2012    Increased echotexture liver c/w fatty infiltration    TUBAL LIGATION  2000     OB History   No obstetric history on file.     Lab work is in process  Labs reviewed in EPIC  BP Readings from Last 3 Encounters:    08/14/24 115/76   06/25/24 124/74   05/16/24 134/80    Wt Readings from Last 3 Encounters:   08/14/24 60.4 kg (133 lb 3.2 oz)   06/25/24 59 kg (130 lb)   05/16/24 60.3 kg (133 lb)                  Patient Active Problem List   Diagnosis    Inflammatory arthritis    Osteopenia    Papanicolaou smear of cervix with low grade squamous intraepithelial lesion (LGSIL)    Latent tuberculosis    Cervicalgia    Recurrent genital herpes    Posterior neck pain    Hyperlipidemia LDL goal <160    Gastroesophageal reflux disease without esophagitis    Major depressive disorder, single episode, moderate (H)    Osteoarthritis of finger, unspecified laterality    Perennial allergic rhinitis    Benign essential hypertension    Diabetes mellitus, type 2 (H)     Past Surgical History:   Procedure Laterality Date    COLONOSCOPY N/A 07/15/2019    Procedure: COLONOSCOPY, WITH POLYPECTOMY AND BIOPSY;  Surgeon: Danyell Peters MD;  Location:  GI    CONIZATION LEEP  02/2011    Completed for recurrent Mild Dysplasia and chronic endocervicitis    DISKECTOMY, LUMBAR, SINGLE SP  12/2007    L5-S1 diskectomy    HC INJ TRANSFORAMIN EPIDURAL, CERV/THOR SINGLE  05/2006    C7-T1 epidural steroid    INJECTION, ANESTHETIC/STEROID, TRANSFORAMINAL EPIDURAL; LUMBAR/SACRAL, SINGLE LEVEL  8,9/2007    left L5 selective nerve root injection under fluoroscopy with corticosteroid.     INJECTION, ANESTHETIC/STEROID, TRANSFORAMINAL EPIDURAL; LUMBAR/SACRAL, SINGLE LEVEL  10/2007    right L5-S1 translaminar epidural injection    LAMINECTOMY LUMBAR ONE LEVEL  03/06/2012    Procedure:LAMINECTOMY LUMBAR ONE LEVEL; REVISION DISCECTOMY L5-S1 ON THE LEFT ; Surgeon:ROSE PRITCHETT; Location: OR    LAPAROSCOPIC CHOLECYSTECTOMY  07/1999    MR LUMBAR SPINE W/O CONTRAST  01/2012    Moderate-sized left posterolateral caudally extruded L5-S1disc herniation impinging on left S1 nerve, mild degenerative disc changes at L4-5    RELEASE TRIGGER FINGER  05/17/2012     Procedure:RELEASE TRIGGER FINGER; right ring finger trigger release  (latex allergy:contact); Surgeon:QUYEN FUNK; Location:[facility]    SONO ABDOMEN LIMITED  09/2012    Increased echotexture liver c/w fatty infiltration    TUBAL LIGATION  2000       Social History     Tobacco Use    Smoking status: Never    Smokeless tobacco: Never   Substance Use Topics    Alcohol use: No     Alcohol/week: 0.0 standard drinks of alcohol     Family History   Problem Relation Age of Onset    Hypertension Mother     Lipids Mother     Hypertension Father     Lipids Father     Lipids Sister          Current Outpatient Medications   Medication Sig Dispense Refill    baclofen (LIORESAL) 10 MG tablet Take 0.5-1 tablets (5-10 mg) by mouth 2 times daily as needed for muscle spasms 180 tablet 1    CALTRATE 600+D PLUS 600-400 MG-UNIT OR TABS 1 TABLET TWICE DAILY  3 MONTHS PRN    CENTRUM OR TABS 1 TABLET DAILY 30 0    cetirizine (ZYRTEC) 10 MG tablet Take 1 tablet (10 mg) by mouth daily 90 tablet 3    FLUoxetine (PROZAC) 40 MG capsule Take 1 capsule (40 mg) by mouth daily 90 capsule 1    gabapentin (NEURONTIN) 300 MG capsule Take 300mg (one capsule) 1-2 times daily 180 capsule 1    losartan (COZAAR) 25 MG tablet Take 1 tablet (25 mg) by mouth daily 90 tablet 1    omeprazole (PRILOSEC) 40 MG DR capsule Take 1 capsule (40 mg) by mouth daily as needed (GERD) 90 capsule 1    rosuvastatin (CRESTOR) 40 MG tablet Take 1 tablet (40 mg) by mouth daily 90 tablet 1    valACYclovir (VALTREX) 500 MG tablet Take 1 tablet (500 mg) by mouth daily 90 tablet 1    VITAMIN D 1000 UNIT OR CAPS 2 CAPSULES DAILY 180 Cap PRN    brimonidine (ALPHAGAN) 0.2 % ophthalmic solution AFTER SURGERY: 1 DROP SURGICAL EYE(S) TWICE DAILY X 1 WEEK (Patient not taking: Reported on 8/14/2024)       Allergies   Allergen Reactions    Aspirin Rash    Latex     Nsaids GI Disturbance    Sulfasalazine Rash     Recent Labs   Lab Test 08/14/24  1610 05/16/24  0953 09/12/23  1539  05/03/23  1009 09/27/21  1055 07/31/20  1501 10/26/17  1829   A1C 6.5*  --  6.4* 5.9* 6.0*   < >  --    LDL  --  68  --  163* 158*   < > 141*   HDL  --  67  --  68 54   < >  --    TRIG  --  76  --  164* 230*   < >  --    ALT  --  41  --  26 57*  --  31   CR  --  0.60  --  0.58 0.63   < > 0.66   GFRESTIMATED  --  >90  --  >90 >90   < > >90   GFRESTBLACK  --   --   --   --   --   --  >90   POTASSIUM  --  4.3  --  4.3 3.6  --  4.2   TSH  --   --   --   --   --   --  2.76    < > = values in this interval not displayed.      Current providers sharing in care for this patient include:  Patient Care Team:  Blu Henry MD as PCP - General (Family Medicine)  Marcie Ferrell PA-C as Assigned PCP    The following health maintenance items are reviewed in Epic and correct as of today:  Health Maintenance   Topic Date Due    RSV VACCINE (Pregnancy & 60+) (1 - 1-dose 60+ series) Never done    COLORECTAL CANCER SCREENING  07/15/2024    PAP FOLLOW-UP  11/06/2024    HPV FOLLOW-UP  11/06/2024    INFLUENZA VACCINE (1) 09/01/2024    LIPID  11/16/2024    PHQ-9  02/14/2025    ANNUAL REVIEW OF HM ORDERS  06/25/2025    MAMMO SCREENING  07/23/2025    MEDICARE ANNUAL WELLNESS VISIT  08/14/2025    FALL RISK ASSESSMENT  08/14/2025    DEXA  03/21/2026    DTAP/TDAP/TD IMMUNIZATION (3 - Td or Tdap) 02/20/2027    GLUCOSE  05/16/2027    ADVANCE CARE PLANNING  08/14/2029    HEPATITIS C SCREENING  Completed    DEPRESSION ACTION PLAN  Completed    Pneumococcal Vaccine: 65+ Years  Completed    ZOSTER IMMUNIZATION  Completed    COVID-19 Vaccine  Completed    IPV IMMUNIZATION  Aged Out    HPV IMMUNIZATION  Aged Out    MENINGITIS IMMUNIZATION  Aged Out    RSV MONOCLONAL ANTIBODY  Aged Out    PAP  Discontinued         Review of Systems  Constitutional, HEENT, cardiovascular, pulmonary, GI, , musculoskeletal, neuro, skin, endocrine and psych systems are negative, except as otherwise noted.     Objective    Exam  /76 (BP Location: Left arm,  "Patient Position: Sitting, Cuff Size: Adult Regular)   Pulse 77   Temp 98  F (36.7  C) (Temporal)   Resp 17   Ht 1.49 m (4' 10.66\")   Wt 60.4 kg (133 lb 3.2 oz)   LMP 03/24/2009   SpO2 99%   BMI 27.21 kg/m     Estimated body mass index is 27.21 kg/m  as calculated from the following:    Height as of this encounter: 1.49 m (4' 10.66\").    Weight as of this encounter: 60.4 kg (133 lb 3.2 oz).    Physical Exam  GENERAL: alert and no distress  EYES: Eyes grossly normal to inspection, PERRL and conjunctivae and sclerae normal  HENT: ear canals and TM's normal, nose and mouth without ulcers or lesions  NECK: no adenopathy, no asymmetry, masses, or scars  RESP: lungs clear to auscultation - no rales, rhonchi or wheezes  BREAST: Deferred, recent mammogram normal  CV: regular rate and rhythm, normal S1 S2, no S3 or S4, no murmur, click or rub, no peripheral edema  ABDOMEN: soft, nontender, no hepatosplenomegaly, no masses and bowel sounds normal  MS: no gross musculoskeletal defects noted, no edema  SKIN: no suspicious lesions or rashes  NEURO: Normal strength and tone, mentation intact and speech normal  PSYCH: mentation appears normal, affect normal/bright         8/14/2024   Mini Cog   Clock Draw Score 2 Normal   3 Item Recall 3 objects recalled   Mini Cog Total Score 5                 Signed Electronically by: Tesha Dinh MD    Answers submitted by the patient for this visit:  Patient Health Questionnaire (Submitted on 8/14/2024)  If you checked off any problems, how difficult have these problems made it for you to do your work, take care of things at home, or get along with other people?: Not difficult at all  PHQ9 TOTAL SCORE: 0    "

## 2024-08-14 NOTE — PATIENT INSTRUCTIONS
As discussed, please do Non fasting LABs today  .     Refills will be taken care.     You will need recheck Colonoscopy of 5 years follow up due at this time. Will sign the orders for you    ========================    Patient Education   Preventive Care Advice   This is general advice given by our system to help you stay healthy. However, your care team may have specific advice just for you. Please talk to your care team about your preventive care needs.  Nutrition  Eat 5 or more servings of fruits and vegetables each day.  Try wheat bread, brown rice and whole grain pasta (instead of white bread, rice, and pasta).  Get enough calcium and vitamin D. Check the label on foods and aim for 100% of the RDA (recommended daily allowance).  Lifestyle  Exercise at least 150 minutes each week  (30 minutes a day, 5 days a week).  Do muscle strengthening activities 2 days a week. These help control your weight and prevent disease.  No smoking.  Wear sunscreen to prevent skin cancer.  Have a dental exam and cleaning every 6 months.  Yearly exams  See your health care team every year to talk about:  Any changes in your health.  Any medicines your care team has prescribed.  Preventive care, family planning, and ways to prevent chronic diseases.  Shots (vaccines)   HPV shots (up to age 26), if you've never had them before.  Hepatitis B shots (up to age 59), if you've never had them before.  COVID-19 shot: Get this shot when it's due.  Flu shot: Get a flu shot every year.  Tetanus shot: Get a tetanus shot every 10 years.  Pneumococcal, hepatitis A, and RSV shots: Ask your care team if you need these based on your risk.  Shingles shot (for age 50 and up)  General health tests  Diabetes screening:  Starting at age 35, Get screened for diabetes at least every 3 years.  If you are younger than age 35, ask your care team if you should be screened for diabetes.  Cholesterol test: At age 39, start having a cholesterol test every 5 years,  or more often if advised.  Bone density scan (DEXA): At age 50, ask your care team if you should have this scan for osteoporosis (brittle bones).  Hepatitis C: Get tested at least once in your life.  STIs (sexually transmitted infections)  Before age 24: Ask your care team if you should be screened for STIs.  After age 24: Get screened for STIs if you're at risk. You are at risk for STIs (including HIV) if:  You are sexually active with more than one person.  You don't use condoms every time.  You or a partner was diagnosed with a sexually transmitted infection.  If you are at risk for HIV, ask about PrEP medicine to prevent HIV.  Get tested for HIV at least once in your life, whether you are at risk for HIV or not.  Cancer screening tests  Cervical cancer screening: If you have a cervix, begin getting regular cervical cancer screening tests starting at age 21.  Breast cancer scan (mammogram): If you've ever had breasts, begin having regular mammograms starting at age 40. This is a scan to check for breast cancer.  Colon cancer screening: It is important to start screening for colon cancer at age 45.  Have a colonoscopy test every 10 years (or more often if you're at risk) Or, ask your provider about stool tests like a FIT test every year or Cologuard test every 3 years.  To learn more about your testing options, visit:   .  For help making a decision, visit:   https://bit.ly/oh30114.  Prostate cancer screening test: If you have a prostate, ask your care team if a prostate cancer screening test (PSA) at age 55 is right for you.  Lung cancer screening: If you are a current or former smoker ages 50 to 80, ask your care team if ongoing lung cancer screenings are right for you.  For informational purposes only. Not to replace the advice of your health care provider. Copyright   2023 [employer]. All rights reserved. Clinically reviewed by the [facility]. [employer] 471954 - REV  01/24.  Hearing Loss: Care Instructions  Overview     Hearing loss is a sudden or slow decrease in how well you hear. It can range from slight to profound. Permanent hearing loss can occur with aging. It also can happen when you are exposed long-term to loud noise. Examples include listening to loud music, riding motorcycles, or being around other loud machines.  Hearing loss can affect your work and home life. It can make you feel lonely or depressed. You may feel that you have lost your independence. But hearing aids and other devices can help you hear better and feel connected to others.  Follow-up care is a key part of your treatment and safety. Be sure to make and go to all appointments, and call your doctor if you are having problems. It's also a good idea to know your test results and keep a list of the medicines you take.  How can you care for yourself at home?  Avoid loud noises whenever possible. This helps keep your hearing from getting worse.  Always wear hearing protection around loud noises.  Wear a hearing aid as directed.  A professional can help you pick a hearing aid that will work best for you.  You can also get hearing aids over the counter for mild to moderate hearing loss.  Have hearing tests as your doctor suggests. They can show whether your hearing has changed. Your hearing aid may need to be adjusted.  Use other devices as needed. These may include:  Telephone amplifiers and hearing aids that can connect to a television, stereo, radio, or microphone.  Devices that use lights or vibrations. These alert you to the doorbell, a ringing telephone, or a baby monitor.  Television closed-captioning. This shows the words at the bottom of the screen. Most new TVs can do this.  TTY (text telephone). This lets you type messages back and forth on the telephone instead of talking or listening. These devices are also called TDD. When messages are typed on the keyboard, they are sent over the phone line to  "a receiving TTY. The message is shown on a monitor.  Use text messaging, social media, and email if it is hard for you to communicate by telephone.  Try to learn a listening technique called speechreading. It is not lipreading. You pay attention to people's gestures, expressions, posture, and tone of voice. These clues can help you understand what a person is saying. Face the person you are talking to, and have them face you. Make sure the lighting is good. You need to see the other person's face clearly.  Think about counseling if you need help to adjust to your hearing loss.  When should you call for help?  Watch closely for changes in your health, and be sure to contact your doctor if:    You think your hearing is getting worse.     You have new symptoms, such as dizziness or nausea.   Where can you learn more?  Go to https://www.[employer].net/patiented  Enter R798 in the search box to learn more about \"Hearing Loss: Care Instructions.\"  Current as of: September 27, 2023               Content Version: 14.0    6075-6016 [employer].   Care instructions adapted under license by your healthcare professional. If you have questions about a medical condition or this instruction, always ask your healthcare professional. [employer] disclaims any warranty or liability for your use of this information.         "

## 2024-08-15 ENCOUNTER — TELEPHONE (OUTPATIENT)
Dept: FAMILY MEDICINE | Facility: CLINIC | Age: 64
End: 2024-08-15
Payer: COMMERCIAL

## 2024-08-15 NOTE — TELEPHONE ENCOUNTER
"----- Message from Tesha Dinh sent at 8/15/2024  1:38 AM CDT -----  Your lab work is positive for New diagnosis of \" Diabetes mellitus \" , on the A1c check done today for your prediabetes.  Given it is borderline A1c at 6.5 will consider recommending diet and lifestyle modifications to normalize this.  Placed referral to diabetic educator as well as diabetic health maintenance ordered including your diabetic eye exam as well as Urine exam.  "

## 2024-08-15 NOTE — TELEPHONE ENCOUNTER
Triage Patient Outreach    Attempt # 1    Was call answered?  No.  Left voicemail to return call to Triage at Primary Clinic    Ariana Lakhani RN

## 2024-08-16 NOTE — TELEPHONE ENCOUNTER
Triage Patient Outreach    Attempt # 2    Was call answered?  No.  Left voicemail to return call to Triage at Primary Clinic    Yady Hu RN

## 2024-08-19 NOTE — TELEPHONE ENCOUNTER
Called patient and relayed provider's message. Patient stated understanding and had no further questions. Lab only appointment was made for the patient.    Courtney WRAY RN  [facility] Triage Team

## 2024-08-20 ENCOUNTER — LAB (OUTPATIENT)
Dept: LAB | Facility: CLINIC | Age: 64
End: 2024-08-20
Payer: COMMERCIAL

## 2024-08-20 DIAGNOSIS — E11.65 TYPE 2 DIABETES MELLITUS WITH HYPERGLYCEMIA, WITHOUT LONG-TERM CURRENT USE OF INSULIN (H): ICD-10-CM

## 2024-08-20 LAB
CREAT UR-MCNC: 147 MG/DL
MICROALBUMIN UR-MCNC: 42.2 MG/L
MICROALBUMIN/CREAT UR: 28.71 MG/G CR (ref 0–25)

## 2024-08-20 PROCEDURE — 82043 UR ALBUMIN QUANTITATIVE: CPT

## 2024-08-20 PROCEDURE — 82570 ASSAY OF URINE CREATININE: CPT

## 2024-08-21 ENCOUNTER — TELEPHONE (OUTPATIENT)
Dept: FAMILY MEDICINE | Facility: CLINIC | Age: 64
End: 2024-08-21
Payer: COMMERCIAL

## 2024-08-21 NOTE — TELEPHONE ENCOUNTER
Called patient and relayed provider's message. Patient stated understanding and had no further questions.    Courtney WRAY RN  [facility] Triage Team

## 2024-08-21 NOTE — TELEPHONE ENCOUNTER
----- Message from Tesha Dinh sent at 8/21/2024  4:28 AM CDT -----  Your Urine is losing proteins mildly. You are already on Losartan for kidney protection.     Thank you  Tesha Dinh MD on 8/21/2024

## 2024-09-09 ENCOUNTER — TELEPHONE (OUTPATIENT)
Dept: FAMILY MEDICINE | Facility: CLINIC | Age: 64
End: 2024-09-09
Payer: COMMERCIAL

## 2024-10-11 DIAGNOSIS — K21.9 GASTROESOPHAGEAL REFLUX DISEASE WITHOUT ESOPHAGITIS: ICD-10-CM

## 2024-10-11 DIAGNOSIS — I10 BENIGN ESSENTIAL HYPERTENSION: ICD-10-CM

## 2024-10-11 DIAGNOSIS — F32.1 MAJOR DEPRESSIVE DISORDER, SINGLE EPISODE, MODERATE (H): ICD-10-CM

## 2024-10-11 DIAGNOSIS — E78.5 HYPERLIPIDEMIA LDL GOAL <130: ICD-10-CM

## 2024-10-11 RX ORDER — LOSARTAN POTASSIUM 25 MG/1
25 TABLET ORAL DAILY
Qty: 90 TABLET | Refills: 1 | OUTPATIENT
Start: 2024-10-11

## 2024-10-11 RX ORDER — OMEPRAZOLE 40 MG/1
40 CAPSULE, DELAYED RELEASE ORAL DAILY PRN
Qty: 90 CAPSULE | Refills: 1 | OUTPATIENT
Start: 2024-10-11

## 2024-10-11 RX ORDER — ROSUVASTATIN CALCIUM 40 MG/1
40 TABLET, COATED ORAL DAILY
Qty: 90 TABLET | Refills: 1 | OUTPATIENT
Start: 2024-10-11

## 2024-10-11 RX ORDER — FLUOXETINE 40 MG/1
40 CAPSULE ORAL DAILY
Qty: 90 CAPSULE | Refills: 1 | OUTPATIENT
Start: 2024-10-11

## 2024-10-16 ENCOUNTER — PATIENT OUTREACH (OUTPATIENT)
Dept: FAMILY MEDICINE | Facility: CLINIC | Age: 64
End: 2024-10-16
Payer: COMMERCIAL

## 2024-10-16 NOTE — LETTER
October 16, 2024      Fredy H Elean  [address]  [place] [address]        Dear ,    This letter is to remind you that you are due for your follow-up Pap smear and Human Papillomavirus (HPV) test.    Please call [phone] to schedule your appointment at your earliest convenience.    If you have completed the appointment outside of the [facility] system, please have the records forwarded to our office. We will update your chart for your provider to review before your next annual wellness visit.     Thank you for choosing [facility]!      Sincerely,    Your [facility] Care Team

## 2024-11-06 ENCOUNTER — OFFICE VISIT (OUTPATIENT)
Dept: FAMILY MEDICINE | Facility: CLINIC | Age: 64
End: 2024-11-06
Payer: COMMERCIAL

## 2024-11-06 VITALS
SYSTOLIC BLOOD PRESSURE: 131 MMHG | TEMPERATURE: 96.7 F | WEIGHT: 134.6 LBS | BODY MASS INDEX: 27.13 KG/M2 | OXYGEN SATURATION: 99 % | RESPIRATION RATE: 17 BRPM | HEART RATE: 69 BPM | HEIGHT: 59 IN | DIASTOLIC BLOOD PRESSURE: 87 MMHG

## 2024-11-06 DIAGNOSIS — R87.612 LGSIL ON PAP SMEAR OF CERVIX: Primary | ICD-10-CM

## 2024-11-06 DIAGNOSIS — K21.9 GASTROESOPHAGEAL REFLUX DISEASE WITHOUT ESOPHAGITIS: ICD-10-CM

## 2024-11-06 DIAGNOSIS — E78.5 HYPERLIPIDEMIA LDL GOAL <130: ICD-10-CM

## 2024-11-06 DIAGNOSIS — F32.1 MAJOR DEPRESSIVE DISORDER, SINGLE EPISODE, MODERATE (H): ICD-10-CM

## 2024-11-06 DIAGNOSIS — A60.00 RECURRENT GENITAL HERPES: ICD-10-CM

## 2024-11-06 DIAGNOSIS — I10 BENIGN ESSENTIAL HYPERTENSION: ICD-10-CM

## 2024-11-06 PROCEDURE — 87624 HPV HI-RISK TYP POOLED RSLT: CPT | Mod: GZ | Performed by: FAMILY MEDICINE

## 2024-11-06 PROCEDURE — 90480 ADMN SARSCOV2 VAC 1/ONLY CMP: CPT | Performed by: FAMILY MEDICINE

## 2024-11-06 PROCEDURE — G2211 COMPLEX E/M VISIT ADD ON: HCPCS | Performed by: FAMILY MEDICINE

## 2024-11-06 PROCEDURE — 91320 SARSCV2 VAC 30MCG TRS-SUC IM: CPT | Performed by: FAMILY MEDICINE

## 2024-11-06 PROCEDURE — 99214 OFFICE O/P EST MOD 30 MIN: CPT | Performed by: FAMILY MEDICINE

## 2024-11-06 RX ORDER — ROSUVASTATIN CALCIUM 40 MG/1
40 TABLET, COATED ORAL DAILY
Qty: 90 TABLET | Refills: 1 | Status: CANCELLED | OUTPATIENT
Start: 2024-11-06

## 2024-11-06 RX ORDER — LOSARTAN POTASSIUM 25 MG/1
25 TABLET ORAL DAILY
Qty: 90 TABLET | Refills: 1 | Status: CANCELLED | OUTPATIENT
Start: 2024-11-06

## 2024-11-06 RX ORDER — OMEPRAZOLE 40 MG/1
40 CAPSULE, DELAYED RELEASE ORAL DAILY PRN
Qty: 90 CAPSULE | Refills: 1 | Status: CANCELLED | OUTPATIENT
Start: 2024-11-06

## 2024-11-06 RX ORDER — FLUOXETINE 40 MG/1
40 CAPSULE ORAL DAILY
Qty: 90 CAPSULE | Refills: 1 | Status: SHIPPED | OUTPATIENT
Start: 2024-11-06 | End: 2024-11-06

## 2024-11-06 RX ORDER — VALACYCLOVIR HYDROCHLORIDE 500 MG/1
500 TABLET, FILM COATED ORAL DAILY
Qty: 90 TABLET | Refills: 1 | Status: CANCELLED | OUTPATIENT
Start: 2024-11-06

## 2024-11-06 RX ORDER — OMEPRAZOLE 40 MG/1
40 CAPSULE, DELAYED RELEASE ORAL DAILY PRN
Qty: 90 CAPSULE | Refills: 1 | Status: SHIPPED | OUTPATIENT
Start: 2024-11-06

## 2024-11-06 RX ORDER — LOSARTAN POTASSIUM 25 MG/1
25 TABLET ORAL DAILY
Qty: 90 TABLET | Refills: 1 | Status: SHIPPED | OUTPATIENT
Start: 2024-11-06

## 2024-11-06 RX ORDER — VALACYCLOVIR HYDROCHLORIDE 500 MG/1
500 TABLET, FILM COATED ORAL DAILY
Qty: 90 TABLET | Refills: 1 | Status: SHIPPED | OUTPATIENT
Start: 2024-11-06

## 2024-11-06 RX ORDER — FLUOXETINE 40 MG/1
40 CAPSULE ORAL DAILY
Qty: 90 CAPSULE | Refills: 1 | Status: CANCELLED | OUTPATIENT
Start: 2024-11-06

## 2024-11-06 RX ORDER — ROSUVASTATIN CALCIUM 40 MG/1
40 TABLET, COATED ORAL DAILY
Qty: 90 TABLET | Refills: 1 | Status: SHIPPED | OUTPATIENT
Start: 2024-11-06

## 2024-11-06 ASSESSMENT — ANXIETY QUESTIONNAIRES
6. BECOMING EASILY ANNOYED OR IRRITABLE: NOT AT ALL
1. FEELING NERVOUS, ANXIOUS, OR ON EDGE: NOT AT ALL
GAD7 TOTAL SCORE: 0
GAD7 TOTAL SCORE: 0
2. NOT BEING ABLE TO STOP OR CONTROL WORRYING: NOT AT ALL
5. BEING SO RESTLESS THAT IT IS HARD TO SIT STILL: NOT AT ALL
3. WORRYING TOO MUCH ABOUT DIFFERENT THINGS: NOT AT ALL
IF YOU CHECKED OFF ANY PROBLEMS ON THIS QUESTIONNAIRE, HOW DIFFICULT HAVE THESE PROBLEMS MADE IT FOR YOU TO DO YOUR WORK, TAKE CARE OF THINGS AT HOME, OR GET ALONG WITH OTHER PEOPLE: NOT DIFFICULT AT ALL
7. FEELING AFRAID AS IF SOMETHING AWFUL MIGHT HAPPEN: NOT AT ALL

## 2024-11-06 ASSESSMENT — PATIENT HEALTH QUESTIONNAIRE - PHQ9
SUM OF ALL RESPONSES TO PHQ QUESTIONS 1-9: 3
5. POOR APPETITE OR OVEREATING: NOT AT ALL

## 2024-11-06 ASSESSMENT — PAIN SCALES - GENERAL: PAINLEVEL_OUTOF10: MODERATE PAIN (5)

## 2024-11-06 NOTE — PROGRESS NOTES
"  Assessment & Plan     LGSIL on Pap smear of cervix    - HPV and Gynecologic Cytology Panel - Recommended Age 30 - 65 Years    Major depressive disorder, single episode, moderate (H)  Symptoms are stable  - FLUoxetine (PROZAC) 20 MG capsule; Take 1 capsule (20 mg) by mouth daily.    Benign essential hypertension  Well-controlled  - losartan (COZAAR) 25 MG tablet; Take 1 tablet (25 mg) by mouth daily.    Hyperlipidemia LDL goal <130  Due for recheck in 6 months  - rosuvastatin (CRESTOR) 40 MG tablet; Take 1 tablet (40 mg) by mouth daily.    Recurrent genital herpes  Symptoms are stable  - valACYclovir (VALTREX) 500 MG tablet; Take 1 tablet (500 mg) by mouth daily.    Gastroesophageal reflux disease without esophagitis  Symptoms well-managed  - omeprazole (PRILOSEC) 40 MG DR capsule; Take 1 capsule (40 mg) by mouth daily as needed (GERD).      The longitudinal plan of care for the diagnosis(es)/condition(s) as documented were addressed during this visit. Due to the added complexity in care, I will continue to support Carlene in the subsequent management and with ongoing continuity of care.      BMI  Estimated body mass index is 27.06 kg/m  as calculated from the following:    Height as of this encounter: 1.502 m (4' 11.13\").    Weight as of this encounter: 61.1 kg (134 lb 9.6 oz).             Mellissa Concepcion is a 64 year old, presenting for the following health issues:  Follow Up ([place] follow up. Pap)        11/6/2024     1:46 PM   Additional Questions   Roomed by Franky HO     Via the Health Maintenance questionnaire, the patient has reported the following services have been completed -Eye Exam: [facility] 2024-02-12, this information has been sent to the abstraction team.     Patient request refill on all her medications.  She is due for an annual exam in 6 months.  Comes in for a Pap smear      Objective    /87   Pulse 69   Temp (!) 96.7  F (35.9  C) (Temporal)   Resp 17   Ht 1.502 m (4' 11.13\")   Wt 61.1 kg " (134 lb 9.6 oz)   LMP 03/24/2009   SpO2 99%   BMI 27.06 kg/m    Body mass index is 27.06 kg/m .  Physical Exam   GENERAL: alert and no distress  RESP: lungs clear to auscultation - no rales, rhonchi or wheezes  CV: regular rate and rhythm, normal S1 S2, no S3 or S4, no murmur, click or rub, no peripheral edema  ABDOMEN: soft, nontender, no hepatosplenomegaly, no masses and bowel sounds normal   (female): normal female external genitalia, normal urethral meatus, normal vaginal mucosa  MS: no gross musculoskeletal defects noted, no edema            Signed Electronically by: Erna Patrick MD

## 2024-11-08 LAB
HPV HR 12 DNA CVX QL NAA+PROBE: POSITIVE
HPV16 DNA CVX QL NAA+PROBE: NEGATIVE
HPV18 DNA CVX QL NAA+PROBE: NEGATIVE
HUMAN PAPILLOMA VIRUS FINAL DIAGNOSIS: ABNORMAL

## 2024-11-15 ENCOUNTER — DOCUMENTATION ONLY (OUTPATIENT)
Dept: OTHER | Facility: CLINIC | Age: 64
End: 2024-11-15
Payer: COMMERCIAL

## 2024-11-17 LAB
BKR AP ASSOCIATED HPV REPORT: ABNORMAL
BKR LAB AP GYN ADEQUACY: ABNORMAL
BKR LAB AP GYN INTERPRETATION: ABNORMAL
BKR LAB AP PREVIOUS ABNL DX: ABNORMAL
BKR LAB AP PREVIOUS ABNORMAL: ABNORMAL
PATH REPORT.COMMENTS IMP SPEC: ABNORMAL
PATH REPORT.COMMENTS IMP SPEC: ABNORMAL
PATH REPORT.RELEVANT HX SPEC: ABNORMAL

## 2024-11-18 ENCOUNTER — PATIENT OUTREACH (OUTPATIENT)
Dept: FAMILY MEDICINE | Facility: CLINIC | Age: 64
End: 2024-11-18
Payer: COMMERCIAL

## 2024-11-18 DIAGNOSIS — R85.611: Primary | ICD-10-CM

## 2024-11-18 PROBLEM — R87.810 CERVICAL HIGH RISK HPV (HUMAN PAPILLOMAVIRUS) TEST POSITIVE: Status: ACTIVE | Noted: 2024-11-18

## 2024-12-02 ENCOUNTER — OFFICE VISIT (OUTPATIENT)
Dept: OBGYN | Facility: CLINIC | Age: 64
End: 2024-12-02
Payer: COMMERCIAL

## 2024-12-02 VITALS — SYSTOLIC BLOOD PRESSURE: 128 MMHG | WEIGHT: 134 LBS | BODY MASS INDEX: 26.94 KG/M2 | DIASTOLIC BLOOD PRESSURE: 78 MMHG

## 2024-12-02 DIAGNOSIS — R87.611 PAPANICOLAOU SMEAR OF CERVIX WITH ATYPICAL SQUAMOUS CELLS CANNOT EXCLUDE HIGH GRADE SQUAMOUS INTRAEPITHELIAL LESION (ASC-H): Primary | ICD-10-CM

## 2024-12-02 DIAGNOSIS — R87.810 CERVICAL HIGH RISK HPV (HUMAN PAPILLOMAVIRUS) TEST POSITIVE: ICD-10-CM

## 2024-12-02 DIAGNOSIS — N88.2 CERVICAL STENOSIS (UTERINE CERVIX): ICD-10-CM

## 2024-12-02 DIAGNOSIS — R87.612 LGSIL ON PAP SMEAR OF CERVIX: ICD-10-CM

## 2024-12-02 PROCEDURE — 88305 TISSUE EXAM BY PATHOLOGIST: CPT | Performed by: STUDENT IN AN ORGANIZED HEALTH CARE EDUCATION/TRAINING PROGRAM

## 2024-12-02 PROCEDURE — 57454 BX/CURETT OF CERVIX W/SCOPE: CPT | Performed by: OBSTETRICS & GYNECOLOGY

## 2024-12-02 NOTE — PROGRESS NOTES
Colposcopy-    INDICATIONS:                                                    Is a pregnancy test required: No.  Was a consent obtained?  Yes      Fredy Parker, is a 64 year old female, who had a recent ASCUS pap.  HPV Other positive Yes prior history of abnormal pap. Here today for colposcopy. Discussed indication, risks of infection and bleeding.    PAP HISTORY:    11/6/24 ASC-H pap, also LSIL, + HR HPV (neg 16/18).      2/27/07 ASCUS/ Neg HPV  6/8/07 ASCUS/Neg HPV  3/20/09 LSIL  5/2009 COLP- Negative  3/26/10 LSIL/+ HR HPV  12/7/10 LSIL/Neg HPV  2/2011 LEEP for recurrent mild dysplasia. Pathology = No evidence of dysplasia.  2011, 2012, 2013 NIL paps  09/29/14 ASCUS, +HR HPV.  10/21/14 [place] = Cervical Bx and ECC= Benign.   12/02/15 LSIL/ +HR HPV.  12/15/15 [place]= Negative. 1 yr pap  2/20/17 LSIL/+ HR HPV (not 16 or 18).  04/17/17 [place]= CORBIN 1. Referred to ObGyn for further evaluation  3/19/18  ASC-H, LSIL/+ HR HPV (not 16/18).    4/10/18 [place]- Normal.  10/2/18 LSIL pap, + HR HPV (not 16/18).   5/14/19 Pap: ASCUS, +HR HPV (not 16/18).   7/8/19 [place] - Mild dysplasia.   7/31/20 ASCUS, +HR HPV, not 16/18.  8/19/20 [place] ECC neg.  9/27/21 ASCUS, +HR HPV, not 16/18.    11/16/2022 Lost to follow-up for pap tracking  6/19/23 LSIL Pap, + HR HPV (neg 16/18).   11/6/23 [place] ECC - no CORBIN.   11/6/24 ASC-H pap, also LSIL, + HR HPV (neg 16/18).   .    PROCEDURE:                                                      Speculum placed. Stenosis of ox, small cervix flush with vagina. Cervix is stained with acetic acid and viewed colposcopically. Squamocolumnar junction is no visualized in it's entirety as it is within os. No AWE noted. Os finder used to dilate os. Random Biopsies done due to pap  7, 1 . Endocervical curretage Done. Monsels placed for bleeding control and hemostasis noted. Speculum removed       POST PROCEDURE:                                                      IMPRESSION: Patient tolerated procedure well, colposcopy  inadequate, limited by TZ receded into os, and normal appearing otherwise    PLAN : Await the results of the biopsies.  She tolerated the procedure well. There were no complications. Patient was discharged in stable condition.    Patient advised to call the clinic if excessive bleeding, pelvic pain, or fever.     Follow-up based on pathology results.        Amina Kaisers, DO

## 2024-12-11 ENCOUNTER — PATIENT OUTREACH (OUTPATIENT)
Dept: ONCOLOGY | Facility: CLINIC | Age: 64
End: 2024-12-11
Payer: COMMERCIAL

## 2024-12-11 DIAGNOSIS — R87.612 LGSIL ON PAP SMEAR OF CERVIX: ICD-10-CM

## 2024-12-11 DIAGNOSIS — R87.611 ATYPICAL SQUAMOUS CELLS CANNOT EXCLUDE HIGH GRADE SQUAMOUS INTRAEPITHELIAL LESION ON CYTOLOGIC SMEAR OF CERVIX (ASC-H): Primary | ICD-10-CM

## 2024-12-11 NOTE — PROGRESS NOTES
Attempted to call Tawn regarding her colposcopy results. VM reached. Instructed her to call the clinic to discuss next steps in plan.    Current path results do not explain her current pap nor are consistent with her persistent hx of abnormal paps. Will plan to refer her to the Dysplasia clinic to determine follow up.   She should receive call to schedule with their clinic.    She has recurrent hx of ASC-H and LSIL w/o many pathology results to support this (are negative). Hx of LEEP 2011 for recurrent mild dysplasia on paps with negative path. Has had CORBIN 1 subsequently in 2017 and 2019. Most recent biopsy showed benign tissue, though samples small, limited by her stenotic os and cervix flush with surrounding vaginal tissues.     Amina Vega Masters, DO

## 2024-12-11 NOTE — PROGRESS NOTES
Referral to Dysplasia Clinic received findings of:    Referred by Provider: Dr DE LA ROSA Masters    Dysplasia clinic. Recurrent LSIL/Asc-H pap w/o path to confirm. Stenotic os and minimal cervix. Consult on managment     Referring Clinic/Organization: [facility]    Path report: Bookmarked in lab tab, dated 11/6 and 12/2    Records Needed:     See Pre-Visit encounter from CSS team for additional details on records collection. Additional questions on records needed for initial consult can be sent to [facility] ADULT NEW PATIENT RECORDS [[id number]] with a copy to  CANCER CARE NURSE NAVIGATION [[id number]]. Please include diagnosis and urgency in subject line.    Additional testing needed prior to consult:     N/A    Referral updates and Plan: Call placed to patient to discuss Dysplasia Clinic with Dr Paulino, see below for outcome    Triage instructions updated and sent to NPS for completion, VM left for patient      Sarah Gresham, VERAN, RN, PHN, OCN  Hematology/Oncology Nurse Navigator   [facility]   [phone]   [email or web address]

## 2024-12-11 NOTE — PROGRESS NOTES
Spoke with Carlene when she returned the call.   Informed her of the results and recommendations for consult with Gyn Oncology about next steps. Informed her of who would be calling her to make the appointments.  Questions answered.    Amina Vega Masters, DO

## 2025-01-04 ENCOUNTER — TRANSFERRED RECORDS (OUTPATIENT)
Dept: MULTI SPECIALTY CLINIC | Facility: CLINIC | Age: 65
End: 2025-01-04

## 2025-01-04 LAB — RETINOPATHY: NORMAL

## 2025-01-15 NOTE — TELEPHONE ENCOUNTER
RECORDS STATUS - ALL OTHER DIAGNOSIS      RECORDS RECEIVED FROM: [facility]   NOTES STATUS DETAILS   OFFICE NOTE from referring provider Epic 12/2/24: Dr. Amina Mora   OPERATIVE REPORT Epic 12/2/24: colposcopy    MEDICATION LIST [facility]    LABS     PATHOLOGY REPORTS Report in Epic 12/2/24: [id number]    ANYTHING RELATED TO DIAGNOSIS Epic Most recent 11/6/24

## 2025-01-27 ENCOUNTER — PRE VISIT (OUTPATIENT)
Dept: ONCOLOGY | Facility: CLINIC | Age: 65
End: 2025-01-27
Payer: COMMERCIAL

## 2025-02-05 ENCOUNTER — DOCUMENTATION ONLY (OUTPATIENT)
Dept: OTHER | Facility: CLINIC | Age: 65
End: 2025-02-05

## 2025-02-05 ENCOUNTER — OFFICE VISIT (OUTPATIENT)
Dept: FAMILY MEDICINE | Facility: CLINIC | Age: 65
End: 2025-02-05
Payer: COMMERCIAL

## 2025-02-05 VITALS
RESPIRATION RATE: 18 BRPM | BODY MASS INDEX: 25.96 KG/M2 | HEART RATE: 72 BPM | TEMPERATURE: 97.7 F | OXYGEN SATURATION: 98 % | SYSTOLIC BLOOD PRESSURE: 136 MMHG | WEIGHT: 128.8 LBS | HEIGHT: 59 IN | DIASTOLIC BLOOD PRESSURE: 80 MMHG

## 2025-02-05 DIAGNOSIS — M79.641 BILATERAL HAND PAIN: Primary | ICD-10-CM

## 2025-02-05 DIAGNOSIS — E11.9 TYPE 2 DIABETES MELLITUS WITHOUT COMPLICATION, WITHOUT LONG-TERM CURRENT USE OF INSULIN (H): ICD-10-CM

## 2025-02-05 DIAGNOSIS — F32.1 MAJOR DEPRESSIVE DISORDER, SINGLE EPISODE, MODERATE (H): ICD-10-CM

## 2025-02-05 DIAGNOSIS — M19.90 INFLAMMATORY ARTHRITIS: ICD-10-CM

## 2025-02-05 DIAGNOSIS — I10 BENIGN ESSENTIAL HYPERTENSION: ICD-10-CM

## 2025-02-05 DIAGNOSIS — M79.642 BILATERAL HAND PAIN: Primary | ICD-10-CM

## 2025-02-05 LAB
CRP SERPL-MCNC: <3 MG/L
ERYTHROCYTE [DISTWIDTH] IN BLOOD BY AUTOMATED COUNT: 13.8 % (ref 10–15)
EST. AVERAGE GLUCOSE BLD GHB EST-MCNC: 134 MG/DL
HBA1C MFR BLD: 6.3 % (ref 0–5.6)
HCT VFR BLD AUTO: 42.3 % (ref 35–47)
HGB BLD-MCNC: 14.1 G/DL (ref 11.7–15.7)
MCH RBC QN AUTO: 30.3 PG (ref 26.5–33)
MCHC RBC AUTO-ENTMCNC: 33.3 G/DL (ref 31.5–36.5)
MCV RBC AUTO: 91 FL (ref 78–100)
PLATELET # BLD AUTO: 183 10E3/UL (ref 150–450)
RBC # BLD AUTO: 4.65 10E6/UL (ref 3.8–5.2)
RHEUMATOID FACT SERPL-ACNC: 10 IU/ML
WBC # BLD AUTO: 5.3 10E3/UL (ref 4–11)

## 2025-02-05 PROCEDURE — 99214 OFFICE O/P EST MOD 30 MIN: CPT | Performed by: FAMILY MEDICINE

## 2025-02-05 PROCEDURE — 86431 RHEUMATOID FACTOR QUANT: CPT | Performed by: FAMILY MEDICINE

## 2025-02-05 PROCEDURE — 83036 HEMOGLOBIN GLYCOSYLATED A1C: CPT | Performed by: FAMILY MEDICINE

## 2025-02-05 PROCEDURE — 85027 COMPLETE CBC AUTOMATED: CPT | Performed by: FAMILY MEDICINE

## 2025-02-05 PROCEDURE — 36415 COLL VENOUS BLD VENIPUNCTURE: CPT | Performed by: FAMILY MEDICINE

## 2025-02-05 PROCEDURE — 86038 ANTINUCLEAR ANTIBODIES: CPT | Performed by: FAMILY MEDICINE

## 2025-02-05 PROCEDURE — 86200 CCP ANTIBODY: CPT | Performed by: FAMILY MEDICINE

## 2025-02-05 PROCEDURE — 86140 C-REACTIVE PROTEIN: CPT | Performed by: FAMILY MEDICINE

## 2025-02-05 ASSESSMENT — PAIN SCALES - GENERAL: PAINLEVEL_OUTOF10: SEVERE PAIN (8)

## 2025-02-05 NOTE — PROGRESS NOTES
"  Assessment & Plan     Bilateral hand pain  Bilateral hand pain with some inflammation and arthritis we will get some blood work I referred her to rheumatology for evaluation as it has been ongoing worse in the morning there is some suspicion of possible inflammatory versus rheumatoid arthritis.  - CBC with Platelets; Future  - Anti Nuclear Jodee IgG by IFA with Reflex; Future  - CRP, inflammation; Future  - Cyclic Citrullinated Peptide Antibody IgG; Future  - Rheumatoid factor; Future  - CBC with Platelets  - Anti Nuclear Jodee IgG by IFA with Reflex  - CRP, inflammation  - Cyclic Citrullinated Peptide Antibody IgG  - Rheumatoid factor  - Adult Rheumatology  Referral; Future    Major depressive disorder, single episode, moderate (H)  Stable    Type 2 diabetes mellitus without complication, without long-term current use of insulin (H)  She still at prediabetic range however it has gone to diabetes range in the past she is continue to work on diet and exercise currently not on any medications.  - HEMOGLOBIN A1C; Future  - HEMOGLOBIN A1C    Benign essential hypertension  Blood pressure is stable continue the losartan.  Initial blood pressure was elevated.    Inflammatory arthritis    - Adult Rheumatology  Referral; Future          BMI  Estimated body mass index is 26.21 kg/m  as calculated from the following:    Height as of this encounter: 1.493 m (4' 10.78\").    Weight as of this encounter: 58.4 kg (128 lb 12.8 oz).     Mellissa Concepcion is a 64 year old, presenting for the following health issues:  Pain (bilateral hand pain/) and Arthritis (Patient believe to have arthritis bilateral hand )  Patient with underlying history of high blood pressure depression anxiety and new onset diagnosis of diabetes but currently not on any medication last A1c 6.5.  Patient came today with complains of bilateral hand pain mostly in the distal and proximal joints.  Pain is worse in the morning and as the day goes by " "it sometimes get better sometimes it is hard for her to hold things.  She has tried over-the-counter medication without any significant relief.  She does not have any proper diagnosis of any arthritis however in the past she has similar complaints.      2/5/2025     8:33 AM   Additional Questions   Roomed by Gilbert     Via the Health Maintenance questionnaire, the patient has reported the following services have been completed -Colonscopy: [facility] health 2024-04-01, this information has not been sent to the abstraction team.  History of Present Illness       Reason for visit:  Bilateral hand pain   She is taking medications regularly.         Acute Illness  Acute illness concerns: Bilateral hand pain  Onset/Duration: 01/2025  Symptoms:  Fever: No  Chills/Sweats: No  Headache (location?): No  Sinus Pressure: No  Conjunctivitis:  No  Ear Pain: no  Rhinorrhea: No  Congestion: No  Sore Throat: No  Cough: no  Wheeze: No  Decreased Appetite:no  Nausea: No  Vomiting: No  Diarrhea: No  Dysuria/Freq.no  Dysuria or Hematuria: No  Fatigue/Achiness: No  Sick/Strep Exposure: No  Therapies tried and outcome: None        Review of Systems  Constitutional, HEENT, cardiovascular, pulmonary, gi and gu systems are negative, except as otherwise noted.      Objective    BP (!) 160/86 (BP Location: Right arm, Patient Position: Sitting, Cuff Size: Adult Large)   Pulse 72   Temp 97.7  F (36.5  C) (Temporal)   Resp 18   Ht 1.493 m (4' 10.78\")   Wt 58.4 kg (128 lb 12.8 oz)   LMP 03/24/2009 (Exact Date)   SpO2 98%   Breastfeeding No   BMI 26.21 kg/m    Body mass index is 26.21 kg/m .  Physical Exam   GENERAL: alert and no distress  NECK: no adenopathy, no asymmetry, masses, or scars  RESP: lungs clear to auscultation - no rales, rhonchi or wheezes  CV: regular rate and rhythm, normal S1 S2, no S3 or S4, no murmur, click or rub, no peripheral edema  ABDOMEN: soft, nontender, no hepatosplenomegaly, no masses and bowel sounds " normal  MS: no gross musculoskeletal defects noted, no edema  Bilateral hand joints have some inflammation range of motion are limited wrist range of motion is normal.          Signed Electronically by: Stevie Graves MD

## 2025-02-06 LAB
ANA SER QL IF: NEGATIVE
CCP AB SER IA-ACNC: 0.9 U/ML

## 2025-02-11 ENCOUNTER — TRANSFERRED RECORDS (OUTPATIENT)
Dept: HEALTH INFORMATION MANAGEMENT | Facility: CLINIC | Age: 65
End: 2025-02-11
Payer: COMMERCIAL

## 2025-02-24 PROBLEM — R87.810 CERVICAL HIGH RISK HPV (HUMAN PAPILLOMAVIRUS) TEST POSITIVE: Status: RESOLVED | Noted: 2024-11-18 | Resolved: 2025-02-24

## 2025-02-26 DIAGNOSIS — R20.2 PARESTHESIA OF BILATERAL LEGS: ICD-10-CM

## 2025-02-26 RX ORDER — GABAPENTIN 300 MG/1
CAPSULE ORAL
Qty: 180 CAPSULE | Refills: 1 | Status: SHIPPED | OUTPATIENT
Start: 2025-02-26

## 2025-03-08 ENCOUNTER — HOSPITAL ENCOUNTER (OUTPATIENT)
Dept: MRI IMAGING | Facility: CLINIC | Age: 65
Discharge: HOME | End: 2025-03-08
Attending: OBSTETRICS & GYNECOLOGY | Admitting: OBSTETRICS & GYNECOLOGY
Payer: COMMERCIAL

## 2025-03-08 DIAGNOSIS — R87.611 ATYPICAL SQUAMOUS CELLS CANNOT EXCLUDE HIGH GRADE SQUAMOUS INTRAEPITHELIAL LESION ON CYTOLOGIC SMEAR OF CERVIX (ASC-H): ICD-10-CM

## 2025-03-08 PROCEDURE — 72197 MRI PELVIS W/O & W/DYE: CPT

## 2025-03-08 PROCEDURE — A9585 GADOBUTROL INJECTION: HCPCS | Performed by: OBSTETRICS & GYNECOLOGY

## 2025-03-08 PROCEDURE — 255N000002 HC RX 255 OP 636: Performed by: OBSTETRICS & GYNECOLOGY

## 2025-03-08 RX ORDER — GADOBUTROL 604.72 MG/ML
5 INJECTION INTRAVENOUS ONCE
Status: COMPLETED | OUTPATIENT
Start: 2025-03-08 | End: 2025-03-08

## 2025-03-08 RX ADMIN — GADOBUTROL 5 ML: 604.72 INJECTION INTRAVENOUS at 08:36

## 2025-03-12 ENCOUNTER — TUMOR CONFERENCE (OUTPATIENT)
Dept: ONCOLOGY | Facility: CLINIC | Age: 65
End: 2025-03-12
Payer: COMMERCIAL

## 2025-03-12 NOTE — TUMOR CONFERENCE
Gyn Onc Tumor Board Note    Date of Tumor Board Presentation: 03/12/25   Patient: Fredy Parker   MRN: [id number]  Age: 64 year old  Gyn Onc Staff: Risa Villagran, Roland Arnett, Karlee Abarca, Joseph Bear, Chidi Cancino, Pedro Gonzalez, and Ruth Paulino  Disciplines Present: Gynecologic Oncology, Pathology, Radiation Oncology, and Radiology    Oncologic Information  Cancer Type:  none- ASC-H cytology, non-16/18 hrHPV+ with cervical stenosis/unable to definitely identify cervix  Clinical Trial Discussion: No  If yes, what clinical trial should be considered: n/a       Consensus/Management Options: Group consensus is in agreement regarding a definitive hysterectomy for patient given inability to sample ECC or treat histology with excisional procedure safely, would recommend intraoperative frozen pathology to determine if any further staging may be needed.           This abstract and interpretation of the conversation at tumor board has been completed by Kiko Vo. These are the general recommendations/discussion provided at tumor board conference. The definitive recommendation/discussion will be made by the primary health care team and patient after full discussion as appropriate.

## 2025-03-14 ENCOUNTER — ANCILLARY PROCEDURE (OUTPATIENT)
Dept: GENERAL RADIOLOGY | Facility: CLINIC | Age: 65
End: 2025-03-14
Attending: PHYSICIAN ASSISTANT
Payer: COMMERCIAL

## 2025-03-14 ENCOUNTER — TELEPHONE (OUTPATIENT)
Dept: FAMILY MEDICINE | Facility: CLINIC | Age: 65
End: 2025-03-14

## 2025-03-14 DIAGNOSIS — M25.531 PAIN IN BOTH WRISTS: ICD-10-CM

## 2025-03-14 DIAGNOSIS — M25.532 PAIN IN BOTH WRISTS: ICD-10-CM

## 2025-03-14 PROCEDURE — 73110 X-RAY EXAM OF WRIST: CPT | Mod: TC | Performed by: RADIOLOGY

## 2025-03-14 NOTE — TELEPHONE ENCOUNTER
I would try the wrist braces first, wear them to bed  Sherry Peters PA-C on 3/14/2025 at 4:58 PM

## 2025-03-14 NOTE — TELEPHONE ENCOUNTER
Called and spoke to the patient. Relayed message from the provider below. Phone number provided to get an appointment scheduled with ortho.     Patient was asking RN what type of medication she can take to help with the pain. Per office visit note, patient can try Voltaren. RN relayed that recommendation to the patient. Patient is wondering if there is a oral medication she can take specifically to help with the tingling in her hands at night.     RN will send message to Sherry for review and recommendations.     Thank you,  Ying Lutz RN

## 2025-03-14 NOTE — TELEPHONE ENCOUNTER
----- Message from Sherry Peters sent at 3/14/2025  3:49 PM CDT -----  Please let pt know that x-ray shows significant arthritis. I put in a referral for orthopedics to discuss possible injections. She should use wrist braces in the meantime as we discussed.   Sherry Peters PA-C on 3/14/2025 at 3:48 PM

## 2025-03-14 NOTE — RESULT ENCOUNTER NOTE
Please let pt know that x-ray shows significant arthritis. I put in a referral for orthopedics to discuss possible injections. She should use wrist braces in the meantime as we discussed.   Sherry Peters PA-C on 3/14/2025 at 3:48 PM

## 2025-03-17 ENCOUNTER — OFFICE VISIT (OUTPATIENT)
Dept: ORTHOPEDICS | Facility: CLINIC | Age: 65
End: 2025-03-17
Attending: PHYSICIAN ASSISTANT
Payer: COMMERCIAL

## 2025-03-17 VITALS — BODY MASS INDEX: 27.08 KG/M2 | WEIGHT: 129 LBS | HEIGHT: 58 IN

## 2025-03-17 DIAGNOSIS — M25.532 PAIN IN BOTH WRISTS: ICD-10-CM

## 2025-03-17 DIAGNOSIS — M19.049 CMC ARTHRITIS: ICD-10-CM

## 2025-03-17 DIAGNOSIS — M25.531 PAIN IN BOTH WRISTS: ICD-10-CM

## 2025-03-17 PROCEDURE — 20604 DRAIN/INJ JOINT/BURSA W/US: CPT | Mod: 50 | Performed by: STUDENT IN AN ORGANIZED HEALTH CARE EDUCATION/TRAINING PROGRAM

## 2025-03-17 PROCEDURE — 99204 OFFICE O/P NEW MOD 45 MIN: CPT | Mod: 25 | Performed by: STUDENT IN AN ORGANIZED HEALTH CARE EDUCATION/TRAINING PROGRAM

## 2025-03-17 RX ORDER — TRIAMCINOLONE ACETONIDE 40 MG/ML
20 INJECTION, SUSPENSION INTRA-ARTICULAR; INTRAMUSCULAR
Status: COMPLETED | OUTPATIENT
Start: 2025-03-17 | End: 2025-03-17

## 2025-03-17 RX ORDER — LIDOCAINE HYDROCHLORIDE 10 MG/ML
0.5 INJECTION, SOLUTION INFILTRATION; PERINEURAL
Status: COMPLETED | OUTPATIENT
Start: 2025-03-17 | End: 2025-03-17

## 2025-03-17 RX ADMIN — LIDOCAINE HYDROCHLORIDE 0.5 ML: 10 INJECTION, SOLUTION INFILTRATION; PERINEURAL at 10:52

## 2025-03-17 RX ADMIN — TRIAMCINOLONE ACETONIDE 20 MG: 40 INJECTION, SUSPENSION INTRA-ARTICULAR; INTRAMUSCULAR at 10:52

## 2025-03-17 NOTE — LETTER
3/17/2025      Fredy Parker  [address]  [place] [address]      Dear Colleague,    Thank you for referring your patient, Fredy Parker, to the [facility]. Please see a copy of my visit note below.    [facility] NEW PATIENT VISIT    REFERRAL SOURCE: Sherry Peters PA-C    PATIENT'S GOAL FOR APPOINTMENT: would like bilateral wrist injections     HISTORY OF PRESENT ILLNESS  Fredy Parker is a 64 year old Right-handed female presenting as a new patient with bilateral wrist/hand pain    When did problem start?/Trauma associated with onset?:  Onset: few months   No ANGELA  Location & description of pain:  R>L, CMC Joint     Exacerbating factors:   Sleeping, gripping, lifting     Remitting factors: Rest    Previous Treatments:  -Medications: tylenol, Bengay   -Rehabilitation: None   -Durable Medical Equipment: wrist brace at night   -Injections: None   -Modalities: heat   -Other Providers seen:     Sports, Hobbies, Employment:  Retired     Average hours of sleep per night: 4-5 hours     Average minutes of exercise per day: 30 minutes     Area of Problem  3/17/2025   Date 2 Date 3 Date 4 Date 5   Function Ability in last week - % of Baseline (0 is worst & 100 is best) *       Sport/Activity Ability in last week - % of Baseline (0 is worst & 100 is best) *       Pain Level in the last week (0 is best & 10 is worst) 10         Additional Information of consideration:  -Neck Pain?: Yes   -Numbness/paresthesias in extremities?: tingling at night   -Weakness?: Yes     MEDICATIONS    Current Outpatient Medications:      baclofen (LIORESAL) 10 MG tablet, Take 0.5-1 tablets (5-10 mg) by mouth 2 times daily as needed for muscle spasms, Disp: 180 tablet, Rfl: 1     CALTRATE 600+D PLUS 600-400 MG-UNIT OR TABS, 1 TABLET TWICE DAILY , Disp: 3 MONTHS, Rfl: PRN     CENTRUM OR TABS, 1 TABLET DAILY, Disp: 30, Rfl: 0     cetirizine (ZYRTEC) 10 MG tablet, Take 1 tablet (10 mg) by  mouth daily, Disp: 90 tablet, Rfl: 3     FLUoxetine (PROZAC) 20 MG capsule, Take 1 capsule (20 mg) by mouth daily., Disp: 90 capsule, Rfl: 1     gabapentin (NEURONTIN) 300 MG capsule, TAKE 300MG (ONE CAPSULE) 1-2 TIMES DAILY, Disp: 180 capsule, Rfl: 1     losartan (COZAAR) 25 MG tablet, Take 1 tablet (25 mg) by mouth daily., Disp: 90 tablet, Rfl: 1     omeprazole (PRILOSEC) 40 MG DR capsule, Take 1 capsule (40 mg) by mouth daily as needed (GERD)., Disp: 90 capsule, Rfl: 1     rosuvastatin (CRESTOR) 40 MG tablet, Take 1 tablet (40 mg) by mouth daily., Disp: 90 tablet, Rfl: 1     valACYclovir (VALTREX) 500 MG tablet, Take 1 tablet (500 mg) by mouth daily., Disp: 90 tablet, Rfl: 1     VITAMIN D 1000 UNIT OR CAPS, 2 CAPSULES DAILY, Disp: 180 Cap, Rfl: PRN    ALLERGIES  Allergies   Allergen Reactions     Aspirin Rash     Latex      Nsaids GI Disturbance     Sulfasalazine Rash       PAST MEDICAL HISTORY  Past Medical History:   Diagnosis Date     Chronic gastric ulcer without mention of hemorrhage, perforation, without mention of obstruction 1994    gastric erosion dx'ed by endoscopy. pt was also treated for pos H.pylori     Disc disorder of cervical region 2006    mild disc changes C3-4/C4-5 and C5-6 with chronic myofascial cervicoscapular pain     Elevated transaminase level 2012    AST/ALT 2-3 x normal; fatty infiltration liver on ultrasound; negative testing for infectious hepatitis; normal iron levels, ELP, CK     HTN (hypertension)      Latent tuberculosis 10/2009    started INH      Major depressive disorder, single episode, moderate (H) 2006     Mixed hyperlipidemia 2009    elevated TG and LDL     Osteopenia 2009    mild, femoral neck     Recurrent genital herpes 2010     Status post laminectomy 2007/2012 12/2007: L5-S1 diskectomy 2012:Revision diskectomy left at L5-S1       PAST SURGICAL HISTORY  Past Surgical History:   Procedure Laterality Date     COLONOSCOPY N/A 07/15/2019    Procedure: COLONOSCOPY,  WITH POLYPECTOMY AND BIOPSY;  Surgeon: Danyell Peters MD;  Location:  GI     CONIZATION LEEP  02/2011    Completed for recurrent Mild Dysplasia and chronic endocervicitis     DISKECTOMY, LUMBAR, SINGLE SP  12/2007    L5-S1 diskectomy     HC INJ TRANSFORAMIN EPIDURAL, CERV/THOR SINGLE  05/2006    C7-T1 epidural steroid     INJECTION, ANESTHETIC/STEROID, TRANSFORAMINAL EPIDURAL; LUMBAR/SACRAL, SINGLE LEVEL  8,9/2007    left L5 selective nerve root injection under fluoroscopy with corticosteroid.      INJECTION, ANESTHETIC/STEROID, TRANSFORAMINAL EPIDURAL; LUMBAR/SACRAL, SINGLE LEVEL  10/2007    right L5-S1 translaminar epidural injection     LAMINECTOMY LUMBAR ONE LEVEL  03/06/2012    Procedure:LAMINECTOMY LUMBAR ONE LEVEL; REVISION DISCECTOMY L5-S1 ON THE LEFT ; Surgeon:ROSE PRITCHETT; Location: OR     LAPAROSCOPIC CHOLECYSTECTOMY  07/1999     MR LUMBAR SPINE W/O CONTRAST  01/2012    Moderate-sized left posterolateral caudally extruded L5-S1disc herniation impinging on left S1 nerve, mild degenerative disc changes at L4-5     RELEASE TRIGGER FINGER  05/17/2012    Procedure:RELEASE TRIGGER FINGER; right ring finger trigger release  (latex allergy:contact); Surgeon:QUYEN FUNK; Location: [facility]     SONO ABDOMEN LIMITED  09/2012    Increased echotexture liver c/w fatty infiltration     TUBAL LIGATION  2000       SOCIAL HISTORY  Social History     Tobacco Use     Smoking status: Never     Passive exposure: Never     Smokeless tobacco: Never   Vaping Use     Vaping status: Never Used   Substance Use Topics     Alcohol use: No     Alcohol/week: 0.0 standard drinks of alcohol     Drug use: No       FAMILY HISTORY  Family History   Problem Relation Age of Onset     Hypertension Mother      Lipids Mother      Hypertension Father      Lipids Father      Lipids Sister        REVIEW OF SYSTEMS  Complete 12 system Review of Systems performed and was negative except for HPI.    VITALS  Vitals:    03/17/25 1020  "  Weight: 58.5 kg (129 lb)   Height: 1.473 m (4' 10\")     PHYSICAL EXAMINATION   General: Age appropriate appearing, no acute distress  HEENT: normocephalic, atraumatic, sclera non-icteric  Skin: No open skin lesion noted in visible areas.  Respiratory: Non labored breathing, No wheezes.  Cardiac/Vessels: No edema, cyanosis, clubbing noted in all extremities.  Lymph: No palpable lymph node swelling noted around the affected area.  Mental: There was no signs of aberrant behaviors noted. Patient was pleasant throughout the encounter.    Functional Movements: Non-antalgic gait appreciated.      WRIST/HAND:   Inspection: No discoloration noted around the wrist and hand bilaterally.  Heberden's nodes present.  Palpation: TTP right worse than left bilateral 1st CMC joint    Special Testin st CMC Grind test (+)    IMAGING STUDIES:  3/14/2025 Bilateral wrist radiographs: \"No fractures. Advanced degenerative changes at both first CMC joints. Osteopenia. \"     I personally reviewed these images and agree with the radiology report and shared the findings with the patient.    IMPRESSION  Fredy Parker is a very pleasant 64 year old right-hand-dominant female who is presenting today with right worse than left bilateral CMC pain with radiographic findings supportive of advanced bilateral CMC osteoarthritis.      PLAN  The following was discussed with the patient:  Activity Modification: As tolerated  Imaging/Tests: Prior Radiographs reviewed  Rehabilitation: Hand therapy recommended, referral placed  Orthotics/Bracing: thumb CMC brace with activity recommended; DME brace provided  Medication: Non-prescription topical and/or oral analgesics may be used as needed  Interventions: ultrasound-guided bilateral CMC joint corticosteroid injection was performed at today's visit.  Please refer to procedure note below for further details  Referral: Given advanced nature of OA, consideration of surgical referral was discussed.  Per " patient preference, will defer at this time.  Follow-up Plan: 2 week post-procedure touch-base  Resources Provided: Written and verbal information detailing above findings and plan provided including after visit summary.    [facility] PROCEDURE VISIT    Referral Source: Sherry Peters     Pre-Procedure Diagnosis: bilateral   thumb pain  Post-Procedure Diagnosis: bilateral  thumb pain    Procedure: US-guided bilateral 1st CMC joint corticosteroid injection    Medications and allergies were reviewed with the patient. No contraindications were identified.    Informed Consent  Following denial of allergy and review of potential side effects and complications including but not necessarily limited to infection, allergic reaction, local tissue breakdown, systemic effects of corticosteroids, elevation of blood glucose, injury to soft tissue and/or nerves and seizure, the patient indicated understanding and agreed to proceed. Written consent was obtained.    Hand / Upper Extremity Injection/Arthrocentesis: bilateral thumb CMC    Date/Time: 3/17/2025 10:52 AM    Performed by: Kaye Suarez MD  Authorized by: Kaye Suarez MD    Indications:  Pain  Needle Size:  30 G  Guidance: ultrasound    Approach:  Radial  Condition: osteoarthritis    Laterality:  Bilateral  Location:  Thumb  Site:  Bilateral thumb CMC    Medications (Right):  20 mg triamcinolone 40 MG/ML; 0.5 mL lidocaine 1 %  Medications (Left):  20 mg triamcinolone 40 MG/ML; 0.5 mL lidocaine 1 %  Outcome:  Tolerated well, no immediate complications  Procedure discussed: discussed risks, benefits, and alternatives    Consent Given by:  Patient  Timeout: timeout called immediately prior to procedure    Prep: patient was prepped and draped in usual sterile fashion     Ultrasound images of procedure were permanently stored.       Procedural Details  The use of direct ultrasound visualization of the needle (rather than a non-guided injection) was  required to increase patient safety by excluding inadvertent intramuscular or intratendinous placement and minimizing bleeding by avoiding osteochondral or vascular injury from the needle.  Additionally, the increased accuracy of placement may increase clinical effectiveness and will allow higher diagnostic specificity when evaluating effectiveness of this injection.    Using ultrasound, a pre-scan of the region was performed to identify the target structure.     The area was prepped with chlorhexidine, then re-examined using the same transducer, a sterile ultrasound transducer cover, and sterile ultrasound gel.    Procedural pause conducted to verify: Correct patient identity, procedure to be performed, and as applicable, correct side and site, correct patient position, and availability of implants, special equipment, or special requirements.    Procedure (Left side)  Transducer: 8-18 MHz Hockeystick  transducer  Patient position: Supine with the forearm in a slightly supinated position with the radial aspect of the wrist facing the ceiling.   Localization process: With the transducer in an anatomic coronal plane, the 1st CMC joint was localized in a short axis view.  Local anesthesia: No local anesthesia was used.  Needle: A 30-gauge, 1-inch needle was used for the injection.  Approach: A proximal to distal in plane approach was used to guide the needle tip into the left 1st CMC joint  Injection/Aspiration: A mixture of 0.5 cc of lidocaine 1% and 0.5 cc of triamcinolone 40mg/ml was injected into the left 1st CMC without complication.    Procedure (Right side)  Transducer: 8-18 MHz Hockeystick transducer  Patient position: Supine with the forearm in a slightly supinated position with the radial aspect of the wrist facing the ceiling.   Localization process: With the transducer in an anatomic coronal plane, the 1st CMC joint was localized in a short axis view.  Local anesthesia: No local anesthesia was  used.  Needle: A 30-gauge, 1-inch needle was used for the injection.  Approach: A proximal to distal in plane approach was used to guide the needle tip into the right 1st CMC joint  Injection/Aspiration: A mixture of 0.5 cc of lidocaine 1% and 0.5 cc of triamcinolone 40mg/ml was injected into the right 1st CMC without complication.    Post-procedural care: The patient tolerated the procedure well. Patient reported excellent pain relief during the anesthetic phase. The patient was asked to ice for improved pain control and avoid submerging the area in water for the next 48 hours to help reduce the risk of infection. The patient was instructed to call the office immediately if there are any questions or concerns. Patient will plan to follow up for 2 week post-procedure touch-base.    They were encouraged to message me on [employer] whenever they needed.    The patient was in agreement with this plan. All questions were answered to the best of my ability.    Total time (face-to-face and non-face-to-face) spent on today's visit was 45 minutes. This included preparation for the visit (i.e. reviewing test results), performance of a medically appropriate history and examination, placing orders for medications, tests or other procedures, and discussing the plan of care with the patient. This time is exclusive of procedures performed and time spent teaching.    Kaye Suarez MD, [facility]  Sports Medicine Attending Physician  Department of Physical Medicine & Rehabilitation          Again, thank you for allowing me to participate in the care of your patient.        Sincerely,        Kaye Suarez MD    Electronically signed

## 2025-03-17 NOTE — PROGRESS NOTES
SPORTS MEDICINE CLINIC NEW PATIENT VISIT    REFERRAL SOURCE: Sherry Peters PA-C    PATIENT'S GOAL FOR APPOINTMENT: would like bilateral wrist injections     HISTORY OF PRESENT ILLNESS  Fredy Parker is a 64 year old Right-handed female presenting as a new patient with bilateral wrist/hand pain    When did problem start?/Trauma associated with onset?:  Onset: few months   No ANGELA  Location & description of pain:  R>L, CMC Joint     Exacerbating factors:   Sleeping, gripping, lifting     Remitting factors: Rest    Previous Treatments:  -Medications: tylenol, Bengay   -Rehabilitation: None   -Durable Medical Equipment: wrist brace at night   -Injections: None   -Modalities: heat   -Other Providers seen:     Sports, Hobbies, Employment:  Retired     Average hours of sleep per night: 4-5 hours     Average minutes of exercise per day: 30 minutes     Area of Problem  3/17/2025   Date 2 Date 3 Date 4 Date 5   Function Ability in last week - % of Baseline (0 is worst & 100 is best) *       Sport/Activity Ability in last week - % of Baseline (0 is worst & 100 is best) *       Pain Level in the last week (0 is best & 10 is worst) 10         Additional Information of consideration:  -Neck Pain?: Yes   -Numbness/paresthesias in extremities?: tingling at night   -Weakness?: Yes     MEDICATIONS    Current Outpatient Medications:     baclofen (LIORESAL) 10 MG tablet, Take 0.5-1 tablets (5-10 mg) by mouth 2 times daily as needed for muscle spasms, Disp: 180 tablet, Rfl: 1    CALTRATE 600+D PLUS 600-400 MG-UNIT OR TABS, 1 TABLET TWICE DAILY , Disp: 3 MONTHS, Rfl: PRN    CENTRUM OR TABS, 1 TABLET DAILY, Disp: 30, Rfl: 0    cetirizine (ZYRTEC) 10 MG tablet, Take 1 tablet (10 mg) by mouth daily, Disp: 90 tablet, Rfl: 3    FLUoxetine (PROZAC) 20 MG capsule, Take 1 capsule (20 mg) by mouth daily., Disp: 90 capsule, Rfl: 1    gabapentin (NEURONTIN) 300 MG capsule, TAKE 300MG (ONE CAPSULE) 1-2 TIMES DAILY, Disp: 180 capsule, Rfl: 1     losartan (COZAAR) 25 MG tablet, Take 1 tablet (25 mg) by mouth daily., Disp: 90 tablet, Rfl: 1    omeprazole (PRILOSEC) 40 MG DR capsule, Take 1 capsule (40 mg) by mouth daily as needed (GERD)., Disp: 90 capsule, Rfl: 1    rosuvastatin (CRESTOR) 40 MG tablet, Take 1 tablet (40 mg) by mouth daily., Disp: 90 tablet, Rfl: 1    valACYclovir (VALTREX) 500 MG tablet, Take 1 tablet (500 mg) by mouth daily., Disp: 90 tablet, Rfl: 1    VITAMIN D 1000 UNIT OR CAPS, 2 CAPSULES DAILY, Disp: 180 Cap, Rfl: PRN    ALLERGIES  Allergies   Allergen Reactions    Aspirin Rash    Latex     Nsaids GI Disturbance    Sulfasalazine Rash       PAST MEDICAL HISTORY  Past Medical History:   Diagnosis Date    Chronic gastric ulcer without mention of hemorrhage, perforation, without mention of obstruction 1994    gastric erosion dx'ed by endoscopy. pt was also treated for pos H.pylori    Disc disorder of cervical region 2006    mild disc changes C3-4/C4-5 and C5-6 with chronic myofascial cervicoscapular pain    Elevated transaminase level 2012    AST/ALT 2-3 x normal; fatty infiltration liver on ultrasound; negative testing for infectious hepatitis; normal iron levels, ELP, CK    HTN (hypertension)     Latent tuberculosis 10/2009    started INH     Major depressive disorder, single episode, moderate (H) 2006    Mixed hyperlipidemia 2009    elevated TG and LDL    Osteopenia 2009    mild, femoral neck    Recurrent genital herpes 2010    Status post laminectomy 2007/2012 12/2007: L5-S1 diskectomy 2012:Revision diskectomy left at L5-S1       PAST SURGICAL HISTORY  Past Surgical History:   Procedure Laterality Date    COLONOSCOPY N/A 07/15/2019    Procedure: COLONOSCOPY, WITH POLYPECTOMY AND BIOPSY;  Surgeon: Danyell Peters MD;  Location:  GI    CONIZATION LEEP  02/2011    Completed for recurrent Mild Dysplasia and chronic endocervicitis    DISKECTOMY, LUMBAR, SINGLE SP  12/2007    L5-S1 diskectomy    HC INJ TRANSFORAMIN EPIDURAL,  "CERV/THOR SINGLE  05/2006    C7-T1 epidural steroid    INJECTION, ANESTHETIC/STEROID, TRANSFORAMINAL EPIDURAL; LUMBAR/SACRAL, SINGLE LEVEL  8,9/2007    left L5 selective nerve root injection under fluoroscopy with corticosteroid.     INJECTION, ANESTHETIC/STEROID, TRANSFORAMINAL EPIDURAL; LUMBAR/SACRAL, SINGLE LEVEL  10/2007    right L5-S1 translaminar epidural injection    LAMINECTOMY LUMBAR ONE LEVEL  03/06/2012    Procedure:LAMINECTOMY LUMBAR ONE LEVEL; REVISION DISCECTOMY L5-S1 ON THE LEFT ; Surgeon:ROSE PRITCHETT; Location: OR    LAPAROSCOPIC CHOLECYSTECTOMY  07/1999    MR LUMBAR SPINE W/O CONTRAST  01/2012    Moderate-sized left posterolateral caudally extruded L5-S1disc herniation impinging on left S1 nerve, mild degenerative disc changes at L4-5    RELEASE TRIGGER FINGER  05/17/2012    Procedure:RELEASE TRIGGER FINGER; right ring finger trigger release  (latex allergy:contact); Surgeon:QUYEN FUNK; Location:[facility]    SONO ABDOMEN LIMITED  09/2012    Increased echotexture liver c/w fatty infiltration    TUBAL LIGATION  2000       SOCIAL HISTORY  Social History     Tobacco Use    Smoking status: Never     Passive exposure: Never    Smokeless tobacco: Never   Vaping Use    Vaping status: Never Used   Substance Use Topics    Alcohol use: No     Alcohol/week: 0.0 standard drinks of alcohol    Drug use: No       FAMILY HISTORY  Family History   Problem Relation Age of Onset    Hypertension Mother     Lipids Mother     Hypertension Father     Lipids Father     Lipids Sister        REVIEW OF SYSTEMS  Complete 12 system Review of Systems performed and was negative except for HPI.    VITALS  Vitals:    03/17/25 1020   Weight: 58.5 kg (129 lb)   Height: 1.473 m (4' 10\")     PHYSICAL EXAMINATION   General: Age appropriate appearing, no acute distress  HEENT: normocephalic, atraumatic, sclera non-icteric  Skin: No open skin lesion noted in visible areas.  Respiratory: Non labored breathing, No " "wheezes.  Cardiac/Vessels: No edema, cyanosis, clubbing noted in all extremities.  Lymph: No palpable lymph node swelling noted around the affected area.  Mental: There was no signs of aberrant behaviors noted. Patient was pleasant throughout the encounter.    Functional Movements: Non-antalgic gait appreciated.      WRIST/HAND:   Inspection: No discoloration noted around the wrist and hand bilaterally.  Heberden's nodes present.  Palpation: TTP right worse than left bilateral 1st CMC joint    Special Testin st CMC Grind test (+)    IMAGING STUDIES:  3/14/2025 Bilateral wrist radiographs: \"No fractures. Advanced degenerative changes at both first CMC joints. Osteopenia. \"     I personally reviewed these images and agree with the radiology report and shared the findings with the patient.    IMPRESSION  Fredy Parker is a very pleasant 64 year old right-hand-dominant female who is presenting today with right worse than left bilateral CMC pain with radiographic findings supportive of advanced bilateral CMC osteoarthritis.      PLAN  The following was discussed with the patient:  Activity Modification: As tolerated  Imaging/Tests: Prior Radiographs reviewed  Rehabilitation: Hand therapy recommended, referral placed  Orthotics/Bracing: thumb CMC brace with activity recommended; DME brace provided  Medication: Non-prescription topical and/or oral analgesics may be used as needed  Interventions: ultrasound-guided bilateral CMC joint corticosteroid injection was performed at today's visit.  Please refer to procedure note below for further details  Referral: Given advanced nature of OA, consideration of surgical referral was discussed.  Per patient preference, will defer at this time.  Follow-up Plan: 2 week post-procedure touch-base  Resources Provided: Written and verbal information detailing above findings and plan provided including after visit summary.    SPORTS MEDICINE CLINIC PROCEDURE VISIT    Referral Source: " Sherry Peters     Pre-Procedure Diagnosis: bilateral   thumb pain  Post-Procedure Diagnosis: bilateral  thumb pain    Procedure: US-guided bilateral 1st CMC joint corticosteroid injection    Medications and allergies were reviewed with the patient. No contraindications were identified.    Informed Consent  Following denial of allergy and review of potential side effects and complications including but not necessarily limited to infection, allergic reaction, local tissue breakdown, systemic effects of corticosteroids, elevation of blood glucose, injury to soft tissue and/or nerves and seizure, the patient indicated understanding and agreed to proceed. Written consent was obtained.    Hand / Upper Extremity Injection/Arthrocentesis: bilateral thumb CMC    Date/Time: 3/17/2025 10:52 AM    Performed by: Kaye Suarez MD  Authorized by: Kaye Suarez MD    Indications:  Pain  Needle Size:  30 G  Guidance: ultrasound    Approach:  Radial  Condition: osteoarthritis    Laterality:  Bilateral  Location:  Thumb  Site:  Bilateral thumb CMC    Medications (Right):  20 mg triamcinolone 40 MG/ML; 0.5 mL lidocaine 1 %  Medications (Left):  20 mg triamcinolone 40 MG/ML; 0.5 mL lidocaine 1 %  Outcome:  Tolerated well, no immediate complications  Procedure discussed: discussed risks, benefits, and alternatives    Consent Given by:  Patient  Timeout: timeout called immediately prior to procedure    Prep: patient was prepped and draped in usual sterile fashion     Ultrasound images of procedure were permanently stored.       Procedural Details  The use of direct ultrasound visualization of the needle (rather than a non-guided injection) was required to increase patient safety by excluding inadvertent intramuscular or intratendinous placement and minimizing bleeding by avoiding osteochondral or vascular injury from the needle.  Additionally, the increased accuracy of placement may increase clinical effectiveness and will  allow higher diagnostic specificity when evaluating effectiveness of this injection.    Using ultrasound, a pre-scan of the region was performed to identify the target structure.     The area was prepped with chlorhexidine, then re-examined using the same transducer, a sterile ultrasound transducer cover, and sterile ultrasound gel.    Procedural pause conducted to verify: Correct patient identity, procedure to be performed, and as applicable, correct side and site, correct patient position, and availability of implants, special equipment, or special requirements.    Procedure (Left side)  Transducer: 8-18 MHz Hockeystick  transducer  Patient position: Supine with the forearm in a slightly supinated position with the radial aspect of the wrist facing the ceiling.   Localization process: With the transducer in an anatomic coronal plane, the 1st CMC joint was localized in a short axis view.  Local anesthesia: No local anesthesia was used.  Needle: A 30-gauge, 1-inch needle was used for the injection.  Approach: A proximal to distal in plane approach was used to guide the needle tip into the left 1st CMC joint  Injection/Aspiration: A mixture of 0.5 cc of lidocaine 1% and 0.5 cc of triamcinolone 40mg/ml was injected into the left 1st CMC without complication.    Procedure (Right side)  Transducer: 8-18 MHz Hockeystick transducer  Patient position: Supine with the forearm in a slightly supinated position with the radial aspect of the wrist facing the ceiling.   Localization process: With the transducer in an anatomic coronal plane, the 1st CMC joint was localized in a short axis view.  Local anesthesia: No local anesthesia was used.  Needle: A 30-gauge, 1-inch needle was used for the injection.  Approach: A proximal to distal in plane approach was used to guide the needle tip into the right 1st CMC joint  Injection/Aspiration: A mixture of 0.5 cc of lidocaine 1% and 0.5 cc of triamcinolone 40mg/ml was injected into the  right 1st CMC without complication.    Post-procedural care: The patient tolerated the procedure well. Patient reported excellent pain relief during the anesthetic phase. The patient was asked to ice for improved pain control and avoid submerging the area in water for the next 48 hours to help reduce the risk of infection. The patient was instructed to call the office immediately if there are any questions or concerns. Patient will plan to follow up for 2 week post-procedure touch-base.    They were encouraged to message me on [employer] whenever they needed.    The patient was in agreement with this plan. All questions were answered to the best of my ability.    Total time (face-to-face and non-face-to-face) spent on today's visit was 45 minutes. This included preparation for the visit (i.e. reviewing test results), performance of a medically appropriate history and examination, placing orders for medications, tests or other procedures, and discussing the plan of care with the patient. This time is exclusive of procedures performed and time spent teaching.    Kaye Suarez MD, [facility]  Sports Medicine Attending Physician  Department of Physical Medicine & Rehabilitation

## 2025-03-17 NOTE — PATIENT INSTRUCTIONS
"Fredyeverardo Parker, It was nice to see you in our office today.      DIAGNOSIS:   1. Pain in both wrists    2. CMC arthritis      INSTRUCTIONS FOR FOLLOW-UP CARE:  Activity Modification: As tolerated  Imaging/Tests: Prior Radiographs reviewed  Rehabilitation: Hand therapy recommended, referral placed  Orthotics/Bracing: Recommend thumb CMC brace with activity such as the following which may be purchased from amaz[employer]: \"Comfort Cool Thumb CMC Restriction Splint\"    Medication: --Topical Medication Options:  May use topical Voltaren gel up to 4 times daily as needed for pain, if symptoms persist,  May use topical Aspercreme up to 4 times daily as needed for pain, if symptoms persist  May alternate with topical Verasome gel up to 4 times daily as needed for pain: this may be purchased at: https://www.[employer]verasome with using patient pricing code [employer]  For night time pain, consider nightly Salonpas patches nightly (on for 12 hours and off for 12 hours)                  --Oral medication options: Tylenol 500-1000mg (up to three times per day) and ibuprofen 600mg (up to three times per day) as needed for pain and swelling. Always take ibuprofen with food.    Interventions: Today you had an ultrasound-guided bilateral CMC joint corticosteroid injection    POST-INJECTION INSTRUCTIONS:  No increased activity the day of the injection, but it is okay to perform typical daily activities. Gradually increase your activities after 24 hours as tolerated.  Do not submerge the area in water (ie. No tub baths, pool, lake, hot tubs...etc) for 48 hours, but you can take a shower  You may have a mild to moderate increase in pain for a few days up to a week following the injection.  The lidocaine (numbing medicine) will wear off in several hours. It usually takes 3-5 days for the steroid medication to start working, although it may take up to 14 days for full effect.   You may use ice packs for 10-15 minutes, 3 to 4 times a " day at the injection site for comfort if needed.  Be sure to put a thin cloth between your skin and the ice to avoid any skin irritation  You may take your normal over the counter pain medications by mouth if needed for pain    If you were fasting, you may resume your normal diet and medications after the procedure  If you have diabetes, your blood sugar may be higher than normal for 10-14 days following a steroid injection. Contact your doctor who manages your diabetes if your blood sugar is significantly higher than usual  If you experience any of the following signs of infection, suspect you may be having a reaction to the medication, or have any questions, call our office ([facility]) at [phone] during regular business hours.  -Fever over 100 degrees F  -Swelling, redness, drainage, increased pain, bleeding, warmth at the injection site  -New or significant worsening pain  If you are experiencing any serious symptoms, call 911 or go to the emergency room.   Follow-up Plan: 2 week post-procedure touch-base    CLINIC LOCATIONS:     [facility]:  [phone] [address], [address] [place] HELP LINE: [phone]   [place] [address] TRIAGE LINE: [phone]   (Monday & Friday) APPOINTMENTS: [phone]    RADIOLOGY: [phone]   [facility] [phone] [address] PHYSICAL & OCCUPATIONAL THERAPY: [phone]*   [place] [address] BILLING QUESTIONS: [phone]   (Tuesday & Thursday) FAX: [phone]       *Therapy locations that offer Saturday options: [place], [place], [place]    Thank you for choosing [facility] Sports Medicine!    If you have any questions, please do not hesitate to reach out on [employer] or Call [phone] and ask for my team.    Kaye Suarez MD, [facility]

## 2025-03-17 NOTE — PROGRESS NOTES
SPORTS MEDICINE CLINIC NEW PATIENT VISIT    REFERRAL SOURCE: Sherry Peters PA-C    PATIENT'S GOAL FOR APPOINTMENT: ***    HISTORY OF PRESENT ILLNESS  Fredy Parker is a 64 year old {RIGHT/LEFT/AMBIDEXTROUS:768206}-handed female presenting as a new patient with {RIGHT/LEFT/BILATERAL:143652} wrist/hand pain    When did problem start?/Trauma associated with onset?:  ***    Location & description of pain:  ***    Exacerbating factors:   ***    Remitting factors:  ***    Previous Treatments:  -Medications: ***  -Rehabilitation: ***  -Durable Medical Equipment:***  -Injections: ***  -Modalities: ***  -Other Providers seen: ***    Sports, Hobbies, Employment:  ***    Average hours of sleep per night: ***    Average minutes of exercise per day: Daily***    Area of Problem  3/17/2025  Date 2 Date 3 Date 4 Date 5   Function Ability in last week - % of Baseline (0 is worst & 100 is best)        Sport/Activity Ability in last week - % of Baseline (0 is worst & 100 is best)        Pain Level in the last week (0 is best & 10 is worst)          Additional Information of consideration:  -Neck Pain?: ***  -Numbness/paresthesias in extremities?: ***  -Weakness?:***    MEDICATIONS    Current Outpatient Medications:     baclofen (LIORESAL) 10 MG tablet, Take 0.5-1 tablets (5-10 mg) by mouth 2 times daily as needed for muscle spasms, Disp: 180 tablet, Rfl: 1    CALTRATE 600+D PLUS 600-400 MG-UNIT OR TABS, 1 TABLET TWICE DAILY , Disp: 3 MONTHS, Rfl: PRN    CENTRUM OR TABS, 1 TABLET DAILY, Disp: 30, Rfl: 0    cetirizine (ZYRTEC) 10 MG tablet, Take 1 tablet (10 mg) by mouth daily, Disp: 90 tablet, Rfl: 3    FLUoxetine (PROZAC) 20 MG capsule, Take 1 capsule (20 mg) by mouth daily., Disp: 90 capsule, Rfl: 1    gabapentin (NEURONTIN) 300 MG capsule, TAKE 300MG (ONE CAPSULE) 1-2 TIMES DAILY, Disp: 180 capsule, Rfl: 1    losartan (COZAAR) 25 MG tablet, Take 1 tablet (25 mg) by mouth daily., Disp: 90 tablet, Rfl: 1    omeprazole (PRILOSEC) 40  MG DR capsule, Take 1 capsule (40 mg) by mouth daily as needed (GERD)., Disp: 90 capsule, Rfl: 1    rosuvastatin (CRESTOR) 40 MG tablet, Take 1 tablet (40 mg) by mouth daily., Disp: 90 tablet, Rfl: 1    valACYclovir (VALTREX) 500 MG tablet, Take 1 tablet (500 mg) by mouth daily., Disp: 90 tablet, Rfl: 1    VITAMIN D 1000 UNIT OR CAPS, 2 CAPSULES DAILY, Disp: 180 Cap, Rfl: PRN    ALLERGIES  Allergies   Allergen Reactions    Aspirin Rash    Latex     Nsaids GI Disturbance    Sulfasalazine Rash       PAST MEDICAL HISTORY  Past Medical History:   Diagnosis Date    Chronic gastric ulcer without mention of hemorrhage, perforation, without mention of obstruction 1994    gastric erosion dx'ed by endoscopy. pt was also treated for pos H.pylori    Disc disorder of cervical region 2006    mild disc changes C3-4/C4-5 and C5-6 with chronic myofascial cervicoscapular pain    Elevated transaminase level 2012    AST/ALT 2-3 x normal; fatty infiltration liver on ultrasound; negative testing for infectious hepatitis; normal iron levels, ELP, CK    HTN (hypertension)     Latent tuberculosis 10/2009    started INH     Major depressive disorder, single episode, moderate (H) 2006    Mixed hyperlipidemia 2009    elevated TG and LDL    Osteopenia 2009    mild, femoral neck    Recurrent genital herpes 2010    Status post laminectomy 2007/2012 12/2007: L5-S1 diskectomy 2012:Revision diskectomy left at L5-S1       PAST SURGICAL HISTORY  Past Surgical History:   Procedure Laterality Date    COLONOSCOPY N/A 07/15/2019    Procedure: COLONOSCOPY, WITH POLYPECTOMY AND BIOPSY;  Surgeon: Danyell Peters MD;  Location:  GI    CONIZATION LEEP  02/2011    Completed for recurrent Mild Dysplasia and chronic endocervicitis    DISKECTOMY, LUMBAR, SINGLE SP  12/2007    L5-S1 diskectomy    HC INJ TRANSFORAMIN EPIDURAL, CERV/THOR SINGLE  05/2006    C7-T1 epidural steroid    INJECTION, ANESTHETIC/STEROID, TRANSFORAMINAL EPIDURAL;  LUMBAR/SACRAL, SINGLE LEVEL  8,9/2007    left L5 selective nerve root injection under fluoroscopy with corticosteroid.     INJECTION, ANESTHETIC/STEROID, TRANSFORAMINAL EPIDURAL; LUMBAR/SACRAL, SINGLE LEVEL  10/2007    right L5-S1 translaminar epidural injection    LAMINECTOMY LUMBAR ONE LEVEL  03/06/2012    Procedure:LAMINECTOMY LUMBAR ONE LEVEL; REVISION DISCECTOMY L5-S1 ON THE LEFT ; Surgeon:ROSE PRITCHETT; Location: OR    LAPAROSCOPIC CHOLECYSTECTOMY  07/1999    MR LUMBAR SPINE W/O CONTRAST  01/2012    Moderate-sized left posterolateral caudally extruded L5-S1disc herniation impinging on left S1 nerve, mild degenerative disc changes at L4-5    RELEASE TRIGGER FINGER  05/17/2012    Procedure:RELEASE TRIGGER FINGER; right ring finger trigger release  (latex allergy:contact); Surgeon:QUYEN FUNK; Location:[facility]    SONO ABDOMEN LIMITED  09/2012    Increased echotexture liver c/w fatty infiltration    TUBAL LIGATION  2000       SOCIAL HISTORY  Social History     Tobacco Use    Smoking status: Never     Passive exposure: Never    Smokeless tobacco: Never   Vaping Use    Vaping status: Never Used   Substance Use Topics    Alcohol use: No     Alcohol/week: 0.0 standard drinks of alcohol    Drug use: No       FAMILY HISTORY  Family History   Problem Relation Age of Onset    Hypertension Mother     Lipids Mother     Hypertension Father     Lipids Father     Lipids Sister        REVIEW OF SYSTEMS  Complete 12 system Review of Systems performed and was negative except for HPI.    VITALS  There were no vitals filed for this visit.    PHYSICAL EXAMINATION ***  General: Age appropriate appearing, no acute distress  HEENT: normocephalic, atraumatic, sclera non-icteric  Skin: No open skin lesion noted in visible areas.  Respiratory: Non labored breathing, No wheezes.  Cardiac/Vessels: No edema, cyanosis, clubbing noted in all extremities.  Lymph: No palpable lymph node swelling noted around the affected area.  Mental:  There was no signs of aberrant behaviors noted. Patient was pleasant throughout the encounter.    Functional Movements: Non-antalgic gait appreciated.      WRIST/HAND:   Inspection: No discoloration noted around the wrist and hand bilaterally ***  Palpation: ***  Range of Motion able to make a full fist, flex and extend all fingers at the DIP and PIP against resistance, retropulse thumb and abduct all fingers against resistance   Reflexes  [L (0-4) / R (0-4)]:  Biceps [2/2], Triceps [2/2], Brachioradialis [2/2]  Sensation:  Intact to light touch bilaterally at the shoulders, lateral elbow, medial elbow, dorsum of thumb, dorsum of 3rd digit, and dorsum of 5th digit.  Strength [L (0-5) / R (0-5)]:   Shoulder: Abduction [5/5], External Rotation [5/5], Internal Rotation [5/5]  Elbow: Flexion [5/5], Extension [5/5]  Wrist: Extension [5/5], Flexion [5/5]  Fingers: Abduction [5/5],  [5/5]    Special Testin st CMC Grind test (-)  De Quervain's test (-)  Lunotriquetral ballotment test (-)  SL Instability  TTP over dorsal test (-)  SL Balottement test (-)  Scaphoid Shift Test (hummel test) -Ulnar to radial movement of wrist (-)  Carpal Tunnel  Phalen test  Tinel at wrist  TFCC  -Fovea sign (-)  -Soft spot between ulnar styloid and FCU  -DRUJ Shuck (-)  -TFCC Compression test (-)  -ECU Subluxation (-)  Gamekeeper's thumb stress test (-)  Boutonniere test (-)  Mallet finger test (-)  Ulnar Froment sign (-)    CERVICAL SPINE  Inspection- Head forward, rounded shoulder posture.  Palpation- No tenderness to palpation over upper trapezius muscles, cervical spinous processes or paraspinal muscles bilaterally  ROM-  Range of motion is active (patient performed) unless noted  Forward flexion (normal = 90 degrees): normal  Extension (normal = 30 degrees): normal  Left/Right  Sidebend (normal = 30 degrees): normal/normal  Rotation (normal = 30 degrees): normal/normal    Special Testing:   Spurling maneuver (-)  Upper extremity  "dural tension sign (Ulnar, Radial, Median) (-)  Lhermitte sign (-)    IMAGING STUDIES:  3/14/2025 Bilateral wrist radiographs: \"No fractures. Advanced degenerative changes at both first CMC joints. Osteopenia. \"    I personally reviewed these images and agree with the radiology report and shared the findings with the patient.    IMPRESSION  Fredy Parker is a very pleasant 64 year old {SIDE:5002::\"right\",\"left\",\"***\"}-hand-dominant female who is presenting today with *** wrist/hand pain that seems related to ***    PLAN  The following was discussed with the patient:  Activity Modification: ***  Imaging/Tests: ***  Rehabilitation: ***  Orthotics/Bracing: ***  Medication: ***  Interventions: ***  Referral: ***  Follow-up Plan: ***  Resources Provided: ***    They were encouraged to message me on [employer] whenever they needed.    The patient was in agreement with this plan. All questions were answered to the best of my ability.    Kaye Suarez MD, [facility]  Sports Medicine Attending Physician  Department of Physical Medicine & Rehabilitation      "

## 2025-03-17 NOTE — TELEPHONE ENCOUNTER
Triage Patient Outreach    Attempt # 1    Was call answered?  No.  Left voicemail to return call to Triage at Primary Clinic    Saundra Hamilton RN

## 2025-03-19 NOTE — TELEPHONE ENCOUNTER
Triage Patient Outreach    Attempt # 2    Was call answered?  No.  Left voicemail to return call to Triage at Primary Clinic    Courtney Shelton RN

## 2025-03-20 NOTE — TELEPHONE ENCOUNTER
Triage Patient Outreach    Attempt # 3    Was call answered?  No.  Left voicemail to return call to Triage at Primary Clinic    Germania Packer RN

## 2025-03-24 NOTE — PROGRESS NOTES
Follow-up Note  [facility]  [facility]      Referring provider/Gynecologist:    Amina Vega Masters, DO  [address] [facility] [address]  [place] [address]      Primary Care Provider:  Blu Henry  [address] [place] [id number]         Patient: Fredy Parker  : 1960  Language: English   Pronouns: no preference    Date of Visit: Mar 27, 2025       Reason for visit: ASC-H cytology, non-16/18 hrHPV+. Cervical stenosis.       History of Present Illness:  Fredy Parker is a 64 year old patient referred to the [facility] for evaluation of persistent hrHPV, new ASC-H cytology. History as follows:    *HPV vaccination status: Unvaccinated.    Cervical Cancer Screening History:  04: Pap test NILM.     07: Pap test ASCUS, hrHPV-.   07: Pap test ASCUS, hrHPV-.     3/20/09: Pap test LSIL.  2009: Colposcopic findings negative.     3/26/10: Pap test LSIL, non-16/18 hrHPV+.   12/7/10: Pap test LSIL, hrHPV-.  2011: Diagnostic LEEP.   -Pathology: No evidence of dysplasia.     11: Pap test NILM.     12: Pap test NILM.     13: Pap test NILM.     14: Pap test ASCUS, non-16/18 hrHPV+.   10/21/14: Cervical biopsy, endocervical curettings pathology negative.     12/2/15: Pap test LSIL, non-16/18 hrHPV+.   12/15/15: Endocervical curettings pathology negative.     17: Pap test LSIL, non-16/18 hrHPV+.   17: Endocervical curettings suggestive but not diagnostic of LSIL.     3/19/18: Pap test ASC-H, non-16/18 hrHPV+.   4/10/18: Endocervical curettings, cervical biopsy pathology negative.     10/2/18: Pap test LSIL, non-16/18 hrHPV+.     19: Pap test ASCUS, non-16/18 hrHPV+.   19: Cervical biopsy pathology LSIL (CIN1); Endocervical curettings pathology negative but no endocervical tissue.    20: Pap test ASCUS, non-16/18 hrHPV+.   20: Endocervical curettings pathology negative.     21: Pap test  ASCUS, non-16/18 hrHPV+.     6/19/23: Pap test LSIL, non-16/18 hrHPV+.   11/6/23: Endocervical curettings pathology negative.     11/6/24: Pap test ASC-H, non-16/18 hrHPV+.   12/2/24: Colposcopy by gynecologist Dr. Mora.   -Findings: Stenosis of os, small cervix flush with vagina. No AWE.   -Pathology: Cervical biopsies 7:00, 1:00 pathology negative; Endocervical curettings pathology negative but no endocervical tissue.  2/27/25: Gynecologic Oncology consultation.   -Findings: Cervix flush with the vagina, difficult to visualize, unable to visualize the cervical os due to scarring. Unable to grasp the cervix with a tenaculum. On bimanual exam, the cervix is difficult to palpate/outline. On rectovaginal exam there are no palpable masses.   3/8/25: Pelvic MRI to evaluate endocervix: There is a focal region of endometrial thickening along the lower uterine segment and upper cervical level measuring 13 x 10 x 5 mm in thickness.   3/12/25: Discussed at the [facility] Gynecologic Oncology tumor board. MRI findings not definitive for malignancy of the endocervix/endometrium. Given anatomical limitations, including low likelihood of successful endocervical/endometrial evaluation on EUA, recommendation for hysterectomy with frozen section and staging as indicated.         Subjective:  Fredy Parker presents to the Gyn Onc HPV Clinic today for a scheduled follow-up visit, accompanied by her daughter.   No current concerns.       Medical History:  Past Medical History:   Diagnosis Date    Chronic gastric ulcer without mention of hemorrhage, perforation, without mention of obstruction 1994    gastric erosion dx'ed by endoscopy. pt was also treated for pos H.pylori    Disc disorder of cervical region 2006    mild disc changes C3-4/C4-5 and C5-6 with chronic myofascial cervicoscapular pain    Elevated transaminase level 2012    AST/ALT 2-3 x normal; fatty infiltration liver on ultrasound; negative testing for infectious hepatitis;  normal iron levels, ELP, CK    HTN (hypertension)     Latent tuberculosis 10/2009    started INH     Major depressive disorder, single episode, moderate (H) 2006    Mixed hyperlipidemia 2009    elevated TG and LDL    Osteopenia 2009    mild, femoral neck    Recurrent genital herpes 2010    Status post laminectomy 2007/2012 12/2007: L5-S1 diskectomy 2012:Revision diskectomy left at L5-S1         Surgical History:  Past Surgical History:   Procedure Laterality Date    COLONOSCOPY N/A 07/15/2019    Procedure: COLONOSCOPY, WITH POLYPECTOMY AND BIOPSY;  Surgeon: Danyell Peters MD;  Location:  GI    CONIZATION LEEP  02/2011    Completed for recurrent Mild Dysplasia and chronic endocervicitis    DISKECTOMY, LUMBAR, SINGLE SP  12/2007    L5-S1 diskectomy    HC INJ TRANSFORAMIN EPIDURAL, CERV/THOR SINGLE  05/2006    C7-T1 epidural steroid    INJECTION, ANESTHETIC/STEROID, TRANSFORAMINAL EPIDURAL; LUMBAR/SACRAL, SINGLE LEVEL  8,9/2007    left L5 selective nerve root injection under fluoroscopy with corticosteroid.     INJECTION, ANESTHETIC/STEROID, TRANSFORAMINAL EPIDURAL; LUMBAR/SACRAL, SINGLE LEVEL  10/2007    right L5-S1 translaminar epidural injection    LAMINECTOMY LUMBAR ONE LEVEL  03/06/2012    Procedure:LAMINECTOMY LUMBAR ONE LEVEL; REVISION DISCECTOMY L5-S1 ON THE LEFT ; Surgeon:ROSE PRITCHETT; Location: OR    LAPAROSCOPIC CHOLECYSTECTOMY  07/1999    MR LUMBAR SPINE W/O CONTRAST  01/2012    Moderate-sized left posterolateral caudally extruded L5-S1disc herniation impinging on left S1 nerve, mild degenerative disc changes at L4-5    RELEASE TRIGGER FINGER  05/17/2012    Procedure:RELEASE TRIGGER FINGER; right ring finger trigger release  (latex allergy:contact); Surgeon:QUYEN FUNK; Location:[facility]    SONO ABDOMEN LIMITED  09/2012    Increased echotexture liver c/w fatty infiltration    TUBAL LIGATION  2000          Gynecologic History:  Menstrual/Menopause status: approximately age  53  Hormone therapy/Contraception status: none  History of STIS: none  Current sexual activity status: not active        Obstetric History:  OB History    Para Term  AB Living   2 1 1 0 1 1   SAB IAB Ectopic Multiple Live Births   1 0 0 0 1      # Outcome Date GA Lbr Kvng/2nd Weight Sex Type Anes PTL Lv   2 SAB     F SAB   JERMAIN   1 Term     M    JERMAIN          Medications:   Current Outpatient Medications   Medication Sig Dispense Refill    baclofen (LIORESAL) 10 MG tablet Take 0.5-1 tablets (5-10 mg) by mouth 2 times daily as needed for muscle spasms 180 tablet 1    CALTRATE 600+D PLUS 600-400 MG-UNIT OR TABS 1 TABLET TWICE DAILY  3 MONTHS PRN    CENTRUM OR TABS 1 TABLET DAILY 30 0    cetirizine (ZYRTEC) 10 MG tablet Take 1 tablet (10 mg) by mouth daily 90 tablet 3    FLUoxetine (PROZAC) 20 MG capsule Take 1 capsule (20 mg) by mouth daily. 90 capsule 1    gabapentin (NEURONTIN) 300 MG capsule TAKE 300MG (ONE CAPSULE) 1-2 TIMES DAILY 180 capsule 1    losartan (COZAAR) 25 MG tablet Take 1 tablet (25 mg) by mouth daily. 90 tablet 1    omeprazole (PRILOSEC) 40 MG DR capsule Take 1 capsule (40 mg) by mouth daily as needed (GERD). 90 capsule 1    rosuvastatin (CRESTOR) 40 MG tablet Take 1 tablet (40 mg) by mouth daily. 90 tablet 1    valACYclovir (VALTREX) 500 MG tablet Take 1 tablet (500 mg) by mouth daily. 90 tablet 1    VITAMIN D 1000 UNIT OR CAPS 2 CAPSULES DAILY 180 Cap PRN     No current facility-administered medications for this visit.          Allergies:   Allergies   Allergen Reactions    Aspirin Rash    Latex     Nsaids GI Disturbance    Sulfasalazine Rash          Social History:  Patient lives with her adult son. Patient's  passed away from cancer .  Work status: retired. Activity: independent with ADLs  Advanced Directives: none. Desired Healthcare Power of : daughter Holly Ellsworth  Social History     Tobacco Use    Smoking status: Never     Passive exposure: Never    Smokeless  tobacco: Never   Vaping Use    Vaping status: Never Used   Substance Use Topics    Alcohol use: No     Alcohol/week: 0.0 standard drinks of alcohol    Drug use: No          Family History:  Family history significant for no known conditions.  No known family history of breast, ovarian, uterine, colon, urothelial/renal, prostate or pancreatic cancers.  No known family history of melanoma.  Family History   Problem Relation Age of Onset    Hypertension Mother     Lipids Mother     Hypertension Father     Lipids Father     Lipids Sister        Physical Exam:   BP (!) 144/88 (BP Location: Right arm, Patient Position: Sitting, Cuff Size: Adult Regular)   Pulse 73   Temp 98  F (36.7  C) (Oral)   Resp 16   Wt 59 kg (130 lb)   LMP 03/24/2009 (Exact Date)   SpO2 98%   BMI 27.17 kg/m    Body mass index is 27.17 kg/m .     General Appearance: healthy and alert, no distress     Musculoskeletal: extremities non tender and without edema    Skin: no lesions or rashes     Neurological: no gross defects     Psychiatric: appropriate mood and affect                                 Laboratory Examination:  NA      Radiographic Examination:  I have independently reviewed & interpreted the 3/8/25 MRI images and participated in the 3/12/25 tumor board discussion detailed in the HPI.       Performance Status:  ECOG Grade 0.       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   Colposcopic impression Atrophic/scarred.   MRI with indeterminate thickening of the upper cervix/lower uterine segment--differential diagnosis includes normal endometrium/cervix vs EIN vs endometrial cancer, less likely endocervical carcinoma.   Cervical stenosis limiting ability to sample the endocervix/endometrium.     Surgical history significant for diagnostic LEEP 2019 (negative pathology).         Plan:   1) Abnormal cervical cancer screening, thickening of the  JOSE ALFREDO/upper cervix: I reviewed the MRI findings with Tawn and provided her with a copy of the MRI report. I also reviewed the tumor board discussion/consensus recommendations with her. Recommend diagnostic and risk-reducing hysterectomy with frozen section and staging as indicated. Alternative is continued surveillance, however, this is not recommended given the high-grade abnormal cytology, indeterminate MRI findings and inability to sample the endocervix/endometrium now or in the future.     After discussion of risks/benefits, Rubyn would like to proceed with the recommended surgical management.   Plan for Pelvic exam under anesthesia, total laparoscopic hysterectomy, bilateral salpingo-oophorectomy; possible pelvic/para-aortic lymphadenectomy.      The patient was counseled that the uterus/cervix will be sent for frozen section pathology examination, and that subsequent surgical decisions will be made based on the pathology results.     2) Genetic risk factors assessed: Does not meet criteria for Genetics referral.     3) Labs/tests ordered: None.    4) Health maintenance issues addressed today: None.  -The patient will see her primary care provider for preoperative evaluation.      5) Code status: Full-code.     6) Prescriptions: None.    7) Preoperative teaching was provided today by ROBIN Skinner.   Risks of the surgery were discussed, including but not limited to:  Bleeding, possibly requiring a blood transfusion; injury to adjacent organs; postoperative complications (infection, possibly requiring antibiotics, drain placement, wound opening; blood clot and risk of pulmonary embolism); medical complications (pneumonia; stroke; heart attack; death); long-term complications (hernia; chronic pain); if lymphadenectomy performed, risk of lymphedema or lymphocele  The patient was counseled that hysterectomy is associated with an increased risk of pelvic organ prolapse and/or urinary incontinence.   The patient was counseled  that trainees such as fellows, residents and/or students may be involved in her case. Each team member will perform to his/her level of training, and may perform a pelvic exam under anesthesia.   The patient will receive antibiotic and VTE prophylaxis as indicated.       A total of 30 minutes was spent with the patient, 100% of which were spent in counseling/treatment planning, 0 minutes of which were spent in procedure time; an additional 10 minutes was spent in chart review (including review of imaging) and documentation.         Ruth Paulino MD, MS, FACOG, FACS  3/27/2025  4:27 PM

## 2025-03-24 NOTE — PATIENT INSTRUCTIONS
SCHEDULING:  -See surgery scheduling below.  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   MRI with thickening of the upper cervix/lower uterine segment--indeterminate, but possible diagnoses include normal endometrium/cervix vs endometrial pre-cancer (endometrial intraepithelial neoplasia) vs endometrial cancer; less likely endocervical cancer.   Cervical stenosis limiting ability to sample the endocervix/enodmetrium.       PLAN:  1) Abnormal cervical cancer screening; lower uterine segment/upper cervical thickening on MRI:   We discussed your case at our gyn onc tumor board. Given the high-grade cytology, MRI findings and inability to sample the endocervix/endometrium, recommend hysterectomy with cancer staging if indicated.     Plan as follows:   See surgery plan below.       SURGERY PLAN:  1.  SURGERY Date to be determined--Surgery to be performed at [facility]: [address], [place]  [address]:  Pelvic exam under anesthesia, total laparoscopic hysterectomy (removal of uterus/cervix), bilateral salpingo-oophorectomy (removal of bilateral ovaries & fallopian tubes); possible pelvic/para-aortic lymphadenectomy (additional lymph node sampling).       2.  Preoperative tests:  None.    3.  PREOPERATIVE EVALUATION:  Please schedule a visit with your primary care provider for preoperative evaluation within 30 days of surgery.     4.  ONE WEEK BEFORE SURGERY:  Please do not take aspirin, ibuprofen, or fish oil, as these drugs can increase your intraoperative bleeding risk; tylenol is okay to take for pain.    5.  HOSPITAL STAY:  You will be discharged to home the day of surgery; you will need someone to drive you home.      6.  FOLLOW-UP:  Return to the gyn onc clinic approximately 2-4 weeks after surgery for your postoperative evaluation.       POSTOP INSTRUCTIONS:  You will be discharged  with ibuprofen 600 mg, which should be taken every 6 hours for the first few days after surgery, then wean off as tolerated.  You can alternate ibuprofen with acetaminophen (tylenol), with a maximum of 4g in 1 day.   You will also be discharged with a narcotic pain medication (e.g. oxycodone). This can be used in addition to ibuprofen if needed; approximately 50% need to take a few narcotic pain tablets, 50% do not need any narcotics. Wean off this medication first. Common side effects include itching, nausea, constipation, fatigue.     Take a stool softener such as senna or colace twice daily after surgery, unless you develop diarrhea.     Decreased appetite after surgery is normal. Often 6-8 very small meals per day is better tolerated than 2-3 large meals. Decreased appetite can last for up to 4-6 months after surgery.    I encourage you to walk and take the stairs. No heavy lifting: Do not lift >10 pounds for the first 4 weeks, then no more than 20 pounds for the next 4 weeks, then can increase lifting as tolerated.    Swelling of the legs/buttocks is normal for 4-6 weeks. It will eventually decrease. Please call if you have increasing pain, especially in one leg more than the other, or if one leg is significantly more swollen than the other.    You may shower the day after surgery, just let the soap and water run over the incision(s) and pat dry. No sitting in a bathtub for 4 weeks.    No driving while taking narcotic pain medications. After you discontinue the narcotic, sit in your car and make sure you can move your foot from the gas to the break without flinching prior to driving (~1-4 weeks after surgery).     If hysterectomy performed: you may have some vaginal spotting as the top of the vagina heals, and this is normal. If you have heavy bleeding like a moderate to heavy period, or have large gushes of fluid, please call the clinic.    Please call if you have fevers 101 or greater, heavy vaginal bleeding  (see above), persistent nausea/vomiting, purulent discharge and/or increasing redness at the incision sites, or other problems or concerns.   You should feel a little bit better every day; call if you start developing more pain, or more nausea.      Activity Restrictions:  -No heavy lifting (>10-20 pounds) for 8 weeks after surgery.  -If hysterectomy performed: Nothing in the vagina (no tampons, no douching, no sex) for 8 weeks after surgery.  -No driving while taking narcotic pain medications.  After you discontinue the narcotic, sit in your car and make sure you can move your foot from the gas to the brake without flinching prior to driving (approximately 1 week after surgery).      Please call with any questions or concerns. [phone]         Ruth Paulino MD, MS, FACOG, FACS  3/27/2025  4:12 PM

## 2025-03-27 ENCOUNTER — ONCOLOGY VISIT (OUTPATIENT)
Dept: ONCOLOGY | Facility: CLINIC | Age: 65
End: 2025-03-27
Attending: OBSTETRICS & GYNECOLOGY
Payer: COMMERCIAL

## 2025-03-27 VITALS
RESPIRATION RATE: 16 BRPM | HEART RATE: 73 BPM | OXYGEN SATURATION: 98 % | DIASTOLIC BLOOD PRESSURE: 88 MMHG | BODY MASS INDEX: 27.17 KG/M2 | SYSTOLIC BLOOD PRESSURE: 144 MMHG | TEMPERATURE: 98 F | WEIGHT: 130 LBS

## 2025-03-27 DIAGNOSIS — N72 HIGH RISK HUMAN PAPILLOMA VIRUS (HPV) INFECTION OF CERVIX: Primary | ICD-10-CM

## 2025-03-27 DIAGNOSIS — B97.7 HIGH RISK HUMAN PAPILLOMA VIRUS (HPV) INFECTION OF CERVIX: Primary | ICD-10-CM

## 2025-03-27 PROCEDURE — G0463 HOSPITAL OUTPT CLINIC VISIT: HCPCS | Performed by: OBSTETRICS & GYNECOLOGY

## 2025-03-27 RX ORDER — HEPARIN SODIUM 10000 [USP'U]/ML
5000 INJECTION, SOLUTION INTRAVENOUS; SUBCUTANEOUS
OUTPATIENT
Start: 2025-03-27

## 2025-03-27 RX ORDER — METRONIDAZOLE 500 MG/100ML
500 INJECTION, SOLUTION INTRAVENOUS
OUTPATIENT
Start: 2025-03-27

## 2025-03-27 ASSESSMENT — PAIN SCALES - GENERAL: PAINLEVEL_OUTOF10: NO PAIN (0)

## 2025-03-27 NOTE — PROGRESS NOTES
"Oncology Rooming Note    March 27, 2025 3:15 PM   Fredy Parker is a 64 year old female who presents for:    Chief Complaint   Patient presents with    Oncology Clinic Visit     CORBIN I (cervical intraepithelial neoplasia I)       Initial Vitals: BP (!) 149/89 (BP Location: Right arm, Patient Position: Sitting, Cuff Size: Adult Regular)   Pulse 73   Temp 98  F (36.7  C) (Oral)   Resp 16   Wt 59 kg (130 lb)   LMP 03/24/2009 (Exact Date)   SpO2 98%   BMI 27.17 kg/m   Estimated body mass index is 27.17 kg/m  as calculated from the following:    Height as of 3/17/25: 1.473 m (4' 10\").    Weight as of this encounter: 59 kg (130 lb). Body surface area is 1.55 meters squared.  No Pain (0) Comment: Data Unavailable   Patient's last menstrual period was 03/24/2009 (exact date).  Allergies reviewed: Yes  Medications reviewed: Yes    Medications: Medication refills not needed today.  Pharmacy name entered into [facility]:    [facility] [place] [address] [id number] - [place] - [address] [facility]    Frailty Screening:   Is the patient here for a new oncology consult visit in cancer care? 2. No    PHQ9:  Did this patient require a PHQ9?: No      Clinical concerns: no       Teresa Quiles CMA              "

## 2025-03-27 NOTE — LETTER
"3/27/2025      Fredy Parker  [address]  [place] [address]      Dear Colleague,    Thank you for referring your patient, Fredy Parker, to the [facility]. Please see a copy of my visit note below.    Oncology Rooming Note    2025 3:15 PM   Fredy Parker is a 64 year old female who presents for:    Chief Complaint   Patient presents with     Oncology Clinic Visit     CORBIN I (cervical intraepithelial neoplasia I)       Initial Vitals: BP (!) 149/89 (BP Location: Right arm, Patient Position: Sitting, Cuff Size: Adult Regular)   Pulse 73   Temp 98  F (36.7  C) (Oral)   Resp 16   Wt 59 kg (130 lb)   LMP 2009 (Exact Date)   SpO2 98%   BMI 27.17 kg/m   Estimated body mass index is 27.17 kg/m  as calculated from the following:    Height as of 3/17/25: 1.473 m (4' 10\").    Weight as of this encounter: 59 kg (130 lb). Body surface area is 1.55 meters squared.  No Pain (0) Comment: Data Unavailable   Patient's last menstrual period was 2009 (exact date).  Allergies reviewed: Yes  Medications reviewed: Yes    Medications: Medication refills not needed today.  Pharmacy name entered into [employer]:    [facility] [place] [address] [facility]  [id number] [place] - [address]    Frailty Screening:   Is the patient here for a new oncology consult visit in cancer care? 2. No    PHQ9:  Did this patient require a PHQ9?: No      Clinical concerns: no       Teresa Quiles CMA                Follow-up Note  [facility] Gynecologic Oncology HPV Clinic  [facility]      Referring provider/Gynecologist:    Amina Vega Masters, DO  [address]  [place] [address]      Primary Care Provider:  Blu Henry  [address] [facility] [address]         Patient: Fredy Parker  : 1960  Language: English   Pronouns: no preference    Date of Visit: Mar 27, 2025       Reason for visit: ASC-H " cytology, non-16/18 hrHPV+. Cervical stenosis.       History of Present Illness:  Fredy Parker is a 64 year old patient referred to the [facility] Gynecologic Oncology HPV Clinic for evaluation of persistent hrHPV, new ASC-H cytology. History as follows:    *HPV vaccination status: Unvaccinated.    Cervical Cancer Screening History:  7/21/04: Pap test NILM.     2/27/07: Pap test ASCUS, hrHPV-.   6/8/07: Pap test ASCUS, hrHPV-.     3/20/09: Pap test LSIL.  5/2009: Colposcopic findings negative.     3/26/10: Pap test LSIL, non-16/18 hrHPV+.   12/7/10: Pap test LSIL, hrHPV-.  2/2011: Diagnostic LEEP.   -Pathology: No evidence of dysplasia.     8/23/11: Pap test NILM.     4/18/12: Pap test NILM.     4/24/13: Pap test NILM.     9/29/14: Pap test ASCUS, non-16/18 hrHPV+.   10/21/14: Cervical biopsy, endocervical curettings pathology negative.     12/2/15: Pap test LSIL, non-16/18 hrHPV+.   12/15/15: Endocervical curettings pathology negative.     12/20/17: Pap test LSIL, non-16/18 hrHPV+.   4/17/17: Endocervical curettings suggestive but not diagnostic of LSIL.     3/19/18: Pap test ASC-H, non-16/18 hrHPV+.   4/10/18: Endocervical curettings, cervical biopsy pathology negative.     10/2/18: Pap test LSIL, non-16/18 hrHPV+.     5/14/19: Pap test ASCUS, non-16/18 hrHPV+.   7/8/19: Cervical biopsy pathology LSIL (CIN1); Endocervical curettings pathology negative but no endocervical tissue.    7/31/20: Pap test ASCUS, non-16/18 hrHPV+.   8/19/20: Endocervical curettings pathology negative.     9/27/21: Pap test ASCUS, non-16/18 hrHPV+.     6/19/23: Pap test LSIL, non-16/18 hrHPV+.   11/6/23: Endocervical curettings pathology negative.     11/6/24: Pap test ASC-H, non-16/18 hrHPV+.   12/2/24: Colposcopy by gynecologist Dr. Mora.   -Findings: Stenosis of os, small cervix flush with vagina. No AWE.   -Pathology: Cervical biopsies 7:00, 1:00 pathology negative; Endocervical curettings pathology negative but no endocervical  tissue.  2/27/25: Gynecologic Oncology consultation.   -Findings: Cervix flush with the vagina, difficult to visualize, unable to visualize the cervical os due to scarring. Unable to grasp the cervix with a tenaculum. On bimanual exam, the cervix is difficult to palpate/outline. On rectovaginal exam there are no palpable masses.   3/8/25: Pelvic MRI to evaluate endocervix: There is a focal region of endometrial thickening along the lower uterine segment and upper cervical level measuring 13 x 10 x 5 mm in thickness.   3/12/25: Discussed at the [facility] Gynecologic Oncology tumor board. MRI findings not definitive for malignancy of the endocervix/endometrium. Given anatomical limitations, including low likelihood of successful endocervical/endometrial evaluation on EUA, recommendation for hysterectomy with frozen section and staging as indicated.         Subjective:  Fredy Parker presents to the Gyn Onc HPV Clinic today for a scheduled follow-up visit, accompanied by her daughter.   No current concerns.       Medical History:  Past Medical History:   Diagnosis Date     Chronic gastric ulcer without mention of hemorrhage, perforation, without mention of obstruction 1994    gastric erosion dx'ed by endoscopy. pt was also treated for pos H.pylori     Disc disorder of cervical region 2006    mild disc changes C3-4/C4-5 and C5-6 with chronic myofascial cervicoscapular pain     Elevated transaminase level 2012    AST/ALT 2-3 x normal; fatty infiltration liver on ultrasound; negative testing for infectious hepatitis; normal iron levels, ELP, CK     HTN (hypertension)      Latent tuberculosis 10/2009    started INH      Major depressive disorder, single episode, moderate (H) 2006     Mixed hyperlipidemia 2009    elevated TG and LDL     Osteopenia 2009    mild, femoral neck     Recurrent genital herpes 2010     Status post laminectomy 2007/2012 12/2007: L5-S1 diskectomy 2012:Revision diskectomy left at L5-S1          Surgical History:  Past Surgical History:   Procedure Laterality Date     COLONOSCOPY N/A 07/15/2019    Procedure: COLONOSCOPY, WITH POLYPECTOMY AND BIOPSY;  Surgeon: Danyell Peters MD;  Location:  GI     CONIZATION LEEP  2011    Completed for recurrent Mild Dysplasia and chronic endocervicitis     DISKECTOMY, LUMBAR, SINGLE SP  2007    L5-S1 diskectomy     HC INJ TRANSFORAMIN EPIDURAL, CERV/THOR SINGLE  2006    C7-T1 epidural steroid     INJECTION, ANESTHETIC/STEROID, TRANSFORAMINAL EPIDURAL; LUMBAR/SACRAL, SINGLE LEVEL  ,2007    left L5 selective nerve root injection under fluoroscopy with corticosteroid.      INJECTION, ANESTHETIC/STEROID, TRANSFORAMINAL EPIDURAL; LUMBAR/SACRAL, SINGLE LEVEL  10/2007    right L5-S1 translaminar epidural injection     LAMINECTOMY LUMBAR ONE LEVEL  2012    Procedure:LAMINECTOMY LUMBAR ONE LEVEL; REVISION DISCECTOMY L5-S1 ON THE LEFT ; Surgeon:ROSE PRITCHETT; Location: OR     LAPAROSCOPIC CHOLECYSTECTOMY  1999     MR LUMBAR SPINE W/O CONTRAST  2012    Moderate-sized left posterolateral caudally extruded L5-S1disc herniation impinging on left S1 nerve, mild degenerative disc changes at L4-5     RELEASE TRIGGER FINGER  2012    Procedure:RELEASE TRIGGER FINGER; right ring finger trigger release  (latex allergy:contact); Surgeon:QUYEN FUNK; Location:[facility]     SONO ABDOMEN LIMITED  2012    Increased echotexture liver c/w fatty infiltration     TUBAL LIGATION            Gynecologic History:  Menstrual/Menopause status: approximately age 53  Hormone therapy/Contraception status: none  History of STIS: none  Current sexual activity status: not active        Obstetric History:  OB History    Para Term  AB Living   2 1 1 0 1 1   SAB IAB Ectopic Multiple Live Births   1 0 0 0 1      # Outcome Date GA Lbr Kvng/2nd Weight Sex Type Anes PTL Lv   2 SAB     F SAB   JERMAIN   1 Term     M    JERMAIN          Medications:    Current Outpatient Medications   Medication Sig Dispense Refill     baclofen (LIORESAL) 10 MG tablet Take 0.5-1 tablets (5-10 mg) by mouth 2 times daily as needed for muscle spasms 180 tablet 1     CALTRATE 600+D PLUS 600-400 MG-UNIT OR TABS 1 TABLET TWICE DAILY  3 MONTHS PRN     CENTRUM OR TABS 1 TABLET DAILY 30 0     cetirizine (ZYRTEC) 10 MG tablet Take 1 tablet (10 mg) by mouth daily 90 tablet 3     FLUoxetine (PROZAC) 20 MG capsule Take 1 capsule (20 mg) by mouth daily. 90 capsule 1     gabapentin (NEURONTIN) 300 MG capsule TAKE 300MG (ONE CAPSULE) 1-2 TIMES DAILY 180 capsule 1     losartan (COZAAR) 25 MG tablet Take 1 tablet (25 mg) by mouth daily. 90 tablet 1     omeprazole (PRILOSEC) 40 MG DR capsule Take 1 capsule (40 mg) by mouth daily as needed (GERD). 90 capsule 1     rosuvastatin (CRESTOR) 40 MG tablet Take 1 tablet (40 mg) by mouth daily. 90 tablet 1     valACYclovir (VALTREX) 500 MG tablet Take 1 tablet (500 mg) by mouth daily. 90 tablet 1     VITAMIN D 1000 UNIT OR CAPS 2 CAPSULES DAILY 180 Cap PRN     No current facility-administered medications for this visit.          Allergies:   Allergies   Allergen Reactions     Aspirin Rash     Latex      Nsaids GI Disturbance     Sulfasalazine Rash          Social History:  Patient lives with her adult son. Patient's  passed away from cancer 2024.  Work status: retired. Activity: independent with ADLs  Advanced Directives: none. Desired Healthcare Power of : daughter Holly Ellsworth  Social History     Tobacco Use     Smoking status: Never     Passive exposure: Never     Smokeless tobacco: Never   Vaping Use     Vaping status: Never Used   Substance Use Topics     Alcohol use: No     Alcohol/week: 0.0 standard drinks of alcohol     Drug use: No          Family History:  Family history significant for no known conditions.  No known family history of breast, ovarian, uterine, colon, urothelial/renal, prostate or pancreatic cancers.  No known family  history of melanoma.  Family History   Problem Relation Age of Onset     Hypertension Mother      Lipids Mother      Hypertension Father      Lipids Father      Lipids Sister        Physical Exam:   BP (!) 144/88 (BP Location: Right arm, Patient Position: Sitting, Cuff Size: Adult Regular)   Pulse 73   Temp 98  F (36.7  C) (Oral)   Resp 16   Wt 59 kg (130 lb)   LMP 03/24/2009 (Exact Date)   SpO2 98%   BMI 27.17 kg/m    Body mass index is 27.17 kg/m .     General Appearance: healthy and alert, no distress     Musculoskeletal: extremities non tender and without edema    Skin: no lesions or rashes     Neurological: no gross defects     Psychiatric: appropriate mood and affect                                 Laboratory Examination:  NA      Radiographic Examination:  I have independently reviewed & interpreted the 3/8/25 MRI images and participated in the 3/12/25 tumor board discussion detailed in the HPI.       Performance Status:  ECOG Grade 0.       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   Colposcopic impression Atrophic/scarred.   MRI with indeterminate thickening of the upper cervix/lower uterine segment--differential diagnosis includes normal endometrium/cervix vs EIN vs endometrial cancer, less likely endocervical carcinoma.   Cervical stenosis limiting ability to sample the endocervix/endometrium.     Surgical history significant for diagnostic LEEP 2019 (negative pathology).         Plan:   1) Abnormal cervical cancer screening, thickening of the JOSE ALFREDO/upper cervix: I reviewed the MRI findings with Bharathwpedro pablo and provided her with a copy of the MRI report. I also reviewed the tumor board discussion/consensus recommendations with her. Recommend diagnostic and risk-reducing hysterectomy with frozen section and staging as indicated. Alternative is continued surveillance, however, this is not recommended given the  high-grade abnormal cytology, indeterminate MRI findings and inability to sample the endocervix/endometrium now or in the future.     After discussion of risks/benefits, Tawn would like to proceed with the recommended surgical management.   Plan for Pelvic exam under anesthesia, total laparoscopic hysterectomy, bilateral salpingo-oophorectomy; possible pelvic/para-aortic lymphadenectomy.      The patient was counseled that the uterus/cervix will be sent for frozen section pathology examination, and that subsequent surgical decisions will be made based on the pathology results.     2) Genetic risk factors assessed: Does not meet criteria for Genetics referral.     3) Labs/tests ordered: None.    4) Health maintenance issues addressed today: None.  -The patient will see her primary care provider for preoperative evaluation.      5) Code status: Full-code.     6) Prescriptions: None.    7) Preoperative teaching was provided today by ROBIN Skinner.   Risks of the surgery were discussed, including but not limited to:  Bleeding, possibly requiring a blood transfusion; injury to adjacent organs; postoperative complications (infection, possibly requiring antibiotics, drain placement, wound opening; blood clot and risk of pulmonary embolism); medical complications (pneumonia; stroke; heart attack; death); long-term complications (hernia; chronic pain); if lymphadenectomy performed, risk of lymphedema or lymphocele  The patient was counseled that hysterectomy is associated with an increased risk of pelvic organ prolapse and/or urinary incontinence.   The patient was counseled that trainees such as fellows, residents and/or students may be involved in her case. Each team member will perform to his/her level of training, and may perform a pelvic exam under anesthesia.   The patient will receive antibiotic and VTE prophylaxis as indicated.       A total of 30 minutes was spent with the patient, 100% of which were spent in  counseling/treatment planning, 0 minutes of which were spent in procedure time; an additional 10 minutes was spent in chart review (including review of imaging) and documentation.         Ruth Paulino MD, MS, FACOG, FACS  3/27/2025  4:27 PM              Again, thank you for allowing me to participate in the care of your patient.        Sincerely,        Ruth Paulino MD    Electronically signed

## 2025-03-31 ENCOUNTER — TELEPHONE (OUTPATIENT)
Dept: ONCOLOGY | Facility: CLINIC | Age: 65
End: 2025-03-31

## 2025-03-31 NOTE — TELEPHONE ENCOUNTER
Called patient to schedule surgery with Dr. Paulino    Spoke with: Fredy (patient)    Date of Surgery: 6/9/2025 per patient's schedule & availability.     Arrival time Discussed with Patient:  No    Location of surgery: [facility] OR     Pre-Op H&P: Patient agreed to schedule with PCP - Dr. Henry - within 30 days of surgery date.     Post-Op Appts: 2-4 week follow up to be scheduled by [facility].      Imaging: NA    Discussed with patient pre-op RN will call 2-3 days prior to surgery with arrival time and instructions:  Yes     Packet sent out: Given in clinic on 3/27    Surgical Soap: Given in clinic on 3/27      Informed patient that they will need an adult  to bring patient home from surgery: Yes  : Patient is prepared to have a  available - one of her children will be driving her.        Additional Comments: None      All patients questions were answered and patient was instructed to review surgical packet and call back with any questions or concerns.       Radha Li on 3/31/2025 at 9:53 AM

## 2025-04-09 ENCOUNTER — ANCILLARY PROCEDURE (OUTPATIENT)
Dept: GENERAL RADIOLOGY | Facility: CLINIC | Age: 65
End: 2025-04-09
Attending: INTERNAL MEDICINE
Payer: COMMERCIAL

## 2025-04-09 ENCOUNTER — OFFICE VISIT (OUTPATIENT)
Dept: INTERNAL MEDICINE | Facility: CLINIC | Age: 65
End: 2025-04-09
Payer: COMMERCIAL

## 2025-04-09 VITALS
BODY MASS INDEX: 27.21 KG/M2 | SYSTOLIC BLOOD PRESSURE: 173 MMHG | OXYGEN SATURATION: 99 % | HEART RATE: 58 BPM | WEIGHT: 129.6 LBS | DIASTOLIC BLOOD PRESSURE: 87 MMHG | HEIGHT: 58 IN | TEMPERATURE: 97.7 F | RESPIRATION RATE: 16 BRPM

## 2025-04-09 DIAGNOSIS — S90.32XA CONTUSION OF LEFT FOOT, INITIAL ENCOUNTER: Primary | ICD-10-CM

## 2025-04-09 DIAGNOSIS — M79.672 LEFT FOOT PAIN: ICD-10-CM

## 2025-04-09 PROCEDURE — 3077F SYST BP >= 140 MM HG: CPT | Performed by: INTERNAL MEDICINE

## 2025-04-09 PROCEDURE — 99213 OFFICE O/P EST LOW 20 MIN: CPT | Performed by: INTERNAL MEDICINE

## 2025-04-09 PROCEDURE — 3079F DIAST BP 80-89 MM HG: CPT | Performed by: INTERNAL MEDICINE

## 2025-04-09 PROCEDURE — 73630 X-RAY EXAM OF FOOT: CPT | Mod: TC | Performed by: RADIOLOGY

## 2025-04-09 ASSESSMENT — PATIENT HEALTH QUESTIONNAIRE - PHQ9
SUM OF ALL RESPONSES TO PHQ QUESTIONS 1-9: 4
10. IF YOU CHECKED OFF ANY PROBLEMS, HOW DIFFICULT HAVE THESE PROBLEMS MADE IT FOR YOU TO DO YOUR WORK, TAKE CARE OF THINGS AT HOME, OR GET ALONG WITH OTHER PEOPLE: NOT DIFFICULT AT ALL
SUM OF ALL RESPONSES TO PHQ QUESTIONS 1-9: 4

## 2025-04-09 NOTE — PROGRESS NOTES
"  {PROVIDER CHARTING PREFERENCE:574862}    Subjective   Fredy is a 64 year old, presenting for the following health issues:  Musculoskeletal Problem    Some swelling   Tylenol for pain does not help   Hit corner of table with foot, there was bruising     Via the Health Maintenance questionnaire, the patient has reported the following services have been completed -Eye Exam: [facility] 2025-01-04, this information has been sent to the abstraction team.  History of Present Illness       Reason for visit:  Hurt the toes   She is taking medications regularly.        {MA/LPN/RN Pre-Provider Visit Orders- hCG/UA/Strep (Optional):075482}  {SUPERLIST (Optional):678643}  {additonal problems for provider to add (Optional):638502}    {ROS Picklists (Optional):636541}      Objective    BP (!) 173/87   Pulse 58   Temp 97.7  F (36.5  C) (Temporal)   Resp 16   Ht 1.473 m (4' 10\")   Wt 58.8 kg (129 lb 9.6 oz)   LMP 03/24/2009 (Exact Date)   SpO2 99%   BMI 27.09 kg/m    Body mass index is 27.09 kg/m .  Physical Exam   {Exam List (Optional):737652}    {Diagnostic Test Results (Optional):369305}        Signed Electronically by: Praful Granados MD  {Email feedback regarding this note to [email or web address]   :523572}  "

## 2025-04-20 NOTE — NURSING NOTE
Goal Outcome Evaluation:      Pt alert and oriented. Pt continuing to ask for pain medication frequently, refer to previous note. VS stable. Plan of care ongoing.                                       Patient presents to the clinic today for a follow up with Alok Carlson MD  regarding Pain Management.                      Neyda Charles MA  Pain management, [place]

## 2025-05-08 DIAGNOSIS — K21.9 GASTROESOPHAGEAL REFLUX DISEASE WITHOUT ESOPHAGITIS: ICD-10-CM

## 2025-05-08 DIAGNOSIS — F32.1 MAJOR DEPRESSIVE DISORDER, SINGLE EPISODE, MODERATE (H): ICD-10-CM

## 2025-05-08 DIAGNOSIS — I10 BENIGN ESSENTIAL HYPERTENSION: ICD-10-CM

## 2025-05-08 DIAGNOSIS — E78.5 HYPERLIPIDEMIA LDL GOAL <130: ICD-10-CM

## 2025-05-08 RX ORDER — OMEPRAZOLE 40 MG/1
40 CAPSULE, DELAYED RELEASE ORAL DAILY PRN
Qty: 90 CAPSULE | Refills: 2 | Status: SHIPPED | OUTPATIENT
Start: 2025-05-08

## 2025-05-08 RX ORDER — LOSARTAN POTASSIUM 25 MG/1
25 TABLET ORAL DAILY
Qty: 30 TABLET | Refills: 0 | Status: SHIPPED | OUTPATIENT
Start: 2025-05-08

## 2025-05-08 RX ORDER — ROSUVASTATIN CALCIUM 40 MG/1
40 TABLET, COATED ORAL DAILY
Qty: 90 TABLET | Refills: 2 | Status: SHIPPED | OUTPATIENT
Start: 2025-05-08

## 2025-05-15 ENCOUNTER — OFFICE VISIT (OUTPATIENT)
Dept: FAMILY MEDICINE | Facility: CLINIC | Age: 65
End: 2025-05-15
Payer: COMMERCIAL

## 2025-05-15 VITALS
WEIGHT: 131 LBS | RESPIRATION RATE: 16 BRPM | BODY MASS INDEX: 26.41 KG/M2 | SYSTOLIC BLOOD PRESSURE: 120 MMHG | HEIGHT: 59 IN | DIASTOLIC BLOOD PRESSURE: 72 MMHG | OXYGEN SATURATION: 97 % | HEART RATE: 70 BPM | TEMPERATURE: 98.3 F

## 2025-05-15 DIAGNOSIS — Z01.818 PRE-OP EXAM: Primary | ICD-10-CM

## 2025-05-15 DIAGNOSIS — M25.532 PAIN IN BOTH WRISTS: ICD-10-CM

## 2025-05-15 DIAGNOSIS — E11.59 TYPE 2 DIABETES MELLITUS WITH OTHER CIRCULATORY COMPLICATION, UNSPECIFIED WHETHER LONG TERM INSULIN USE (H): ICD-10-CM

## 2025-05-15 DIAGNOSIS — M25.531 PAIN IN BOTH WRISTS: ICD-10-CM

## 2025-05-15 DIAGNOSIS — R20.2 PARESTHESIA OF BILATERAL LEGS: ICD-10-CM

## 2025-05-15 DIAGNOSIS — I10 BENIGN ESSENTIAL HYPERTENSION: ICD-10-CM

## 2025-05-15 DIAGNOSIS — R85.611: ICD-10-CM

## 2025-05-15 DIAGNOSIS — M06.4 INFLAMMATORY POLYARTHROPATHY (H): ICD-10-CM

## 2025-05-15 LAB
ERYTHROCYTE [DISTWIDTH] IN BLOOD BY AUTOMATED COUNT: 13.1 % (ref 10–15)
HCT VFR BLD AUTO: 40 % (ref 35–47)
HGB BLD-MCNC: 13.6 G/DL (ref 11.7–15.7)
MCH RBC QN AUTO: 30.5 PG (ref 26.5–33)
MCHC RBC AUTO-ENTMCNC: 34 G/DL (ref 31.5–36.5)
MCV RBC AUTO: 90 FL (ref 78–100)
PLATELET # BLD AUTO: 177 10E3/UL (ref 150–450)
RBC # BLD AUTO: 4.46 10E6/UL (ref 3.8–5.2)
WBC # BLD AUTO: 6.7 10E3/UL (ref 4–11)

## 2025-05-15 PROCEDURE — 80048 BASIC METABOLIC PNL TOTAL CA: CPT | Performed by: FAMILY MEDICINE

## 2025-05-15 PROCEDURE — 85027 COMPLETE CBC AUTOMATED: CPT | Performed by: FAMILY MEDICINE

## 2025-05-15 PROCEDURE — 3078F DIAST BP <80 MM HG: CPT | Performed by: FAMILY MEDICINE

## 2025-05-15 PROCEDURE — 93000 ELECTROCARDIOGRAM COMPLETE: CPT | Performed by: FAMILY MEDICINE

## 2025-05-15 PROCEDURE — 36415 COLL VENOUS BLD VENIPUNCTURE: CPT | Performed by: FAMILY MEDICINE

## 2025-05-15 PROCEDURE — 80061 LIPID PANEL: CPT | Performed by: FAMILY MEDICINE

## 2025-05-15 PROCEDURE — 3074F SYST BP LT 130 MM HG: CPT | Performed by: FAMILY MEDICINE

## 2025-05-15 PROCEDURE — 1126F AMNT PAIN NOTED NONE PRSNT: CPT | Performed by: FAMILY MEDICINE

## 2025-05-15 PROCEDURE — 99214 OFFICE O/P EST MOD 30 MIN: CPT | Performed by: FAMILY MEDICINE

## 2025-05-15 RX ORDER — GABAPENTIN 300 MG/1
900 CAPSULE ORAL AT BEDTIME
Qty: 270 CAPSULE | Refills: 3 | Status: SHIPPED | OUTPATIENT
Start: 2025-05-15

## 2025-05-15 RX ORDER — HYDROXYCHLOROQUINE SULFATE 200 MG/1
200 TABLET, FILM COATED ORAL 2 TIMES DAILY
Qty: 180 TABLET | Refills: 1 | Status: SHIPPED | OUTPATIENT
Start: 2025-05-15

## 2025-05-15 ASSESSMENT — PATIENT HEALTH QUESTIONNAIRE - PHQ9
SUM OF ALL RESPONSES TO PHQ QUESTIONS 1-9: 3
10. IF YOU CHECKED OFF ANY PROBLEMS, HOW DIFFICULT HAVE THESE PROBLEMS MADE IT FOR YOU TO DO YOUR WORK, TAKE CARE OF THINGS AT HOME, OR GET ALONG WITH OTHER PEOPLE: NOT DIFFICULT AT ALL
SUM OF ALL RESPONSES TO PHQ QUESTIONS 1-9: 3

## 2025-05-15 ASSESSMENT — PAIN SCALES - GENERAL: PAINLEVEL_OUTOF10: NO PAIN (0)

## 2025-05-15 NOTE — PROGRESS NOTES
Preoperative Evaluation  [address] [address]  Phone: [phone]  Primary Provider: Blu Henry MD  Pre-op Performing Provider: Blu Henry MD  May 15, 2025             5/15/2025   Surgical Information   What procedure is being done? total laparoscopic hysterectomy, bilateral salpingo-oophorectomY    Facility or Hospital where procedure/surgery will be performed: [facility]   Who is doing the procedure / surgery? Dr Paulino   Date of surgery / procedure: 6/9/25   Time of surgery / procedure: TBD   Where do you plan to recover after surgery? at home with family     Fax number for surgical facility: Note does not need to be faxed, will be available electronically in Epic.    Assessment & Plan     The proposed surgical procedure is considered INTERMEDIATE risk.    Benign essential hypertension    - BASIC METABOLIC PANEL; Future    Type 2 diabetes mellitus with other circulatory complication, unspecified whether long term insulin use (H)    - Lipid panel reflex to direct LDL Non-fasting; Future    Pre-op exam    - BASIC METABOLIC PANEL; Future  - Lipid panel reflex to direct LDL Non-fasting; Future  - EKG 12-lead complete w/read - Clinics  - CBC with platelets; Future    Papanicolaou smear of anus with atypical squamous cells cannot exclude high grade squamous intraepithelial lesion (ASC-H)    - BASIC METABOLIC PANEL; Future  - Lipid panel reflex to direct LDL Non-fasting; Future  - CBC with platelets; Future    Paresthesia of bilateral legs  Worsening, will have her to try 900mg at bedtime   - gabapentin (NEURONTIN) 300 MG capsule; Take 3 capsules (900 mg) by mouth at bedtime. Take 300mg (one capsule)  times daily    Inflammatory polyarthropathy (H)  Worsening, will have her to see rheumatology for follow up  - hydroxychloroquine (PLAQUENIL) 200 MG tablet; Take 1 tablet (200 mg) by mouth 2 times daily.    Pain in both wrists  Mentioned above   -  hydroxychloroquine (PLAQUENIL) 200 MG tablet; Take 1 tablet (200 mg) by mouth 2 times daily.         Risks and Recommendations  The patient has the following additional risks and recommendations for perioperative complications:   - No identified additional risk factors other than previously addressed    Antiplatelet or Anticoagulation Medication Instructions   - We reviewed the medication list and the patient is not on an antiplatelet or anticoagulation medications.    Additional Medication Instructions  We reviewed the medication list and there are no chronic medications that need to be adjusted for this procedure.    Recommendation  Approval given to proceed with proposed procedure, without further diagnostic evaluation.        Mellissa Daniel is a 64 year old, presenting for the following:  Pre-Op Exam and Medication Request (Hydroxychloroquine )          5/15/2025     2:21 PM   Additional Questions   Roomed by Shae PINO               5/15/2025   Pre-Op Questionnaire   Have you ever had a heart attack or stroke? No   Have you ever had surgery on your heart or blood vessels, such as a stent placement, a coronary artery bypass, or surgery on an artery in your head, neck, heart, or legs? No   Do you have chest pain with activity? No   Do you have a history of heart failure? No   Do you currently have a cold, bronchitis or symptoms of other infection? No   Do you have a cough, shortness of breath, or wheezing? No   Do you or anyone in your family have previous history of blood clots? No   Do you or does anyone in your family have a serious bleeding problem such as prolonged bleeding following surgeries or cuts? No   Have you ever had problems with anemia or been told to take iron pills? No   Have you had any abnormal blood loss such as black, tarry or bloody stools, or abnormal vaginal bleeding? No   Have you ever had a blood transfusion? No   Are you willing to have a blood transfusion if it is medically needed  before, during, or after your surgery? (!) NO    Have you or any of your relatives ever had problems with anesthesia? No   Do you have sleep apnea, excessive snoring or daytime drowsiness? No   Do you have any artifical heart valves or other implanted medical devices like a pacemaker, defibrillator, or continuous glucose monitor? No   Do you have artificial joints? No   Are you allergic to latex? (!) YES     Advance Care Planning    Discussed advance care planning with patient; however, patient declined at this time.    Preoperative Review of    reviewed - no record of controlled substances prescribed.      Status of Chronic Conditions:  See problem list for active medical problems.  Problems all longstanding and stable, except as noted/documented.  See ROS for pertinent symptoms related to these conditions.    Patient Active Problem List    Diagnosis Date Noted    Diabetes mellitus, type 2 (H) 08/14/2024     Priority: Medium    Benign essential hypertension 12/07/2023     Priority: Medium    Gastroesophageal reflux disease without esophagitis 12/03/2015     Priority: Medium    Major depressive disorder, single episode, moderate (H) 12/03/2015     Priority: Medium    Osteoarthritis of finger, unspecified laterality 12/03/2015     Priority: Medium    Perennial allergic rhinitis 12/03/2015     Priority: Medium    Hyperlipidemia LDL goal <130 05/03/2015     Priority: Medium    Posterior neck pain 09/30/2014     Priority: Medium    Recurrent genital herpes      Priority: Medium    Cervicalgia 02/15/2012     Priority: Medium     Problem list name updated by automated process. Provider to review      Latent tuberculosis 10/01/2009     Priority: Medium    Osteopenia      Priority: Medium     mild, femoral neck      CORBIN I (cervical intraepithelial neoplasia I) 03/01/2009     Priority: Medium     2/27/07 ASCUS/ Neg HPV  6/8/07 ASCUS/Neg HPV  3/20/09 LSIL  5/2009 COLP- Negative  3/26/10 LSIL/+ HR HPV  12/7/10 LSIL/Neg  HPV  2/2011 LEEP for recurrent mild dysplasia. Pathology = No evidence of dysplasia.  2011, 2012, 2013 NIL paps  09/29/14 ASCUS, +HR HPV.   10/21/14 [facility] = Cervical Bx and ECC= Benign. Repeat co-test 1 yr  12/02/15 [facility]/ +HR HPV. Plan: colposcopy  12/15/15 [facility]= Negative. 1 yr pap  2/20/17 LSIL/+ HR HPV (not 16 or 18).   04/17/17 [facility]= CORBIN 1. Referred to ObGyn for further evaluation  3/19/18  ASC-H, LSIL/+ HR HPV (not 16/18).  Plan: colposcopy by 6/19/18  4/10/18 [facility]- Normal.   10/2/18 LSIL pap, + HR HPV (not 16/18). Plan cotest in 6 months  5/14/19 Pap: ASCUS, +HR HPV (not 16/18). Plan colpo  7/8/19 [facility] - Mild dysplasia. Plan 1 co-test   7/31/20 ASCUS, +HR HPV, not 16/18. Plan [facility] bef 10/31/20  8/19/20 [facility] ECC neg. Plan: cotest in 1 year  9/27/21 ASCUS, +HR HPV, not 16/18. Plan 1 yr co-test    11/16/2022 Lost to follow-up for pap tracking  6/19/23 LSIL Pap, + HR HPV (neg 16/18). [facility] due by 9/19/2023 11/6/23 [facility] ECC - no CORBIN. Plan: cotest in 1 year, due 11/6/2024 11/6/24 ASC-H pap, also LSIL, + HR HPV (neg 16/18). [facility] due by 2/6/2025 12/2/24 [facility] Bx & ECC - negative. Plan: dysplasia clinic referral.           Inflammatory polyarthropathy (H)      Priority: Medium     IP joints and Angel's tendons        Past Medical History:   Diagnosis Date    Chronic gastric ulcer without mention of hemorrhage, perforation, without mention of obstruction 1994    gastric erosion dx'ed by endoscopy. pt was also treated for pos H.pylori    Disc disorder of cervical region 2006    mild disc changes C3-4/C4-5 and C5-6 with chronic myofascial cervicoscapular pain    Elevated transaminase level 2012    AST/ALT 2-3 x normal; fatty infiltration liver on ultrasound; negative testing for infectious hepatitis; normal iron levels, ELP, CK    HTN (hypertension)     Latent tuberculosis 10/2009    started INH     Major depressive disorder, single episode, moderate (H) 2006    Mixed hyperlipidemia 2009    elevated TG and LDL     Osteopenia 2009    mild, femoral neck    Recurrent genital herpes 2010    Status post laminectomy 2007/2012 12/2007: L5-S1 diskectomy 2012:Revision diskectomy left at L5-S1     Past Surgical History:   Procedure Laterality Date    COLONOSCOPY N/A 07/15/2019    Procedure: COLONOSCOPY, WITH POLYPECTOMY AND BIOPSY;  Surgeon: Danyell Peters MD;  Location:  GI    CONIZATION LEEP  02/2011    Completed for recurrent Mild Dysplasia and chronic endocervicitis    DISKECTOMY, LUMBAR, SINGLE SP  12/2007    L5-S1 diskectomy    HC INJ TRANSFORAMIN EPIDURAL, CERV/THOR SINGLE  05/2006    C7-T1 epidural steroid    INJECTION, ANESTHETIC/STEROID, TRANSFORAMINAL EPIDURAL; LUMBAR/SACRAL, SINGLE LEVEL  8,9/2007    left L5 selective nerve root injection under fluoroscopy with corticosteroid.     INJECTION, ANESTHETIC/STEROID, TRANSFORAMINAL EPIDURAL; LUMBAR/SACRAL, SINGLE LEVEL  10/2007    right L5-S1 translaminar epidural injection    LAMINECTOMY LUMBAR ONE LEVEL  03/06/2012    Procedure:LAMINECTOMY LUMBAR ONE LEVEL; REVISION DISCECTOMY L5-S1 ON THE LEFT ; Surgeon:ROSE PRITCHETT; Location: OR    LAPAROSCOPIC CHOLECYSTECTOMY  07/1999    MR LUMBAR SPINE W/O CONTRAST  01/2012    Moderate-sized left posterolateral caudally extruded L5-S1disc herniation impinging on left S1 nerve, mild degenerative disc changes at L4-5    RELEASE TRIGGER FINGER  05/17/2012    Procedure:RELEASE TRIGGER FINGER; right ring finger trigger release  (latex allergy:contact); Surgeon:QUYEN FUNK; Location:[facility]    SONO ABDOMEN LIMITED  09/2012    Increased echotexture liver c/w fatty infiltration    TUBAL LIGATION  2000     Current Outpatient Medications   Medication Sig Dispense Refill    baclofen (LIORESAL) 10 MG tablet Take 0.5-1 tablets (5-10 mg) by mouth 2 times daily as needed for muscle spasms 180 tablet 1    CALTRATE 600+D PLUS 600-400 MG-UNIT OR TABS 1 TABLET TWICE DAILY  3 MONTHS PRN    CENTRUM OR TABS 1 TABLET DAILY 30 0     "FLUoxetine (PROZAC) 20 MG capsule TAKE 1 CAPSULE BY MOUTH EVERY DAY 90 capsule 1    gabapentin (NEURONTIN) 300 MG capsule TAKE 300MG (ONE CAPSULE) 1-2 TIMES DAILY 180 capsule 1    losartan (COZAAR) 25 MG tablet TAKE 1 TABLET BY MOUTH EVERY DAY 30 tablet 0    omeprazole (PRILOSEC) 40 MG DR capsule TAKE 1 CAPSULE (40 MG) BY MOUTH DAILY AS NEEDED (GERD). 90 capsule 2    rosuvastatin (CRESTOR) 40 MG tablet TAKE 1 TABLET BY MOUTH EVERY DAY 90 tablet 2    valACYclovir (VALTREX) 500 MG tablet Take 1 tablet (500 mg) by mouth daily. 90 tablet 1    VITAMIN D 1000 UNIT OR CAPS 2 CAPSULES DAILY 180 Cap PRN    cetirizine (ZYRTEC) 10 MG tablet Take 1 tablet (10 mg) by mouth daily 90 tablet 3       Allergies   Allergen Reactions    Aspirin Rash    Latex     Nsaids GI Disturbance    Sulfasalazine Rash        Social History     Tobacco Use    Smoking status: Never     Passive exposure: Never    Smokeless tobacco: Never   Substance Use Topics    Alcohol use: No     Alcohol/week: 0.0 standard drinks of alcohol     Family History   Problem Relation Age of Onset    Hypertension Mother     Lipids Mother     Hypertension Father     Lipids Father     Lipids Sister      History   Drug Use No             Review of Systems  Constitutional, HEENT, cardiovascular, pulmonary, GI, , musculoskeletal, neuro, skin, endocrine and psych systems are negative, except as otherwise noted.    Objective    /72 (BP Location: Right arm, Patient Position: Sitting, Cuff Size: Adult Regular)   Pulse 70   Temp 98.3  F (36.8  C) (Tympanic)   Resp 16   Ht 1.491 m (4' 10.7\")   Wt 59.4 kg (131 lb)   LMP 03/24/2009 (Exact Date)   SpO2 97%   BMI 26.73 kg/m     Estimated body mass index is 26.73 kg/m  as calculated from the following:    Height as of this encounter: 1.491 m (4' 10.7\").    Weight as of this encounter: 59.4 kg (131 lb).  Physical Exam  GENERAL: alert and no distress  EYES: Eyes grossly normal to inspection, PERRL and conjunctivae and " sclerae normal  HENT: ear canals and TM's normal, nose and mouth without ulcers or lesions  NECK: no adenopathy, no asymmetry, masses, or scars  RESP: lungs clear to auscultation - no rales, rhonchi or wheezes  CV: regular rate and rhythm, normal S1 S2, no S3 or S4, no murmur, click or rub, no peripheral edema  ABDOMEN: soft, nontender, no hepatosplenomegaly, no masses and bowel sounds normal  MS: no gross musculoskeletal defects noted, no edema    Recent Labs   Lab Test 02/05/25  0913 08/14/24  1610 05/16/24  0953   HGB 14.1 13.8 13.7    187 178   NA  --   --  141   POTASSIUM  --   --  4.3   CR  --   --  0.60   A1C 6.3* 6.5*  --         Diagnostics  Recent Results (from the past 48 hours)   BASIC METABOLIC PANEL    Collection Time: 05/15/25  3:03 PM   Result Value Ref Range    Sodium 140 135 - 145 mmol/L    Potassium 4.3 3.4 - 5.3 mmol/L    Chloride 105 98 - 107 mmol/L    Carbon Dioxide (CO2) 23 22 - 29 mmol/L    Anion Gap 12 7 - 15 mmol/L    Urea Nitrogen 17.5 8.0 - 23.0 mg/dL    Creatinine 0.72 0.51 - 0.95 mg/dL    GFR Estimate >90 >60 mL/min/1.73m2    Calcium 9.4 8.8 - 10.4 mg/dL    Glucose 94 70 - 99 mg/dL    Patient Fasting > 8hrs? Yes    Lipid panel reflex to direct LDL Non-fasting    Collection Time: 05/15/25  3:03 PM   Result Value Ref Range    Cholesterol 156 <200 mg/dL    Triglycerides 111 <150 mg/dL    Direct Measure HDL 84 >=50 mg/dL    LDL Cholesterol Calculated 50 <100 mg/dL    Non HDL Cholesterol 72 <130 mg/dL    Patient Fasting > 8hrs? Yes    CBC with platelets    Collection Time: 05/15/25  3:03 PM   Result Value Ref Range    WBC Count 6.7 4.0 - 11.0 10e3/uL    RBC Count 4.46 3.80 - 5.20 10e6/uL    Hemoglobin 13.6 11.7 - 15.7 g/dL    Hematocrit 40.0 35.0 - 47.0 %    MCV 90 78 - 100 fL    MCH 30.5 26.5 - 33.0 pg    MCHC 34.0 31.5 - 36.5 g/dL    RDW 13.1 10.0 - 15.0 %    Platelet Count 177 150 - 450 10e3/uL      EKG: appears normal, NSR, normal axis, normal intervals, no acute ST/T changes c/w  ischemia, no LVH by voltage criteria, unchanged from previous tracings    Revised Cardiac Risk Index (RCRI)  The patient has the following serious cardiovascular risks for perioperative complications:   - No serious cardiac risks = 0 points     RCRI Interpretation: 0 points: Class I (very low risk - 0.4% complication rate)         Signed Electronically by: Blu Henry MD  A copy of this evaluation report is provided to the requesting physician.         Answers submitted by the patient for this visit:  Patient Health Questionnaire (Submitted on 5/15/2025)  If you checked off any problems, how difficult have these problems made it for you to do your work, take care of things at home, or get along with other people?: Not difficult at all  PHQ9 TOTAL SCORE: 3

## 2025-05-16 LAB
ANION GAP SERPL CALCULATED.3IONS-SCNC: 12 MMOL/L (ref 7–15)
BUN SERPL-MCNC: 17.5 MG/DL (ref 8–23)
CALCIUM SERPL-MCNC: 9.4 MG/DL (ref 8.8–10.4)
CHLORIDE SERPL-SCNC: 105 MMOL/L (ref 98–107)
CHOLEST SERPL-MCNC: 156 MG/DL
CREAT SERPL-MCNC: 0.72 MG/DL (ref 0.51–0.95)
EGFRCR SERPLBLD CKD-EPI 2021: >90 ML/MIN/1.73M2
FASTING STATUS PATIENT QL REPORTED: YES
FASTING STATUS PATIENT QL REPORTED: YES
GLUCOSE SERPL-MCNC: 94 MG/DL (ref 70–99)
HCO3 SERPL-SCNC: 23 MMOL/L (ref 22–29)
HDLC SERPL-MCNC: 84 MG/DL
LDLC SERPL CALC-MCNC: 50 MG/DL
NONHDLC SERPL-MCNC: 72 MG/DL
POTASSIUM SERPL-SCNC: 4.3 MMOL/L (ref 3.4–5.3)
SODIUM SERPL-SCNC: 140 MMOL/L (ref 135–145)
TRIGL SERPL-MCNC: 111 MG/DL

## 2025-05-17 ENCOUNTER — RESULTS FOLLOW-UP (OUTPATIENT)
Dept: FAMILY MEDICINE | Facility: CLINIC | Age: 65
End: 2025-05-17
Payer: COMMERCIAL

## 2025-05-22 ENCOUNTER — OFFICE VISIT (OUTPATIENT)
Dept: INTERNAL MEDICINE | Facility: CLINIC | Age: 65
End: 2025-05-22
Payer: COMMERCIAL

## 2025-05-22 VITALS
SYSTOLIC BLOOD PRESSURE: 131 MMHG | HEIGHT: 58 IN | TEMPERATURE: 98.3 F | WEIGHT: 132.8 LBS | HEART RATE: 69 BPM | DIASTOLIC BLOOD PRESSURE: 81 MMHG | OXYGEN SATURATION: 98 % | RESPIRATION RATE: 16 BRPM | BODY MASS INDEX: 27.88 KG/M2

## 2025-05-22 DIAGNOSIS — L29.9 PRURITIC DISORDER: Primary | ICD-10-CM

## 2025-05-22 PROCEDURE — 3079F DIAST BP 80-89 MM HG: CPT | Performed by: INTERNAL MEDICINE

## 2025-05-22 PROCEDURE — 3075F SYST BP GE 130 - 139MM HG: CPT | Performed by: INTERNAL MEDICINE

## 2025-05-22 PROCEDURE — 99213 OFFICE O/P EST LOW 20 MIN: CPT | Performed by: INTERNAL MEDICINE

## 2025-05-22 RX ORDER — DIPHENHYDRAMINE HCL 25 MG
25 CAPSULE ORAL EVERY 6 HOURS PRN
Qty: 30 CAPSULE | Refills: 1 | Status: SHIPPED | OUTPATIENT
Start: 2025-05-22

## 2025-05-22 RX ORDER — PREDNISONE 5 MG/1
TABLET ORAL
Qty: 9 TABLET | Refills: 0 | Status: SHIPPED | OUTPATIENT
Start: 2025-05-22 | End: 2025-05-30

## 2025-05-22 NOTE — PROGRESS NOTES
"  {PROVIDER CHARTING PREFERENCE:937624}    Subjective   Fredy is a 64 year old, presenting for the following health issues:  Derm Problem    Two weeks of pruritus on both of her arms and legs. It is more bothersome at night. She hsa tried benadryl and cortisone creams. She has not had any benefit. No rash.     She has not had any new soaps or clothes. No travel. No animal exposure.     She takes zyrtec daily.    History of Present Illness       Reason for visit:  [place]   She is taking medications regularly.        {MA/LPN/RN Pre-Provider Visit Orders- hCG/UA/Strep (Optional):952303}  {SUPERLIST (Optional):210211}  {additonal problems for provider to add (Optional):819257}    {ROS Picklists (Optional):379609}      Objective    /81   Pulse 69   Temp 98.3  F (36.8  C) (Temporal)   Resp 16   Ht 1.473 m (4' 10\")   Wt 60.2 kg (132 lb 12.8 oz)   LMP 03/24/2009 (Exact Date)   SpO2 98%   BMI 27.76 kg/m    Body mass index is 27.76 kg/m .  Physical Exam   {Exam List (Optional):106118}    {Diagnostic Test Results (Optional):348171}        Signed Electronically by: Martina Fitzgerald MD  {Email feedback regarding this note to [email or web address]   :567181}  "

## 2025-05-28 ENCOUNTER — TELEPHONE (OUTPATIENT)
Dept: FAMILY MEDICINE | Facility: CLINIC | Age: 65
End: 2025-05-28
Payer: COMMERCIAL

## 2025-05-28 DIAGNOSIS — L29.9 PRURITIC DISORDER: Primary | ICD-10-CM

## 2025-05-28 NOTE — TELEPHONE ENCOUNTER
Pt states that took the full coarse of prednisone. She states it helped a lot and now that she is out she is itchy again. Pt asking what the next steps are and if she can get another prescription for prednisone. Please review and advise.     Germania Packer RN

## 2025-05-29 NOTE — TELEPHONE ENCOUNTER
Triage RN called and left detailed VM for the patient with the provider's response. Informed patient she should be getting a call from the scheduling line to get an appointment scheduled with the Allergist. Informed patient she could call the clinic back and we can assist in getting her scheduled for a lab only appt.     If pt does call back: please get pt scheduled for a lab appt. No need to transfer to nursing unless pt has additional questions.     Thank you,  Ying Lutz RN

## 2025-05-29 NOTE — TELEPHONE ENCOUNTER
I'd like her to come in for labs and ot see an allergist. I do not recommend an additional course of steroids. Orders placed.

## 2025-06-08 DIAGNOSIS — I10 BENIGN ESSENTIAL HYPERTENSION: ICD-10-CM

## 2025-06-09 ENCOUNTER — ANESTHESIA (OUTPATIENT)
Dept: SURGERY | Facility: CLINIC | Age: 65
End: 2025-06-09
Payer: COMMERCIAL

## 2025-06-09 ENCOUNTER — RESULTS FOLLOW-UP (OUTPATIENT)
Dept: ONCOLOGY | Facility: CLINIC | Age: 65
End: 2025-06-09

## 2025-06-09 ENCOUNTER — TELEPHONE (OUTPATIENT)
Dept: ONCOLOGY | Facility: CLINIC | Age: 65
End: 2025-06-09

## 2025-06-09 ENCOUNTER — ANESTHESIA EVENT (OUTPATIENT)
Dept: SURGERY | Facility: CLINIC | Age: 65
End: 2025-06-09
Payer: COMMERCIAL

## 2025-06-09 ENCOUNTER — HOSPITAL ENCOUNTER (OUTPATIENT)
Facility: CLINIC | Age: 65
Discharge: HOME | End: 2025-06-09
Attending: OBSTETRICS & GYNECOLOGY | Admitting: OBSTETRICS & GYNECOLOGY
Payer: COMMERCIAL

## 2025-06-09 VITALS
TEMPERATURE: 97.5 F | HEART RATE: 76 BPM | DIASTOLIC BLOOD PRESSURE: 68 MMHG | SYSTOLIC BLOOD PRESSURE: 140 MMHG | HEIGHT: 58 IN | RESPIRATION RATE: 14 BRPM | BODY MASS INDEX: 28.13 KG/M2 | WEIGHT: 134 LBS | OXYGEN SATURATION: 96 %

## 2025-06-09 DIAGNOSIS — Z90.710 S/P LAPAROSCOPIC HYSTERECTOMY: Primary | ICD-10-CM

## 2025-06-09 LAB
ABO + RH BLD: NORMAL
BLD GP AB SCN SERPL QL: NEGATIVE
SPECIMEN EXP DATE BLD: NORMAL

## 2025-06-09 PROCEDURE — 999N000141 HC STATISTIC PRE-PROCEDURE NURSING ASSESSMENT: Performed by: OBSTETRICS & GYNECOLOGY

## 2025-06-09 PROCEDURE — 250N000025 HC SEVOFLURANE, PER MIN: Performed by: OBSTETRICS & GYNECOLOGY

## 2025-06-09 PROCEDURE — 250N000011 HC RX IP 250 OP 636: Performed by: OBSTETRICS & GYNECOLOGY

## 2025-06-09 PROCEDURE — 258N000003 HC RX IP 258 OP 636: Performed by: NURSE ANESTHETIST, CERTIFIED REGISTERED

## 2025-06-09 PROCEDURE — 250N000011 HC RX IP 250 OP 636: Mod: JZ | Performed by: STUDENT IN AN ORGANIZED HEALTH CARE EDUCATION/TRAINING PROGRAM

## 2025-06-09 PROCEDURE — 250N000009 HC RX 250: Performed by: OBSTETRICS & GYNECOLOGY

## 2025-06-09 PROCEDURE — 250N000013 HC RX MED GY IP 250 OP 250 PS 637

## 2025-06-09 PROCEDURE — 272N000001 HC OR GENERAL SUPPLY STERILE: Performed by: OBSTETRICS & GYNECOLOGY

## 2025-06-09 PROCEDURE — 360N000077 HC SURGERY LEVEL 4, PER MIN: Performed by: OBSTETRICS & GYNECOLOGY

## 2025-06-09 PROCEDURE — 370N000017 HC ANESTHESIA TECHNICAL FEE, PER MIN: Performed by: OBSTETRICS & GYNECOLOGY

## 2025-06-09 PROCEDURE — 250N000009 HC RX 250: Performed by: NURSE ANESTHETIST, CERTIFIED REGISTERED

## 2025-06-09 PROCEDURE — 258N000003 HC RX IP 258 OP 636: Performed by: STUDENT IN AN ORGANIZED HEALTH CARE EDUCATION/TRAINING PROGRAM

## 2025-06-09 PROCEDURE — 86900 BLOOD TYPING SEROLOGIC ABO: CPT | Performed by: OBSTETRICS & GYNECOLOGY

## 2025-06-09 PROCEDURE — 88305 TISSUE EXAM BY PATHOLOGIST: CPT | Mod: TC | Performed by: OBSTETRICS & GYNECOLOGY

## 2025-06-09 PROCEDURE — 258N000001 HC RX 258: Performed by: OBSTETRICS & GYNECOLOGY

## 2025-06-09 PROCEDURE — 36415 COLL VENOUS BLD VENIPUNCTURE: CPT | Performed by: OBSTETRICS & GYNECOLOGY

## 2025-06-09 PROCEDURE — 88307 TISSUE EXAM BY PATHOLOGIST: CPT | Mod: TC | Performed by: OBSTETRICS & GYNECOLOGY

## 2025-06-09 PROCEDURE — 710N000009 HC RECOVERY PHASE 1, LEVEL 1, PER MIN: Performed by: OBSTETRICS & GYNECOLOGY

## 2025-06-09 PROCEDURE — 99207 PR BUNDLED PROCEDURE IN GLOBAL PKG STATISTIC: CPT | Performed by: OBSTETRICS & GYNECOLOGY

## 2025-06-09 PROCEDURE — 710N000012 HC RECOVERY PHASE 2, PER MINUTE: Performed by: OBSTETRICS & GYNECOLOGY

## 2025-06-09 PROCEDURE — 250N000011 HC RX IP 250 OP 636: Performed by: NURSE ANESTHETIST, CERTIFIED REGISTERED

## 2025-06-09 RX ORDER — METRONIDAZOLE 500 MG/100ML
500 INJECTION, SOLUTION INTRAVENOUS
Status: COMPLETED | OUTPATIENT
Start: 2025-06-09 | End: 2025-06-09

## 2025-06-09 RX ORDER — MAGNESIUM HYDROXIDE 1200 MG/15ML
LIQUID ORAL PRN
Status: DISCONTINUED | OUTPATIENT
Start: 2025-06-09 | End: 2025-06-09 | Stop reason: HOSPADM

## 2025-06-09 RX ORDER — HYDRALAZINE HYDROCHLORIDE 20 MG/ML
10 INJECTION INTRAMUSCULAR; INTRAVENOUS ONCE
Status: COMPLETED | OUTPATIENT
Start: 2025-06-09 | End: 2025-06-09

## 2025-06-09 RX ORDER — OXYCODONE HYDROCHLORIDE 5 MG/1
5 TABLET ORAL EVERY 6 HOURS PRN
Qty: 6 TABLET | Refills: 0 | Status: SHIPPED | OUTPATIENT
Start: 2025-06-09 | End: 2025-06-12

## 2025-06-09 RX ORDER — HEPARIN SODIUM 5000 [USP'U]/.5ML
5000 INJECTION, SOLUTION INTRAVENOUS; SUBCUTANEOUS
Status: COMPLETED | OUTPATIENT
Start: 2025-06-09 | End: 2025-06-09

## 2025-06-09 RX ORDER — FENTANYL CITRATE 50 UG/ML
INJECTION, SOLUTION INTRAMUSCULAR; INTRAVENOUS PRN
Status: DISCONTINUED | OUTPATIENT
Start: 2025-06-09 | End: 2025-06-09

## 2025-06-09 RX ORDER — LABETALOL HYDROCHLORIDE 5 MG/ML
5 INJECTION, SOLUTION INTRAVENOUS ONCE
Status: COMPLETED | OUTPATIENT
Start: 2025-06-09 | End: 2025-06-09

## 2025-06-09 RX ORDER — PROPOFOL 10 MG/ML
INJECTION, EMULSION INTRAVENOUS CONTINUOUS PRN
Status: DISCONTINUED | OUTPATIENT
Start: 2025-06-09 | End: 2025-06-09

## 2025-06-09 RX ORDER — BUPIVACAINE HYDROCHLORIDE 2.5 MG/ML
INJECTION, SOLUTION EPIDURAL; INFILTRATION; INTRACAUDAL; PERINEURAL PRN
Status: DISCONTINUED | OUTPATIENT
Start: 2025-06-09 | End: 2025-06-09 | Stop reason: HOSPADM

## 2025-06-09 RX ORDER — LIDOCAINE HYDROCHLORIDE 20 MG/ML
INJECTION, SOLUTION INFILTRATION; PERINEURAL PRN
Status: DISCONTINUED | OUTPATIENT
Start: 2025-06-09 | End: 2025-06-09

## 2025-06-09 RX ORDER — DEXAMETHASONE SODIUM PHOSPHATE 4 MG/ML
INJECTION, SOLUTION INTRA-ARTICULAR; INTRALESIONAL; INTRAMUSCULAR; INTRAVENOUS; SOFT TISSUE PRN
Status: DISCONTINUED | OUTPATIENT
Start: 2025-06-09 | End: 2025-06-09

## 2025-06-09 RX ORDER — CEFAZOLIN SODIUM/WATER 2 G/20 ML
2 SYRINGE (ML) INTRAVENOUS SEE ADMIN INSTRUCTIONS
Status: DISCONTINUED | OUTPATIENT
Start: 2025-06-09 | End: 2025-06-09 | Stop reason: HOSPADM

## 2025-06-09 RX ORDER — CEFAZOLIN SODIUM/WATER 2 G/20 ML
2 SYRINGE (ML) INTRAVENOUS
Status: COMPLETED | OUTPATIENT
Start: 2025-06-09 | End: 2025-06-09

## 2025-06-09 RX ORDER — BUPIVACAINE HYDROCHLORIDE 2.5 MG/ML
INJECTION, SOLUTION EPIDURAL; INFILTRATION; INTRACAUDAL; PERINEURAL
Status: DISCONTINUED
Start: 2025-06-09 | End: 2025-06-09 | Stop reason: HOSPADM

## 2025-06-09 RX ORDER — ONDANSETRON 2 MG/ML
INJECTION INTRAMUSCULAR; INTRAVENOUS PRN
Status: DISCONTINUED | OUTPATIENT
Start: 2025-06-09 | End: 2025-06-09

## 2025-06-09 RX ORDER — LOSARTAN POTASSIUM 25 MG/1
25 TABLET ORAL DAILY
Qty: 90 TABLET | Refills: 0 | Status: SHIPPED | OUTPATIENT
Start: 2025-06-09

## 2025-06-09 RX ORDER — FENTANYL CITRATE 50 UG/ML
50 INJECTION, SOLUTION INTRAMUSCULAR; INTRAVENOUS ONCE
Refills: 0 | Status: COMPLETED | OUTPATIENT
Start: 2025-06-09 | End: 2025-06-09

## 2025-06-09 RX ORDER — PROPOFOL 10 MG/ML
INJECTION, EMULSION INTRAVENOUS PRN
Status: DISCONTINUED | OUTPATIENT
Start: 2025-06-09 | End: 2025-06-09

## 2025-06-09 RX ORDER — ACETAMINOPHEN 325 MG/1
975 TABLET ORAL ONCE
Status: COMPLETED | OUTPATIENT
Start: 2025-06-09 | End: 2025-06-09

## 2025-06-09 RX ORDER — SODIUM CHLORIDE, SODIUM LACTATE, POTASSIUM CHLORIDE, CALCIUM CHLORIDE 600; 310; 30; 20 MG/100ML; MG/100ML; MG/100ML; MG/100ML
INJECTION, SOLUTION INTRAVENOUS CONTINUOUS
Status: DISCONTINUED | OUTPATIENT
Start: 2025-06-09 | End: 2025-06-09 | Stop reason: HOSPADM

## 2025-06-09 RX ADMIN — PROPOFOL 200 MG: 10 INJECTION, EMULSION INTRAVENOUS at 07:41

## 2025-06-09 RX ADMIN — FENTANYL CITRATE 50 MCG: 50 INJECTION INTRAMUSCULAR; INTRAVENOUS at 07:45

## 2025-06-09 RX ADMIN — LABETALOL HYDROCHLORIDE 5 MG: 5 INJECTION INTRAVENOUS at 10:45

## 2025-06-09 RX ADMIN — HEPARIN SODIUM 5000 UNITS: 5000 INJECTION, SOLUTION INTRAVENOUS; SUBCUTANEOUS at 06:36

## 2025-06-09 RX ADMIN — DEXAMETHASONE SODIUM PHOSPHATE 4 MG: 4 INJECTION, SOLUTION INTRA-ARTICULAR; INTRALESIONAL; INTRAMUSCULAR; INTRAVENOUS; SOFT TISSUE at 07:46

## 2025-06-09 RX ADMIN — LIDOCAINE HYDROCHLORIDE 60 MG: 20 INJECTION, SOLUTION INFILTRATION; PERINEURAL at 07:36

## 2025-06-09 RX ADMIN — ROCURONIUM BROMIDE 20 MG: 50 INJECTION, SOLUTION INTRAVENOUS at 08:27

## 2025-06-09 RX ADMIN — MIDAZOLAM 2 MG: 1 INJECTION INTRAMUSCULAR; INTRAVENOUS at 07:39

## 2025-06-09 RX ADMIN — ROCURONIUM BROMIDE 50 MG: 50 INJECTION, SOLUTION INTRAVENOUS at 07:41

## 2025-06-09 RX ADMIN — FENTANYL CITRATE 50 MCG: 50 INJECTION INTRAMUSCULAR; INTRAVENOUS at 11:27

## 2025-06-09 RX ADMIN — METRONIDAZOLE 500 MG: 500 INJECTION, SOLUTION INTRAVENOUS at 06:37

## 2025-06-09 RX ADMIN — PROPOFOL 50 MCG/KG/MIN: 10 INJECTION, EMULSION INTRAVENOUS at 07:46

## 2025-06-09 RX ADMIN — Medication 250 MG: at 09:25

## 2025-06-09 RX ADMIN — LABETALOL HYDROCHLORIDE 5 MG: 5 INJECTION INTRAVENOUS at 10:52

## 2025-06-09 RX ADMIN — PHENYLEPHRINE HYDROCHLORIDE 100 MCG: 10 INJECTION INTRAVENOUS at 09:09

## 2025-06-09 RX ADMIN — PHENYLEPHRINE HYDROCHLORIDE 100 MCG: 10 INJECTION INTRAVENOUS at 07:53

## 2025-06-09 RX ADMIN — HYDROMORPHONE HYDROCHLORIDE 0.5 MG: 1 INJECTION, SOLUTION INTRAMUSCULAR; INTRAVENOUS; SUBCUTANEOUS at 08:22

## 2025-06-09 RX ADMIN — ACETAMINOPHEN 975 MG: 325 TABLET, FILM COATED ORAL at 11:36

## 2025-06-09 RX ADMIN — CEFAZOLIN 2 G: 500 INJECTION, POWDER, FOR SOLUTION INTRAMUSCULAR; INTRAVENOUS at 07:39

## 2025-06-09 RX ADMIN — SODIUM CHLORIDE, SODIUM LACTATE, POTASSIUM CHLORIDE, AND CALCIUM CHLORIDE: .6; .31; .03; .02 INJECTION, SOLUTION INTRAVENOUS at 06:36

## 2025-06-09 RX ADMIN — FENTANYL CITRATE 50 MCG: 50 INJECTION INTRAMUSCULAR; INTRAVENOUS at 07:41

## 2025-06-09 RX ADMIN — HYDRALAZINE HYDROCHLORIDE 10 MG: 20 INJECTION INTRAMUSCULAR; INTRAVENOUS at 11:26

## 2025-06-09 RX ADMIN — ONDANSETRON 4 MG: 2 INJECTION INTRAMUSCULAR; INTRAVENOUS at 07:47

## 2025-06-09 ASSESSMENT — ACTIVITIES OF DAILY LIVING (ADL)
ADLS_ACUITY_SCORE: 42

## 2025-06-09 NOTE — OR NURSING
Paged VIKASH Alvarez regarding patient hypertensive in PACU. BP's 217/109. VIKASH ordered labetalol 5 mg if BP's doesn't drop repeat the dose.

## 2025-06-09 NOTE — OP NOTE
SURGICAL OPERATIVE REPORT    Date of surgery:  06/08/25    Surgeon:  Ruth Paulino MD     Assistants:    - Mireya Flores MD, MPH, MS (PGY-3)    Pre-operative diagnosis:    - ASC-H, Non-16/18 hrHPV positive   - Cervical stenosis     Post-perative diagnosis:  same as above s/p procedure below     Anesthesia:  GETA    Procedure:  Total laparoscopic hysterectomy, bilateral salpingectomy, cystoscopy, staging     Specimen:  ***  Condition:  ***  Drains:  ***    EBL:  *** mL   IVF:  *** mL crystalloid  UOP:  *** mL clear yellow      Findings:  ***    Indication:  Fredy Parker is a 65 year old female referred to Gynecology Oncology for persistent hrHPV positive findings and new ASC-H cytology. Unable to complete a endocervical sampling in clinic due to significant cervical stenosis. MRI findings were notable for focal endometrial thickening extending to the upper cervical level. Low suspicion for cervical or endometrial cancer, however given her persistent findings and inability for adequate sampling, recommendations for definitive management with a hysterectomy, bilateral salpingectomy, and possible pelvic and paraaortic sampling. Risks, benefits, and alternatives were discussed with the patient. Consents were signed.       Procedure:  The patient was taken to the OR where general endotracheal anesthesia was administered without complication. She was placed in a dorsal lithotomy position using yellow fin stirrups. Exam under anesthesia revealed the above listed findings. She was then prepped and draped in usual sterile fashion. After time out was completed, a covington catheter was placed in the bladder and a Dedira ring with Nora uterine manipulator was sewn into place and used for uterine manipulation. Attention was then turned towards the abdomen. The umbilical plate was grasped with an Allis clamp and elevated and bilateral periumbilical folds were grasped with perforating towel clamps. The umbilicus was then incised with a  scalpel and a Veress needle was inserted into the abdomen. Intraabdominal placement was verified with a saline drop test. The abdomen was insufflated to a pressure of 13 mmHg. Opening pressure was 6 mmHg. A 5 mm trocar was then inserted into the umbilicus and a 5 mm laparoscope was introduced. Laparoscopic findings were as noted above. The patient was placed in Trendelenburg position. Two additional ports were placed in the left and right lower quadrants under direct visualization, 10 and 5 mm respectively. Pelvic washings were obtained and sent to cytology. The bowel was swept out of the pelvis. The right ureter was identified and the right round ligament was grasped and pulled medially. The round ligament was then transected using a Monopolar scissors and the posterior leaf of the broad ligament was opened up lateral to the IP ligament. A defect was then created in the posterior peritoneum medial to the IP ligament and the IP was grasped, desiccated, and transected using a Ligasure PK device. The posterior broad ligament was then followed medially to the level of the uterus, coming across the uterine vessels. This procedure was then repeated on the contralateral side. Next, the anterior broad ligament was taken down bilaterally and medially to create a bladder flap. The Koh cup was identified. Uterine arteries were dissected off of the cervix by grasping just lateral to the cervix, desiccating, and cutting until the uterine arteries were dissected off past the level of the cup. Colpotomy was then performed with Monopolar scissors. The uterus was removed from the vagina and sent to pathology for frozen section. A vaginal occluder balloon was placed in the vagina for pneumoperitoneum. The vaginal cuff was closed with figure of X suture using an EndoStitch suture device.     The patient was taken out of trendelenburg position. Cystoscopy was performed with the above listed findings. The 10 mm port was closed with a  Franck Meza closure device. Skin was then sutured with 4-0 monocryl suture and steri strips and sterile dressings were applied. The patient tolerated the procedure well. She was awoken from anesthesia and extubated without difficulties and brought to the PACU in stable condition.      Dr. Paulino was scrubbed and present for the entire duration of the procedure.      Mireya Flores MD, MPH, MS   [facility]  Gynecology Oncology Resident, PGY-3  06/08/2025 8:55 PM    To reach the GYNECOLOGY ONCOLOGY team for this patient, please page [phone]

## 2025-06-09 NOTE — ANESTHESIA CARE TRANSFER NOTE
Patient: Fredy Parker    Procedure: Procedure(s):  Exam under anesthesia pelvic  total laparoscopic hysterectomy, bilateral salpingo-oophorectomY       Diagnosis: High risk human papilloma virus (HPV) infection of cervix [N72, B97.7]  Diagnosis Additional Information: No value filed.    Anesthesia Type:   General     Note:    Oropharynx: oropharynx clear of all foreign objects  Level of Consciousness: awake  Oxygen Supplementation: face mask  Level of Supplemental Oxygen (L/min / FiO2): 8  Independent Airway: airway patency satisfactory and stable  Dentition: dentition unchanged  Vital Signs Stable: post-procedure vital signs reviewed and stable  Report to RN Given: handoff report given  Patient transferred to: PACU    Handoff Report: Identifed the Patient, Identified the Reponsible Provider, Reviewed the pertinent medical history, Discussed the surgical course, Reviewed Intra-OP anesthesia mangement and issues during anesthesia, Set expectations for post-procedure period and Allowed opportunity for questions and acknowledgement of understanding      Vitals:  Vitals Value Taken Time   /62    Temp 36  C (96.8  F) 06/09/25 10:00   Pulse 64 06/09/25 10:00   Resp 0 06/09/25 10:00   SpO2 100 % 06/09/25 10:00   Vitals shown include unfiled device data.    Electronically Signed By: MARIA DEL CARMEN [place] CRNA  June 9, 2025  10:01 AM

## 2025-06-09 NOTE — TELEPHONE ENCOUNTER
Called Fredy's daughter Holly per Fredy's request. Reviewed the intraoperative findings and procedure performed. Plan for discharge to home today when meeting PACU criteria.       Ruth Paulino MD, MS, FACOG, FACS  6/9/2025  9:50 AM     Price (Do Not Change): 0.00 Instructions: This plan will send the code FBSE to the PM system.  DO NOT or CHANGE the price. Detail Level: Simple

## 2025-06-09 NOTE — BRIEF OP NOTE
"[facility]    Brief Operative Note    Pre-operative diagnosis: High risk human papilloma virus (HPV) infection of cervix [N72, B97.7]  Post-operative diagnosis {Postop DX:967159}    Procedure: Exam under anesthesia pelvic, N/A - Vagina  total laparoscopic hysterectomy, bilateral salpingo-oophorectomY, Bilateral - Abdomen  ; possible pelvic, para-aortic lymphadenectomy., N/A - Abdomen    Surgeon: Surgeons and Role:     * Ruth Paulino MD - Primary  Anesthesia: General   Estimated Blood Loss: {:[id number]}    Drains: {Drains:281523::\"None\"}  Specimens: * No specimens in log *  Findings:   ***.  - Exam under anesthesia revealed ***verted uterus, *** size, mobile with no adnexal masses.  - 1. EUA revealed ***verted uterus, *** adnexal fullness.   2. Initial laparoscopic survey revealed no damage on entry, normal appearing bowel, liver and diaphragm.   3. Small, mobile uterus without abnormality.   4. Normal *** fallopian tube and ovary. Normal *** fallopian tube. Approximately *** simple cyst on *** ovary abutting normal appearing ovarian tissue.  5. Bilateral ureters were noted to vermiculate, they were seen ***in the retroperitoneal space in the pelvis.    6. Cyst with clear liquid contents.   7. Hemostasis at the end of the case.    Complications: {Laceration/Puncture/Hemorrhage which is intentional or necessitated by the patient's condition/anatomy are not complications and should be documented in the findings or comments section or full op note:[id number]::\"None\"}.  Implants: * No surgical log found *          "

## 2025-06-09 NOTE — ANESTHESIA PREPROCEDURE EVALUATION
Anesthesia Pre-Procedure Evaluation    Patient: Fredy Parker   MRN: [id number] : 1960          Procedure : Procedure(s):  Exam under anesthesia pelvic  total laparoscopic hysterectomy, bilateral salpingo-oophorectomY  ; possible pelvic, para-aortic lymphadenectomy.         Past Medical History:   Diagnosis Date    Chronic gastric ulcer without mention of hemorrhage, perforation, without mention of obstruction 1994    gastric erosion dx'ed by endoscopy. pt was also treated for pos H.pylori    Disc disorder of cervical region     mild disc changes C3-4/C4-5 and C5-6 with chronic myofascial cervicoscapular pain    Elevated transaminase level     AST/ALT 2-3 x normal; fatty infiltration liver on ultrasound; negative testing for infectious hepatitis; normal iron levels, ELP, CK    HTN (hypertension)     Latent tuberculosis 10/2009    started INH     Major depressive disorder, single episode, moderate (H)     Mixed hyperlipidemia     elevated TG and LDL    Osteopenia     mild, femoral neck    Recurrent genital herpes 2010    Status post laminectomy 2007: L5-S1 diskectomy 2012:Revision diskectomy left at L5-S1      Past Surgical History:   Procedure Laterality Date    COLONOSCOPY N/A 07/15/2019    Procedure: COLONOSCOPY, WITH POLYPECTOMY AND BIOPSY;  Surgeon: Danyell Peters MD;  Location:  GI    CONIZATION LEEP  2011    Completed for recurrent Mild Dysplasia and chronic endocervicitis    DISKECTOMY, LUMBAR, SINGLE SP  2007    L5-S1 diskectomy    HC INJ TRANSFORAMIN EPIDURAL, CERV/THOR SINGLE  2006    C7-T1 epidural steroid    INJECTION, ANESTHETIC/STEROID, TRANSFORAMINAL EPIDURAL; LUMBAR/SACRAL, SINGLE LEVEL  ,2007    left L5 selective nerve root injection under fluoroscopy with corticosteroid.     INJECTION, ANESTHETIC/STEROID, TRANSFORAMINAL EPIDURAL; LUMBAR/SACRAL, SINGLE LEVEL  10/2007    right L5-S1 translaminar epidural injection    LAMINECTOMY  LUMBAR ONE LEVEL  03/06/2012    Procedure:LAMINECTOMY LUMBAR ONE LEVEL; REVISION DISCECTOMY L5-S1 ON THE LEFT ; Surgeon:ROSE PRITCHETT; Location: OR    LAPAROSCOPIC CHOLECYSTECTOMY  07/1999    MR LUMBAR SPINE W/O CONTRAST  01/2012    Moderate-sized left posterolateral caudally extruded L5-S1disc herniation impinging on left S1 nerve, mild degenerative disc changes at L4-5    RELEASE TRIGGER FINGER  05/17/2012    Procedure:RELEASE TRIGGER FINGER; right ring finger trigger release  (latex allergy:contact); Surgeon:QUYEN FUNK; Location: [facility]    SONO ABDOMEN LIMITED  09/2012    Increased echotexture liver c/w fatty infiltration    TUBAL LIGATION  2000      Allergies   Allergen Reactions    Aspirin Rash    Latex     Nsaids GI Disturbance    Sulfasalazine Rash      Social History     Tobacco Use    Smoking status: Never     Passive exposure: Never    Smokeless tobacco: Never   Substance Use Topics    Alcohol use: No     Alcohol/week: 0.0 standard drinks of alcohol      Wt Readings from Last 1 Encounters:   06/09/25 60.8 kg (134 lb)        Anesthesia Evaluation            ROS/MED HX  ENT/Pulmonary:     (+)           allergic rhinitis,                             Neurologic:       Cardiovascular:     (+) Dyslipidemia hypertension- -   -  - -                                      METS/Exercise Tolerance:     Hematologic:       Musculoskeletal: Comment: Inflammatory polyarthropathy      GI/Hepatic:     (+) GERD,                   Renal/Genitourinary:       Endo:     (+)  type II DM,                    Psychiatric/Substance Use:     (+) psychiatric history depression       Infectious Disease: Comment: Latent TB      Malignancy:   (+) Malignancy, History of Other.Other CA cervical status post.    Other:              Physical Exam  Airway  Mallampati: II  TM distance: >3 FB  Neck ROM: full  Mouth opening: >= 4 cm    Cardiovascular - normal exam   Dental   (+) Modest Abnormalities - crowns, retainers, 1 or 2 missing  "teeth      Pulmonary - normal exam      Neurological - normal exam  She appears awake, alert and oriented x3.    Other Findings       OUTSIDE LABS:  CBC:   Lab Results   Component Value Date    WBC 6.7 05/15/2025    WBC 5.3 02/05/2025    HGB 13.6 05/15/2025    HGB 14.1 02/05/2025    HCT 40.0 05/15/2025    HCT 42.3 02/05/2025     05/15/2025     02/05/2025     BMP:   Lab Results   Component Value Date     05/15/2025     05/16/2024    POTASSIUM 4.3 05/15/2025    POTASSIUM 4.3 05/16/2024    CHLORIDE 105 05/15/2025    CHLORIDE 108 (H) 05/16/2024    CO2 23 05/15/2025    CO2 22 05/16/2024    BUN 17.5 05/15/2025    BUN 25.6 (H) 05/16/2024    CR 0.72 05/15/2025    CR 0.60 05/16/2024    GLC 94 05/15/2025     (H) 05/16/2024     COAGS: No results found for: \"PTT\", \"INR\", \"FIBR\"  POC: No results found for: \"BGM\", \"HCG\", \"HCGS\"  HEPATIC:   Lab Results   Component Value Date    ALBUMIN 4.5 05/16/2024    PROTTOTAL 6.9 05/16/2024    ALT 41 05/16/2024    AST 35 05/16/2024    ALKPHOS 117 05/16/2024    BILITOTAL 0.3 05/16/2024     OTHER:   Lab Results   Component Value Date    A1C 6.3 (H) 02/05/2025    RAYMOND 9.4 05/15/2025    LIPASE 207 05/02/2005    AMYLASE 74 06/20/2005    TSH 2.76 10/26/2017    SED 11 12/02/2015       Anesthesia Plan    ASA Status:  3      NPO Status: NPO Appropriate   Anesthesia Type: General.  Airway: oral.  Induction: intravenous.  Maintenance: Balanced.   Techniques and Equipment:       - Monitoring Plan: standard ASA monitoring, train of four monitoring     Consents    Anesthesia Plan(s) and associated risks, benefits, and realistic alternatives discussed. Questions answered and patient/representative(s) expressed understanding.     - Discussed: anesthesiologist, CRNA     - Discussed with:  Patient               Postoperative Care    Pain management: non-narcotic analgesics, plan for postoperative opioid use, multimodal analgesia.     Comments:                   Jacques Alvarez, " "MD    I have reviewed the pertinent notes and labs in the chart from the past 30 days and (re)examined the patient.  Any updates or changes from those notes are reflected in this note.    Clinically Significant Risk Factors Present on Admission                   # Hypertension: Noted on problem list           # Overweight: Estimated body mass index is 28.01 kg/m  as calculated from the following:    Height as of this encounter: 1.473 m (4' 10\").    Weight as of this encounter: 60.8 kg (134 lb).                    "

## 2025-06-09 NOTE — OR NURSING
Dr. Paulino contacted.  Patient continues with urinary retention.  Order placed for covington catheter to go home with.  Patient will return to the office in 1 week

## 2025-06-09 NOTE — OR NURSING
Patient unable to void, bladder scanned for 500 ml. Resident notified. Orders to straight cath for 200 ml and give more time for the patient to void.

## 2025-06-09 NOTE — OP NOTE
[facility] Operative Note    Pre-operative diagnosis: 1) High risk human papilloma virus (HPV) infection of cervix [N72, B97.7]; 2) Endometrial thickening on MRI, inability to sample the endometrium due to cervical stenosis.    Post-operative diagnosis 1) High risk human papilloma virus (HPV) infection of cervix [N72, B97.7]; 2) Endometrial thickening on MRI, inability to sample the endometrium due to cervical stenosis, normal endometrium on gross pathology examination.      Procedure: Procedure(s):  Pelvic exam under anesthesia,  total laparoscopic hysterectomy, bilateral salpingo-oophorectomy, pelvic washings.      Surgeon: Ruth Paulino MD   Assistants(s): Aubree Flores MD     Estimated blood loss: 15 mL      Specimens: 1) Pelvic washings for cytology; 2) Uterus, cervix, bilateral ovaries & fallopian tubes.     Findings: On pelvic exam under anesthesia, the cervix was so flush with the vagina it could not be palpated. On laparoscopic examination, there was an adhesions from the bowel epiploica to the anterior abdominal wall which was taken down for visualization of the pelvis. Uterus, bilateral ovaries & fallopian tubes were grossly normal. On gross pathology examination of the endometrium/cervix there was no thickening/mass. Remainder of pelvic/abdominal survey was normal.        Procedure:   The patient was taken to the operating room where general endotracheal anesthesia was administered and found to be adequate. She was placed in the dorsal lithotomy position, and a pelvic exam under anesthesia was performed with the findings as noted above. She was then prepped and draped in the normal sterile fashion. A covington catheter was placed in the bladder. An EEA sizer wrapped in a sponge and a blue glove was placed in the vagina for uterine manipulation.     A 12 mm balloon trocar was placed above the umbilicus using the open technique. The laparoscope was placed to confirm correct placement of the trocar, and CO2  gas was insufflated to a pressure of 15 mmHg. Bilateral lower quadrant 5 mm ports and a suprapubic 11 mm port were placed under direct visualization with the laparoscope. The epiploica adhesion was taken off the anterior abdominal wall using the LigaSure device. The patient was then placed into steep trendelenberg position, and the bowel was displaced into the upper abdomen. Pelvic washings were obtained and sent for cytology.    The bilateral round ligaments were then cauterized and divided. The retroperitoneal space was opened bilaterally for adequate visualization of the bilateral infundibulopelvic ligaments and ureters. The bilateral infundibulopelvic ligaments were skeletonized, cauterized and divided, with care taken to preserve the underlying ureters. The peritoneal incision was then extended toward the uterus, and a bladder flap was created, taking the bladder flap down off the lower uterine segment and cervix. The bilateral uterine arteries were then skeletonized, cauterized and divided down to the level of the cervicovaginal junction. A colpotomy incision was then made circumferentially using electrocautery to amputate the cervix from the vagina, using the EEA sizer as a guide to identify the cervicovaginal junction. The entire specimen was then delivered through the vagina. A pneumoballoon was placed in the vagina to maintain pneumoperitoneum.    The vaginal cuff was closed with a running continuous stitch of 0-polysorb suture using an Endostitch device, with care taken to include the cuff angles as well as the vaginal mucosa. The pelvis was then irrigated, and the vaginal cuff, all vascular pedicles and dissection sites were observed to be hemostatic. Hemostasis was maintained when the intraperitoneal pressure was decreased. The bilateral ureters were visualized retroperitoneally, and found to be vermiculating normally, and without evidence of injury. 20 mL of 0.25% bupivacaine was instilled in the  intraperitoneally cavity.     The fascia at the suprapubic and supraumbilical port sites were closed with a figure-of-eight stitch of 0-vicryl using the Franck Ben device. The remaining ports were then removed. The subcutaneous tissue at the 4 port sites was irrigated. The subcutaneous tissue at all 4 port sites was closed with interrupted stitches of 3-0 vicryl. The skin at all 4 port sites was closed with exofin glue. Local anesthetic was administered pre-peritoneally at all 4 port sites.    The patient tolerated the procedure well. Sponge, instrument and needle count was correct at the end of the procedure. The patient was extubated in the operating room and taken in stable condition to the PACU.        Ruth Paulino MD, MS, FACOG, FACS  6/9/2025  9:40 AM

## 2025-06-09 NOTE — ANESTHESIA POSTPROCEDURE EVALUATION
Patient: Fredy Parker    Procedure: Procedure(s):  Exam under anesthesia pelvic  total laparoscopic hysterectomy, bilateral salpingo-oophorectomY       Anesthesia Type:  General    Note:  Disposition: Outpatient   Postop Pain Control: Uneventful            Sign Out: Well controlled pain   PONV: No   Neuro/Psych: Uneventful            Sign Out: Acceptable/Baseline neuro status   Airway/Respiratory: Uneventful            Sign Out: Acceptable/Baseline resp. status   CV/Hemodynamics: Uneventful            Sign Out: Acceptable CV status   Other NRE: NONE   DID A NON-ROUTINE EVENT OCCUR?            Last vitals:  Vitals Value Taken Time   /81 06/09/25 11:41   Temp 36.4  C (97.6  F) 06/09/25 11:30   Pulse 82 06/09/25 11:42   Resp 9 06/09/25 11:42   SpO2 97 % 06/09/25 11:42   Vitals shown include unfiled device data.    Electronically Signed By: Jacques Alvarez MD  June 9, 2025  12:32 PM

## 2025-06-09 NOTE — DISCHARGE INSTRUCTIONS
Same Day Surgery Discharge Instructions for Sedation and General Anesthesia     It's not unusual to feel dizzy, light-headed or faint for up to 24 hours after surgery or while taking pain medication.  If you have these symptoms: sit for a few minutes before standing and have someone assist you when you get up to walk or use the bathroom.    You should rest and relax for the next 24 hours. We recommend you make arrangements to have an adult stay with you for at least 24 hours after your discharge.  Avoid hazardous and strenuous activity.    DO NOT DRIVE any vehicle or operate mechanical equipment for 24 hours following the end of your surgery.  Even though you may feel normal, your reactions may be affected by the medication you have received.    Do not drink alcoholic beverages for 24 hours following surgery.     Slowly progress to your regular diet as you feel able. It's not unusual to feel nauseated and/or vomit after receiving anesthesia.  If you develop these symptoms, drink clear liquids (apple juice, ginger ale, broth, 7-up, etc. ) until you feel better.  If your nausea and vomiting persists for 24 hours, please notify your surgeon.      All narcotic pain medications, along with inactivity and anesthesia, can cause constipation. Drinking plenty of liquids and increasing fiber intake will help.    For any questions of a medical nature, call your surgeon.    Do not make important decisions for 24 hours.    If you had general anesthesia, you may have a sore throat for a couple of days related to the breathing tube used during surgery.  You may use Cepacol lozenges to help with this discomfort.  If it worsens or if you develop a fever, contact your surgeon.     If you feel your pain is not well managed with the pain medications prescribed by your surgeon, please contact your surgeon's office to let them know so they can address your concerns.     Today you were given 975 mg of Tylenol at 11:38 AM .  The recommended daily maximum dose is 4000 mg.      **If you have questions or concerns about your procedure, call   Dr. Paulino at [phone].**

## 2025-06-09 NOTE — ANESTHESIA PROCEDURE NOTES
Airway         Procedure Start/Stop Times: 6/9/2025 7:45 AM  Staff -        Performed By: CRNAIndications and Patient Condition       Indications for airway management: marc-procedural and airway protection       Induction type:intravenous       Mask difficulty assessment: 2 - vent by mask + OA or adjuvant +/- NMBA    Final Airway Details       Final airway type: endotracheal airway       Successful airway: ETT - single  Endotracheal Airway Details        ETT size (mm): 7.0       Cuffed: yes       Successful intubation technique: direct laryngoscopy       DL Blade Type: Jackson 2       Grade View of Cords: 1       Adjucts: stylet       Position: Right       Measured from: lips       Secured at (cm): 21       Bite block used: None    Post intubation assessment        Placement verified by: capnometry, equal breath sounds and chest rise        Number of attempts at approach: 1       Secured with: tape       Ease of procedure: easy       Dentition: Intact and Unchanged    Medication(s) Administered   Medication Administration Time: 6/9/2025 7:45 AM

## 2025-06-10 LAB
PATH REPORT.COMMENTS IMP SPEC: NORMAL
PATH REPORT.FINAL DX SPEC: NORMAL
PATH REPORT.FINAL DX SPEC: NORMAL
PATH REPORT.GROSS SPEC: NORMAL
PATH REPORT.GROSS SPEC: NORMAL
PATH REPORT.INTRAOP OBS SPEC DOC: NORMAL
PATH REPORT.MICROSCOPIC SPEC OTHER STN: NORMAL
PATH REPORT.MICROSCOPIC SPEC OTHER STN: NORMAL
PATH REPORT.RELEVANT HX SPEC: NORMAL
PATH REPORT.RELEVANT HX SPEC: NORMAL
PHOTO IMAGE: NORMAL

## 2025-06-10 PROCEDURE — 88307 TISSUE EXAM BY PATHOLOGIST: CPT | Mod: 26 | Performed by: STUDENT IN AN ORGANIZED HEALTH CARE EDUCATION/TRAINING PROGRAM

## 2025-06-10 PROCEDURE — 88305 TISSUE EXAM BY PATHOLOGIST: CPT | Mod: 26 | Performed by: PATHOLOGY

## 2025-06-10 PROCEDURE — 88329 PATH CONSLTJ DRG SURG: CPT | Performed by: PATHOLOGY

## 2025-06-13 ENCOUNTER — RESULTS FOLLOW-UP (OUTPATIENT)
Dept: ONCOLOGY | Facility: CLINIC | Age: 65
End: 2025-06-13

## 2025-06-13 DIAGNOSIS — N39.0 URINARY TRACT INFECTION ASSOCIATED WITH INDWELLING URETHRAL CATHETER, INITIAL ENCOUNTER: Primary | ICD-10-CM

## 2025-06-13 DIAGNOSIS — T83.511A URINARY TRACT INFECTION ASSOCIATED WITH INDWELLING URETHRAL CATHETER, INITIAL ENCOUNTER: Primary | ICD-10-CM

## 2025-06-13 PROCEDURE — 87086 URINE CULTURE/COLONY COUNT: CPT | Performed by: OBSTETRICS & GYNECOLOGY

## 2025-06-13 PROCEDURE — 81001 URINALYSIS AUTO W/SCOPE: CPT | Performed by: OBSTETRICS & GYNECOLOGY

## 2025-06-13 RX ORDER — NITROFURANTOIN 25; 75 MG/1; MG/1
100 CAPSULE ORAL 2 TIMES DAILY
Qty: 10 CAPSULE | Refills: 0 | Status: SHIPPED | OUTPATIENT
Start: 2025-06-13 | End: 2025-06-18

## 2025-06-16 ENCOUNTER — TELEPHONE (OUTPATIENT)
Dept: ONCOLOGY | Facility: CLINIC | Age: 65
End: 2025-06-16
Payer: COMMERCIAL

## 2025-06-16 NOTE — CONFIDENTIAL NOTE
Spoke to Fredy told her Dr. Paulino has sent in prescription for Macrobid to her pharmacy [facility] in [place] since she had catheter placed for about a week.  Not clear cut infection but hoping this will help with some of her symptoms.    Thanks    Susanne Shrestha RN Care Coordinator  [facility]  Ph.[phone] Fax. [phone]

## 2025-06-19 ENCOUNTER — TELEPHONE (OUTPATIENT)
Dept: ONCOLOGY | Facility: CLINIC | Age: 65
End: 2025-06-19
Payer: COMMERCIAL

## 2025-06-19 NOTE — TELEPHONE ENCOUNTER
Patient is calling to report she is still having dysuria after finishing course of Macrobid.     No new or worsening sx; sx have not resolved. Denies fever, chills, hematuria, severe abdominal pain, bleeding, n/v/c/d. She is eating and drinking well. Has some mild soreness from hysterectomy on 6/9/25 that is controlled with tylenol. Incisions seem to be healing well.     No other sx noted. She would like another round of abx.    Routing to care team for further review and recommendations.     Debbie Schultz, RN, BSN, PHN, OCN  [facility]

## 2025-06-19 NOTE — TELEPHONE ENCOUNTER
Writer called patient to discuss recommendations below, left a message to call back.    Debbie Schultz RN, BSN, PHN  [facility]    Paola Anderson APRN CNP to Me  Ruth Pauilno MD Thersleff, Ashley, APRN CNP Luther, Elizabeth, RN  (Selected Message)        6/19/25 12:17 PM     It appears her UC was negative so I'm not surprised she did not have improvement with the antibiotics.  I suspect some urethral irritation from surgery.  She should push fluids.  OK to try OTC Azo (phenazopyridine) this may cause her urine to turn orange.  If symptoms do not improve then we should have her come to clinic for evaluation.     Paola

## 2025-06-19 NOTE — TELEPHONE ENCOUNTER
Writer called patient, left a message to call back. Attempt #2.    Debbie Schultz RN, BSN, PHN  [facility]

## 2025-06-20 NOTE — TELEPHONE ENCOUNTER
Attempted to reach pt again, but she did not answer.  Left  message to return call.  Keyona Sorto RN on 6/20/2025 at 11:47 AM

## 2025-06-23 NOTE — TELEPHONE ENCOUNTER
Called patient multiple times with no success. Mychart is not active. Will await a call back from patient.     Debbie Schultz RN, BSN, PHN, OCN  [facility]

## 2025-08-25 ENCOUNTER — NURSE TRIAGE (OUTPATIENT)
Dept: FAMILY MEDICINE | Facility: CLINIC | Age: 65
End: 2025-08-25
Payer: COMMERCIAL

## 2025-08-25 ENCOUNTER — PATIENT OUTREACH (OUTPATIENT)
Dept: CARE COORDINATION | Facility: CLINIC | Age: 65
End: 2025-08-25
Payer: COMMERCIAL

## 2025-08-27 ENCOUNTER — VIRTUAL VISIT (OUTPATIENT)
Dept: FAMILY MEDICINE | Facility: CLINIC | Age: 65
End: 2025-08-27
Payer: COMMERCIAL

## 2025-08-27 DIAGNOSIS — M06.4 INFLAMMATORY POLYARTHROPATHY (H): ICD-10-CM

## 2025-08-27 DIAGNOSIS — I10 BENIGN ESSENTIAL HYPERTENSION: ICD-10-CM

## 2025-08-27 DIAGNOSIS — T78.40XA ALLERGIC REACTION, INITIAL ENCOUNTER: Primary | ICD-10-CM

## 2025-08-27 DIAGNOSIS — T14.8XXA EXCORIATION: ICD-10-CM

## 2025-08-27 PROCEDURE — 98006 SYNCH AUDIO-VIDEO EST MOD 30: CPT | Performed by: FAMILY MEDICINE

## 2025-08-27 RX ORDER — CETIRIZINE HYDROCHLORIDE 10 MG/1
10 TABLET ORAL 2 TIMES DAILY
Qty: 20 TABLET | Refills: 0 | Status: SHIPPED | OUTPATIENT
Start: 2025-08-27

## 2025-08-27 RX ORDER — FAMOTIDINE 40 MG/1
40 TABLET, FILM COATED ORAL DAILY
Qty: 20 TABLET | Refills: 0 | Status: SHIPPED | OUTPATIENT
Start: 2025-08-27

## 2025-08-27 RX ORDER — PREDNISONE 10 MG/1
TABLET ORAL
Qty: 20 TABLET | Refills: 0 | Status: SHIPPED | OUTPATIENT
Start: 2025-08-27

## (undated) DEVICE — SUCTION MANIFOLD NEPTUNE 2 SYS 4 PORT 0702-020-000

## (undated) DEVICE — SYR 50ML LL W/O NDL 309653

## (undated) DEVICE — NDL SPINAL 22GA 3.5" QUINCKE 405181

## (undated) DEVICE — ENDO TROCAR FIRST ENTRY KII FIOS ADV FIX 12X100MM CFF73

## (undated) DEVICE — SU VICRYL+ 0 27 UR6 VLT VCP603H

## (undated) DEVICE — DRAPE LEGGINGS 8421

## (undated) DEVICE — TUBING SMOKE EVAC PNEUMOCLEAR HIGH FLOW 0620050250

## (undated) DEVICE — JELLY LUBRICATING SURGILUBE 2OZ TUBE

## (undated) DEVICE — SUCTION IRR STRYKERFLOW II W/TIP 250-070-520

## (undated) DEVICE — SU VICRYL 4-0 PS-2 18" UND J496H

## (undated) DEVICE — Device

## (undated) DEVICE — ESU GROUND PAD ADULT W/CORD E7507

## (undated) DEVICE — ENDO TROCAR FIRST ENTRY KII FIOS ADV FIX 11X100MM CFF33

## (undated) DEVICE — WIPES FOLEY CARE SURESTEP PROVON DFC100

## (undated) DEVICE — CATH TRAY FOLEY SURESTEP 16FR W/URINE MTR STATLK LF A303416A

## (undated) DEVICE — LINEN TOWEL PACK X5 5464

## (undated) DEVICE — ENDO TROCAR SLEEVE KII ADV FIXATION 05X100MM CFS02

## (undated) DEVICE — SU VICRYL 3-0 SH 27" J316H

## (undated) DEVICE — DEVICE ENDO STITCH APPLIER 10MM 173016

## (undated) DEVICE — SU ENDO STITCH POLYSORB 0 48" 170052

## (undated) DEVICE — ANTIFOG SOLUTION SEE SHARP 150M TROCAR SWABS 30978 (COI)

## (undated) DEVICE — TRAP SPECIMEN MUCUS 80CC DYND44180

## (undated) DEVICE — KIT POSITIONER TRENDELENBURG NUBLUE TP3000-NB

## (undated) DEVICE — ENDO TROCAR FIRST ENTRY KII FIOS ADV FIX 05X100MM CFF03

## (undated) DEVICE — ESU LIGASURE LAPAROSCOPIC BLUNT TIP SEALER 5MMX37CM LF1837

## (undated) DEVICE — DRAPE LAVH/LAPAROSCOPY W/POUCH 29474

## (undated) DEVICE — SOL NACL 0.9% INJ 1000ML BAG 2B1324X

## (undated) DEVICE — ESU CORD MONOPOLAR 10'  E0510

## (undated) DEVICE — SOL WATER IRRIG 1000ML BOTTLE 2F7114

## (undated) DEVICE — KOH COLPOTOMIZER OCCLUDER  CPO-6

## (undated) RX ORDER — PROPOFOL 10 MG/ML
INJECTION, EMULSION INTRAVENOUS
Status: DISPENSED
Start: 2025-06-09

## (undated) RX ORDER — TRIAMCINOLONE ACETONIDE 40 MG/ML
INJECTION, SUSPENSION INTRA-ARTICULAR; INTRAMUSCULAR
Status: DISPENSED
Start: 2020-01-15

## (undated) RX ORDER — HEPARIN SODIUM 5000 [USP'U]/.5ML
INJECTION, SOLUTION INTRAVENOUS; SUBCUTANEOUS
Status: DISPENSED
Start: 2025-06-09

## (undated) RX ORDER — FENTANYL CITRATE 50 UG/ML
INJECTION, SOLUTION INTRAMUSCULAR; INTRAVENOUS
Status: DISPENSED
Start: 2019-07-15

## (undated) RX ORDER — FENTANYL CITRATE 0.05 MG/ML
INJECTION, SOLUTION INTRAMUSCULAR; INTRAVENOUS
Status: DISPENSED
Start: 2025-06-09

## (undated) RX ORDER — FENTANYL CITRATE 50 UG/ML
INJECTION, SOLUTION INTRAMUSCULAR; INTRAVENOUS
Status: DISPENSED
Start: 2025-06-09

## (undated) RX ORDER — LIDOCAINE HYDROCHLORIDE 10 MG/ML
INJECTION, SOLUTION EPIDURAL; INFILTRATION; INTRACAUDAL; PERINEURAL
Status: DISPENSED
Start: 2020-01-15

## (undated) RX ORDER — DEXAMETHASONE SODIUM PHOSPHATE 4 MG/ML
INJECTION, SOLUTION INTRA-ARTICULAR; INTRALESIONAL; INTRAMUSCULAR; INTRAVENOUS; SOFT TISSUE
Status: DISPENSED
Start: 2025-06-09

## (undated) RX ORDER — LABETALOL HYDROCHLORIDE 5 MG/ML
INJECTION, SOLUTION INTRAVENOUS
Status: DISPENSED
Start: 2025-06-09

## (undated) RX ORDER — HYDROMORPHONE HYDROCHLORIDE 1 MG/ML
INJECTION, SOLUTION INTRAMUSCULAR; INTRAVENOUS; SUBCUTANEOUS
Status: DISPENSED
Start: 2025-06-09

## (undated) RX ORDER — ONDANSETRON 2 MG/ML
INJECTION INTRAMUSCULAR; INTRAVENOUS
Status: DISPENSED
Start: 2025-06-09

## (undated) RX ORDER — ACETAMINOPHEN 325 MG/1
TABLET ORAL
Status: DISPENSED
Start: 2025-06-09

## (undated) RX ORDER — GLYCOPYRROLATE 0.2 MG/ML
INJECTION, SOLUTION INTRAMUSCULAR; INTRAVENOUS
Status: DISPENSED
Start: 2025-06-09

## (undated) RX ORDER — METRONIDAZOLE 500 MG/100ML
INJECTION, SOLUTION INTRAVENOUS
Status: DISPENSED
Start: 2025-06-09

## (undated) RX ORDER — CEFAZOLIN SODIUM/WATER 2 G/20 ML
SYRINGE (ML) INTRAVENOUS
Status: DISPENSED
Start: 2025-06-09

## (undated) RX ORDER — HYDRALAZINE HYDROCHLORIDE 20 MG/ML
INJECTION INTRAMUSCULAR; INTRAVENOUS
Status: DISPENSED
Start: 2025-06-09